# Patient Record
Sex: MALE | Race: WHITE | Employment: FULL TIME | URBAN - METROPOLITAN AREA
[De-identification: names, ages, dates, MRNs, and addresses within clinical notes are randomized per-mention and may not be internally consistent; named-entity substitution may affect disease eponyms.]

---

## 2021-12-01 ENCOUNTER — PATIENT OUTREACH (OUTPATIENT)
Dept: CASE MANAGEMENT | Age: 56
End: 2021-12-01

## 2021-12-01 ENCOUNTER — HOSPITAL ENCOUNTER (OUTPATIENT)
Dept: LAB | Age: 56
Discharge: HOME | End: 2021-12-01

## 2021-12-01 ENCOUNTER — DOCUMENTATION ONLY (OUTPATIENT)
Dept: HEMATOLOGY | Age: 56
End: 2021-12-01

## 2021-12-01 DIAGNOSIS — C83.38 DIFFUSE LARGE B-CELL LYMPHOMA OF LYMPH NODES OF MULTIPLE REGIONS (HCC): ICD-10-CM

## 2021-12-01 LAB
ALBUMIN SERPL-MCNC: 4 G/DL (ref 3.5–5)
ALBUMIN/GLOB SERPL: 1.3 {RATIO} (ref 1.2–3.5)
ALP SERPL-CCNC: 118 U/L (ref 50–136)
ALT SERPL-CCNC: 44 U/L (ref 12–65)
ANION GAP SERPL CALC-SCNC: 3 MMOL/L (ref 7–16)
AST SERPL-CCNC: 39 U/L (ref 15–37)
BASOPHILS # BLD: 0.1 K/UL (ref 0–0.2)
BASOPHILS NFR BLD: 1 % (ref 0–2)
BILIRUB SERPL-MCNC: 0.3 MG/DL (ref 0.2–1.1)
BUN SERPL-MCNC: 14 MG/DL (ref 6–23)
CALCIUM SERPL-MCNC: 9.4 MG/DL (ref 8.3–10.4)
CHLORIDE SERPL-SCNC: 107 MMOL/L (ref 98–107)
CO2 SERPL-SCNC: 32 MMOL/L (ref 21–32)
CREAT SERPL-MCNC: 0.7 MG/DL (ref 0.8–1.5)
DIFFERENTIAL METHOD BLD: ABNORMAL
EOSINOPHIL # BLD: 0.1 K/UL (ref 0–0.8)
EOSINOPHIL NFR BLD: 2 % (ref 0.5–7.8)
ERYTHROCYTE [DISTWIDTH] IN BLOOD BY AUTOMATED COUNT: 12.2 % (ref 11.9–14.6)
GLOBULIN SER CALC-MCNC: 3.2 G/DL (ref 2.3–3.5)
GLUCOSE SERPL-MCNC: 104 MG/DL (ref 65–100)
HAV IGM SER QL: NONREACTIVE
HBV CORE IGM SER QL: NONREACTIVE
HBV SURFACE AG SER QL: NONREACTIVE
HCT VFR BLD AUTO: 37.1 %
HCV AB SER QL: NONREACTIVE
HGB BLD-MCNC: 12.6 G/DL (ref 13.6–17.2)
HIV 1+2 AB+HIV1 P24 AG SERPL QL IA: NONREACTIVE
HIV12 RESULT COMMENT, HHIVC: ABNORMAL
IMM GRANULOCYTES # BLD AUTO: 0 K/UL (ref 0–0.5)
IMM GRANULOCYTES NFR BLD AUTO: 0 % (ref 0–5)
LDH SERPL L TO P-CCNC: 311 U/L (ref 100–190)
LYMPHOCYTES # BLD: 1 K/UL (ref 0.5–4.6)
LYMPHOCYTES NFR BLD: 20 % (ref 13–44)
MAGNESIUM SERPL-MCNC: 2.4 MG/DL (ref 1.8–2.4)
MCH RBC QN AUTO: 28.9 PG (ref 26.1–32.9)
MCHC RBC AUTO-ENTMCNC: 34 G/DL (ref 31.4–35)
MCV RBC AUTO: 85.1 FL (ref 79.6–97.8)
MONOCYTES # BLD: 0.5 K/UL (ref 0.1–1.3)
MONOCYTES NFR BLD: 10 % (ref 4–12)
NEUTS SEG # BLD: 3.3 K/UL (ref 1.7–8.2)
NEUTS SEG NFR BLD: 67 % (ref 43–78)
NRBC # BLD: 0 K/UL (ref 0–0.2)
PHOSPHATE SERPL-MCNC: 3.2 MG/DL (ref 2.5–4.5)
PLATELET # BLD AUTO: 248 K/UL (ref 150–450)
PMV BLD AUTO: 9.4 FL (ref 9.4–12.3)
POTASSIUM SERPL-SCNC: 4.2 MMOL/L (ref 3.5–5.1)
PROT SERPL-MCNC: 7.2 G/DL (ref 6.3–8.2)
RBC # BLD AUTO: 4.36 M/UL (ref 4.23–5.6)
SODIUM SERPL-SCNC: 142 MMOL/L (ref 136–145)
URATE SERPL-MCNC: 4 MG/DL (ref 2.6–6)
WBC # BLD AUTO: 4.9 K/UL (ref 4.3–11.1)

## 2021-12-01 PROCEDURE — 84550 ASSAY OF BLOOD/URIC ACID: CPT

## 2021-12-01 PROCEDURE — 85025 COMPLETE CBC W/AUTO DIFF WBC: CPT

## 2021-12-01 PROCEDURE — 83735 ASSAY OF MAGNESIUM: CPT

## 2021-12-01 PROCEDURE — 80053 COMPREHEN METABOLIC PANEL: CPT

## 2021-12-01 PROCEDURE — 84100 ASSAY OF PHOSPHORUS: CPT

## 2021-12-01 PROCEDURE — 87389 HIV-1 AG W/HIV-1&-2 AB AG IA: CPT

## 2021-12-01 PROCEDURE — 80074 ACUTE HEPATITIS PANEL: CPT

## 2021-12-01 PROCEDURE — 36415 COLL VENOUS BLD VENIPUNCTURE: CPT

## 2021-12-01 PROCEDURE — 82232 ASSAY OF BETA-2 PROTEIN: CPT

## 2021-12-01 PROCEDURE — 83615 LACTATE (LD) (LDH) ENZYME: CPT

## 2021-12-01 NOTE — PROGRESS NOTES
Met with Monik Austin to discuss insurance options for 2022. Patient was mailed different [id number] [employer] [place] insurance options to review. Patient will contact me if interested in enrolling into an insurance plan. Patient signed LPOA. Patient completed FAP application and it was turned in to registration.

## 2021-12-01 NOTE — PROGRESS NOTES
12/2 Pt came in for a new patient appt. We are going to get some blood work today. Dr. Stefany Tijerina would like to admit this pt next for a full work up with some scans then start some chemotherapy. After he has completed some chemo, we will then work on getting an ASCT done. Contacted financial to come and talk with pt regarding getting insurance so he can be covered for the transplant.

## 2021-12-03 ENCOUNTER — PATIENT OUTREACH (OUTPATIENT)
Dept: CASE MANAGEMENT | Age: 56
End: 2021-12-03

## 2021-12-03 LAB — B2 MICROGLOB SERPL-MCNC: 1.6 MG/L (ref 0.8–2.34)

## 2021-12-03 NOTE — PROGRESS NOTES
Tried calling pt 3 times to let him know when his direct admit to the hospital is scheduled for. 1st call he wasn't availiable, the last 2 calls pt hung up or forwarded it to his voicemail.

## 2021-12-06 ENCOUNTER — APPOINTMENT (OUTPATIENT)
Dept: CT IMAGING | Age: 56
DRG: 847 | End: 2021-12-06
Attending: NURSE PRACTITIONER

## 2021-12-06 ENCOUNTER — HOSPITAL ENCOUNTER (INPATIENT)
Age: 56
LOS: 4 days | Discharge: HOME | DRG: 847 | End: 2021-12-10
Attending: INTERNAL MEDICINE | Admitting: INTERNAL MEDICINE

## 2021-12-06 DIAGNOSIS — I34.0 NONRHEUMATIC MITRAL VALVE REGURGITATION: ICD-10-CM

## 2021-12-06 DIAGNOSIS — H49.02 3RD NERVE PALSY, PARTIAL, LEFT: ICD-10-CM

## 2021-12-06 DIAGNOSIS — Z51.11 ADMISSION FOR ANTINEOPLASTIC CHEMOTHERAPY: Primary | ICD-10-CM

## 2021-12-06 DIAGNOSIS — C83.31 DIFFUSE LARGE B-CELL LYMPHOMA OF LYMPH NODES OF NECK (HCC): ICD-10-CM

## 2021-12-06 DIAGNOSIS — C85.90 LYMPHOMA (HCC): ICD-10-CM

## 2021-12-06 DIAGNOSIS — I10 PRIMARY HYPERTENSION: ICD-10-CM

## 2021-12-06 LAB
ALBUMIN SERPL-MCNC: 4.3 G/DL (ref 3.5–5)
ALBUMIN/GLOB SERPL: 1.4 {RATIO} (ref 1.2–3.5)
ALP SERPL-CCNC: 120 U/L (ref 50–136)
ALT SERPL-CCNC: 33 U/L (ref 12–65)
ANION GAP SERPL CALC-SCNC: 3 MMOL/L (ref 7–16)
AST SERPL-CCNC: 28 U/L (ref 15–37)
BASOPHILS # BLD: 0.1 K/UL (ref 0–0.2)
BASOPHILS NFR BLD: 1 % (ref 0–2)
BILIRUB SERPL-MCNC: 0.4 MG/DL (ref 0.2–1.1)
BUN SERPL-MCNC: 14 MG/DL (ref 6–23)
CALCIUM SERPL-MCNC: 9.3 MG/DL (ref 8.3–10.4)
CHLORIDE SERPL-SCNC: 107 MMOL/L (ref 98–107)
CO2 SERPL-SCNC: 31 MMOL/L (ref 21–32)
CREAT SERPL-MCNC: 0.66 MG/DL (ref 0.8–1.5)
DIFFERENTIAL METHOD BLD: ABNORMAL
EOSINOPHIL # BLD: 0.1 K/UL (ref 0–0.8)
EOSINOPHIL NFR BLD: 2 % (ref 0.5–7.8)
ERYTHROCYTE [DISTWIDTH] IN BLOOD BY AUTOMATED COUNT: 12.1 % (ref 11.9–14.6)
EST. AVERAGE GLUCOSE BLD GHB EST-MCNC: 103 MG/DL
GLOBULIN SER CALC-MCNC: 3 G/DL (ref 2.3–3.5)
GLUCOSE SERPL-MCNC: 97 MG/DL (ref 65–100)
HBA1C MFR BLD: 5.2 % (ref 4.2–6.3)
HCT VFR BLD AUTO: 36.6 % (ref 41.1–50.3)
HGB BLD-MCNC: 12.4 G/DL (ref 13.6–17.2)
IMM GRANULOCYTES # BLD AUTO: 0 K/UL (ref 0–0.5)
IMM GRANULOCYTES NFR BLD AUTO: 0 % (ref 0–5)
LDH SERPL L TO P-CCNC: 327 U/L (ref 100–190)
LYMPHOCYTES # BLD: 1.2 K/UL (ref 0.5–4.6)
LYMPHOCYTES NFR BLD: 29 % (ref 13–44)
MAGNESIUM SERPL-MCNC: 2.4 MG/DL (ref 1.8–2.4)
MCH RBC QN AUTO: 28.6 PG (ref 26.1–32.9)
MCHC RBC AUTO-ENTMCNC: 33.9 G/DL (ref 31.4–35)
MCV RBC AUTO: 84.3 FL (ref 79.6–97.8)
MONOCYTES # BLD: 0.4 K/UL (ref 0.1–1.3)
MONOCYTES NFR BLD: 10 % (ref 4–12)
NEUTS SEG # BLD: 2.4 K/UL (ref 1.7–8.2)
NEUTS SEG NFR BLD: 58 % (ref 43–78)
NRBC # BLD: 0 K/UL (ref 0–0.2)
PLATELET # BLD AUTO: 247 K/UL (ref 150–450)
PMV BLD AUTO: 9.8 FL (ref 9.4–12.3)
POTASSIUM SERPL-SCNC: 3.8 MMOL/L (ref 3.5–5.1)
PROT SERPL-MCNC: 7.3 G/DL (ref 6.3–8.2)
RBC # BLD AUTO: 4.34 M/UL (ref 4.23–5.6)
SODIUM SERPL-SCNC: 141 MMOL/L (ref 136–145)
URATE SERPL-MCNC: 3.8 MG/DL (ref 2.6–6)
WBC # BLD AUTO: 4.1 K/UL (ref 4.3–11.1)

## 2021-12-06 PROCEDURE — 74011250636 HC RX REV CODE- 250/636: Performed by: NURSE PRACTITIONER

## 2021-12-06 PROCEDURE — 74011250637 HC RX REV CODE- 250/637: Performed by: INTERNAL MEDICINE

## 2021-12-06 PROCEDURE — 74011000258 HC RX REV CODE- 258: Performed by: INTERNAL MEDICINE

## 2021-12-06 PROCEDURE — 74011000636 HC RX REV CODE- 636: Performed by: INTERNAL MEDICINE

## 2021-12-06 PROCEDURE — 65270000029 HC RM PRIVATE

## 2021-12-06 PROCEDURE — APPSS60 APP SPLIT SHARED TIME 46-60 MINUTES: Performed by: NURSE PRACTITIONER

## 2021-12-06 PROCEDURE — 80053 COMPREHEN METABOLIC PANEL: CPT

## 2021-12-06 PROCEDURE — 83615 LACTATE (LD) (LDH) ENZYME: CPT

## 2021-12-06 PROCEDURE — 71260 CT THORAX DX C+: CPT

## 2021-12-06 PROCEDURE — 84550 ASSAY OF BLOOD/URIC ACID: CPT

## 2021-12-06 PROCEDURE — 83036 HEMOGLOBIN GLYCOSYLATED A1C: CPT

## 2021-12-06 PROCEDURE — 74011250637 HC RX REV CODE- 250/637: Performed by: NURSE PRACTITIONER

## 2021-12-06 PROCEDURE — 99223 1ST HOSP IP/OBS HIGH 75: CPT | Performed by: INTERNAL MEDICINE

## 2021-12-06 PROCEDURE — 83735 ASSAY OF MAGNESIUM: CPT

## 2021-12-06 PROCEDURE — 74011250636 HC RX REV CODE- 250/636: Performed by: INTERNAL MEDICINE

## 2021-12-06 PROCEDURE — 85025 COMPLETE CBC W/AUTO DIFF WBC: CPT

## 2021-12-06 PROCEDURE — 99221 1ST HOSP IP/OBS SF/LOW 40: CPT | Performed by: PSYCHIATRY & NEUROLOGY

## 2021-12-06 RX ORDER — HYDROCODONE BITARTRATE AND ACETAMINOPHEN 5; 325 MG/1; MG/1
1 TABLET ORAL
Status: DISCONTINUED | OUTPATIENT
Start: 2021-12-06 | End: 2021-12-10 | Stop reason: HOSPADM

## 2021-12-06 RX ORDER — ACYCLOVIR 800 MG/1
400 TABLET ORAL 2 TIMES DAILY
Status: DISCONTINUED | OUTPATIENT
Start: 2021-12-06 | End: 2021-12-10 | Stop reason: HOSPADM

## 2021-12-06 RX ORDER — HEPARIN 100 UNIT/ML
300 SYRINGE INTRAVENOUS AS NEEDED
Status: DISCONTINUED | OUTPATIENT
Start: 2021-12-06 | End: 2021-12-10 | Stop reason: HOSPADM

## 2021-12-06 RX ORDER — EPINEPHRINE 1 MG/ML
0.3 INJECTION, SOLUTION, CONCENTRATE INTRAVENOUS AS NEEDED
Status: CANCELLED | OUTPATIENT
Start: 2021-12-06

## 2021-12-06 RX ORDER — ACETAMINOPHEN 325 MG/1
650 TABLET ORAL AS NEEDED
Status: DISCONTINUED | OUTPATIENT
Start: 2021-12-06 | End: 2021-12-10 | Stop reason: HOSPADM

## 2021-12-06 RX ORDER — ONDANSETRON 2 MG/ML
4 INJECTION INTRAMUSCULAR; INTRAVENOUS
Status: DISCONTINUED | OUTPATIENT
Start: 2021-12-06 | End: 2021-12-10 | Stop reason: HOSPADM

## 2021-12-06 RX ORDER — DIPHENHYDRAMINE HYDROCHLORIDE 50 MG/ML
50 INJECTION, SOLUTION INTRAMUSCULAR; INTRAVENOUS AS NEEDED
Status: CANCELLED
Start: 2021-12-06

## 2021-12-06 RX ORDER — LISINOPRIL 5 MG/1
5 TABLET ORAL DAILY
Status: DISCONTINUED | OUTPATIENT
Start: 2021-12-06 | End: 2021-12-09

## 2021-12-06 RX ORDER — ONDANSETRON 2 MG/ML
8 INJECTION INTRAMUSCULAR; INTRAVENOUS AS NEEDED
Status: DISCONTINUED | OUTPATIENT
Start: 2021-12-06 | End: 2021-12-10 | Stop reason: HOSPADM

## 2021-12-06 RX ORDER — MORPHINE SULFATE 2 MG/ML
2 INJECTION, SOLUTION INTRAMUSCULAR; INTRAVENOUS
Status: DISCONTINUED | OUTPATIENT
Start: 2021-12-06 | End: 2021-12-10 | Stop reason: HOSPADM

## 2021-12-06 RX ORDER — ACETAMINOPHEN 325 MG/1
650 TABLET ORAL ONCE
Status: COMPLETED | OUTPATIENT
Start: 2021-12-06 | End: 2021-12-06

## 2021-12-06 RX ORDER — ALLOPURINOL 300 MG/1
300 TABLET ORAL DAILY
Status: DISCONTINUED | OUTPATIENT
Start: 2021-12-06 | End: 2021-12-10 | Stop reason: HOSPADM

## 2021-12-06 RX ORDER — SODIUM CHLORIDE 9 MG/ML
75 INJECTION, SOLUTION INTRAVENOUS CONTINUOUS
Status: DISCONTINUED | OUTPATIENT
Start: 2021-12-06 | End: 2021-12-10 | Stop reason: HOSPADM

## 2021-12-06 RX ORDER — HYDROCORTISONE SODIUM SUCCINATE 100 MG/2ML
100 INJECTION, POWDER, FOR SOLUTION INTRAMUSCULAR; INTRAVENOUS AS NEEDED
Status: CANCELLED | OUTPATIENT
Start: 2021-12-06

## 2021-12-06 RX ORDER — ALBUTEROL SULFATE 0.83 MG/ML
2.5 SOLUTION RESPIRATORY (INHALATION) AS NEEDED
Status: CANCELLED
Start: 2021-12-06

## 2021-12-06 RX ORDER — DIPHENHYDRAMINE HYDROCHLORIDE 50 MG/ML
50 INJECTION, SOLUTION INTRAMUSCULAR; INTRAVENOUS ONCE
Status: COMPLETED | OUTPATIENT
Start: 2021-12-06 | End: 2021-12-06

## 2021-12-06 RX ORDER — SODIUM CHLORIDE 0.9 % (FLUSH) 0.9 %
10 SYRINGE (ML) INJECTION
Status: COMPLETED | OUTPATIENT
Start: 2021-12-06 | End: 2021-12-06

## 2021-12-06 RX ORDER — ENOXAPARIN SODIUM 100 MG/ML
40 INJECTION SUBCUTANEOUS EVERY 24 HOURS
Status: DISCONTINUED | OUTPATIENT
Start: 2021-12-06 | End: 2021-12-10 | Stop reason: HOSPADM

## 2021-12-06 RX ORDER — FLUCONAZOLE 100 MG/1
200 TABLET ORAL DAILY
Status: DISCONTINUED | OUTPATIENT
Start: 2021-12-06 | End: 2021-12-10 | Stop reason: HOSPADM

## 2021-12-06 RX ORDER — DIPHENHYDRAMINE HYDROCHLORIDE 50 MG/ML
25 INJECTION, SOLUTION INTRAMUSCULAR; INTRAVENOUS AS NEEDED
Status: DISCONTINUED | OUTPATIENT
Start: 2021-12-06 | End: 2021-12-10 | Stop reason: HOSPADM

## 2021-12-06 RX ORDER — SODIUM CHLORIDE 0.9 % (FLUSH) 0.9 %
10 SYRINGE (ML) INJECTION AS NEEDED
Status: DISCONTINUED | OUTPATIENT
Start: 2021-12-06 | End: 2021-12-10 | Stop reason: HOSPADM

## 2021-12-06 RX ADMIN — ACYCLOVIR 400 MG: 800 TABLET ORAL at 15:38

## 2021-12-06 RX ADMIN — Medication 10 ML: at 21:25

## 2021-12-06 RX ADMIN — FLUCONAZOLE 200 MG: 100 TABLET ORAL at 15:38

## 2021-12-06 RX ADMIN — IOPAMIDOL 100 ML: 755 INJECTION, SOLUTION INTRAVENOUS at 18:32

## 2021-12-06 RX ADMIN — SODIUM CHLORIDE 75 ML/HR: 900 INJECTION, SOLUTION INTRAVENOUS at 13:02

## 2021-12-06 RX ADMIN — SODIUM CHLORIDE 100 ML: 9 INJECTION, SOLUTION INTRAVENOUS at 18:33

## 2021-12-06 RX ADMIN — ENOXAPARIN SODIUM 40 MG: 100 INJECTION SUBCUTANEOUS at 15:41

## 2021-12-06 RX ADMIN — DIPHENHYDRAMINE HYDROCHLORIDE 50 MG: 50 INJECTION, SOLUTION INTRAMUSCULAR; INTRAVENOUS at 20:10

## 2021-12-06 RX ADMIN — SODIUM CHLORIDE 784 MG: 900 INJECTION, SOLUTION INTRAVENOUS at 20:43

## 2021-12-06 RX ADMIN — ACETAMINOPHEN 650 MG: 325 TABLET, FILM COATED ORAL at 20:10

## 2021-12-06 RX ADMIN — Medication 10 ML: at 18:33

## 2021-12-06 RX ADMIN — ALLOPURINOL 300 MG: 300 TABLET ORAL at 15:38

## 2021-12-06 RX ADMIN — DIATRIZOATE MEGLUMINE AND DIATRIZOATE SODIUM 15 ML: 660; 100 LIQUID ORAL; RECTAL at 17:36

## 2021-12-06 NOTE — PROGRESS NOTES
12/06/21 1200   Dual Skin Pressure Injury Assessment   Dual Skin Pressure Injury Assessment WDL   Second Care Provider (Based on [address] [place] [address]) Cristobal Quintana RN    Skin Integumentary   Skin Integumentary (WDL) X    Pressure  Injury Documentation No Pressure Injury Noted-Pressure Ulcer Prevention Initiated   Skin Color Appropriate for ethnicity   Skin Condition/Temp Dry; Warm   Skin Integrity Intact; Scars (comment);  Tattoos (comment)   Turgor Non-tenting   Hair Growth Present

## 2021-12-06 NOTE — CONSULTS
Consult    Patient: Neva Mejia MRN: [id number]     YOB: 1965  Age: 64 y.o. Sex: male      Subjective:      Neva Mejia is a 64 y.o. male who is being seen for 3rd nerve palsy. The patient has a history of DLBCL and is followed by hematology/oncology. 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 His 3rd nerve palsy developed over a year ago. It has actually improved. He had an MRI of the brain which was unremarkable.   He has no other neurological symptoms aneurysm was ruled out with CT angiogram.    Past Medical History:   Diagnosis Date    Hypertension      Past Surgical History:   Procedure Laterality Date    HX BACK SURGERY      26 years ago    IR CHOLECYSTOSTOMY PERCUTANEOUS        Family History   Problem Relation Age of Onset    Diabetes Mother     Diabetes Father     Hypertension Brother      Social History     Tobacco Use    Smoking status: Never Smoker    Smokeless tobacco: Never Used   Substance Use Topics    Alcohol use: Not Currently      Current Facility-Administered Medications   Medication Dose Route Frequency Provider Last Rate Last Admin    lisinopriL (PRINIVIL, ZESTRIL) tablet 5 mg  5 mg Oral DAILY Monico Nageotte, NP        0.9% sodium chloride infusion  75 mL/hr IntraVENous CONTINUOUS Wyatt Mcdermotte NP 75 mL/hr at 12/06/21 1302 75 mL/hr at 12/06/21 1302    enoxaparin (LOVENOX) injection 40 mg  40 mg SubCUTAneous Q24H Monico Nageotte, NP        ondansetron [facility]) injection 4 mg  4 mg IntraVENous Q4H PRN Monico Nageotte, NP        prochlorperazine (COMPAZINE) with saline injection 5 mg  5 mg IntraVENous Q6H PRN Monico Nageotte, NP        HYDROcodone-acetaminophen (NORCO) 5-325 mg per tablet 1 Tablet  1 Tablet Oral Q6H PRN Wyatt Nageotte, NP        morphine injection 2 mg  2 mg IntraVENous Q4H PRN Wyatt Nageotte, NP        allopurinoL (ZYLOPRIM) tablet 300 mg  300 mg Oral DAILY Wyatt Nageotte, NP        acyclovir (ZOVIRAX) tablet 400 mg  400 mg Oral BID Ellyn Saunders NP        fluconazole (DIFLUCAN) tablet 200 mg  200 mg Oral DAILY Ellyn Saunders NP        acetaminophen (TYLENOL) tablet 650 mg  650 mg Oral ONCE Ezequiel Garcia MD        diphenhydrAMINE (BENADRYL) injection 50 mg  50 mg IntraVENous ONCE Ezequiel Garcia MD        riTUXimab-pvvr (RUXIENCE) 784 mg in 0.9% sodium chloride 784 mL infusion  375 mg/m2 (Treatment Plan Recorded) IntraVENous ONCE Jacki Cash MD            No Known Allergies    Review of Systems:  CONSTITUTIONAL: No weight loss, fever, chills, weakness or fatigue. HEENT: Eyes: No visual loss, blurred vision, double vision or yellow sclerae. Ears, Nose, Throat: No hearing loss, sneezing, congestion, runny nose or sore throat. SKIN: No rash or itching. CARDIOVASCULAR: No chest pain, chest pressure or chest discomfort. No palpitations or edema. RESPIRATORY: No shortness of breath, cough or sputum. GASTROINTESTINAL: No anorexia, nausea, vomiting or diarrhea. No abdominal pain or blood. GENITOURINARY: no burning with urination. NEUROLOGICAL: No headache, dizziness, syncope, paralysis, ataxia, numbness or tingling in the extremities. No change in bowel or bladder control. MUSCULOSKELETAL: No muscle, back pain, joint pain or stiffness. HEMATOLOGIC: No anemia, bleeding or bruising. LYMPHATICS: No enlarged nodes. No history of splenectomy. PSYCHIATRIC: No history of depression or anxiety. ENDOCRINOLOGIC: No reports of sweating, cold or heat intolerance. No polyuria or polydipsia. ALLERGIES: No history of asthma, hives, eczema or rhinitis. Objective:     Vitals:    12/06/21 1127   BP: (!) 172/79   Pulse: 76   Resp: 20   Temp: 97.4 °F (36.3 °C)   SpO2: 97%   Weight: 187 lb 6.4 oz (85 kg)        Physical Exam:  General - Well developed, well nourished, in no apparent distress. Pleasant and conversent. HEENT - Normocephalic, atraumatic. Conjunctiva, tympanic membranes, and oropharynx are clear.    Neck - Supple without masses, no bruits   Cardiovascular - Regular rate and rhythm. Normal S1, S2 without murmurs, rubs, or gallops. Lungs - Clear to auscultation. Abdomen - Soft, nontender with normal bowel sounds. Extremities - Peripheral pulses intact. No edema and no rashes. Neurological examination - Comprehension, attention , memory and reasoning are intact. Language and speech are normal. On cranial nerve examination pupils are equal round and reactive to light. Fundoscopic examination is normal. Visual acuity is adequate. Visual fields are full to finger confrontation. Partial 3rd nerve palsy on the left. Face is symmetric and sensation is intact to light touch. Hearing is intact to finger rustle bilaterally. Motor examination - There is normal muscle tone and bulk. Power is full throughout. Muscle stretch reflexes are normoactive and there are no pathological reflexes present. Sensation is intact to light touch, pinprick, vibration and proprioception in all extremities. Cerebellar examination is normal. Gait and stance are normal.             Assessment:     59-year-old man with DLBCL. He has a 3rd nerve palsy on the left which developed over a year ago and has been improving. He has seen neurology for this in the past.  It is thought to be unrelated to his cancer diagnosis. It may well be unrelated. Spread of cancer to the meninges can cause cranial nerve palsies, but the fact that it is improving is reassuring. Plan:     No further neurological work-up necessary.     Signed By: Luma Herman DO     December 6, 2021

## 2021-12-06 NOTE — PROGRESS NOTES
Received call from Dr. Marlene Maldonado, final path report received. OK to proceed with treatment. Pharmacist notified.       Mayela Hinson NP  [facility]ology [facility]

## 2021-12-06 NOTE — H&P
[facility] Hematology & Oncology        Inpatient Hematology / Oncology History and Physical    Reason for Admission:  Lymphoma     History of Present Illness:  Mr. Marily Hernandez is a 64 y.o. male admitted on 12/6/2021 with relapsed DLBCL. He is a patient of Dr. Jaswant Tim with relapsed DLBCL. 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 He is s/p R-CHOP from April to July 2021. In November 2021, CT CAP showed disease relapse with new L supraclavicular mass as well as stable axillary and chest wall LNs and shotty RP LNs. He is s/p L supraclavicular LN bx +DLBCL. He is being admitted for C1 R-ICE with IT MTX. Heme History (copied):    14-year-old  male, history of herniated lumbar disc & back surgery, cholecystectomy, now kindly referred to us by Dr. Brink [address], for relapsed DLBCL. His hematologic history is as follows:     - 11/20: Presented with inability to keep left eye open and headaches, was seen by ophthalmology and diagnosed with 3rd cranial nerve palsy. Per records, brain imaging including MRI wo contrast 11/12/20 with no acute changes. CT head without contrast 11/12/20 unremarkable, CT angiogram with contrast 11/12/20 without acute findings. He was also seen by neurology Dr. Dalila Calle. Headaches slowly resolved over time. - 03/21: On a follow-up visit, noted 30 pound weight loss. He was also found to have enlarged mass in left upper chest with diffusely palpable adenopathy in cervical area as well as bulky bilateral axillary adenopathy. Per patient, swelling developed over a 2-week, and was nontender. Per patient reports being told that his lymphoma diagnosis was seperate from his 3rd cranial nerve palsy.  - CT neck/chest 3/22/2021 showed extensive adenopathy including left pectoralis lymph node mass measuring 7 x 3.7 cm. Extensive bilateral axillary adenopathy measuring up to 6.8 x 11.4 cm.   Extensive mediastinal adenopathy noted, including right infrahilar region mass measuring 4.6 x 5.3 cm. Enlarged right adrenal gland measuring 4.1 x 4 cm. Extensive adenopathy in the reggie hepatis measuring 5 x 4.4 cm. PET scan could not be performed due to insurance issues. Patient seen by hematology Dr. Zachery Duque. Core needle biopsy of left chest wall mass showed high-grade B-cell malignancy with FISH testing revealing rearrangement of BCL 6/3 q. and additional signals for BCL-2/18 q. Significantly no evidence of MYC rearrangement noted. Bone marrow biopsy without lymphoma involvement. 2D echo with normal EF. LDH elevated at 311. Hepatitis B negative. He was diagnosed with DLBCL, stage IIIb, IPI score 2.  - R-CHOP x6 (from 4/6/2021 till 7/28/2021. PET scan 8/13/2021 with essentially CR and minimal FDG uptake in the right axillary lymph node with FDG 1.2.  -11/16/2021 CT CAP with disease relapse including new left supraclavicular mass measuring 5.5 x 3.4 cm. Stable axillary and chest wall lymph nodes as well as shotty retroperitoneal lymph nodes. Ultrasound-guided left supraclavicular lymph node biopsy 11/22/2021 showing monoclonal B-cell population with increased cell size, results consistent with mature B-cell lymphoproliferative disorder. Patient now referred to us for further work-up and management. Review of Systems:  Constitutional Denies fever, chills, weight loss, appetite changes, fatigue, night sweats. HEENT Denies trauma, blurry vision, hearing loss, ear pain, nosebleeds, sore throat, neck pain and ear discharge. Skin Denies lesions or rashes. Lungs Denies dyspnea, cough, sputum production or hemoptysis. Cardiovascular Denies chest pain, palpitations, or lower extremity edema. Gastrointestinal Denies nausea, vomiting, changes in bowel habits, bloody or black stools, abdominal pain.  Denies dysuria, frequency or hesitancy of urination. Neuro +3rd cranial nerve palsy. Denies headaches, visual changes or ataxia.  Denies dizziness, tingling, tremors, sensory change, speech change, focal weakness or headaches. Hematology Denies easy bruising or bleeding, denies gingival bleeding or epistaxis. Endo Denies heat/cold intolerance, denies diabetes or thyroid abnormalities. MSK Denies back pain, arthralgias, myalgias or frequent falls. Psychiatric/Behavioral Denies depression and substance abuse. The patient is not nervous/anxious. No Known Allergies  Past Medical History:   Diagnosis Date    Hypertension      Past Surgical History:   Procedure Laterality Date    HX BACK SURGERY      26 years ago    IR CHOLECYSTOSTOMY PERCUTANEOUS       Family History   Problem Relation Age of Onset    Diabetes Mother     Diabetes Father     Hypertension Brother      Social History     Socioeconomic History    Marital status:      Spouse name: Not on file    Number of children: Not on file    Years of education: Not on file    Highest education level: Not on file   Occupational History    Not on file   Tobacco Use    Smoking status: Never Smoker    Smokeless tobacco: Never Used   Vaping Use    Vaping Use: Never used   Substance and Sexual Activity    Alcohol use: Not Currently    Drug use: Not Currently    Sexual activity: Not on file   Other Topics Concern    Not on file   Social History Narrative    Not on file     Social Determinants of Health     Financial Resource Strain:     Difficulty of Paying Living Expenses: Not on file   Food Insecurity:     Worried About Running Out of Food in the Last Year: Not on file    Sayra of Food in the Last Year: Not on file   Transportation Needs:     Lack of Transportation (Medical): Not on file    Lack of Transportation (Non-Medical):  Not on file   Physical Activity:     Days of Exercise per Week: Not on file    Minutes of Exercise per Session: Not on file   Stress:     Feeling of Stress : Not on file   Social Connections:     Frequency of Communication with Friends and Family: Not on file    Frequency of Social Gatherings with Friends and Family: Not on file    Attends Taoist Services: Not on file    Active Member of Clubs or Organizations: Not on file    Attends Club or Organization Meetings: Not on file    Marital Status: Not on file   Intimate Partner Violence:     Fear of Current or Ex-Partner: Not on file    Emotionally Abused: Not on file    Physically Abused: Not on file    Sexually Abused: Not on file   Housing Stability:     Unable to Pay for Housing in the Last Year: Not on file    Number of [place] in the Last Year: Not on file    Unstable Housing in the Last Year: Not on file     Current Outpatient Medications   Medication Sig Dispense Refill    lisinopriL (PRINIVIL, ZESTRIL) 5 mg tablet Take  by mouth daily. OBJECTIVE:  No data found. No data recorded. No intake/output data recorded. Physical Exam:  Constitutional: Well developed, well nourished male in no acute distress, sitting comfortably in the bedside chair. HEENT: Normocephalic and atraumatic. Oropharynx is clear, mucous membranes are moist.  +L eye palsy. Sclerae anicteric. Neck supple without JVD. No thyromegaly present. Skin Warm and dry. No bruising and no rash noted. No erythema. No pallor. Respiratory Lungs are clear to auscultation bilaterally without wheezes, rales or rhonchi, normal air exchange without accessory muscle use. CVS Normal rate, regular rhythm and normal S1 and S2. No murmurs, gallops, or rubs. Abdomen Soft, nontender and nondistended, normoactive bowel sounds. No palpable mass. No hepatosplenomegaly. Neuro Grossly nonfocal with no obvious sensory or motor deficits. MSK Normal range of motion in general.  No edema and no tenderness. Psych Appropriate mood and affect. Labs:    No results found for this or any previous visit (from the past 24 hour(s)).     Imaging:  pending    ASSESSMENT:        PLAN:  Relapsed DLBCL  - s/p R-CHOP April - July 2021  - In 11/2021, new supraclavicular mass bx +relapsed DLBCL  - Admit for R-ICE with IT MTX. IR consulted for LP/IT chemo. - Echo and CT CAP ordered    3rd cranial nerve palsy  - Check MRI brain  - Neuro consult    Continue home meds  Ppx meds: Acyclovir, Diflucan  Rhea SOPs  Lovenox for DVT ppx (hold for plt <50k)    Goals and plan of care reviewed with the patient. All questions answered to the best of our ability. Disposition:  Anticipate discharge home after the completion of chemotherapy.               David Perez NP   [facility] Hematology [facility] On[facility]  [id number] [place] [address]  Office : [phone]  Fax : [phone]

## 2021-12-07 ENCOUNTER — APPOINTMENT (OUTPATIENT)
Dept: NON INVASIVE DIAGNOSTICS | Age: 56
DRG: 847 | End: 2021-12-07
Attending: NURSE PRACTITIONER

## 2021-12-07 ENCOUNTER — APPOINTMENT (OUTPATIENT)
Dept: MRI IMAGING | Age: 56
DRG: 847 | End: 2021-12-07
Attending: NURSE PRACTITIONER

## 2021-12-07 ENCOUNTER — DOCUMENTATION ONLY (OUTPATIENT)
Dept: HEMATOLOGY | Age: 56
End: 2021-12-07

## 2021-12-07 LAB
ALBUMIN SERPL-MCNC: 3.6 G/DL (ref 3.5–5)
ALBUMIN/GLOB SERPL: 1.4 {RATIO} (ref 1.2–3.5)
ALP SERPL-CCNC: 99 U/L (ref 50–136)
ALT SERPL-CCNC: 31 U/L (ref 12–65)
ANION GAP SERPL CALC-SCNC: 4 MMOL/L (ref 7–16)
AST SERPL-CCNC: 22 U/L (ref 15–37)
BASOPHILS # BLD: 0.1 K/UL (ref 0–0.2)
BASOPHILS NFR BLD: 2 % (ref 0–2)
BILIRUB SERPL-MCNC: 0.3 MG/DL (ref 0.2–1.1)
BUN SERPL-MCNC: 14 MG/DL (ref 6–23)
CALCIUM SERPL-MCNC: 8.7 MG/DL (ref 8.3–10.4)
CHLORIDE SERPL-SCNC: 109 MMOL/L (ref 98–107)
CO2 SERPL-SCNC: 30 MMOL/L (ref 21–32)
CREAT SERPL-MCNC: 0.68 MG/DL (ref 0.8–1.5)
DIFFERENTIAL METHOD BLD: ABNORMAL
ECHO AV AREA PEAK VELOCITY: 2.15 CM2
ECHO AV AREA VTI: 2.28 CM2
ECHO AV AREA/BSA PEAK VELOCITY: 1 CM2/M2
ECHO AV AREA/BSA VTI: 1.1 CM2/M2
ECHO AV CUSP MM: 2.3 CM
ECHO AV MEAN GRADIENT: 3 MMHG
ECHO AV PEAK GRADIENT: 8 MMHG
ECHO AV PEAK VELOCITY: 145 CM/S
ECHO AV VTI: 28.7 CM
ECHO EST RA PRESSURE: 3 MMHG
ECHO LA AREA 2C: 27.9 CM2
ECHO LA AREA 4C: 18.2 CM2
ECHO LA MAJOR AXIS: 5.5 CM
ECHO LA MINOR AXIS: 2.66 CM
ECHO LV E' LATERAL VELOCITY: 12 CM/S
ECHO LV E' SEPTAL VELOCITY: 8.87 CM/S
ECHO LV EDV A4C: 66 CM3
ECHO LV ESV A4C: 27 CM3
ECHO LV INTERNAL DIMENSION DIASTOLIC: 5.21 CM (ref 4.2–5.9)
ECHO LV INTERNAL DIMENSION SYSTOLIC: 3.81 CM
ECHO LV IVSD: 1.06 CM (ref 0.6–1)
ECHO LV MASS 2D: 218.7 G (ref 88–224)
ECHO LV MASS INDEX 2D: 105.7 G/M2 (ref 49–115)
ECHO LV POSTERIOR WALL DIASTOLIC: 1.12 CM (ref 0.6–1)
ECHO LVOT DIAM: 2.1 CM
ECHO LVOT PEAK GRADIENT: 3 MMHG
ECHO LVOT VTI: 18.9 CM
ECHO MV A VELOCITY: 74.5 CM/S
ECHO MV E DECELERATION TIME (DT): 158 MS
ECHO MV E VELOCITY: 125 CM/S
ECHO MV E/A RATIO: 1.68
ECHO MV E/E' LATERAL: 10.42
ECHO MV E/E' RATIO (AVERAGED): 12.25
ECHO MV E/E' SEPTAL: 14.09
ECHO MV MEAN GRADIENT: 3 MMHG
ECHO MV REGURGITANT VTIA: 207 CM
ECHO MV VTI: 28.7 CM
ECHO RIGHT VENTRICULAR SYSTOLIC PRESSURE (RVSP): 24 MMHG
ECHO RV INTERNAL DIMENSION: 2.85 CM
ECHO RV TAPSE: 3.4 CM (ref 1.5–2)
ECHO TV REGURGITANT MAX VELOCITY: 230 CM/S
ECHO TV REGURGITANT PEAK GRADIENT: 21 MMHG
EOSINOPHIL # BLD: 0.2 K/UL (ref 0–0.8)
EOSINOPHIL NFR BLD: 5 % (ref 0.5–7.8)
ERYTHROCYTE [DISTWIDTH] IN BLOOD BY AUTOMATED COUNT: 12.3 % (ref 11.9–14.6)
GLOBULIN SER CALC-MCNC: 2.6 G/DL (ref 2.3–3.5)
GLUCOSE SERPL-MCNC: 89 MG/DL (ref 65–100)
HCT VFR BLD AUTO: 33.8 % (ref 41.1–50.3)
HGB BLD-MCNC: 11.4 G/DL (ref 13.6–17.2)
IMM GRANULOCYTES # BLD AUTO: 0 K/UL (ref 0–0.5)
IMM GRANULOCYTES NFR BLD AUTO: 0 % (ref 0–5)
LDH SERPL L TO P-CCNC: 264 U/L (ref 100–190)
LYMPHOCYTES # BLD: 1.1 K/UL (ref 0.5–4.6)
LYMPHOCYTES NFR BLD: 28 % (ref 13–44)
MAGNESIUM SERPL-MCNC: 2.2 MG/DL (ref 1.8–2.4)
MCH RBC QN AUTO: 28.9 PG (ref 26.1–32.9)
MCHC RBC AUTO-ENTMCNC: 33.7 G/DL (ref 31.4–35)
MCV RBC AUTO: 85.6 FL (ref 79.6–97.8)
MONOCYTES # BLD: 0.5 K/UL (ref 0.1–1.3)
MONOCYTES NFR BLD: 12 % (ref 4–12)
NEUTS SEG # BLD: 2.1 K/UL (ref 1.7–8.2)
NEUTS SEG NFR BLD: 53 % (ref 43–78)
NRBC # BLD: 0 K/UL (ref 0–0.2)
PLATELET # BLD AUTO: 219 K/UL (ref 150–450)
PMV BLD AUTO: 9.9 FL (ref 9.4–12.3)
POTASSIUM SERPL-SCNC: 3.7 MMOL/L (ref 3.5–5.1)
PROT SERPL-MCNC: 6.2 G/DL (ref 6.3–8.2)
RBC # BLD AUTO: 3.95 M/UL (ref 4.23–5.6)
SODIUM SERPL-SCNC: 143 MMOL/L (ref 136–145)
URATE SERPL-MCNC: 3.7 MG/DL (ref 2.6–6)
WBC # BLD AUTO: 3.9 K/UL (ref 4.3–11.1)

## 2021-12-07 PROCEDURE — 85025 COMPLETE CBC W/AUTO DIFF WBC: CPT

## 2021-12-07 PROCEDURE — 74011250636 HC RX REV CODE- 250/636: Performed by: NURSE PRACTITIONER

## 2021-12-07 PROCEDURE — 74011250636 HC RX REV CODE- 250/636: Performed by: INTERNAL MEDICINE

## 2021-12-07 PROCEDURE — 80053 COMPREHEN METABOLIC PANEL: CPT

## 2021-12-07 PROCEDURE — 84550 ASSAY OF BLOOD/URIC ACID: CPT

## 2021-12-07 PROCEDURE — 99232 SBSQ HOSP IP/OBS MODERATE 35: CPT | Performed by: INTERNAL MEDICINE

## 2021-12-07 PROCEDURE — 65270000029 HC RM PRIVATE

## 2021-12-07 PROCEDURE — 93306 TTE W/DOPPLER COMPLETE: CPT

## 2021-12-07 PROCEDURE — 83735 ASSAY OF MAGNESIUM: CPT

## 2021-12-07 PROCEDURE — APPSS30 APP SPLIT SHARED TIME 16-30 MINUTES: Performed by: NURSE PRACTITIONER

## 2021-12-07 PROCEDURE — A9576 INJ PROHANCE MULTIPACK: HCPCS | Performed by: INTERNAL MEDICINE

## 2021-12-07 PROCEDURE — 36591 DRAW BLOOD OFF VENOUS DEVICE: CPT

## 2021-12-07 PROCEDURE — 70553 MRI BRAIN STEM W/O & W/DYE: CPT

## 2021-12-07 PROCEDURE — 83615 LACTATE (LD) (LDH) ENZYME: CPT

## 2021-12-07 PROCEDURE — 74011250637 HC RX REV CODE- 250/637: Performed by: NURSE PRACTITIONER

## 2021-12-07 RX ORDER — DIPHENHYDRAMINE HYDROCHLORIDE 50 MG/ML
25 INJECTION, SOLUTION INTRAMUSCULAR; INTRAVENOUS
Status: DISCONTINUED | OUTPATIENT
Start: 2021-12-07 | End: 2021-12-10 | Stop reason: HOSPADM

## 2021-12-07 RX ORDER — SODIUM CHLORIDE 0.9 % (FLUSH) 0.9 %
10 SYRINGE (ML) INJECTION
Status: COMPLETED | OUTPATIENT
Start: 2021-12-07 | End: 2021-12-07

## 2021-12-07 RX ORDER — ONDANSETRON 2 MG/ML
8 INJECTION INTRAMUSCULAR; INTRAVENOUS EVERY 8 HOURS
Status: COMPLETED | OUTPATIENT
Start: 2021-12-07 | End: 2021-12-10

## 2021-12-07 RX ORDER — DEXAMETHASONE SODIUM PHOSPHATE 100 MG/10ML
10 INJECTION INTRAMUSCULAR; INTRAVENOUS EVERY 24 HOURS
Status: COMPLETED | OUTPATIENT
Start: 2021-12-07 | End: 2021-12-09

## 2021-12-07 RX ORDER — LORAZEPAM 2 MG/ML
0.5 INJECTION INTRAMUSCULAR
Status: DISCONTINUED | OUTPATIENT
Start: 2021-12-07 | End: 2021-12-10 | Stop reason: HOSPADM

## 2021-12-07 RX ADMIN — SODIUM CHLORIDE 75 ML/HR: 900 INJECTION, SOLUTION INTRAVENOUS at 11:49

## 2021-12-07 RX ADMIN — LISINOPRIL 5 MG: 5 TABLET ORAL at 09:27

## 2021-12-07 RX ADMIN — GADOTERIDOL 17 ML: 279.3 INJECTION, SOLUTION INTRAVENOUS at 08:51

## 2021-12-07 RX ADMIN — FLUCONAZOLE 200 MG: 100 TABLET ORAL at 09:27

## 2021-12-07 RX ADMIN — ONDANSETRON 8 MG: 2 INJECTION INTRAMUSCULAR; INTRAVENOUS at 18:41

## 2021-12-07 RX ADMIN — ACYCLOVIR 400 MG: 800 TABLET ORAL at 17:43

## 2021-12-07 RX ADMIN — Medication 10 ML: at 08:52

## 2021-12-07 RX ADMIN — ACYCLOVIR 400 MG: 800 TABLET ORAL at 09:28

## 2021-12-07 RX ADMIN — SODIUM CHLORIDE 75 ML/HR: 900 INJECTION, SOLUTION INTRAVENOUS at 02:14

## 2021-12-07 RX ADMIN — ETOPOSIDE 200 MG: 20 INJECTION INTRAVENOUS at 18:54

## 2021-12-07 RX ADMIN — DEXAMETHASONE SODIUM PHOSPHATE 10 MG: 10 INJECTION INTRAMUSCULAR; INTRAVENOUS at 18:39

## 2021-12-07 RX ADMIN — ENOXAPARIN SODIUM 40 MG: 100 INJECTION SUBCUTANEOUS at 11:45

## 2021-12-07 RX ADMIN — ALLOPURINOL 300 MG: 300 TABLET ORAL at 09:28

## 2021-12-07 NOTE — PROGRESS NOTES
Kasia Figueredobbles has been enrolled into the [address] [place] [address] with an effective date of 1/1/2022 if the [employer] accepts the patient's application.

## 2021-12-07 NOTE — PROGRESS NOTES
END OF SHIFT NOTE:    Intake/Output  No intake/output data recorded. Voiding: YES  Catheter: NO  Drain:              Stool:  0 occurrences. Emesis:  0 occurrences. VITAL SIGNS  Patient Vitals for the past 12 hrs:   Temp Pulse Resp BP SpO2   12/06/21 1522 97.6 °F (36.4 °C) (!) 59 20 (!) 162/77 100 %   12/06/21 1127 97.4 °F (36.3 °C) 76 20 (!) 172/79 97 %       Pain Assessment  Pain 1  Pain Scale 1: Numeric (0 - 10) (12/06/21 1325)  Pain Intensity 1: 0 (12/06/21 1325)  Patient Stated Pain Goal: 0 (12/06/21 1325)  Pain Reassessment 1: Patient resting w/respiratory rate greater than 10 (12/06/21 1200)    Ambulating  Yes    Additional Information: CT done right before shift change. Chemo teaching and consent sign     Shift report given to oncoming nurse at the bedside.     Marcos Reich RN

## 2021-12-07 NOTE — PROGRESS NOTES
[facility] Hematology & Oncology        Inpatient Hematology / Oncology Progress Note    Reason for Admission:  Admission for antineoplastic chemotherapy [Z51.11]    24 Hour Events:  Afebrile, hypertensive at times  Day 2 RICE  Awaiting LP/IT MTX    Transfusions: None  Replacements: None    ROS:  Constitutional: Negative for fever, chills, weakness, malaise, fatigue. CV: Negative for chest pain, palpitations, edema. Respiratory: Negative for dyspnea, cough, wheezing. GI: Negative for nausea, abdominal pain, diarrhea. 10 point review of systems is otherwise negative with the exception of the elements mentioned above in the HPI. No Known Allergies  Past Medical History:   Diagnosis Date    Hypertension      Past Surgical History:   Procedure Laterality Date    HX BACK SURGERY      26 years ago    IR CHOLECYSTOSTOMY PERCUTANEOUS       Family History   Problem Relation Age of Onset    Diabetes Mother     Diabetes Father     Hypertension Brother      Social History     Socioeconomic History    Marital status:      Spouse name: Not on file    Number of children: Not on file    Years of education: Not on file    Highest education level: Not on file   Occupational History    Not on file   Tobacco Use    Smoking status: Never Smoker    Smokeless tobacco: Never Used   Vaping Use    Vaping Use: Never used   Substance and Sexual Activity    Alcohol use: Not Currently    Drug use: Not Currently    Sexual activity: Not on file   Other Topics Concern    Not on file   Social History Narrative    Not on file     Social Determinants of Health     Financial Resource Strain:     Difficulty of Paying Living Expenses: Not on file   Food Insecurity:     Worried About Running Out of Food in the Last Year: Not on file    Sayra of Food in the Last Year: Not on file   Transportation Needs:     Lack of Transportation (Medical): Not on file    Lack of Transportation (Non-Medical):  Not on file Physical Activity:     Days of Exercise per Week: Not on file    Minutes of Exercise per Session: Not on file   Stress:     Feeling of Stress : Not on file   Social Connections:     Frequency of Communication with Friends and Family: Not on file    Frequency of Social Gatherings with Friends and Family: Not on file    Attends Sabianist Services: Not on file    Active Member of [address] [place] [address] or Organizations: Not on file    Attends Club or Organization Meetings: Not on file    Marital Status: Not on file   Intimate Partner Violence:     Fear of Current or Ex-Partner: Not on file    Emotionally Abused: Not on file    Physically Abused: Not on file    Sexually Abused: Not on file   Housing Stability:     Unable to Pay for Housing in the Last Year: Not on file    Number of [place] in the Last Year: Not on file    Unstable Housing in the Last Year: Not on file     Current Facility-Administered Medications   Medication Dose Route Frequency Provider Last Rate Last Admin    [START ON 12/8/2021] fosaprepitant (EMEND) 150 mg in 0.9% sodium chloride 150 mL IVPB  150 mg IntraVENous ONCE Kishore Barros MD        dexamethasone (DECADRON) 10 mg/mL injection 10 mg  10 mg IntraVENous Q24H Kishore Barros MD        ondansetron [facility]) injection 8 mg  8 mg IntraVENous Q8H Kishore Barros MD        prochlorperazine (COMPAZINE) with saline injection 10 mg  10 mg IntraVENous Q6H PRN Kishore Barros MD        diphenhydrAMINE (BENADRYL) injection 25 mg  25 mg IntraVENous Q6H PRN Kishore Barros MD        LORazepam (ATIVAN) injection 0.5 mg  0.5 mg IntraVENous Q6H PRN Kishore Barros MD        etoposide (VEPESID) 200 mg in 0.9% sodium chloride 500 mL chemo infusion  200 mg IntraVENous Q24H Kishore Barros MD        [START ON 12/8/2021] CARBOplatin (PARAPLATIN) 750 mg in 0.9% sodium chloride 250 mL chemo infusion  750 mg IntraVENous ONCE Kishore Barros MD        [START ON 12/8/2021] ifosfamide (IFEX) 10,000 mg, mesna (MESNEX) 10,000 mg in 0.9% sodium chloride 1,000 mL chemo infusion  10,000 mg IntraVENous CONTINUOUS Vernell Ríos MD        lisinopriL (PRINIVIL, ZESTRIL) tablet 5 mg  5 mg Oral DAILY Bryan Fried, NP   5 mg at 12/07/21 2380    0.9% sodium chloride infusion  75 mL/hr IntraVENous CONTINUOUS Bryan Fried, NP 75 mL/hr at 12/07/21 1149 75 mL/hr at 12/07/21 1149    enoxaparin (LOVENOX) injection 40 mg  40 mg SubCUTAneous Q24H Bryan Fried, NP   40 mg at 12/07/21 1145    ondansetron (ZOFRAN) injection 4 mg  4 mg IntraVENous Q4H PRN Bryan Fried, NP        prochlorperazine (COMPAZINE) with saline injection 5 mg  5 mg IntraVENous Q6H PRN Bryan Fried, NP        HYDROcodone-acetaminophen (NORCO) 5-325 mg per tablet 1 Tablet  1 Tablet Oral Q6H PRN Bryan Fried, NP        morphine injection 2 mg  2 mg IntraVENous Q4H PRN Bryan Fried, NP        allopurinoL (ZYLOPRIM) tablet 300 mg  300 mg Oral DAILY Bryan Fried, NP   300 mg at 12/07/21 9259    acyclovir (ZOVIRAX) tablet 400 mg  400 mg Oral BID Bryan Fried, NP   400 mg at 12/07/21 2247    fluconazole (DIFLUCAN) tablet 200 mg  200 mg Oral DAILY Bryan Fried, NP   200 mg at 12/07/21 4991    sodium chloride 0.9 % bolus infusion 500 mL  500 mL IntraVENous ONCE PRN Vernell Ríos MD        diphenhydrAMINE (BENADRYL) injection 25 mg  25 mg IntraVENous PRN Vernell Ríos MD        acetaminophen (TYLENOL) tablet 650 mg  650 mg Oral PRN Vernell Ríos MD        meperidine (DEMEROL) injection 25 mg  25 mg IntraVENous PRN Vernell Ríos MD        [facility]ans[facility]ron [facility]) injection 8 mg  8 mg IntraVENous PRN Vernell Ríos MD        heparin (porcine) pf 300 Units  300 Units InterCATHeter PRN Vernell Ríos MD        central line flush (saline) syringe 10 mL  10 mL InterCATHeter PRN Vernell Ríos MD   10 mL at 12/06/21 2125       OBJECTIVE:  Patient Vitals for the past 8 hrs:   BP Temp Pulse Resp SpO2 Height Weight   12/07/21 1109 138/80     6' (1.829 m) 187 lb (84.8 kg)   21 0805 (!) 144/78 97.6 °F (36.4 °C) 76 18 97 %       Temp (24hrs), Av.6 °F (36.4 °C), Min:97.3 °F (36.3 °C), Max:98 °F (36.7 °C)    701 -  1900  In: 320 [I.V.:320]  Out: -     Physical Exam:  Constitutional: Well developed, well nourished male in no acute distress, sitting comfortably in the hospital bed. HEENT: Normocephalic and atraumatic. Oropharynx is clear, mucous membranes are moist.  +L eye palsy (wearing eye patch). Sclerae anicteric. Neck supple without JVD. No thyromegaly present. Skin Warm and dry. No bruising and no rash noted. No erythema. No pallor. Respiratory Lungs are clear to auscultation bilaterally without wheezes, rales or rhonchi, normal air exchange without accessory muscle use. CVS Normal rate, regular rhythm and normal S1 and S2. No murmurs, gallops, or rubs. Abdomen Soft, nontender and nondistended, normoactive bowel sounds. No palpable mass. No hepatosplenomegaly. Neuro Grossly nonfocal with no obvious sensory or motor deficits. MSK Normal range of motion in general.  No edema and no tenderness. Psych Appropriate mood and affect. Labs:    Recent Results (from the past 24 hour(s))   METABOLIC PANEL, COMPREHENSIVE    Collection Time: 21 12:49 PM   Result Value Ref Range    Sodium 141 136 - 145 mmol/L    Potassium 3.8 3.5 - 5.1 mmol/L    Chloride 107 98 - 107 mmol/L    CO2 31 21 - 32 mmol/L    Anion gap 3 (L) 7 - 16 mmol/L    Glucose 97 65 - 100 mg/dL    BUN 14 6 - 23 MG/DL    Creatinine 0.66 (L) 0.8 - 1.5 MG/DL    GFR est AA >60 >60 ml/min/1.73m2    GFR est non-AA >60 >60 ml/min/1.73m2    Calcium 9.3 8.3 - 10.4 MG/DL    Bilirubin, total 0.4 0.2 - 1.1 MG/DL    ALT (SGPT) 33 12 - 65 U/L    AST (SGOT) 28 15 - 37 U/L    Alk.  phosphatase 120 50 - 136 U/L    Protein, total 7.3 6.3 - 8.2 g/dL    Albumin 4.3 3.5 - 5.0 g/dL    Globulin 3.0 2.3 - 3.5 g/dL    A-G Ratio 1.4 1.2 - 3.5 MAGNESIUM    Collection Time: 12/06/21 12:49 PM   Result Value Ref Range    Magnesium 2.4 1.8 - 2.4 mg/dL   URIC ACID    Collection Time: 12/06/21 12:49 PM   Result Value Ref Range    Uric acid 3.8 2.6 - 6.0 MG/DL   LD    Collection Time: 12/06/21 12:49 PM   Result Value Ref Range     (H) 100 - 190 U/L   CBC WITH AUTOMATED DIFF    Collection Time: 12/06/21 12:51 PM   Result Value Ref Range    WBC 4.1 (L) 4.3 - 11.1 K/uL    RBC 4.34 4.23 - 5.6 M/uL    HGB 12.4 (L) 13.6 - 17.2 g/dL    HCT 36.6 (L) 41.1 - 50.3 %    MCV 84.3 79.6 - 97.8 FL    MCH 28.6 26.1 - 32.9 PG    MCHC 33.9 31.4 - 35.0 g/dL    RDW 12.1 11.9 - 14.6 %    PLATELET 105 832 - 171 K/uL    MPV 9.8 9.4 - 12.3 FL    ABSOLUTE NRBC 0.00 0.0 - 0.2 K/uL    DF AUTOMATED      NEUTROPHILS 58 43 - 78 %    LYMPHOCYTES 29 13 - 44 %    MONOCYTES 10 4.0 - 12.0 %    EOSINOPHILS 2 0.5 - 7.8 %    BASOPHILS 1 0.0 - 2.0 %    IMMATURE GRANULOCYTES 0 0.0 - 5.0 %    ABS. NEUTROPHILS 2.4 1.7 - 8.2 K/UL    ABS. LYMPHOCYTES 1.2 0.5 - 4.6 K/UL    ABS. MONOCYTES 0.4 0.1 - 1.3 K/UL    ABS. EOSINOPHILS 0.1 0.0 - 0.8 K/UL    ABS. BASOPHILS 0.1 0.0 - 0.2 K/UL    ABS. IMM. GRANS. 0.0 0.0 - 0.5 K/UL   HEMOGLOBIN A1C WITH EAG    Collection Time: 12/06/21 12:51 PM   Result Value Ref Range    Hemoglobin A1c 5.2 4.20 - 6.30 %    Est. average glucose 770 mg/dL   METABOLIC PANEL, COMPREHENSIVE    Collection Time: 12/07/21  2:16 AM   Result Value Ref Range    Sodium 143 136 - 145 mmol/L    Potassium 3.7 3.5 - 5.1 mmol/L    Chloride 109 (H) 98 - 107 mmol/L    CO2 30 21 - 32 mmol/L    Anion gap 4 (L) 7 - 16 mmol/L    Glucose 89 65 - 100 mg/dL    BUN 14 6 - 23 MG/DL    Creatinine 0.68 (L) 0.8 - 1.5 MG/DL    GFR est AA >60 >60 ml/min/1.73m2    GFR est non-AA >60 >60 ml/min/1.73m2    Calcium 8.7 8.3 - 10.4 MG/DL    Bilirubin, total 0.3 0.2 - 1.1 MG/DL    ALT (SGPT) 31 12 - 65 U/L    AST (SGOT) 22 15 - 37 U/L    Alk.  phosphatase 99 50 - 136 U/L    Protein, total 6.2 (L) 6.3 - 8.2 g/dL Albumin 3.6 3.5 - 5.0 g/dL    Globulin 2.6 2.3 - 3.5 g/dL    A-G Ratio 1.4 1.2 - 3.5     MAGNESIUM    Collection Time: 12/07/21  2:16 AM   Result Value Ref Range    Magnesium 2.2 1.8 - 2.4 mg/dL   CBC WITH AUTOMATED DIFF    Collection Time: 12/07/21  2:16 AM   Result Value Ref Range    WBC 3.9 (L) 4.3 - 11.1 K/uL    RBC 3.95 (L) 4.23 - 5.6 M/uL    HGB 11.4 (L) 13.6 - 17.2 g/dL    HCT 33.8 (L) 41.1 - 50.3 %    MCV 85.6 79.6 - 97.8 FL    MCH 28.9 26.1 - 32.9 PG    MCHC 33.7 31.4 - 35.0 g/dL    RDW 12.3 11.9 - 14.6 %    PLATELET 723 986 - 816 K/uL    MPV 9.9 9.4 - 12.3 FL    ABSOLUTE NRBC 0.00 0.0 - 0.2 K/uL    DF AUTOMATED      NEUTROPHILS 53 43 - 78 %    LYMPHOCYTES 28 13 - 44 %    MONOCYTES 12 4.0 - 12.0 %    EOSINOPHILS 5 0.5 - 7.8 %    BASOPHILS 2 0.0 - 2.0 %    IMMATURE GRANULOCYTES 0 0.0 - 5.0 %    ABS. NEUTROPHILS 2.1 1.7 - 8.2 K/UL    ABS. LYMPHOCYTES 1.1 0.5 - 4.6 K/UL    ABS. MONOCYTES 0.5 0.1 - 1.3 K/UL    ABS. EOSINOPHILS 0.2 0.0 - 0.8 K/UL    ABS. BASOPHILS 0.1 0.0 - 0.2 K/UL    ABS. IMM.  GRANS. 0.0 0.0 - 0.5 K/UL   LD    Collection Time: 12/07/21  2:16 AM   Result Value Ref Range     (H) 100 - 190 U/L   URIC ACID    Collection Time: 12/07/21  2:16 AM   Result Value Ref Range    Uric acid 3.7 2.6 - 6.0 MG/DL   ECHO ADULT COMPLETE    Collection Time: 12/07/21 10:00 AM   Result Value Ref Range    Left Atrium Minor Axis 2.66 cm    Left Atrium Major Axis 5.50 cm    LA Area 2C 27.90 cm2    LA Area 4C 18.20 cm2    LV ED Vol A4C 66.00 cm3    LV ES Vol A4C 27.00 cm3    IVSd 1.06 (A) 0.6 - 1.0 cm    LVIDd 5.21 4.2 - 5.9 cm    LVIDs 3.81 cm    LVOT d 2.10 cm    LVOT VTI 18.90 cm    LVOT Peak Gradient 3.00 mmHg    LVPWd 1.12 (A) 0.6 - 1.0 cm    LV E' Lateral Velocity 12.00 cm/s    LV E' Septal Velocity 8.87 cm/s    AV Cusp 2.30 cm    Aortic Valve Systolic Mean Gradient 1.04 mmHg    AoV VTI 28.70 cm    Aortic Valve Systolic Peak Velocity 450.35 cm/s    AoV PG 8.00 mmHg    Aortic Valve Area by Continuity of VTI 2.28 cm2    Aortic Valve Area by Continuity of Peak Velocity 2.15 cm2    Mitral Regurgitant Velocity Time Integral 207.00 cm    MV Mean Gradient 3.00 mmHg    Mitral Valve Annulus Velocity Time Integral 28.70 cm    Mitral Valve E Wave Deceleration Time 158.00 ms    MV A Danny 74.50 cm/s    MV E Danny 125.00 cm/s    RVIDd 2.85 cm    TR Max Velocity 230.00 cm/s    Triscuspid Valve Regurgitation Peak Gradient 21.00 mmHg    Tapse 3.40 (A) 1.5 - 2.0 cm    MV E/A 1.68     LV Mass .7 88 - 224 g    LV Mass AL Index 105.7 49 - 115 g/m2    E/E' lateral 10.42     E/E' septal 14.09     RVSP 24.0 mmHg    E/E' ratio (averaged) 12.25     Est. 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 FINDINGS:     BRAIN:     Midline structures including the corpus callosum, pituitary gland, optic nerves,   and cerebellum are well developed. The ventricles are within normal limits for the mild degree of global brain   parenchymal volume loss. There is no midline shift. The basilar cisterns are   patent. There is no cerebellar tonsillar ectopia or herniation.      There are T2 hyperintensities in the periventricular and subcortical white   matter, a nonspecific finding which likely represents chronic microangiopathy. Diffusion imaging shows no evidence of acute infarction or other acute   abnormality. The expected large intracranial vascular flow voids are preserved. There is no   evidence of intracranial blood products. There is no abnormal intra or extra-axial postcontrast enhancement.      There are no suspicious osseous lesions. ORBITS:     The optic nerves demonstrate normal morphology and signal characteristics. The globes, extraocular muscles, and lacrimal glands are unremarkable. There is no abnormal enhancement in the region of the superior orbital fissure. The cavernous sinuses enhance symmetrically and normally. There is no abnormal enhancement along the expected course of the cisternal   segment of cranial nerve III. There is a patent left posterior communicating artery is courses slightly more   caudad than anatomically typical (axial thin cut postcontrast image 96)    Impression:       1. There is a left posterior communicating artery, whose course is more caudad   than anatomically typical. It is difficult to discern the relationship of this   vessel and the left cisternal segment of cranial nerve III. If there is a   concern for a neurovascular conflict causing the cranial nerve III palsy, the   patient could return for additional heavily T2-weighted thin cut images through   the expected position of the cisternal segment of cranial nerve III. 2. There is otherwise no definite MRI evidence of an etiology for the described   cranial nerve III palsy. 3. No evidence of intracranial manifestations of the systemic lymphoma. 4. Mild burden of chronic microangiopathy.       VOICE DICTATED BY: Dr. Brady Lincoln [[id number]] Collected: 12/07/21 0835   Order Status: Completed Updated: 12/07/21 0853   Narrative:     CT of the Chest, Abdomen, and Pelvis     INDICATION: Follow-up diffuse large B-cell lymphoma. Multiple axial images were obtained through the chest, abdomen, and pelvis.    Oral contrast was used for bowel opacification.  100mL of Isovue 370 intravenous   contrast was used for better evaluation of solid organs and vascular structures.    Radiation dose reduction techniques were used for this study.  All CT scans   performed at this facility use one or all of the following: Automated exposure   control, adjustment of the mA and/or kVp according to patient's size, iterative   reconstruction. COMPARISON: CT chest abdomen pelvis 11/16/2021. PET/CT 8/13/2021. FINDINGS:   LUNGS AND PLEURA: Stable anterior right middle lobe lung nodule again measures   0.5 cm. Additional stable 0.5 cm subpleural nodule left lower lobe on image 49. A few tiny left major fissure lymph nodes are also stable. No new or enlarging   lung lesions. No pleural fluid. MEDIASTINUM/AXILLAE: The left infraclavicular soft tissue mass on image 9 of   series 3 measures approximately 5.8 x 5.0 cm in greatest axial dimensions. This   measures approximately 3.3 cm craniocaudal dimension on image 63 of series 400. This previously measured 5.5 x 3.4 x 2.8 cm suggesting interval growth. Right   axillary lymph node on image 25 1.9 x 0.6 cm, previously remeasuring 2.2 x 1.0   cm suggesting mild interval improvement. Left axillary lymph nodes appear stable   if not improved as well. No enlarged mediastinal or hilar lymph nodes. Right   chest port appears in good position. Heart is normal in size. Moderate coronary   artery calcification. Esophagus is unremarkable. Thyroid gland appears normal   where imaged. HEPATOBILIARY: Cholecystectomy. Liver is unremarkable. PANCREAS: Normal.     SPLEEN: Normal.     ADRENAL GLANDS: Normal.     KIDNEYS/BLADDER: Kidneys and bladder are unremarkable. No hydronephrosis. Excreted contrast is noted in the collecting systems and bladder. BOWEL: No bowel obstruction. LYMPH NODES: No enlarged abdominal or pelvic lymph nodes. Inguinal lymph nodes   are stable if not slightly smaller in size. VASCULATURE: Unremarkable. PELVIC ORGANS: Prostate gland and rectum are unremarkable. No pelvic free fluid   or mass. MUSCULOSKELETAL: Degenerative spine changes. No destructive bone lesions are   evident    Impression:     1. Stable right upper lobe and left lower lung nodules. No new lung lesions. 2. Slight interval growth in the left infraclavicular soft tissue mass when   compared to prior CT. This was not well appreciated on prior PET/CT imaging. This is likely the area of recurrent lymphoma. Remaining axillary and   intrathoracic lymph nodes are stable if not slightly smaller in size. No   enlarged abdominal or pelvic lymph nodes. ASSESSMENT:  Problem List  Never Reviewed          Codes Class Noted    Admission for antineoplastic chemotherapy ICD-10-CM: Z51.11  ICD-9-CM: V58.11  12/6/2021            Mr. Dominga Adams is a 64 y.o. male admitted on 12/6/2021 with relapsed DLBCL. He is a patient of Dr. Richmond Watson with relapsed DLBCL. 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 He is s/p R-CHOP from April to July 2021. In November 2021, CT CAP showed disease relapse with new L supraclavicular mass as well as stable axillary and chest wall LNs and shotty RP LNs. He is s/p L supraclavicular LN bx +DLBCL. He is being admitted for C1 R-ICE with IT MTX. Heme History (copied):    70-year-old  male, history of herniated lumbar disc & back surgery, cholecystectomy, now kindly referred to us by Dr. Fátima Salazar area [address] [place] [address], for relapsed DLBCL. His hematologic history is as follows:     - 11/20: Presented with inability to keep left eye open and headaches, was seen by ophthalmology and diagnosed with 3rd cranial nerve palsy. Per records, brain imaging including MRI wo contrast 11/12/20 with no acute changes. CT head without contrast 11/12/20 unremarkable, CT angiogram with contrast 11/12/20 without acute findings.  He was also seen by neurology Dr. Santiago Haines. Headaches slowly resolved over time. - 03/21: On a follow-up visit, noted 30 pound weight loss. He was also found to have enlarged mass in left upper chest with diffusely palpable adenopathy in cervical area as well as bulky bilateral axillary adenopathy. Per patient, swelling developed over a 2-week, and was nontender. Per patient reports being told that his lymphoma diagnosis was seperate from his 3rd cranial nerve palsy.  - CT neck/chest 3/22/2021 showed extensive adenopathy including left pectoralis lymph node mass measuring 7 x 3.7 cm. Extensive bilateral axillary adenopathy measuring up to 6.8 x 11.4 cm. Extensive mediastinal adenopathy noted, including right infrahilar region mass measuring 4.6 x 5.3 cm. Enlarged right adrenal gland measuring 4.1 x 4 cm. Extensive adenopathy in the reggie hepatis measuring 5 x 4.4 cm. PET scan could not be performed due to insurance issues. Patient seen by hematology Dr. Mansi Prater. Core needle biopsy of left chest wall mass showed high-grade B-cell malignancy with FISH testing revealing rearrangement of BCL 6/3 q. and additional signals for BCL-2/18 q. Significantly no evidence of MYC rearrangement noted. Bone marrow biopsy without lymphoma involvement. 2D echo with normal EF. LDH elevated at 311. Hepatitis B negative. He was diagnosed with DLBCL, stage IIIb, IPI score 2.  - R-CHOP x6 (from 4/6/2021 till 7/28/2021. PET scan 8/13/2021 with essentially CR and minimal FDG uptake in the right axillary lymph node with FDG 1.2.  -11/16/2021 CT CAP with disease relapse including new left supraclavicular mass measuring 5.5 x 3.4 cm. Stable axillary and chest wall lymph nodes as well as shotty retroperitoneal lymph nodes. Ultrasound-guided left supraclavicular lymph node biopsy 11/22/2021 showing monoclonal B-cell population with increased cell size, results consistent with mature B-cell lymphoproliferative disorder.   Patient now referred to us for further work-up and management. PLAN:  Relapsed DLBCL  - s/p R-CHOP April - July 2021  - In 11/2021, new supraclavicular mass bx +relapsed DLBCL  - Admit for R-ICE with IT MTX. IR consulted for LP/IT chemo. - Echo and CT CAP ordered  12/7 Echo with EF 60-65% with mod to severe eccentric mitral regurgitation. CT CAP with stable disease. Day 2 R-ICE. Awaiting LP/IT MTX. Tolerating treatment well thus far. 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  Per neurology, no further neuro w/u necessary. Continue home meds  Ppx meds: Acyclovir, Diflucan  Rhea SOPs  Lovenox for DVT ppx (hold for plt <50k)    Goals and plan of care reviewed with the patient. All questions answered to the best of our ability. Disposition:  Anticipate discharge home after the completion of chemotherapy on Thurs, 12/9.               Grace Xavier NP   [facility]  [id number] [place] [address]  Office : [phone]  Fax : [phone]

## 2021-12-07 NOTE — PROGRESS NOTES
Care Management Interventions  Support Systems: Spouse/Significant Other, Child(dea)  Confirm Follow Up Transport: Family  Discharge Location  Discharge Placement: Home  SW met with pt who was admitted for initial cycle of chemo. Pt had temporarily been in remission but  reoccurance in Nov 2021. Pt reports he lives with his wife and his 19y/o and 20 y/o children still live in the home. Pt states he drives but his spouse does not. Pt states he was working up until this admission and hopes to get back to work as soon as he can. Pt reports his spouse does not work. Pt states he is independent and has no DME. No needs anticipated for dc but SW will follow until dc.   Urszula Lopez, BSW

## 2021-12-08 ENCOUNTER — HOSPITAL ENCOUNTER (OUTPATIENT)
Dept: INTERVENTIONAL RADIOLOGY/VASCULAR | Age: 56
Discharge: HOME | End: 2021-12-08
Attending: NURSE PRACTITIONER

## 2021-12-08 VITALS
TEMPERATURE: 97.4 F | OXYGEN SATURATION: 99 % | WEIGHT: 187 LBS | BODY MASS INDEX: 25.33 KG/M2 | HEIGHT: 72 IN | DIASTOLIC BLOOD PRESSURE: 77 MMHG | RESPIRATION RATE: 16 BRPM | SYSTOLIC BLOOD PRESSURE: 164 MMHG | HEART RATE: 85 BPM

## 2021-12-08 DIAGNOSIS — Z51.11 ADMISSION FOR ANTINEOPLASTIC CHEMOTHERAPY: Primary | ICD-10-CM

## 2021-12-08 LAB
ALBUMIN SERPL-MCNC: 3.6 G/DL (ref 3.5–5)
ALBUMIN/GLOB SERPL: 1.2 {RATIO} (ref 1.2–3.5)
ALP SERPL-CCNC: 109 U/L (ref 50–136)
ALT SERPL-CCNC: 27 U/L (ref 12–65)
ANION GAP SERPL CALC-SCNC: 2 MMOL/L (ref 7–16)
AST SERPL-CCNC: 20 U/L (ref 15–37)
BASOPHILS # BLD: 0 K/UL (ref 0–0.2)
BASOPHILS NFR BLD: 0 % (ref 0–2)
BILIRUB SERPL-MCNC: 0.4 MG/DL (ref 0.2–1.1)
BUN SERPL-MCNC: 12 MG/DL (ref 6–23)
CALCIUM SERPL-MCNC: 8.3 MG/DL (ref 8.3–10.4)
CHLORIDE SERPL-SCNC: 108 MMOL/L (ref 98–107)
CO2 SERPL-SCNC: 30 MMOL/L (ref 21–32)
CREAT SERPL-MCNC: 0.85 MG/DL (ref 0.8–1.5)
DIFFERENTIAL METHOD BLD: ABNORMAL
EOSINOPHIL # BLD: 0 K/UL (ref 0–0.8)
EOSINOPHIL NFR BLD: 0 % (ref 0.5–7.8)
ERYTHROCYTE [DISTWIDTH] IN BLOOD BY AUTOMATED COUNT: 12.1 % (ref 11.9–14.6)
GLOBULIN SER CALC-MCNC: 3.1 G/DL (ref 2.3–3.5)
GLUCOSE SERPL-MCNC: 188 MG/DL (ref 65–100)
HCT VFR BLD AUTO: 35.5 % (ref 41.1–50.3)
HGB BLD-MCNC: 11.9 G/DL (ref 13.6–17.2)
IMM GRANULOCYTES # BLD AUTO: 0 K/UL (ref 0–0.5)
IMM GRANULOCYTES NFR BLD AUTO: 1 % (ref 0–5)
LDH SERPL L TO P-CCNC: 307 U/L (ref 100–190)
LYMPHOCYTES # BLD: 0.3 K/UL (ref 0.5–4.6)
LYMPHOCYTES NFR BLD: 10 % (ref 13–44)
MAGNESIUM SERPL-MCNC: 2.3 MG/DL (ref 1.8–2.4)
MCH RBC QN AUTO: 28.3 PG (ref 26.1–32.9)
MCHC RBC AUTO-ENTMCNC: 33.5 G/DL (ref 31.4–35)
MCV RBC AUTO: 84.5 FL (ref 79.6–97.8)
MONOCYTES # BLD: 0 K/UL (ref 0.1–1.3)
MONOCYTES NFR BLD: 1 % (ref 4–12)
NEUTS SEG # BLD: 3 K/UL (ref 1.7–8.2)
NEUTS SEG NFR BLD: 89 % (ref 43–78)
NRBC # BLD: 0 K/UL (ref 0–0.2)
PLATELET # BLD AUTO: 225 K/UL (ref 150–450)
PMV BLD AUTO: 9.9 FL (ref 9.4–12.3)
POTASSIUM SERPL-SCNC: 4 MMOL/L (ref 3.5–5.1)
PROT SERPL-MCNC: 6.7 G/DL (ref 6.3–8.2)
RBC # BLD AUTO: 4.2 M/UL (ref 4.23–5.6)
SODIUM SERPL-SCNC: 140 MMOL/L (ref 136–145)
URATE SERPL-MCNC: 3.2 MG/DL (ref 2.6–6)
WBC # BLD AUTO: 3.4 K/UL (ref 4.3–11.1)

## 2021-12-08 PROCEDURE — APPSS30 APP SPLIT SHARED TIME 16-30 MINUTES: Performed by: NURSE PRACTITIONER

## 2021-12-08 PROCEDURE — 74011250636 HC RX REV CODE- 250/636: Performed by: INTERNAL MEDICINE

## 2021-12-08 PROCEDURE — 83615 LACTATE (LD) (LDH) ENZYME: CPT

## 2021-12-08 PROCEDURE — 77030003666 HC NDL SPINAL BD -A

## 2021-12-08 PROCEDURE — 99232 SBSQ HOSP IP/OBS MODERATE 35: CPT | Performed by: INTERNAL MEDICINE

## 2021-12-08 PROCEDURE — 84550 ASSAY OF BLOOD/URIC ACID: CPT

## 2021-12-08 PROCEDURE — 74011000250 HC RX REV CODE- 250: Performed by: INTERNAL MEDICINE

## 2021-12-08 PROCEDURE — 83735 ASSAY OF MAGNESIUM: CPT

## 2021-12-08 PROCEDURE — 96450 CHEMOTHERAPY INTO CNS: CPT

## 2021-12-08 PROCEDURE — 80053 COMPREHEN METABOLIC PANEL: CPT

## 2021-12-08 PROCEDURE — 74011250637 HC RX REV CODE- 250/637: Performed by: NURSE PRACTITIONER

## 2021-12-08 PROCEDURE — 77030014143 HC TY PUNC LUMBR BD -A

## 2021-12-08 PROCEDURE — 36591 DRAW BLOOD OFF VENOUS DEVICE: CPT

## 2021-12-08 PROCEDURE — 74011000258 HC RX REV CODE- 258: Performed by: INTERNAL MEDICINE

## 2021-12-08 PROCEDURE — 65270000029 HC RM PRIVATE

## 2021-12-08 PROCEDURE — 74011000250 HC RX REV CODE- 250: Performed by: PHYSICIAN ASSISTANT

## 2021-12-08 PROCEDURE — 85025 COMPLETE CBC W/AUTO DIFF WBC: CPT

## 2021-12-08 RX ORDER — EPINEPHRINE 1 MG/ML
0.3 INJECTION, SOLUTION, CONCENTRATE INTRAVENOUS AS NEEDED
Status: CANCELLED | OUTPATIENT
Start: 2021-12-08

## 2021-12-08 RX ORDER — HYDROCORTISONE SODIUM SUCCINATE 100 MG/2ML
100 INJECTION, POWDER, FOR SOLUTION INTRAMUSCULAR; INTRAVENOUS AS NEEDED
Status: CANCELLED | OUTPATIENT
Start: 2021-12-08

## 2021-12-08 RX ORDER — ALBUTEROL SULFATE 0.83 MG/ML
2.5 SOLUTION RESPIRATORY (INHALATION) AS NEEDED
Status: CANCELLED
Start: 2021-12-08

## 2021-12-08 RX ORDER — LIDOCAINE HYDROCHLORIDE 20 MG/ML
0.2 INJECTION, SOLUTION INFILTRATION; PERINEURAL ONCE
Status: COMPLETED | OUTPATIENT
Start: 2021-12-08 | End: 2021-12-08

## 2021-12-08 RX ORDER — DIPHENHYDRAMINE HYDROCHLORIDE 50 MG/ML
50 INJECTION, SOLUTION INTRAMUSCULAR; INTRAVENOUS AS NEEDED
Status: CANCELLED
Start: 2021-12-08

## 2021-12-08 RX ADMIN — FLUCONAZOLE 200 MG: 100 TABLET ORAL at 08:36

## 2021-12-08 RX ADMIN — ONDANSETRON 8 MG: 2 INJECTION INTRAMUSCULAR; INTRAVENOUS at 00:34

## 2021-12-08 RX ADMIN — CARBOPLATIN 700 MG: 10 INJECTION, SOLUTION INTRAVENOUS at 19:50

## 2021-12-08 RX ADMIN — ONDANSETRON 8 MG: 2 INJECTION INTRAMUSCULAR; INTRAVENOUS at 08:36

## 2021-12-08 RX ADMIN — LIDOCAINE HYDROCHLORIDE 4 MG: 20 INJECTION, SOLUTION INFILTRATION; PERINEURAL at 13:56

## 2021-12-08 RX ADMIN — LISINOPRIL 5 MG: 5 TABLET ORAL at 08:36

## 2021-12-08 RX ADMIN — ETOPOSIDE 200 MG: 20 INJECTION INTRAVENOUS at 18:34

## 2021-12-08 RX ADMIN — DEXAMETHASONE SODIUM PHOSPHATE 10 MG: 10 INJECTION INTRAMUSCULAR; INTRAVENOUS at 17:04

## 2021-12-08 RX ADMIN — MESNA 10000 MG: 100 INJECTION, SOLUTION INTRAVENOUS at 21:00

## 2021-12-08 RX ADMIN — ACYCLOVIR 400 MG: 800 TABLET ORAL at 08:36

## 2021-12-08 RX ADMIN — ONDANSETRON 8 MG: 2 INJECTION INTRAMUSCULAR; INTRAVENOUS at 17:03

## 2021-12-08 RX ADMIN — SODIUM CHLORIDE 12 MG: 9 INJECTION, SOLUTION INTRAMUSCULAR; INTRAVENOUS; SUBCUTANEOUS at 13:25

## 2021-12-08 RX ADMIN — FOSAPREPITANT 150 MG: 150 INJECTION, POWDER, LYOPHILIZED, FOR SOLUTION INTRAVENOUS at 17:59

## 2021-12-08 RX ADMIN — ACYCLOVIR 400 MG: 800 TABLET ORAL at 17:03

## 2021-12-08 RX ADMIN — ALLOPURINOL 300 MG: 300 TABLET ORAL at 08:36

## 2021-12-08 NOTE — PROGRESS NOTES
TRANSFER - OUT REPORT:    Verbal report given to TRAV Nuñez on Myrna Daugherty  being transferred to [facility] room 367 for routine progression of care       Report consisted of patients Situation, Background, Assessment and   Recommendations(SBAR). Information from the following report(s) SBAR, Kardex, Procedure Summary and MAR was reviewed with the receiving nurse. Lines:   Venous Access Device 12/06/21 (Active)   Central Line Being Utilized Yes 12/08/21 4891   Criteria for Appropriate Use Irritant/vesicant medication 12/08/21 0904   Site Assessment Clean, dry, & intact 12/08/21 0904   Date of Last Dressing Change 12/06/21 12/08/21 0904   Dressing Status Clean, dry, & intact 12/08/21 0904   Dressing Type Disk with Chlorhexadine gluconate (CHG); Transparent 12/08/21 0904   Action Taken Other (comment) 12/08/21 0904   Date Accessed (Medial Site) 12/06/21 12/08/21 0758   Positive Blood Return (Medial Site) Yes 12/08/21 0758   Action Taken (Medial Site) Infusing 12/08/21 0904   Date Needle Changed (Medial Site) 12/06/21 12/08/21 0758   Alcohol Cap Used No 12/08/21 0904        Opportunity for questions and clarification was provided.       Patient transported with:   Transport

## 2021-12-08 NOTE — PROGRESS NOTES
[facility]        Inpatient Hematology / Oncology Progress Note    Reason for Admission:  Admission for antineoplastic chemotherapy [Z51.11]    24 Hour Events:  Afebrile, hypertensive  Day 3 RICE  LP/IT MTX today    Transfusions: None  Replacements: None    ROS:  Constitutional: Negative for fever, chills, weakness, malaise, fatigue. CV: Negative for chest pain, palpitations, edema. Respiratory: Negative for dyspnea, cough, wheezing. GI: Negative for nausea, abdominal pain, diarrhea. 10 point review of systems is otherwise negative with the exception of the elements mentioned above in the HPI. No Known Allergies  Past Medical History:   Diagnosis Date    Hypertension      Past Surgical History:   Procedure Laterality Date    HX BACK SURGERY      26 years ago    IR CHOLECYSTOSTOMY PERCUTANEOUS       Family History   Problem Relation Age of Onset    Diabetes Mother     Diabetes Father     Hypertension Brother      Social History     Socioeconomic History    Marital status:      Spouse name: Not on file    Number of children: Not on file    Years of education: Not on file    Highest education level: Not on file   Occupational History    Not on file   Tobacco Use    Smoking status: Never Smoker    Smokeless tobacco: Never Used   Vaping Use    Vaping Use: Never used   Substance and Sexual Activity    Alcohol use: Not Currently    Drug use: Not Currently    Sexual activity: Not on file   Other Topics Concern    Not on file   Social History Narrative    Not on file     Social Determinants of Health     Financial Resource Strain:     Difficulty of Paying Living Expenses: Not on file   Food Insecurity:     Worried About Running Out of Food in the Last Year: Not on file    Sayra of Food in the Last Year: Not on file   Transportation Needs:     Lack of Transportation (Medical): Not on file    Lack of Transportation (Non-Medical):  Not on file   Physical Activity:     Days of Exercise per Week: Not on file    Minutes of Exercise per Session: Not on file   Stress:     Feeling of Stress : Not on file   Social Connections:     Frequency of Communication with Friends and Family: Not on file    Frequency of Social Gatherings with Friends and Family: Not on file    Attends Rastafarian Services: Not on file    Active Member of [employer] or Organizations: Not on file    Attends Club or Organization Meetings: Not on file    Marital Status: Not on file   Intimate Partner Violence:     Fear of Current or Ex-Partner: Not on file    Emotionally Abused: Not on file    Physically Abused: Not on file    Sexually Abused: Not on file   Housing Stability:     Unable to Pay for Housing in the Last Year: Not on file    Number of [place] in the Last Year: Not on file    Unstable Housing in the Last Year: Not on file     Current Facility-Administered Medications   Medication Dose Route Frequency Provider Last Rate Last Admin    fosaprepitant (EMEND) 150 mg in 0.9% sodium chloride 150 mL IVPB  150 mg IntraVENous Radha Parks MD        dexamethasone (DECADRON) 10 mg/mL injection 10 mg  10 mg IntraVENous Q24H Theodore Rodríguez MD   10 mg at 12/07/21 1839    ondansetron (ZOFRAN) injection 8 mg  8 mg IntraVENous Q8H Theodore Rodríguez MD   8 mg at 12/08/21 0836    prochlorperazine (COMPAZINE) with saline injection 10 mg  10 mg IntraVENous Q6H PRN Theodore Rodríguez MD        diphenhydrAMINE (BENADRYL) injection 25 mg  25 mg IntraVENous Q6H PRN Theodore Rodríguez MD        LORazepam (ATIVAN) injection 0.5 mg  0.5 mg IntraVENous Q6H PRN Theodore Rodríguez MD        etoposide (VEPESID) 200 mg in 0.9% sodium chloride 500 mL chemo infusion  200 mg IntraVENous Q24H Theodore Rodríguez MD   Transfusion Completed at 12/07/21 2000    CARBOplatin (PARAPLATIN) 700 mg in 0.9% sodium chloride 250 mL chemo infusion  700 mg IntraVENous ONCE Theodore Rodríguez MD        ifosfamide (IFEX) 10,000 mg, mesna (MESNEX) 10,000 mg in 0.9% sodium chloride 1,000 mL chemo infusion  10,000 mg IntraVENous CONTINUOUS Everardo Verdin MD        lisinopriL (PRINIVIL, ZESTRIL) tablet 5 mg  5 mg Oral DAILY Carmela Pitcher, NP   5 mg at 12/08/21 0836    0.9% sodium chloride infusion  75 mL/hr IntraVENous CONTINUOUS Carmela Pitcher, NP 75 mL/hr at 12/07/21 1149 75 mL/hr at 12/07/21 1149    enoxaparin (LOVENOX) injection 40 mg  40 mg SubCUTAneous Q24H Carmela Pitcher, NP   40 mg at 12/07/21 1145    ondansetron (ZOFRAN) injection 4 mg  4 mg IntraVENous Q4H PRN Carmela Pitcher, NP        prochlorperazine (COMPAZINE) with saline injection 5 mg  5 mg IntraVENous Q6H PRN Carmela Pitcher, NP        HYDROcodone-acetaminophen (NORCO) 5-325 mg per tablet 1 Tablet  1 Tablet Oral Q6H PRN Carmela Pitcher, NP        morphine injection 2 mg  2 mg IntraVENous Q4H PRN Carmela Pitcher, NP        allopurinoL (ZYLOPRIM) tablet 300 mg  300 mg Oral DAILY Carmela Pitcher, NP   300 mg at 12/08/21 0836    acyclovir (ZOVIRAX) tablet 400 mg  400 mg Oral BID Carmela Pitcher, NP   400 mg at 12/08/21 0836    fluconazole (DIFLUCAN) tablet 200 mg  200 mg Oral DAILY Carmela Pitcher, NP   200 mg at 12/08/21 6475    diphenhydrAMINE (BENADRYL) injection 25 mg  25 mg IntraVENous PRN Everardo Verdin MD        acetaminophen (TYLENOL) tablet 650 mg  650 mg Oral PRN Everardo Verdin MD        meperidine (DEMEROL) injection 25 mg  25 mg IntraVENous PRN Everardo Verdin MD        ondansetron [facility]) injection 8 mg  8 mg IntraVENous PRN Everardo Verdin MD        heparin (porcine) pf 300 Units  300 Units InterCATHeter PRN Everardo Verdin MD        central line flush (saline) syringe 10 mL  10 mL InterCATHeter PRN Everardo Verdin MD   10 mL at 12/06/21 3255       OBJECTIVE:  Patient Vitals for the past 8 hrs:   BP Temp Pulse Resp SpO2   12/08/21 1051 (!) 147/77 98 °F (36.7 °C) 80 18 97 %   12/08/21 0815 (!) 144/78 97.5 °F (36.4 °C) 78 16 98 %   12/08/21 0505 (!) 159/74 97.7 °F (36.5 °C) 79 14 96 %     Temp (24hrs), Av.8 °F (36.6 °C), Min:97.5 °F (36.4 °C), Max:98.3 °F (36.8 °C)    No intake/output data recorded. Physical Exam:  Constitutional: Well developed, well nourished male in no acute distress, sitting comfortably in the hospital bed. HEENT: Normocephalic and atraumatic. Oropharynx is clear, mucous membranes are moist.  +L eye palsy (wearing eye patch). Sclerae anicteric. Neck supple without JVD. No thyromegaly present. Skin Warm and dry. No bruising and no rash noted. No erythema. No pallor. Respiratory Lungs are clear to auscultation bilaterally without wheezes, rales or rhonchi, normal air exchange without accessory muscle use. CVS Normal rate, regular rhythm and normal S1 and S2. No murmurs, gallops, or rubs. Abdomen Soft, nontender and nondistended, normoactive bowel sounds. No palpable mass. No hepatosplenomegaly. Neuro Grossly nonfocal with no obvious sensory or motor deficits. MSK Normal range of motion in general.  No edema and no tenderness. Psych Appropriate mood and affect. Labs:    Recent Results (from the past 24 hour(s))   METABOLIC PANEL, COMPREHENSIVE    Collection Time: 21  2:56 AM   Result Value Ref Range    Sodium 140 136 - 145 mmol/L    Potassium 4.0 3.5 - 5.1 mmol/L    Chloride 108 (H) 98 - 107 mmol/L    CO2 30 21 - 32 mmol/L    Anion gap 2 (L) 7 - 16 mmol/L    Glucose 188 (H) 65 - 100 mg/dL    BUN 12 6 - 23 MG/DL    Creatinine 0.85 0.8 - 1.5 MG/DL    GFR est AA >60 >60 ml/min/1.73m2    GFR est non-AA >60 >60 ml/min/1.73m2    Calcium 8.3 8.3 - 10.4 MG/DL    Bilirubin, total 0.4 0.2 - 1.1 MG/DL    ALT (SGPT) 27 12 - 65 U/L    AST (SGOT) 20 15 - 37 U/L    Alk.  phosphatase 109 50 - 136 U/L    Protein, total 6.7 6.3 - 8.2 g/dL    Albumin 3.6 3.5 - 5.0 g/dL    Globulin 3.1 2.3 - 3.5 g/dL    A-G Ratio 1.2 1.2 - 3.5     MAGNESIUM    Collection Time: 21  2:56 AM   Result Value Ref Range    Magnesium 2.3 1.8 - 2.4 mg/dL   CBC WITH AUTOMATED DIFF    Collection Time: 12/08/21  2:56 AM   Result Value Ref Range    WBC 3.4 (L) 4.3 - 11.1 K/uL    RBC 4.20 (L) 4.23 - 5.6 M/uL    HGB 11.9 (L) 13.6 - 17.2 g/dL    HCT 35.5 (L) 41.1 - 50.3 %    MCV 84.5 79.6 - 97.8 FL    MCH 28.3 26.1 - 32.9 PG    MCHC 33.5 31.4 - 35.0 g/dL    RDW 12.1 11.9 - 14.6 %    PLATELET 331 232 - 503 K/uL    MPV 9.9 9.4 - 12.3 FL    ABSOLUTE NRBC 0.00 0.0 - 0.2 K/uL    DF AUTOMATED      NEUTROPHILS 89 (H) 43 - 78 %    LYMPHOCYTES 10 (L) 13 - 44 %    MONOCYTES 1 (L) 4.0 - 12.0 %    EOSINOPHILS 0 (L) 0.5 - 7.8 %    BASOPHILS 0 0.0 - 2.0 %    IMMATURE GRANULOCYTES 1 0.0 - 5.0 %    ABS. NEUTROPHILS 3.0 1.7 - 8.2 K/UL    ABS. LYMPHOCYTES 0.3 (L) 0.5 - 4.6 K/UL    ABS. MONOCYTES 0.0 (L) 0.1 - 1.3 K/UL    ABS. EOSINOPHILS 0.0 0.0 - 0.8 K/UL    ABS. BASOPHILS 0.0 0.0 - 0.2 K/UL    ABS. IMM. GRANS. 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 FINDINGS:     BRAIN:     Midline structures including the corpus callosum, pituitary gland, optic nerves,   and cerebellum are well developed. The ventricles are within normal limits for the mild degree of global brain   parenchymal volume loss. There is no midline shift. The basilar cisterns are   patent. There is no cerebellar tonsillar ectopia or herniation.      There are T2 hyperintensities in the periventricular and subcortical white   matter, a nonspecific finding which likely represents chronic microangiopathy. Diffusion imaging shows no evidence of acute infarction or other acute   abnormality. The expected large intracranial vascular flow voids are preserved. There is no   evidence of intracranial blood products. There is no abnormal intra or extra-axial postcontrast enhancement.      There are no suspicious osseous lesions. ORBITS:     The optic nerves demonstrate normal morphology and signal characteristics. The globes, extraocular muscles, and lacrimal glands are unremarkable. There is no abnormal enhancement in the region of the superior orbital fissure. The cavernous sinuses enhance symmetrically and normally. There is no abnormal enhancement along the expected course of the cisternal   segment of cranial nerve III. There is a patent left posterior communicating artery is courses slightly more   caudad than anatomically typical (axial thin cut postcontrast image 96)    Impression:       1. There is a left posterior communicating artery, whose course is more caudad   than anatomically typical. It is difficult to discern the relationship of this   vessel and the left cisternal segment of cranial nerve III. If there is a   concern for a neurovascular conflict causing the cranial nerve III palsy, the   patient could return for additional heavily T2-weighted thin cut images through   the expected position of the cisternal segment of cranial nerve III. 2. There is otherwise no definite MRI evidence of an etiology for the described   cranial nerve III palsy. 3. No evidence of intracranial manifestations of the systemic lymphoma.      4. Mild burden of chronic microangiopathy.       VOICE DICTATED BY: Dr. Viral Rivas [[id number]] Collected: 12/07/21 0835   Order Status: Completed Updated: 12/07/21 0853   Narrative:     CT of the Chest, Abdomen, and Pelvis     INDICATION: Follow-up diffuse large B-cell lymphoma. Multiple axial images were obtained through the chest, abdomen, and pelvis. Oral contrast was used for bowel opacification.  100mL of Isovue 370 intravenous   contrast was used for better evaluation of solid organs and vascular structures.    Radiation dose reduction techniques were used for this study.  All CT scans   performed at this facility use one or all of the following: Automated exposure   control, adjustment of the mA and/or kVp according to patient's size, iterative   reconstruction. COMPARISON: CT chest abdomen pelvis 11/16/2021. PET/CT 8/13/2021. FINDINGS:   LUNGS AND PLEURA: Stable anterior right middle lobe lung nodule again measures   0.5 cm. Additional stable 0.5 cm subpleural nodule left lower lobe on image 49. A few tiny left major fissure lymph nodes are also stable. No new or enlarging   lung lesions. No pleural fluid. MEDIASTINUM/AXILLAE: The left infraclavicular soft tissue mass on image 9 of   series 3 measures approximately 5.8 x 5.0 cm in greatest axial dimensions. This   measures approximately 3.3 cm craniocaudal dimension on image 63 of series 400. This previously measured 5.5 x 3.4 x 2.8 cm suggesting interval growth. Right   axillary lymph node on image 25 1.9 x 0.6 cm, previously remeasuring 2.2 x 1.0   cm suggesting mild interval improvement. Left axillary lymph nodes appear stable   if not improved as well. No enlarged mediastinal or hilar lymph nodes. Right   chest port appears in good position. Heart is normal in size. Moderate coronary   artery calcification. Esophagus is unremarkable. Thyroid gland appears normal   where imaged. HEPATOBILIARY: Cholecystectomy. Liver is unremarkable.      PANCREAS: Normal. SPLEEN: Normal.     ADRENAL GLANDS: Normal.     KIDNEYS/BLADDER: Kidneys and bladder are unremarkable. No hydronephrosis. Excreted contrast is noted in the collecting systems and bladder. BOWEL: No bowel obstruction. LYMPH NODES: No enlarged abdominal or pelvic lymph nodes. Inguinal lymph nodes   are stable if not slightly smaller in size. VASCULATURE: Unremarkable. PELVIC ORGANS: Prostate gland and rectum are unremarkable. No pelvic free fluid   or mass. MUSCULOSKELETAL: Degenerative spine changes. No destructive bone lesions are   evident    Impression:     1. Stable right upper lobe and left lower lung nodules. No new lung lesions. 2. Slight interval growth in the left infraclavicular soft tissue mass when   compared to prior CT. This was not well appreciated on prior PET/CT imaging. This is likely the area of recurrent lymphoma. Remaining axillary and   intrathoracic lymph nodes are stable if not slightly smaller in size. No   enlarged abdominal or pelvic lymph nodes. ASSESSMENT:  Problem List  Never Reviewed          Codes Class Noted    Admission for antineoplastic chemotherapy ICD-10-CM: Z51.11  ICD-9-CM: V58.11  12/6/2021            Mr. Rudy Schroeder is a 64 y.o. male admitted on 12/6/2021 with relapsed DLBCL. He is a patient of Dr. Mp Berg with relapsed DLBCL. 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 He is s/p R-CHOP from April to July 2021. In November 2021, CT CAP showed disease relapse with new L supraclavicular mass as well as stable axillary and chest wall LNs and shotty RP LNs. He is s/p L supraclavicular LN bx +DLBCL. He is being admitted for C1 R-ICE with IT MTX. Heme History (copied):    45-year-old  male, history of herniated lumbar disc & back surgery, cholecystectomy, now kindly referred to us by Dr. Clari Cardoza area [address] [place] [address], for relapsed DLBCL.   His hematologic history is as follows:     - 11/20: Presented with inability to keep left eye open and headaches, was seen by ophthalmology and diagnosed with 3rd cranial nerve palsy. Per records, brain imaging including MRI wo contrast 11/12/20 with no acute changes. CT head without contrast 11/12/20 unremarkable, CT angiogram with contrast 11/12/20 without acute findings. He was also seen by neurology Dr. Rob Downey. Headaches slowly resolved over time. - 03/21: On a follow-up visit, noted 30 pound weight loss. He was also found to have enlarged mass in left upper chest with diffusely palpable adenopathy in cervical area as well as bulky bilateral axillary adenopathy. Per patient, swelling developed over a 2-week, and was nontender. Per patient reports being told that his lymphoma diagnosis was seperate from his 3rd cranial nerve palsy.  - CT neck/chest 3/22/2021 showed extensive adenopathy including left pectoralis lymph node mass measuring 7 x 3.7 cm. Extensive bilateral axillary adenopathy measuring up to 6.8 x 11.4 cm. Extensive mediastinal adenopathy noted, including right infrahilar region mass measuring 4.6 x 5.3 cm. Enlarged right adrenal gland measuring 4.1 x 4 cm. Extensive adenopathy in the reggie hepatis measuring 5 x 4.4 cm. PET scan could not be performed due to insurance issues. Patient seen by hematology Dr. Sarah Boo. Core needle biopsy of left chest wall mass showed high-grade B-cell malignancy with FISH testing revealing rearrangement of BCL 6/3 q. and additional signals for BCL-2/18 q. Significantly no evidence of MYC rearrangement noted. Bone marrow biopsy without lymphoma involvement. 2D echo with normal EF. LDH elevated at 311. Hepatitis B negative. He was diagnosed with DLBCL, stage IIIb, IPI score 2.  - R-CHOP x6 (from 4/6/2021 till 7/28/2021. PET scan 8/13/2021 with essentially CR and minimal FDG uptake in the right axillary lymph node with FDG 1.2.  -11/16/2021 CT CAP with disease relapse including new left supraclavicular mass measuring 5.5 x 3.4 cm.   Stable axillary and chest wall lymph nodes as well as shotty retroperitoneal lymph nodes. Ultrasound-guided left supraclavicular lymph node biopsy 11/22/2021 showing monoclonal B-cell population with increased cell size, results consistent with mature B-cell lymphoproliferative disorder. Patient now referred to us for further work-up and management. PLAN:  Relapsed DLBCL  - s/p R-CHOP April - July 2021  - In 11/2021, new supraclavicular mass bx +relapsed DLBCL  - Admit for R-ICE with IT MTX. IR consulted for LP/IT chemo. - Echo and CT CAP ordered  12/7 Echo with EF 60-65% with mod to severe eccentric mitral regurgitation (Dr Josselyn Palencia notified). CT CAP with stable disease. Day 2 R-ICE. Awaiting LP/IT MTX. Tolerating treatment well thus far. 12/8 Day 3 RICE, tolerating treatment well. LP/IT MTX today. 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  Per neurology, no further neuro w/u necessary. Continue home meds  Ppx meds: Acyclovir, Diflucan  Rhea SOPs  Lovenox for DVT ppx (hold for plt <50k)    Goals and plan of care reviewed with the patient. All questions answered to the best of our ability. Disposition:  Anticipate discharge home after the completion of chemotherapy tomorrow evening.               Edmond Wakefield NP   [facility]  [address] [address]  Office : [phone]  Fax : [phone]

## 2021-12-08 NOTE — PROGRESS NOTES
END OF SHIFT NOTE:    Intake/Output  No intake/output data recorded. Voiding: YES  Catheter: NO  Drain:              Stool:  0 occurrences. Emesis:  0 occurrences. VITAL SIGNS  Patient Vitals for the past 12 hrs:   Temp Pulse Resp BP SpO2   12/07/21 1534 97.6 °F (36.4 °C) 73 20 (!) 147/80 98 %   12/07/21 1204 97.5 °F (36.4 °C) 78 20 (!) 147/82 97 %   12/07/21 1109    138/80    12/07/21 0805 97.6 °F (36.4 °C) 76 18 (!) 144/78 97 %       Pain Assessment  Pain 1  Pain Scale 1: Numeric (0 - 10) (12/07/21 0840)  Pain Intensity 1: 0 (12/07/21 0840)  Patient Stated Pain Goal: 0 (12/07/21 0840)  Pain Reassessment 1: Patient resting w/respiratory rate greater than 10 (12/06/21 1200)    Ambulating  Yes    Additional Information: IT chemo  time 11:30 AM tomorrow     Shift report given to oncoming nurse at the bedside.     Jacob Chambers RN

## 2021-12-08 NOTE — PROCEDURES
Department of Interventional Radiology  [phone]        Interventional Radiology Brief Procedure Note    Patient: Kasia Dong MRN: [id number]  SSN: [id number]    YOB: 1965  Age: 64 y.o. Sex: male      Date of Procedure: 12/8/2021    Pre-Procedure Diagnosis: lymphoma    Post-Procedure Diagnosis: SAME    Procedure(s): Lumbar Puncture  IT chemotherapy    Brief Description of Procedure: as above.       Performed By: Fer Palmer PA-C     Assistants: None    Anesthesia:Lidocaine    Estimated Blood Loss: 0    Specimens:  CSF    Implants:  None    Findings: opening pressure <81 cm D06    Complications: None    Recommendations: bedrest     Follow Up: prn    Signed By: Fer Palmer PA-C     December 8, 2021

## 2021-12-08 NOTE — PROGRESS NOTES
TRANSFER - OUT REPORT:    Verbal report given to TRAV Herman on Jakub Morse  being transferred to IR prep/recovery room #6 for routine progression of care       Report consisted of patients Situation, Background, Assessment and   Recommendations(SBAR). Information from the following report(s) SBAR, Kardex, Procedure Summary and MAR was reviewed with the receiving nurse. Lines:   Venous Access Device 12/06/21 (Active)   Central Line Being Utilized Yes 12/08/21 5423   Criteria for Appropriate Use Irritant/vesicant medication 12/08/21 0904   Site Assessment Clean, dry, & intact 12/08/21 0904   Date of Last Dressing Change 12/06/21 12/08/21 0904   Dressing Status Clean, dry, & intact 12/08/21 0904   Dressing Type Disk with Chlorhexadine gluconate (CHG); Transparent 12/08/21 0904   Action Taken Other (comment) 12/08/21 0904   Date Accessed (Medial Site) 12/06/21 12/08/21 0758   Positive Blood Return (Medial Site) Yes 12/08/21 0758   Action Taken (Medial Site) Infusing 12/08/21 0904   Date Needle Changed (Medial Site) 12/06/21 12/08/21 0758   Alcohol Cap Used No 12/08/21 0904        Opportunity for questions and clarification was provided. Patient transported with:   Registered Nurse      PA was only able to obtain 1cc of cerebrospinal fluid after a long attempt; proceeded with ITC.

## 2021-12-09 PROBLEM — C83.30 DIFFUSE LARGE B CELL LYMPHOMA (HCC): Status: ACTIVE | Noted: 2021-12-09

## 2021-12-09 PROBLEM — I10 HYPERTENSION: Status: ACTIVE | Noted: 2021-12-09

## 2021-12-09 PROBLEM — I34.0 MITRAL REGURGITATION: Status: ACTIVE | Noted: 2021-12-09

## 2021-12-09 LAB
ALBUMIN SERPL-MCNC: 3.3 G/DL (ref 3.5–5)
ALBUMIN/GLOB SERPL: 1.2 {RATIO} (ref 1.2–3.5)
ALP SERPL-CCNC: 94 U/L (ref 50–136)
ALT SERPL-CCNC: 24 U/L (ref 12–65)
ANION GAP SERPL CALC-SCNC: 5 MMOL/L (ref 7–16)
AST SERPL-CCNC: 16 U/L (ref 15–37)
B PERT DNA SPEC QL NAA+PROBE: NOT DETECTED
BASOPHILS # BLD: 0 K/UL (ref 0–0.2)
BASOPHILS NFR BLD: 0 % (ref 0–2)
BILIRUB SERPL-MCNC: 0.4 MG/DL (ref 0.2–1.1)
BORDETELLA PARAPERTUSSIS PCR, BORPAR: NOT DETECTED
BUN SERPL-MCNC: 16 MG/DL (ref 6–23)
C PNEUM DNA SPEC QL NAA+PROBE: NOT DETECTED
CALCIUM SERPL-MCNC: 8.9 MG/DL (ref 8.3–10.4)
CHLORIDE SERPL-SCNC: 110 MMOL/L (ref 98–107)
CO2 SERPL-SCNC: 28 MMOL/L (ref 21–32)
COVID-19 RAPID TEST, COVR: NOT DETECTED
CREAT SERPL-MCNC: 0.64 MG/DL (ref 0.8–1.5)
DIFFERENTIAL METHOD BLD: ABNORMAL
EOSINOPHIL # BLD: 0 K/UL (ref 0–0.8)
EOSINOPHIL NFR BLD: 0 % (ref 0.5–7.8)
ERYTHROCYTE [DISTWIDTH] IN BLOOD BY AUTOMATED COUNT: 12.4 % (ref 11.9–14.6)
FLUAV SUBTYP SPEC NAA+PROBE: NOT DETECTED
FLUBV RNA SPEC QL NAA+PROBE: NOT DETECTED
GLOBULIN SER CALC-MCNC: 2.7 G/DL (ref 2.3–3.5)
GLUCOSE SERPL-MCNC: 145 MG/DL (ref 65–100)
HADV DNA SPEC QL NAA+PROBE: NOT DETECTED
HCOV 229E RNA SPEC QL NAA+PROBE: NOT DETECTED
HCOV HKU1 RNA SPEC QL NAA+PROBE: NOT DETECTED
HCOV NL63 RNA SPEC QL NAA+PROBE: NOT DETECTED
HCOV OC43 RNA SPEC QL NAA+PROBE: NOT DETECTED
HCT VFR BLD AUTO: 33.3 % (ref 41.1–50.3)
HGB BLD-MCNC: 11.2 G/DL (ref 13.6–17.2)
HMPV RNA SPEC QL NAA+PROBE: NOT DETECTED
HPIV1 RNA SPEC QL NAA+PROBE: NOT DETECTED
HPIV2 RNA SPEC QL NAA+PROBE: NOT DETECTED
HPIV3 RNA SPEC QL NAA+PROBE: NOT DETECTED
HPIV4 RNA SPEC QL NAA+PROBE: NOT DETECTED
IMM GRANULOCYTES # BLD AUTO: 0.3 K/UL (ref 0–0.5)
IMM GRANULOCYTES NFR BLD AUTO: 3 % (ref 0–5)
LDH SERPL L TO P-CCNC: 293 U/L (ref 100–190)
LYMPHOCYTES # BLD: 0.4 K/UL (ref 0.5–4.6)
LYMPHOCYTES NFR BLD: 5 % (ref 13–44)
M PNEUMO DNA SPEC QL NAA+PROBE: NOT DETECTED
MAGNESIUM SERPL-MCNC: 2.2 MG/DL (ref 1.8–2.4)
MCH RBC QN AUTO: 28.6 PG (ref 26.1–32.9)
MCHC RBC AUTO-ENTMCNC: 33.6 G/DL (ref 31.4–35)
MCV RBC AUTO: 85.2 FL (ref 79.6–97.8)
MONOCYTES # BLD: 0.2 K/UL (ref 0.1–1.3)
MONOCYTES NFR BLD: 2 % (ref 4–12)
NEUTS SEG # BLD: 7.7 K/UL (ref 1.7–8.2)
NEUTS SEG NFR BLD: 90 % (ref 43–78)
NRBC # BLD: 0 K/UL (ref 0–0.2)
PLATELET # BLD AUTO: 220 K/UL (ref 150–450)
PLATELET COMMENTS,PCOM: ADEQUATE
PMV BLD AUTO: 10.3 FL (ref 9.4–12.3)
POTASSIUM SERPL-SCNC: 3.9 MMOL/L (ref 3.5–5.1)
PROT SERPL-MCNC: 6 G/DL (ref 6.3–8.2)
RBC # BLD AUTO: 3.91 M/UL (ref 4.23–5.6)
RBC MORPH BLD: ABNORMAL
RSV RNA SPEC QL NAA+PROBE: NOT DETECTED
RV+EV RNA SPEC QL NAA+PROBE: NOT DETECTED
SARS-COV-2 PCR, COVPCR: NOT DETECTED
SODIUM SERPL-SCNC: 143 MMOL/L (ref 136–145)
SOURCE, COVRS: NORMAL
URATE SERPL-MCNC: 2.7 MG/DL (ref 2.6–6)
WBC # BLD AUTO: 8.6 K/UL (ref 4.3–11.1)
WBC MORPH BLD: ABNORMAL

## 2021-12-09 PROCEDURE — 74011250636 HC RX REV CODE- 250/636: Performed by: INTERNAL MEDICINE

## 2021-12-09 PROCEDURE — 36591 DRAW BLOOD OFF VENOUS DEVICE: CPT

## 2021-12-09 PROCEDURE — 99232 SBSQ HOSP IP/OBS MODERATE 35: CPT | Performed by: INTERNAL MEDICINE

## 2021-12-09 PROCEDURE — 74011250637 HC RX REV CODE- 250/637: Performed by: NURSE PRACTITIONER

## 2021-12-09 PROCEDURE — 74011250636 HC RX REV CODE- 250/636: Performed by: NURSE PRACTITIONER

## 2021-12-09 PROCEDURE — 65270000029 HC RM PRIVATE

## 2021-12-09 PROCEDURE — APPSS30 APP SPLIT SHARED TIME 16-30 MINUTES: Performed by: NURSE PRACTITIONER

## 2021-12-09 PROCEDURE — 0202U NFCT DS 22 TRGT SARS-COV-2: CPT

## 2021-12-09 PROCEDURE — 74011250637 HC RX REV CODE- 250/637: Performed by: INTERNAL MEDICINE

## 2021-12-09 PROCEDURE — 84550 ASSAY OF BLOOD/URIC ACID: CPT

## 2021-12-09 PROCEDURE — 87635 SARS-COV-2 COVID-19 AMP PRB: CPT

## 2021-12-09 PROCEDURE — 80053 COMPREHEN METABOLIC PANEL: CPT

## 2021-12-09 PROCEDURE — 3E0R305 INTRODUCTION OF OTHER ANTINEOPLASTIC INTO SPINAL CANAL, PERCUTANEOUS APPROACH: ICD-10-PCS | Performed by: RADIOLOGY

## 2021-12-09 PROCEDURE — 83735 ASSAY OF MAGNESIUM: CPT

## 2021-12-09 PROCEDURE — 009U3ZX DRAINAGE OF SPINAL CANAL, PERCUTANEOUS APPROACH, DIAGNOSTIC: ICD-10-PCS | Performed by: RADIOLOGY

## 2021-12-09 PROCEDURE — 99253 IP/OBS CNSLTJ NEW/EST LOW 45: CPT | Performed by: INTERNAL MEDICINE

## 2021-12-09 PROCEDURE — APPNB15 APP NON BILLABLE TIME 0-15 MINS: Performed by: NURSE PRACTITIONER

## 2021-12-09 PROCEDURE — 83615 LACTATE (LD) (LDH) ENZYME: CPT

## 2021-12-09 PROCEDURE — 85025 COMPLETE CBC W/AUTO DIFF WBC: CPT

## 2021-12-09 RX ORDER — BACLOFEN 10 MG/1
5 TABLET ORAL 3 TIMES DAILY
Status: DISCONTINUED | OUTPATIENT
Start: 2021-12-09 | End: 2021-12-10 | Stop reason: HOSPADM

## 2021-12-09 RX ORDER — LISINOPRIL 5 MG/1
5 TABLET ORAL
Status: COMPLETED | OUTPATIENT
Start: 2021-12-09 | End: 2021-12-09

## 2021-12-09 RX ORDER — AMOXICILLIN 250 MG
1 CAPSULE ORAL DAILY
Status: DISCONTINUED | OUTPATIENT
Start: 2021-12-09 | End: 2021-12-10 | Stop reason: HOSPADM

## 2021-12-09 RX ORDER — ONDANSETRON 8 MG/1
8 TABLET, ORALLY DISINTEGRATING ORAL
Qty: 90 TABLET | Refills: 0 | Status: SHIPPED | OUTPATIENT
Start: 2021-12-09 | End: 2021-12-17

## 2021-12-09 RX ORDER — CALCIUM CARBONATE 200(500)MG
200 TABLET,CHEWABLE ORAL
Status: DISCONTINUED | OUTPATIENT
Start: 2021-12-09 | End: 2021-12-10 | Stop reason: HOSPADM

## 2021-12-09 RX ORDER — PROCHLORPERAZINE MALEATE 10 MG
5-10 TABLET ORAL
Qty: 60 TABLET | Refills: 0 | Status: ON HOLD | OUTPATIENT
Start: 2021-12-09 | End: 2021-12-27

## 2021-12-09 RX ORDER — ALLOPURINOL 300 MG/1
300 TABLET ORAL DAILY
Qty: 30 TABLET | Refills: 0 | Status: SHIPPED | OUTPATIENT
Start: 2021-12-10 | End: 2021-12-17

## 2021-12-09 RX ORDER — ACYCLOVIR 400 MG/1
400 TABLET ORAL 2 TIMES DAILY
Qty: 60 TABLET | Refills: 0 | Status: SHIPPED | OUTPATIENT
Start: 2021-12-09 | End: 2022-01-06

## 2021-12-09 RX ORDER — POLYETHYLENE GLYCOL 3350 17 G/17G
17 POWDER, FOR SOLUTION ORAL DAILY
Status: DISCONTINUED | OUTPATIENT
Start: 2021-12-09 | End: 2021-12-10 | Stop reason: HOSPADM

## 2021-12-09 RX ORDER — PANTOPRAZOLE SODIUM 40 MG/1
40 TABLET, DELAYED RELEASE ORAL
Status: DISCONTINUED | OUTPATIENT
Start: 2021-12-09 | End: 2021-12-10 | Stop reason: HOSPADM

## 2021-12-09 RX ORDER — FLUCONAZOLE 200 MG/1
200 TABLET ORAL DAILY
Qty: 30 TABLET | Refills: 0 | Status: SHIPPED | OUTPATIENT
Start: 2021-12-10 | End: 2022-03-09 | Stop reason: ALTCHOICE

## 2021-12-09 RX ORDER — ATENOLOL 50 MG/1
25 TABLET ORAL DAILY
Status: DISCONTINUED | OUTPATIENT
Start: 2021-12-09 | End: 2021-12-10 | Stop reason: HOSPADM

## 2021-12-09 RX ORDER — LISINOPRIL 5 MG/1
10 TABLET ORAL DAILY
Status: DISCONTINUED | OUTPATIENT
Start: 2021-12-10 | End: 2021-12-10 | Stop reason: HOSPADM

## 2021-12-09 RX ADMIN — ONDANSETRON 8 MG: 2 INJECTION INTRAMUSCULAR; INTRAVENOUS at 16:06

## 2021-12-09 RX ADMIN — BACLOFEN 5 MG: 10 TABLET ORAL at 21:23

## 2021-12-09 RX ADMIN — ALLOPURINOL 300 MG: 300 TABLET ORAL at 08:51

## 2021-12-09 RX ADMIN — BACLOFEN 5 MG: 10 TABLET ORAL at 16:06

## 2021-12-09 RX ADMIN — DEXAMETHASONE SODIUM PHOSPHATE 10 MG: 10 INJECTION INTRAMUSCULAR; INTRAVENOUS at 16:06

## 2021-12-09 RX ADMIN — ATENOLOL 25 MG: 50 TABLET ORAL at 16:06

## 2021-12-09 RX ADMIN — ANTACID TABLETS 200 MG: 500 TABLET, CHEWABLE ORAL at 12:27

## 2021-12-09 RX ADMIN — PANTOPRAZOLE SODIUM 40 MG: 40 TABLET, DELAYED RELEASE ORAL at 12:19

## 2021-12-09 RX ADMIN — FLUCONAZOLE 200 MG: 100 TABLET ORAL at 08:51

## 2021-12-09 RX ADMIN — DOCUSATE SODIUM 50MG AND SENNOSIDES 8.6MG 1 TABLET: 8.6; 5 TABLET, FILM COATED ORAL at 09:21

## 2021-12-09 RX ADMIN — Medication 10 ML: at 21:36

## 2021-12-09 RX ADMIN — ACYCLOVIR 400 MG: 800 TABLET ORAL at 08:51

## 2021-12-09 RX ADMIN — ETOPOSIDE 200 MG: 20 INJECTION INTRAVENOUS at 17:42

## 2021-12-09 RX ADMIN — LISINOPRIL 5 MG: 5 TABLET ORAL at 08:51

## 2021-12-09 RX ADMIN — ONDANSETRON 8 MG: 2 INJECTION INTRAMUSCULAR; INTRAVENOUS at 00:32

## 2021-12-09 RX ADMIN — SODIUM CHLORIDE 75 ML/HR: 900 INJECTION, SOLUTION INTRAVENOUS at 00:45

## 2021-12-09 RX ADMIN — POLYETHYLENE GLYCOL 3350 17 G: 17 POWDER, FOR SOLUTION ORAL at 09:21

## 2021-12-09 RX ADMIN — LISINOPRIL 5 MG: 5 TABLET ORAL at 12:12

## 2021-12-09 RX ADMIN — ACYCLOVIR 400 MG: 800 TABLET ORAL at 17:47

## 2021-12-09 RX ADMIN — ONDANSETRON 8 MG: 2 INJECTION INTRAMUSCULAR; INTRAVENOUS at 08:51

## 2021-12-09 RX ADMIN — ONDANSETRON 8 MG: 2 INJECTION INTRAMUSCULAR; INTRAVENOUS at 23:06

## 2021-12-09 RX ADMIN — ENOXAPARIN SODIUM 40 MG: 100 INJECTION SUBCUTANEOUS at 12:13

## 2021-12-09 NOTE — PROGRESS NOTES
RN reports pt c/o heartburn and hiccups. Orders placed for Protonix, Tums, and Baclofen.         Nesha Maxwell, AMAN  [facility]

## 2021-12-09 NOTE — CONSULTS
[facility] Cardiology Initial Cardiac Evaluation                 Date of  Admission: 12/6/2021 11:21 AM     Primary Care Physician: None  Primary Cardiologist: None  Referring Physician: Dr Cecilia Quigley physician: Dr Charleen Joshi    CC: Mitral regurgitation      Daniel Choe is a 64 y.o. male admitted for Admission for antineoplastic chemotherapy [Z51.11] for diffuse large B-cell lymphoma. .  He has a h/o htn and  diffuse large B cell lymphoma dx 3-2021 s/p treatment w R-CHOP w remission. Echo 3-2021 w EF 60-65% w mild LVH, no valvular abnormalities.  he had recurrence of lymphoma. He is being admitted to start chemo w R-ICE w intrathecal methotrexate. 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 Echo 12-7-21 w EF 60-65% w mod-severe MR. No EKG on admission, /78.   -He denies any anginal chest discomfort, has had some increasing fatigue and has some mild dyspnea on exertion but denies orthopnea PND. He denies ever being told he had a previous murmur. He also has not really seen anybody for medical care with lack of insurance.     Soc: No tobacco use  FH: Neg CAD    Patient Active Problem List   Diagnosis Code    Admission for antineoplastic chemotherapy Z51.11       Past Medical History:   Diagnosis Date    Hypertension       Past Surgical History:   Procedure Laterality Date    HX BACK SURGERY      26 years ago    IR CHOLECYSTOSTOMY PERCUTANEOUS       No Known Allergies   Family History   Problem Relation Age of Onset    Diabetes Mother     Diabetes Father     Hypertension Brother       Social History     Tobacco Use    Smoking status: Never Smoker    Smokeless tobacco: Never Used   Substance Use Topics    Alcohol use: Not Currently        Current Facility-Administered Medications   Medication Dose Route Frequency    dexamethasone (DECADRON) 10 mg/mL injection 10 mg  10 mg IntraVENous Q24H    ondansetron (ZOFRAN) injection 8 mg  8 mg IntraVENous Q8H    prochlorperazine (COMPAZINE) with saline injection 10 mg  10 mg IntraVENous Q6H PRN    diphenhydrAMINE (BENADRYL) injection 25 mg  25 mg IntraVENous Q6H PRN    LORazepam (ATIVAN) injection 0.5 mg  0.5 mg IntraVENous Q6H PRN    etoposide (VEPESID) 200 mg in 0.9% sodium chloride 500 mL chemo infusion  200 mg IntraVENous Q24H    ifosfamide (IFEX) 10,000 mg, mesna (MESNEX) 10,000 mg in 0.9% sodium chloride 1,000 mL chemo infusion  10,000 mg IntraVENous CONTINUOUS    lisinopriL (PRINIVIL, ZESTRIL) tablet 5 mg  5 mg Oral DAILY    0.9% sodium chloride infusion  75 mL/hr IntraVENous CONTINUOUS    enoxaparin (LOVENOX) injection 40 mg  40 mg SubCUTAneous Q24H    ondansetron (ZOFRAN) injection 4 mg  4 mg IntraVENous Q4H PRN    prochlorperazine (COMPAZINE) with saline injection 5 mg  5 mg IntraVENous Q6H PRN    HYDROcodone-acetaminophen (NORCO) 5-325 mg per tablet 1 Tablet  1 Tablet Oral Q6H PRN    morphine injection 2 mg  2 mg IntraVENous Q4H PRN    allopurinoL (ZYLOPRIM) tablet 300 mg  300 mg Oral DAILY    acyclovir (ZOVIRAX) tablet 400 mg  400 mg Oral BID    fluconazole (DIFLUCAN) tablet 200 mg  200 mg Oral DAILY    diphenhydrAMINE (BENADRYL) injection 25 mg  25 mg IntraVENous PRN    acetaminophen (TYLENOL) tablet 650 mg  650 mg Oral PRN    meperidine (DEMEROL) injection 25 mg  25 mg IntraVENous PRN    ondansetron (ZOFRAN) injection 8 mg  8 mg IntraVENous PRN    heparin (porcine) pf 300 Units  300 Units InterCATHeter PRN    central line flush (saline) syringe 10 mL  10 mL InterCATHeter PRN       Review of Symptoms:  General: + weight loss,  no weakness, fever or chills  Skin: no rashes, lumps, or other skin changes  HEENT: no headache, dizziness, lightheadedness, vision changes, hearing changes, tinnitus, vertigo, sinus pressure/pain, bleeding gums, sore throat, or hoarseness  Neck: no swollen glands, goiter, pain or stiffness  Respiratory: no cough, sputum, hemoptysis, no dyspnea, wheezing  Cardiovascular: + as per HPI  Gastrointestinal: no GERD, constipation, diarrhea, liver problems, or h/o GI bleed  Urinary: no frequency, urgency , hematuria, burning/pain with urination, recent flank pain, polyuria, nocturia, or difficulty urinating  Peripheral Vascular: no claudication, leg cramps, prior DVTs, swelling of calves, legs, or feet, color change, or swelling with redness or tenderness  Musculoskeletal: no muscle or joint pain/stiffness, joint swelling, erythema of joints, or back pain  Psychiatric: no depression or excessive stress  Neurological: no sensory or motor loss, seizures, syncope, tremors, numbness, no dementia  Hematologic: no anemia, easy bruising or bleeding  Endocrine: no thyroid problems, heat or cold intolerance, excessive sweating, polyuria, polydipsia, no  diabetes. Physical Exam  Vitals:    12/08/21 1557 12/08/21 2012 12/08/21 2238 12/09/21 0358   BP: (!) 168/82 (!) 179/87 (!) 157/84 (!) 156/72   Pulse: 90 99 91 86   Resp: 18 20 18 16   Temp: 97.9 °F (36.6 °C) 98.3 °F (36.8 °C) 98.4 °F (36.9 °C) 97.5 °F (36.4 °C)   SpO2: 98% 99% 98%    Weight:  84.6 kg (186 lb 8 oz)     Height:           Physical Exam:  General: Well Developed, Well Nourished, No Acute Distress  Head: normocephalic atraumatic   ENT: pupils equal and round, no abnormalities noted  Neck: supple, no JVD, no carotid bruits  Heart: S1S2 with RRR , no rubs or gallops, loud 3/6 holosystolic murmur heard at the apex.   Lungs: Clear throughout auscultation bilaterally without adventitious sounds  Abd: soft, nontender, nondistended, with good bowel sounds  Ext: warm, no edema, calves supple/nontender, pulses 2+ bilaterally  Skin: warm and dry  Psychiatric: Normal mood and affect  Neurologic: Alert and oriented X 3      Labs:   Recent Labs     12/09/21  0340 12/08/21  0256    140   K 3.9 4.0   MG 2.2 2.3   BUN 16 12   CREA 0.64* 0.85   * 188*   WBC 8.6 3.4*   HGB 11.2* 11.9*   HCT 33.3* 35.5*    225        Assessment/Plan: Assessment:   Admission for antineoplastic chemotherapy (12/6/2021)- s/p  R-ICE and intrathecal methotrexate    Diffuse large B cell lymphoma ([address] (12/9/2021)- per onc  -On appropriate chemotherapy. 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 This could be from myxomatous degeneration.  -I agree that a ERINN would be beneficial to rule out significant mitral valve disease. We can try to see if we can have this scheduled tomorrow. Better control of blood pressures should also help his mitral regurgitation.  -If there is any suspicion for underlying bacteremia/infection, blood cultures can be considered.    -Keep him n.p.o. after midnight.  -Plans discussed with Thomas Coulter NP. Thank you very much for this referral. We appreciate the opportunity to participate in this patient's care. We will follow along with above stated plan.           Consulting MD: Lito Hernandez

## 2021-12-09 NOTE — PROGRESS NOTES
[facility]        Inpatient Hematology / Oncology Progress Note    Reason for Admission:  Admission for antineoplastic chemotherapy [Z51.11]    24 Hour Events:  Afebrile, hypertensive  Day 4 RICE  S/p LP/IT MTX yesterday, flow pending  Cards consulted for mitral regurgitation    Transfusions: None  Replacements: None    ROS:  Constitutional: Negative for fever, chills, weakness, malaise, fatigue. CV: Negative for chest pain, palpitations, edema. Respiratory: Negative for dyspnea, cough, wheezing. GI: Negative for nausea, abdominal pain, diarrhea. 10 point review of systems is otherwise negative with the exception of the elements mentioned above in the HPI.        No Known Allergies  Past Medical History:   Diagnosis Date    Hypertension      Past Surgical History:   Procedure Laterality Date    HX BACK SURGERY      26 years ago    IR CHOLECYSTOSTOMY PERCUTANEOUS      IR INTRATHECAL CHEMO INJECTION CNS  12/8/2021     Family History   Problem Relation Age of Onset    Diabetes Mother     Diabetes Father     Hypertension Brother      Social History     Socioeconomic History    Marital status:      Spouse name: Not on file    Number of children: Not on file    Years of education: Not on file    Highest education level: Not on file   Occupational History    Not on file   Tobacco Use    Smoking status: Never Smoker    Smokeless tobacco: Never Used   Vaping Use    Vaping Use: Never used   Substance and Sexual Activity    Alcohol use: Not Currently    Drug use: Not Currently    Sexual activity: Not on file   Other Topics Concern    Not on file   Social History Narrative    Not on file     Social Determinants of Health     Financial Resource Strain:     Difficulty of Paying Living Expenses: Not on file   Food Insecurity:     Worried About Running Out of Food in the Last Year: Not on file    Sayra of Food in the Last Year: Not on file   Transportation Needs:     Lack of Transportation (Medical): Not on file    Lack of Transportation (Non-Medical):  Not on file   Physical Activity:     Days of Exercise per Week: Not on file    Minutes of Exercise per Session: Not on file   Stress:     Feeling of Stress : Not on file   Social Connections:     Frequency of Communication with Friends and Family: Not on file    Frequency of Social Gatherings with Friends and Family: Not on file    Attends Restoration Services: Not on file    Active Member of [address] [place] [address] or Organizations: Not on file    Attends Club or Organization Meetings: Not on file    Marital Status: Not on file   Intimate Partner Violence:     Fear of Current or Ex-Partner: Not on file    Emotionally Abused: Not on file    Physically Abused: Not on file    Sexually Abused: Not on file   Housing Stability:     Unable to Pay for Housing in the Last Year: Not on file    Number of [place] in the Last Year: Not on file    Unstable Housing in the Last Year: Not on file     Current Facility-Administered Medications   Medication Dose Route Frequency Provider Last Rate Last Admin    polyethylene glycol (MIRALAX) packet 17 g  17 g Oral DAILY Kishore Barros MD   17 g at 12/09/21 2558    senna-docusate (PERICOLACE) 8.6-50 mg per tablet 1 Tablet  1 Tablet Oral DAILY Kishore Barros MD   1 Tablet at 12/09/21 0921    [START ON 12/10/2021] lisinopriL (PRINIVIL, ZESTRIL) tablet 10 mg  10 mg Oral DAILY Francia Elise NP        lisinopriL (PRINIVIL, ZESTRIL) tablet 5 mg  5 mg Oral NOW Francia Elise NP        dexamethasone (DECADRON) 10 mg/mL injection 10 mg  10 mg IntraVENous Q24H Kishore Barros MD   10 mg at 12/08/21 1704    ondansetron (ZOFRAN) injection 8 mg  8 mg IntraVENous Q8H Kishore Barros MD   8 mg at 12/09/21 0851    prochlorperazine (COMPAZINE) with saline injection 10 mg  10 mg IntraVENous Q6H PRN Kishore Barros MD        diphenhydrAMINE (BENADRYL) injection 25 mg  25 mg IntraVENous Q6H PRN Britany Cuellar Thuan Payne MD        LORazepam (ATIVAN) injection 0.5 mg  0.5 mg IntraVENous Q6H PRN Karoline Harris MD        etoposide (VEPESID) 200 mg in 0.9% sodium chloride 500 mL chemo infusion  200 mg IntraVENous Q24H Karoline Harris  mL/hr at 12/08/21 1834 200 mg at 12/08/21 1834    ifosfamide (IFEX) 10,000 mg, mesna (MESNEX) 10,000 mg in 0.9% sodium chloride 1,000 mL chemo infusion  10,000 mg IntraVENous CONTINUOUS Karoline Harris MD 50 mL/hr at 12/08/21 2100 10,000 mg at 12/08/21 2100    0.9% sodium chloride infusion  75 mL/hr IntraVENous CONTINUOUS Isaacchaparrita Prince, NP 75 mL/hr at 12/09/21 0045 75 mL/hr at 12/09/21 0045    enoxaparin (LOVENOX) injection 40 mg  40 mg SubCUTAneous Q24H Isaac Postin, NP   40 mg at 12/07/21 1145    ondansetron (ZOFRAN) injection 4 mg  4 mg IntraVENous Q4H PRN Isaac Postin, NP        prochlorperazine (COMPAZINE) with saline injection 5 mg  5 mg IntraVENous Q6H PRN Isaac Postin, NP        HYDROcodone-acetaminophen (NORCO) 5-325 mg per tablet 1 Tablet  1 Tablet Oral Q6H PRN Isaac Postin, NP        morphine injection 2 mg  2 mg IntraVENous Q4H PRN Isaac Postin, NP        allopurinoL (ZYLOPRIM) tablet 300 mg  300 mg Oral DAILY Isaac Postin, NP   300 mg at 12/09/21 0851    acyclovir (ZOVIRAX) tablet 400 mg  400 mg Oral BID Isaac Postin, NP   400 mg at 12/09/21 0851    fluconazole (DIFLUCAN) tablet 200 mg  200 mg Oral DAILY Isaac Postin, NP   200 mg at 12/09/21 0851    diphenhydrAMINE (BENADRYL) injection 25 mg  25 mg IntraVENous PRN Karoline Harris MD        acetaminophen (TYLENOL) tablet 650 mg  650 mg Oral PRN Karoline Harris MD        meperidine (DEMEROL) injection 25 mg  25 mg IntraVENous PRN Karoline Harris MD        [facility] injection 8 mg  8 mg IntraVENous PRN Karoline Harris MD        heparin (porcine) pf 300 Units  300 Units InterCATHeter PRN Karoline Harris MD        central line flush (saline) syringe 10 mL  10 mL InterCATHeter PRN Karoline Harris, MD   10 mL at 216       OBJECTIVE:  Patient Vitals for the past 8 hrs:   BP Temp Pulse Resp SpO2   21 0816 (!) 156/78 97.7 °F (36.5 °C) 77 18 98 %   21 0358 (!) 156/72 97.5 °F (36.4 °C) 86 16      Temp (24hrs), Av.9 °F (36.6 °C), Min:97.4 °F (36.3 °C), Max:98.4 °F (36.9 °C)    701 - 1900  In: 480 [P.O.:480]  Out: -     Physical Exam:  Constitutional: Well developed, well nourished male in no acute distress, sitting comfortably in the hospital bed. HEENT: Normocephalic and atraumatic. Oropharynx is clear, mucous membranes are moist.  +L eye palsy (wearing eye patch). Sclerae anicteric. Neck supple without JVD. No thyromegaly present. Skin Warm and dry. No bruising noted. Erythematous macular rash to RUE. Respiratory Lungs are clear to auscultation bilaterally without wheezes, rales or rhonchi, normal air exchange without accessory muscle use. CVS Normal rate, regular rhythm and normal S1 and S2. No murmurs, gallops, or rubs. Abdomen Soft, nontender and nondistended, normoactive bowel sounds. No palpable mass. No hepatosplenomegaly. Neuro Grossly nonfocal with no obvious sensory or motor deficits. MSK Normal range of motion in general.  No edema and no tenderness. Psych Appropriate mood and affect. Labs:    Recent Results (from the past 24 hour(s))   METABOLIC PANEL, COMPREHENSIVE    Collection Time: 21  3:40 AM   Result Value Ref Range    Sodium 143 136 - 145 mmol/L    Potassium 3.9 3.5 - 5.1 mmol/L    Chloride 110 (H) 98 - 107 mmol/L    CO2 28 21 - 32 mmol/L    Anion gap 5 (L) 7 - 16 mmol/L    Glucose 145 (H) 65 - 100 mg/dL    BUN 16 6 - 23 MG/DL    Creatinine 0.64 (L) 0.8 - 1.5 MG/DL    GFR est AA >60 >60 ml/min/1.73m2    GFR est non-AA >60 >60 ml/min/1.73m2    Calcium 8.9 8.3 - 10.4 MG/DL    Bilirubin, total 0.4 0.2 - 1.1 MG/DL    ALT (SGPT) 24 12 - 65 U/L    AST (SGOT) 16 15 - 37 U/L    Alk.  phosphatase 94 50 - 136 U/L    Protein, total 6.0 (L) 6.3 - 8.2 g/dL    Albumin 3.3 (L) 3.5 - 5.0 g/dL    Globulin 2.7 2.3 - 3.5 g/dL    A-G Ratio 1.2 1.2 - 3.5     MAGNESIUM    Collection Time: 12/09/21  3:40 AM   Result Value Ref Range    Magnesium 2.2 1.8 - 2.4 mg/dL   CBC WITH AUTOMATED DIFF    Collection Time: 12/09/21  3:40 AM   Result Value Ref Range    WBC 8.6 4.3 - 11.1 K/uL    RBC 3.91 (L) 4.23 - 5.6 M/uL    HGB 11.2 (L) 13.6 - 17.2 g/dL    HCT 33.3 (L) 41.1 - 50.3 %    MCV 85.2 79.6 - 97.8 FL    MCH 28.6 26.1 - 32.9 PG    MCHC 33.6 31.4 - 35.0 g/dL    RDW 12.4 11.9 - 14.6 %    PLATELET 512 924 - 125 K/uL    MPV 10.3 9.4 - 12.3 FL    ABSOLUTE NRBC 0.00 0.0 - 0.2 K/uL    NEUTROPHILS 90 (H) 43 - 78 %    LYMPHOCYTES 5 (L) 13 - 44 %    MONOCYTES 2 (L) 4.0 - 12.0 %    EOSINOPHILS 0 (L) 0.5 - 7.8 %    BASOPHILS 0 0.0 - 2.0 %    IMMATURE GRANULOCYTES 3 0.0 - 5.0 %    ABS. NEUTROPHILS 7.7 1.7 - 8.2 K/UL    ABS. LYMPHOCYTES 0.4 (L) 0.5 - 4.6 K/UL    ABS. MONOCYTES 0.2 0.1 - 1.3 K/UL    ABS. EOSINOPHILS 0.0 0.0 - 0.8 K/UL    ABS. BASOPHILS 0.0 0.0 - 0.2 K/UL    ABS. IMM.  GRANS. 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 FINDINGS:     BRAIN:     Midline structures including the corpus callosum, pituitary gland, optic nerves,   and cerebellum are well developed. The ventricles are within normal limits for the mild degree of global brain   parenchymal volume loss. There is no midline shift. The basilar cisterns are   patent. There is no cerebellar tonsillar ectopia or herniation.      There are T2 hyperintensities in the periventricular and subcortical white   matter, a nonspecific finding which likely represents chronic microangiopathy. Diffusion imaging shows no evidence of acute infarction or other acute   abnormality. The expected large intracranial vascular flow voids are preserved. There is no   evidence of intracranial blood products. There is no abnormal intra or extra-axial postcontrast enhancement.      There are no suspicious osseous lesions. ORBITS:     The optic nerves demonstrate normal morphology and signal characteristics. The globes, extraocular muscles, and lacrimal glands are unremarkable. There is no abnormal enhancement in the region of the superior orbital fissure. The cavernous sinuses enhance symmetrically and normally. There is no abnormal enhancement along the expected course of the cisternal   segment of cranial nerve III. There is a patent left posterior communicating artery is courses slightly more   caudad than anatomically typical (axial thin cut postcontrast image 96)    Impression:       1. There is a left posterior communicating artery, whose course is more caudad   than anatomically typical. It is difficult to discern the relationship of this   vessel and the left cisternal segment of cranial nerve III.  If there is a   concern for a neurovascular conflict causing the cranial nerve III palsy, the   patient could return for additional heavily T2-weighted thin cut images through   the expected position of the cisternal segment of cranial nerve III. 2. There is otherwise no definite MRI evidence of an etiology for the described   cranial nerve III palsy. 3. No evidence of intracranial manifestations of the systemic lymphoma. 4. Mild burden of chronic microangiopathy.       VOICE DICTATED BY: Dr. Brady Lincoln [[id number]] Collected: 12/07/21 0835   Order Status: Completed Updated: 12/07/21 0853   Narrative:     CT of the Chest, Abdomen, and Pelvis     INDICATION: Follow-up diffuse large B-cell lymphoma. Multiple axial images were obtained through the chest, abdomen, and pelvis. Oral contrast was used for bowel opacification.  100mL of Isovue 370 intravenous   contrast was used for better evaluation of solid organs and vascular structures.    Radiation dose reduction techniques were used for this study.  All CT scans   performed at this facility use one or all of the following: Automated exposure   control, adjustment of the mA and/or kVp according to patient's size, iterative   reconstruction. COMPARISON: CT chest abdomen pelvis 11/16/2021. PET/CT 8/13/2021. FINDINGS:   LUNGS AND PLEURA: Stable anterior right middle lobe lung nodule again measures   0.5 cm. Additional stable 0.5 cm subpleural nodule left lower lobe on image 49. A few tiny left major fissure lymph nodes are also stable. No new or enlarging   lung lesions. No pleural fluid. MEDIASTINUM/AXILLAE: The left infraclavicular soft tissue mass on image 9 of   series 3 measures approximately 5.8 x 5.0 cm in greatest axial dimensions. This   measures approximately 3.3 cm craniocaudal dimension on image 63 of series 400. This previously measured 5.5 x 3.4 x 2.8 cm suggesting interval growth. Right   axillary lymph node on image 25 1.9 x 0.6 cm, previously remeasuring 2.2 x 1.0   cm suggesting mild interval improvement.  Left axillary lymph nodes appear stable   if not improved as well. No enlarged mediastinal or hilar lymph nodes. Right   chest port appears in good position. Heart is normal in size. Moderate coronary   artery calcification. Esophagus is unremarkable. Thyroid gland appears normal   where imaged. HEPATOBILIARY: Cholecystectomy. Liver is unremarkable. PANCREAS: Normal.     SPLEEN: Normal.     ADRENAL GLANDS: Normal.     KIDNEYS/BLADDER: Kidneys and bladder are unremarkable. No hydronephrosis. Excreted contrast is noted in the collecting systems and bladder. BOWEL: No bowel obstruction. LYMPH NODES: No enlarged abdominal or pelvic lymph nodes. Inguinal lymph nodes   are stable if not slightly smaller in size. VASCULATURE: Unremarkable. PELVIC ORGANS: Prostate gland and rectum are unremarkable. No pelvic free fluid   or mass. MUSCULOSKELETAL: Degenerative spine changes. No destructive bone lesions are   evident    Impression:     1. Stable right upper lobe and left lower lung nodules. No new lung lesions. 2. Slight interval growth in the left infraclavicular soft tissue mass when   compared to prior CT. This was not well appreciated on prior PET/CT imaging. This is likely the area of recurrent lymphoma. Remaining axillary and   intrathoracic lymph nodes are stable if not slightly smaller in size. No   enlarged abdominal or pelvic lymph nodes. ASSESSMENT:  Problem List  Never Reviewed          Codes Class Noted    Diffuse large B cell lymphoma ([facility] [address] ICD-10-CM: C83.30  ICD-9-CM: 202.80  12/9/2021        Hypertension ICD-10-CM: I10  ICD-9-CM: 401.9  12/9/2021        Mitral regurgitation ICD-10-CM: I34.0  ICD-9-CM: 424.0  12/9/2021        Admission for antineoplastic chemotherapy ICD-10-CM: Z51.11  ICD-9-CM: V58.11  12/6/2021            Mr. Verna Gallagher is a 64 y.o. male admitted on 12/6/2021 with relapsed DLBCL. He is a patient of Dr. Cheryl Apodaca with relapsed DLBCL. 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 He is s/p R-CHOP from April to July 2021. In November 2021, CT CAP showed disease relapse with new L supraclavicular mass as well as stable axillary and chest wall LNs and shotty RP LNs. He is s/p L supraclavicular LN bx +DLBCL. He is being admitted for C1 R-ICE with IT MTX. Heme History (copied):    66-year-old  male, history of herniated lumbar disc & back surgery, cholecystectomy, now kindly referred to us by Dr. Selina Egan area [address] [place] [address], for relapsed DLBCL. His hematologic history is as follows:     - 11/20: Presented with inability to keep left eye open and headaches, was seen by ophthalmology and diagnosed with 3rd cranial nerve palsy. Per records, brain imaging including MRI wo contrast 11/12/20 with no acute changes. CT head without contrast 11/12/20 unremarkable, CT angiogram with contrast 11/12/20 without acute findings. He was also seen by neurology Dr. Nilda Johnson. Headaches slowly resolved over time. - 03/21: On a follow-up visit, noted 30 pound weight loss. He was also found to have enlarged mass in left upper chest with diffusely palpable adenopathy in cervical area as well as bulky bilateral axillary adenopathy. Per patient, swelling developed over a 2-week, and was nontender. Per patient reports being told that his lymphoma diagnosis was seperate from his 3rd cranial nerve palsy.  - CT neck/chest 3/22/2021 showed extensive adenopathy including left pectoralis lymph node mass measuring 7 x 3.7 cm. Extensive bilateral axillary adenopathy measuring up to 6.8 x 11.4 cm. Extensive mediastinal adenopathy noted, including right infrahilar region mass measuring 4.6 x 5.3 cm. Enlarged right adrenal gland measuring 4.1 x 4 cm. Extensive adenopathy in the reggie hepatis measuring 5 x 4.4 cm. PET scan could not be performed due to insurance issues. Patient seen by hematology Dr. Karen Aguirre.   Core needle biopsy of left chest wall mass showed high-grade B-cell malignancy with FISH testing revealing rearrangement of BCL 6/3 q. and additional signals for BCL-2/18 q. Significantly no evidence of MYC rearrangement noted. Bone marrow biopsy without lymphoma involvement. 2D echo with normal EF. LDH elevated at 311. Hepatitis B negative. He was diagnosed with DLBCL, stage IIIb, IPI score 2.  - R-CHOP x6 (from 4/6/2021 till 7/28/2021. PET scan 8/13/2021 with essentially CR and minimal FDG uptake in the right axillary lymph node with FDG 1.2.  -11/16/2021 CT CAP with disease relapse including new left supraclavicular mass measuring 5.5 x 3.4 cm. Stable axillary and chest wall lymph nodes as well as shotty retroperitoneal lymph nodes. Ultrasound-guided left supraclavicular lymph node biopsy 11/22/2021 showing monoclonal B-cell population with increased cell size, results consistent with mature B-cell lymphoproliferative disorder. Patient now referred to us for further work-up and management. PLAN:  Relapsed DLBCL  - s/p R-CHOP April - July 2021  - In 11/2021, new supraclavicular mass bx +relapsed DLBCL  - Admit for R-ICE with IT MTX. IR consulted for LP/IT chemo. - Echo and CT CAP ordered  12/7 Echo with EF 60-65% with mod to severe eccentric mitral regurgitation (Dr Silvester Romberg notified). CT CAP with stable disease. Day 2 R-ICE. Awaiting LP/IT MTX. Tolerating treatment well thus far. 12/8 Day 3 RICE, tolerating treatment well. LP/IT MTX today. 12/9 Day 4 RICE. Doing well, no complaints. S/p LP/IT MTX yesterday, flow pending. 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  Per neurology, no further neuro w/u necessary. Mitral regurgitation  12/9 TTE with mod to severe mitral regurgitation. Dr. Silvester Romberg would like patient evaluated by cards while admitted. Consult placed.     Hypertension  - Con't home meds  12/9 Increase lisinopril to 10mg daily    Continue home meds  Ppx meds: Acyclovir, Gabi Wilkerson SOPs  Lovenox for DVT ppx (hold for plt <50k)    Goals and plan of care reviewed with the patient. All questions answered to the best of our ability. Disposition:  Anticipate discharge home after the completion of chemotherapy. Awaiting cards consult.               Gwen Chavez NP   [facility]  [address] [place] [address]  Office : [phone]  Fax : [phone]

## 2021-12-10 ENCOUNTER — HOSPITAL ENCOUNTER (OUTPATIENT)
Dept: CARDIAC CATH/INVASIVE PROCEDURES | Age: 56
Discharge: OTHER INSTITUTION | End: 2021-12-10
Attending: INTERNAL MEDICINE | Admitting: INTERNAL MEDICINE

## 2021-12-10 VITALS
SYSTOLIC BLOOD PRESSURE: 147 MMHG | DIASTOLIC BLOOD PRESSURE: 72 MMHG | WEIGHT: 190 LBS | BODY MASS INDEX: 25.73 KG/M2 | HEIGHT: 72 IN | HEART RATE: 68 BPM | OXYGEN SATURATION: 97 %

## 2021-12-10 VITALS
TEMPERATURE: 98.4 F | RESPIRATION RATE: 17 BRPM | OXYGEN SATURATION: 97 % | SYSTOLIC BLOOD PRESSURE: 146 MMHG | BODY MASS INDEX: 25.86 KG/M2 | HEIGHT: 72 IN | DIASTOLIC BLOOD PRESSURE: 84 MMHG | WEIGHT: 190.9 LBS | HEART RATE: 75 BPM

## 2021-12-10 DIAGNOSIS — I34.0 SEVERE MITRAL REGURGITATION: ICD-10-CM

## 2021-12-10 DIAGNOSIS — I34.0 MITRAL VALVE INSUFFICIENCY, UNSPECIFIED ETIOLOGY: ICD-10-CM

## 2021-12-10 LAB
ALBUMIN SERPL-MCNC: 3.2 G/DL (ref 3.5–5)
ALBUMIN/GLOB SERPL: 1.3 {RATIO} (ref 1.2–3.5)
ALP SERPL-CCNC: 75 U/L (ref 50–136)
ALT SERPL-CCNC: 29 U/L (ref 12–65)
ANION GAP SERPL CALC-SCNC: 3 MMOL/L (ref 7–16)
AST SERPL-CCNC: 16 U/L (ref 15–37)
BASOPHILS # BLD: 0 K/UL (ref 0–0.2)
BASOPHILS NFR BLD: 0 % (ref 0–2)
BILIRUB SERPL-MCNC: 0.5 MG/DL (ref 0.2–1.1)
BUN SERPL-MCNC: 15 MG/DL (ref 6–23)
CALCIUM SERPL-MCNC: 8.8 MG/DL (ref 8.3–10.4)
CHLORIDE SERPL-SCNC: 110 MMOL/L (ref 98–107)
CO2 SERPL-SCNC: 29 MMOL/L (ref 21–32)
CREAT SERPL-MCNC: 0.67 MG/DL (ref 0.8–1.5)
DIFFERENTIAL METHOD BLD: ABNORMAL
EOSINOPHIL # BLD: 0 K/UL (ref 0–0.8)
EOSINOPHIL NFR BLD: 0 % (ref 0.5–7.8)
ERYTHROCYTE [DISTWIDTH] IN BLOOD BY AUTOMATED COUNT: 12.6 % (ref 11.9–14.6)
GLOBULIN SER CALC-MCNC: 2.5 G/DL (ref 2.3–3.5)
GLUCOSE SERPL-MCNC: 129 MG/DL (ref 65–100)
HCT VFR BLD AUTO: 31.8 % (ref 41.1–50.3)
HGB BLD-MCNC: 10.5 G/DL (ref 13.6–17.2)
IMM GRANULOCYTES # BLD AUTO: 0.2 K/UL (ref 0–0.5)
IMM GRANULOCYTES NFR BLD AUTO: 3 % (ref 0–5)
LDH SERPL L TO P-CCNC: 225 U/L (ref 100–190)
LYMPHOCYTES # BLD: 0.4 K/UL (ref 0.5–4.6)
LYMPHOCYTES NFR BLD: 6 % (ref 13–44)
MAGNESIUM SERPL-MCNC: 2.3 MG/DL (ref 1.8–2.4)
MCH RBC QN AUTO: 28.6 PG (ref 26.1–32.9)
MCHC RBC AUTO-ENTMCNC: 33 G/DL (ref 31.4–35)
MCV RBC AUTO: 86.6 FL (ref 79.6–97.8)
MONOCYTES # BLD: 0.3 K/UL (ref 0.1–1.3)
MONOCYTES NFR BLD: 4 % (ref 4–12)
NEUTS SEG # BLD: 6.1 K/UL (ref 1.7–8.2)
NEUTS SEG NFR BLD: 87 % (ref 43–78)
NRBC # BLD: 0 K/UL (ref 0–0.2)
PLATELET # BLD AUTO: 169 K/UL (ref 150–450)
PMV BLD AUTO: 10.6 FL (ref 9.4–12.3)
POTASSIUM SERPL-SCNC: 4.2 MMOL/L (ref 3.5–5.1)
PROT SERPL-MCNC: 5.7 G/DL (ref 6.3–8.2)
RBC # BLD AUTO: 3.67 M/UL (ref 4.23–5.6)
SODIUM SERPL-SCNC: 142 MMOL/L (ref 136–145)
URATE SERPL-MCNC: 2.5 MG/DL (ref 2.6–6)
WBC # BLD AUTO: 7 K/UL (ref 4.3–11.1)

## 2021-12-10 PROCEDURE — 99152 MOD SED SAME PHYS/QHP 5/>YRS: CPT | Performed by: INTERNAL MEDICINE

## 2021-12-10 PROCEDURE — 93312 ECHO TRANSESOPHAGEAL: CPT

## 2021-12-10 PROCEDURE — APPSS180 APP SPLIT SHARED TIME > 60 MINUTES: Performed by: NURSE PRACTITIONER

## 2021-12-10 PROCEDURE — 74011250637 HC RX REV CODE- 250/637: Performed by: NURSE PRACTITIONER

## 2021-12-10 PROCEDURE — 74011250637 HC RX REV CODE- 250/637: Performed by: INTERNAL MEDICINE

## 2021-12-10 PROCEDURE — 99239 HOSP IP/OBS DSCHRG MGMT >30: CPT | Performed by: INTERNAL MEDICINE

## 2021-12-10 PROCEDURE — 99024 POSTOP FOLLOW-UP VISIT: CPT | Performed by: INTERNAL MEDICINE

## 2021-12-10 PROCEDURE — 74011250636 HC RX REV CODE- 250/636: Performed by: INTERNAL MEDICINE

## 2021-12-10 PROCEDURE — 93312 ECHO TRANSESOPHAGEAL: CPT | Performed by: INTERNAL MEDICINE

## 2021-12-10 PROCEDURE — 36591 DRAW BLOOD OFF VENOUS DEVICE: CPT

## 2021-12-10 PROCEDURE — 85025 COMPLETE CBC W/AUTO DIFF WBC: CPT

## 2021-12-10 PROCEDURE — 84550 ASSAY OF BLOOD/URIC ACID: CPT

## 2021-12-10 PROCEDURE — 99153 MOD SED SAME PHYS/QHP EA: CPT

## 2021-12-10 PROCEDURE — 74011000250 HC RX REV CODE- 250: Performed by: INTERNAL MEDICINE

## 2021-12-10 PROCEDURE — 93320 DOPPLER ECHO COMPLETE: CPT | Performed by: INTERNAL MEDICINE

## 2021-12-10 PROCEDURE — 83735 ASSAY OF MAGNESIUM: CPT

## 2021-12-10 PROCEDURE — 83615 LACTATE (LD) (LDH) ENZYME: CPT

## 2021-12-10 PROCEDURE — 93325 DOPPLER ECHO COLOR FLOW MAPG: CPT | Performed by: INTERNAL MEDICINE

## 2021-12-10 PROCEDURE — 74011250636 HC RX REV CODE- 250/636: Performed by: NURSE PRACTITIONER

## 2021-12-10 PROCEDURE — 99152 MOD SED SAME PHYS/QHP 5/>YRS: CPT

## 2021-12-10 PROCEDURE — 80053 COMPREHEN METABOLIC PANEL: CPT

## 2021-12-10 PROCEDURE — 76377 3D RENDER W/INTRP POSTPROCES: CPT | Performed by: INTERNAL MEDICINE

## 2021-12-10 PROCEDURE — 87040 BLOOD CULTURE FOR BACTERIA: CPT

## 2021-12-10 RX ORDER — FLUCONAZOLE 100 MG/1
200 TABLET ORAL DAILY
Status: DISCONTINUED | OUTPATIENT
Start: 2021-12-11 | End: 2021-12-10 | Stop reason: HOSPADM

## 2021-12-10 RX ORDER — SODIUM CHLORIDE 0.9 % (FLUSH) 0.9 %
10 SYRINGE (ML) INJECTION AS NEEDED
Status: DISCONTINUED | OUTPATIENT
Start: 2021-12-10 | End: 2021-12-10 | Stop reason: HOSPADM

## 2021-12-10 RX ORDER — LISINOPRIL 5 MG/1
10 TABLET ORAL DAILY
Status: DISCONTINUED | OUTPATIENT
Start: 2021-12-11 | End: 2021-12-10 | Stop reason: HOSPADM

## 2021-12-10 RX ORDER — LORAZEPAM 2 MG/ML
0.5 INJECTION INTRAMUSCULAR
Status: DISCONTINUED | OUTPATIENT
Start: 2021-12-10 | End: 2021-12-10 | Stop reason: HOSPADM

## 2021-12-10 RX ORDER — PANTOPRAZOLE SODIUM 40 MG/1
40 TABLET, DELAYED RELEASE ORAL
Status: DISCONTINUED | OUTPATIENT
Start: 2021-12-11 | End: 2021-12-10 | Stop reason: HOSPADM

## 2021-12-10 RX ORDER — ACYCLOVIR 800 MG/1
400 TABLET ORAL 2 TIMES DAILY
Status: DISCONTINUED | OUTPATIENT
Start: 2021-12-10 | End: 2021-12-10 | Stop reason: HOSPADM

## 2021-12-10 RX ORDER — AMOXICILLIN 250 MG
1 CAPSULE ORAL DAILY
Status: DISCONTINUED | OUTPATIENT
Start: 2021-12-11 | End: 2021-12-10 | Stop reason: HOSPADM

## 2021-12-10 RX ORDER — ATENOLOL 50 MG/1
25 TABLET ORAL DAILY
Status: DISCONTINUED | OUTPATIENT
Start: 2021-12-11 | End: 2021-12-10 | Stop reason: HOSPADM

## 2021-12-10 RX ORDER — ATENOLOL 25 MG/1
25 TABLET ORAL DAILY
Qty: 30 TABLET | Refills: 0 | Status: SHIPPED | OUTPATIENT
Start: 2021-12-11 | End: 2022-01-06

## 2021-12-10 RX ORDER — SODIUM CHLORIDE 9 MG/ML
75 INJECTION, SOLUTION INTRAVENOUS CONTINUOUS
Status: DISCONTINUED | OUTPATIENT
Start: 2021-12-10 | End: 2021-12-10 | Stop reason: HOSPADM

## 2021-12-10 RX ORDER — DIPHENHYDRAMINE HYDROCHLORIDE 50 MG/ML
25 INJECTION, SOLUTION INTRAMUSCULAR; INTRAVENOUS AS NEEDED
Status: DISCONTINUED | OUTPATIENT
Start: 2021-12-10 | End: 2021-12-10 | Stop reason: HOSPADM

## 2021-12-10 RX ORDER — ENOXAPARIN SODIUM 100 MG/ML
40 INJECTION SUBCUTANEOUS EVERY 24 HOURS
Status: DISCONTINUED | OUTPATIENT
Start: 2021-12-10 | End: 2021-12-10 | Stop reason: HOSPADM

## 2021-12-10 RX ORDER — LISINOPRIL 10 MG/1
10 TABLET ORAL DAILY
Qty: 30 TABLET | Refills: 0 | Status: SHIPPED | OUTPATIENT
Start: 2021-12-11 | End: 2022-01-06

## 2021-12-10 RX ORDER — HYDROCODONE BITARTRATE AND ACETAMINOPHEN 5; 325 MG/1; MG/1
1 TABLET ORAL
Status: DISCONTINUED | OUTPATIENT
Start: 2021-12-10 | End: 2021-12-10 | Stop reason: HOSPADM

## 2021-12-10 RX ORDER — BACLOFEN 10 MG/1
5 TABLET ORAL 3 TIMES DAILY
Status: DISCONTINUED | OUTPATIENT
Start: 2021-12-10 | End: 2021-12-10 | Stop reason: HOSPADM

## 2021-12-10 RX ORDER — ONDANSETRON 2 MG/ML
4 INJECTION INTRAMUSCULAR; INTRAVENOUS
Status: DISCONTINUED | OUTPATIENT
Start: 2021-12-10 | End: 2021-12-10 | Stop reason: HOSPADM

## 2021-12-10 RX ORDER — ACETAMINOPHEN 325 MG/1
650 TABLET ORAL AS NEEDED
Status: DISCONTINUED | OUTPATIENT
Start: 2021-12-10 | End: 2021-12-10 | Stop reason: HOSPADM

## 2021-12-10 RX ORDER — FENTANYL CITRATE 50 UG/ML
25-50 INJECTION, SOLUTION INTRAMUSCULAR; INTRAVENOUS
Status: DISCONTINUED | OUTPATIENT
Start: 2021-12-10 | End: 2021-12-10 | Stop reason: HOSPADM

## 2021-12-10 RX ORDER — POLYETHYLENE GLYCOL 3350 17 G/17G
17 POWDER, FOR SOLUTION ORAL DAILY
Status: DISCONTINUED | OUTPATIENT
Start: 2021-12-11 | End: 2021-12-10 | Stop reason: HOSPADM

## 2021-12-10 RX ORDER — ONDANSETRON 2 MG/ML
8 INJECTION INTRAMUSCULAR; INTRAVENOUS EVERY 8 HOURS
Status: DISCONTINUED | OUTPATIENT
Start: 2021-12-10 | End: 2021-12-10 | Stop reason: HOSPADM

## 2021-12-10 RX ORDER — CALCIUM CARBONATE 200(500)MG
200 TABLET,CHEWABLE ORAL
Status: DISCONTINUED | OUTPATIENT
Start: 2021-12-10 | End: 2021-12-10 | Stop reason: HOSPADM

## 2021-12-10 RX ORDER — LIDOCAINE HYDROCHLORIDE 20 MG/ML
15 SOLUTION OROPHARYNGEAL AS NEEDED
Status: DISCONTINUED | OUTPATIENT
Start: 2021-12-10 | End: 2021-12-10 | Stop reason: HOSPADM

## 2021-12-10 RX ORDER — DIPHENHYDRAMINE HYDROCHLORIDE 50 MG/ML
25 INJECTION, SOLUTION INTRAMUSCULAR; INTRAVENOUS
Status: DISCONTINUED | OUTPATIENT
Start: 2021-12-10 | End: 2021-12-10 | Stop reason: HOSPADM

## 2021-12-10 RX ORDER — HEPARIN 100 UNIT/ML
300 SYRINGE INTRAVENOUS AS NEEDED
Status: DISCONTINUED | OUTPATIENT
Start: 2021-12-10 | End: 2021-12-10 | Stop reason: HOSPADM

## 2021-12-10 RX ORDER — ALLOPURINOL 300 MG/1
300 TABLET ORAL DAILY
Status: DISCONTINUED | OUTPATIENT
Start: 2021-12-11 | End: 2021-12-10 | Stop reason: HOSPADM

## 2021-12-10 RX ORDER — MIDAZOLAM HYDROCHLORIDE 1 MG/ML
.5-2 INJECTION, SOLUTION INTRAMUSCULAR; INTRAVENOUS
Status: DISCONTINUED | OUTPATIENT
Start: 2021-12-10 | End: 2021-12-10 | Stop reason: HOSPADM

## 2021-12-10 RX ORDER — ONDANSETRON 2 MG/ML
4 INJECTION INTRAMUSCULAR; INTRAVENOUS AS NEEDED
Status: DISCONTINUED | OUTPATIENT
Start: 2021-12-10 | End: 2021-12-10 | Stop reason: HOSPADM

## 2021-12-10 RX ORDER — ONDANSETRON 2 MG/ML
8 INJECTION INTRAMUSCULAR; INTRAVENOUS AS NEEDED
Status: DISCONTINUED | OUTPATIENT
Start: 2021-12-10 | End: 2021-12-10 | Stop reason: HOSPADM

## 2021-12-10 RX ORDER — MORPHINE SULFATE 2 MG/ML
2 INJECTION, SOLUTION INTRAMUSCULAR; INTRAVENOUS
Status: DISCONTINUED | OUTPATIENT
Start: 2021-12-10 | End: 2021-12-10 | Stop reason: HOSPADM

## 2021-12-10 RX ADMIN — MIDAZOLAM 2 MG: 1 INJECTION INTRAMUSCULAR; INTRAVENOUS at 09:55

## 2021-12-10 RX ADMIN — ALLOPURINOL 300 MG: 300 TABLET ORAL at 08:04

## 2021-12-10 RX ADMIN — LIDOCAINE HYDROCHLORIDE 15 ML: 20 SOLUTION ORAL; TOPICAL at 09:29

## 2021-12-10 RX ADMIN — MIDAZOLAM 1 MG: 1 INJECTION INTRAMUSCULAR; INTRAVENOUS at 10:10

## 2021-12-10 RX ADMIN — FLUCONAZOLE 200 MG: 100 TABLET ORAL at 08:04

## 2021-12-10 RX ADMIN — BACLOFEN 5 MG: 10 TABLET ORAL at 08:05

## 2021-12-10 RX ADMIN — DOCUSATE SODIUM 50MG AND SENNOSIDES 8.6MG 1 TABLET: 8.6; 5 TABLET, FILM COATED ORAL at 08:03

## 2021-12-10 RX ADMIN — ACYCLOVIR 400 MG: 800 TABLET ORAL at 08:04

## 2021-12-10 RX ADMIN — SODIUM CHLORIDE 75 ML/HR: 900 INJECTION, SOLUTION INTRAVENOUS at 02:22

## 2021-12-10 RX ADMIN — ATENOLOL 25 MG: 50 TABLET ORAL at 08:04

## 2021-12-10 RX ADMIN — MIDAZOLAM 1 MG: 1 INJECTION INTRAMUSCULAR; INTRAVENOUS at 09:57

## 2021-12-10 RX ADMIN — ONDANSETRON 8 MG: 2 INJECTION INTRAMUSCULAR; INTRAVENOUS at 08:03

## 2021-12-10 RX ADMIN — MIDAZOLAM 2 MG: 1 INJECTION INTRAMUSCULAR; INTRAVENOUS at 10:02

## 2021-12-10 RX ADMIN — PANTOPRAZOLE SODIUM 40 MG: 40 TABLET, DELAYED RELEASE ORAL at 08:04

## 2021-12-10 RX ADMIN — FENTANYL CITRATE 25 MCG: 50 INJECTION INTRAMUSCULAR; INTRAVENOUS at 10:08

## 2021-12-10 RX ADMIN — MIDAZOLAM 2 MG: 1 INJECTION INTRAMUSCULAR; INTRAVENOUS at 10:08

## 2021-12-10 RX ADMIN — FENTANYL CITRATE 50 MCG: 50 INJECTION INTRAMUSCULAR; INTRAVENOUS at 09:54

## 2021-12-10 RX ADMIN — ONDANSETRON 4 MG: 2 INJECTION INTRAMUSCULAR; INTRAVENOUS at 10:15

## 2021-12-10 RX ADMIN — MIDAZOLAM 2 MG: 1 INJECTION INTRAMUSCULAR; INTRAVENOUS at 09:59

## 2021-12-10 RX ADMIN — LISINOPRIL 10 MG: 5 TABLET ORAL at 08:04

## 2021-12-10 NOTE — PROGRESS NOTES
Patient discharge is complete at this time. Port needle removed. Follow up appointments and instructions reviewed. Prescriptions reviewed. Opportunity for questions given. Patient is awaiting ride at this time.

## 2021-12-10 NOTE — PROGRESS NOTES
[facility] CARDIOLOGY PROGRESS NOTE           12/10/2021 11:34 AM    Admit Date: 12/10/2021      Subjective:     No overnight events. Denies any chest discomfort. ROS:  Cardiovascular:  As noted above    Objective:      Vitals:    12/10/21 1040 12/10/21 1044 12/10/21 1100 12/10/21 1115   BP: (!) 146/84 (!) 145/87 (!) 142/72 (!) 147/72   Pulse: 66 69 72 68   SpO2: 98% 99% 97% 97%   Weight: 190 lb (86.2 kg)      Height: 6' (1.829 m)          Physical Exam:  General-No Acute Distress  Neck- supple, no JVD  CV- regular rate and rhythm; apical 2/6 HSM  Lung- clear bilaterally  Abd- soft, nontender, nondistended  Ext- no edema bilaterally. Skin- warm and dry    Data Review:   Recent Labs     12/10/21  0221 12/09/21  0340    143   K 4.2 3.9   MG 2.3 2.2   BUN 15 16   CREA 0.67* 0.64*   * 145*   WBC 7.0 8.6   HGB 10.5* 11.2*   HCT 31.8* 33.3*    220       Assessment/Plan:     Active Problems:     Admission for antineoplastic chemotherapy (12/6/2021)- s/p  R-ICE and intrathecal methotrexate     Diffuse large B cell lymphoma (HCC) (12/9/2021)- per onc  -On appropriate chemotherapy.     Hypertension (12/9/2021)-  -controlled.     Mitral regurgitation (12/9/2021)-  -ERINN consistent with severe mitral regurgitation with partially flail A2 segment of the anterior mitral leaflet. -Preserved left ventricular systolic function.  -Check blood cultures.  -Will need to be closely monitored in the outpatient setting to assess for surgical timing/options.     Danisha Calderon MD  12/10/2021 11:34 AM

## 2021-12-10 NOTE — PROGRESS NOTES
Problem: Pain  Goal: *Control of Pain  12/9/2021 2002 by Kristie Kaur  Outcome: Progressing Towards Goal  12/9/2021 2000 by Kristie Kaur  Outcome: Progressing Towards Goal  Goal: *PALLIATIVE CARE:  Alleviation of Pain  12/9/2021 2002 by Kristie Kaur  Outcome: Progressing Towards Goal  12/9/2021 2000 by Kristie Kaur  Outcome: Progressing Towards Goal

## 2021-12-10 NOTE — PROGRESS NOTES
Pt chemo completed this evening. Pt will go downtown 12/10/21 for ERINN and then possible discharge pending cards result. Rapid covid was negative. Resp virus panel pending.

## 2021-12-10 NOTE — PROGRESS NOTES
Report received from [address] [place] [address]. Procedural findings communicated. Intra procedural  medication administration reviewed. Progression of care discussed. Patient received into [address] [facility] 8 post procedure.      Routine post procedural vital signs  yes

## 2021-12-10 NOTE — PROGRESS NOTES
END OF SHIFT NOTE:    Intake/Output  12/09 1901 - 12/10 0700  In: 3686 [P.O.:360; I.V.:3326]  Out: 950 [Urine:950]   Voiding: YES  Catheter: NO  Drain:              Stool:  0 occurrences. Emesis:  0 occurrences. VITAL SIGNS  Patient Vitals for the past 12 hrs:   Temp Pulse Resp BP SpO2   12/09/21 2244 97.9 °F (36.6 °C) 67 18 (!) 147/68 97 %   12/09/21 2047 97.8 °F (36.6 °C) 72 18 111/77 97 %   12/09/21 1603 97.8 °F (36.6 °C) 77 18 (!) 150/76 97 %       Pain Assessment  Pain 1  Pain Scale 1: Numeric (0 - 10) (12/10/21 0140)  Pain Intensity 1: 0 (12/10/21 0140)  Patient Stated Pain Goal: 0 (12/10/21 0140)  Pain Reassessment 1: Patient resting w/respiratory rate greater than 10 (12/06/21 1200)    Ambulating  Yes    Additional Information:   Completed chemo C1 R-ICE last night;tolerated well. NPO fr MN observed;planned ERINN. Shift report given to oncoming nurse TRAV Hayes at the bedside.     Alice Schlatter, RN

## 2021-12-10 NOTE — PROGRESS NOTES
TRANSFER - OUT REPORT:    Verbal report given to [facility] RN on Feliz Hamilton  being transferred to [place] [id number](unit) for routine progression of care       Report consisted of patients Situation, Background, Assessment and   Recommendations(SBAR). Information from the following report(s) Procedure Summary was reviewed with the receiving nurse. Lines:   Venous Access Device 12/06/21 (Active)   Central Line Being Utilized Yes 12/10/21 0744   Criteria for Appropriate Use Irritant/vesicant medication 12/10/21 0744   Site Assessment Clean, dry, & intact 12/10/21 0744   Date of Last Dressing Change 12/06/21 12/10/21 0744   Dressing Status Clean, dry, & intact 12/10/21 0744   Dressing Type Disk with Chlorhexadine gluconate (CHG); Transparent 12/10/21 0744   Action Taken Blood drawn 12/10/21 0243   Date Accessed (Medial Site) 12/06/21 12/10/21 0744   Positive Blood Return (Medial Site) Yes 12/10/21 0744   Action Taken (Medial Site) Infusing 12/10/21 0744   Date Needle Changed (Medial Site) 12/06/21 12/10/21 0744   Alcohol Cap Used No 12/09/21 1349        Opportunity for questions and clarification was provided.       Patient transported with:   [facility] service

## 2021-12-10 NOTE — PROGRESS NOTES
ERINN by Dr Mima Mcclain  II ASA II Mallampati  10mg versed  75mcg fentanyl  Viscous Solution given at 0932  Pt tolerated well.     Blood cultures x 2 ordered  Report given to [facility] in recovery

## 2021-12-10 NOTE — PROGRESS NOTES
TRANSFER - IN REPORT:    Verbal report received from RN(name) on Karthik Spore  being received from [facility](unit) for routine progression of care      Report consisted of patients Situation, Background, Assessment and   Recommendations(SBAR). Information from the following report(s) Procedure Summary was reviewed with the receiving nurse. Opportunity for questions and clarification was provided. Assessment completed upon patients arrival to unit and care assumed.

## 2021-12-10 NOTE — DISCHARGE SUMMARY
[facility] Hematology & Oncology: Inpatient Hematology / Oncology Discharge Summary Note    Patient ID:  Derrick Fuentes  [id number]  55 y.o.  1965    Admit Date: 12/6/2021    Discharge Date: 12/10/2021    Admission Diagnoses: Admission for antineoplastic chemotherapy [Z51.11]    Discharge Diagnoses:  Principal Diagnosis: <principal problem not specified>  Active Problems:    Admission for antineoplastic chemotherapy (12/6/2021)      Diffuse large B cell lymphoma ([address] (12/9/2021)      Hypertension (12/9/2021)      Mitral regurgitation (12/9/2021)        Hospital Course:  Mr. Gutierrez Bruno is a 64 y.o. male admitted on 12/6/2021 with relapsed DLBCL. He is a patient of Dr. Rena Wilkins with relapsed DLBCL. 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 He is s/p R-CHOP from April to July 2021. In November 2021, CT CAP showed disease relapse with new L supraclavicular mass as well as stable axillary and chest wall LNs and shotty RP LNs. He is s/p L supraclavicular LN bx +DLBCL. He was admitted for C1 R-ICE with IT MTX. He tolerated treatment well, completed on 12/9. LP/IT MTX performed 12/8, flow pending. MRI brain with  L posterior communicating artery, whose course is more caudad than anatomically typical.  Per neurology, no further neuro w/u necessary. Echo with EF 60-65% with mod to severe eccentric mitral regurgitation. Cardiology performed ERINN today which revealed severe MR with a partially flail anterior leaflet. BCx obtained. Discussed with cardiology, ok to discharge home with close cardiology f/u. Appt has been requested. He is scheduled for Udenyca on 12/11 and oncology f/u on 12/16. Advised to call with fever, chills, uncontrollable symptoms, or with any other concerns. Relapsed DLBCL  - s/p R-CHOP April - July 2021  - In 11/2021, new supraclavicular mass bx +relapsed DLBCL  - Admit for R-ICE with IT MTX. IR consulted for LP/IT chemo.   - Echo and CT CAP ordered  12/7 Echo with EF 60-65% with mod to severe eccentric mitral regurgitation (Dr Walter Rodriguez notified). CT CAP with stable disease. Day 2 R-ICE. Awaiting LP/IT MTX. Tolerating treatment well thus far. 12/8 Day 3 RICE, tolerating treatment well. LP/IT MTX today. 12/9 Day 4 RICE. Doing well, no complaints. S/p LP/IT MTX yesterday, flow pending.      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  Per neurology, no further neuro w/u necessary.     Mitral regurgitation  12/9 TTE with mod to severe mitral regurgitation. Dr. Walter Rodriguez would like patient evaluated by cards while admitted. Consult placed.     Hypertension  - Con't home meds  12/9 Increase lisinopril to 10mg daily    Consults:  IP CONSULT TO NEUROLOGY  IP CONSULT TO INTERVENTIONAL RADIOLOGY  IP CONSULT TO CARDIOLOGY    Pertinent Diagnostic Studies:   Labs:    Recent Labs     12/10/21  0221 12/09/21  0340 12/08/21  0256   WBC 7.0 8.6 3.4*   HGB 10.5* 11.2* 11.9*    220 225   ANEU 6.1 7.7 3.0      Recent Labs     12/10/21  0221 12/09/21  0340 12/08/21  0256    143 140   K 4.2 3.9 4.0   * 110* 108*   CO2 29 28 30   * 145* 188*   BUN 15 16 12   CREA 0.67* 0.64* 0.85   CA 8.8 8.9 8.3   AP 75 94 109   TP 5.7* 6.0* 6.7   ALB 3.2* 3.3* 3.6   MG 2.3 2.2 2.3       Imaging:  IR INTRATHECAL CHEMO INJECTION CNS [[id number]] Collected: 12/08/21 1503   Order Status: Completed Updated: 12/09/21 0946   Narrative:     Title:   1. Diagnostic Lumbar puncture. 2. Intrathecal chemotherapy administration     History: 51-year-old male with relapsed lymphoma. : Lizy Montelongo PA-C     Supervising Physician: Branden Rangel M.D. Consent: Informed written and oral consent was obtained from the patient after   explanation of benefits and risks (including, but not limited to: infection,   nerve injury, hemorrhage).  The patient's questions were answered to   satisfaction. The patient stated understanding and requested that we proceed. Procedure: Maximal sterile barrier technique was used.  With the patient prone,   the skin of the back was prepped and draped in the standard sterile fashion. 1%   lidocaine was used for local field block. Using fluoroscopy, a 22 gauge spinal   needle was advanced into the intrathecal space at the L3-4 level. Appropriate   position was confirmed with clear spinal fluid return. Opening pressure was less than 6 cm H20.       A total of 1 cc was removed for evaluation. Unable to obtain additional fluid   despite needle repositioning. The chemotherapy was injected as ordered by the   oncologist.     The needle was removed and a dressing was applied. Complications:  None. Radiation dose:   Fluoroscopy time: 18 seconds. Reference air kerma (mGy): 6   Kerma area product (cGy.cm2):  86.93   Fluoroscopic images: 3     Contrast:  0 milliliters. Impression:     Uncomplicated lumbar puncture.       Plan: The patient will recover at bedrest.      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 FINDINGS:     BRAIN:     Midline structures including the corpus callosum, pituitary gland, optic nerves,   and cerebellum are well developed. The ventricles are within normal limits for the mild degree of global brain   parenchymal volume loss. There is no midline shift.  The basilar cisterns are   patent. There is no cerebellar tonsillar ectopia or herniation.      There are T2 hyperintensities in the periventricular and subcortical white   matter, a nonspecific finding which likely represents chronic microangiopathy. Diffusion imaging shows no evidence of acute infarction or other acute   abnormality. The expected large intracranial vascular flow voids are preserved. There is no   evidence of intracranial blood products. There is no abnormal intra or extra-axial postcontrast enhancement.      There are no suspicious osseous lesions. ORBITS:     The optic nerves demonstrate normal morphology and signal characteristics. The globes, extraocular muscles, and lacrimal glands are unremarkable. There is no abnormal enhancement in the region of the superior orbital fissure. The cavernous sinuses enhance symmetrically and normally. There is no abnormal enhancement along the expected course of the cisternal   segment of cranial nerve III. There is a patent left posterior communicating artery is courses slightly more   caudad than anatomically typical (axial thin cut postcontrast image 96)    Impression:       1. There is a left posterior communicating artery, whose course is more caudad   than anatomically typical. It is difficult to discern the relationship of this   vessel and the left cisternal segment of cranial nerve III. If there is a   concern for a neurovascular conflict causing the cranial nerve III palsy, the   patient could return for additional heavily T2-weighted thin cut images through   the expected position of the cisternal segment of cranial nerve III. 2. There is otherwise no definite MRI evidence of an etiology for the described   cranial nerve III palsy. 3. No evidence of intracranial manifestations of the systemic lymphoma.      4. Mild burden of chronic microangiopathy.       VOICE DICTATED BY: Dr. Jose M Guy ABD PELV W CONT [[id number]] Collected: 12/07/21 0835   Order Status: Completed Updated: 12/07/21 0853   Narrative:     CT of the Chest, Abdomen, and Pelvis     INDICATION: Follow-up diffuse large B-cell lymphoma. Multiple axial images were obtained through the chest, abdomen, and pelvis. Oral contrast was used for bowel opacification.  100mL of Isovue 370 intravenous   contrast was used for better evaluation of solid organs and vascular structures.    Radiation dose reduction techniques were used for this study.  All CT scans   performed at this facility use one or all of the following: Automated exposure   control, adjustment of the mA and/or kVp according to patient's size, iterative   reconstruction. COMPARISON: CT chest abdomen pelvis 11/16/2021. PET/CT 8/13/2021. FINDINGS:   LUNGS AND PLEURA: Stable anterior right middle lobe lung nodule again measures   0.5 cm. Additional stable 0.5 cm subpleural nodule left lower lobe on image 49. A few tiny left major fissure lymph nodes are also stable. No new or enlarging   lung lesions. No pleural fluid. MEDIASTINUM/AXILLAE: The left infraclavicular soft tissue mass on image 9 of   series 3 measures approximately 5.8 x 5.0 cm in greatest axial dimensions. This   measures approximately 3.3 cm craniocaudal dimension on image 63 of series 400. This previously measured 5.5 x 3.4 x 2.8 cm suggesting interval growth. Right   axillary lymph node on image 25 1.9 x 0.6 cm, previously remeasuring 2.2 x 1.0   cm suggesting mild interval improvement. Left axillary lymph nodes appear stable   if not improved as well. No enlarged mediastinal or hilar lymph nodes. Right   chest port appears in good position. Heart is normal in size. Moderate coronary   artery calcification. Esophagus is unremarkable. Thyroid gland appears normal   where imaged. HEPATOBILIARY: Cholecystectomy. Liver is unremarkable.      PANCREAS: Normal.     SPLEEN: Normal.     ADRENAL GLANDS: Normal.     KIDNEYS/BLADDER: Kidneys and bladder are unremarkable. No hydronephrosis. Excreted contrast is noted in the collecting systems and bladder. BOWEL: No bowel obstruction. LYMPH NODES: No enlarged abdominal or pelvic lymph nodes. Inguinal lymph nodes   are stable if not slightly smaller in size. VASCULATURE: Unremarkable. PELVIC ORGANS: Prostate gland and rectum are unremarkable. No pelvic free fluid   or mass. MUSCULOSKELETAL: Degenerative spine changes. No destructive bone lesions are   evident    Impression:     1. Stable right upper lobe and left lower lung nodules. No new lung lesions. 2. Slight interval growth in the left infraclavicular soft tissue mass when   compared to prior CT. This was not well appreciated on prior PET/CT imaging. This is likely the area of recurrent lymphoma. Remaining axillary and   intrathoracic lymph nodes are stable if not slightly smaller in size. No   enlarged abdominal or pelvic lymph nodes. Current Discharge Medication List      START taking these medications    Details   atenoloL (TENORMIN) 25 mg tablet Take 1 Tablet by mouth daily. Qty: 30 Tablet, Refills: 0  Start date: 12/11/2021      acyclovir (ZOVIRAX) 400 mg tablet Take 1 Tablet by mouth two (2) times a day for 30 days. Qty: 60 Tablet, Refills: 0  Start date: 12/9/2021, End date: 1/8/2022      allopurinoL (ZYLOPRIM) 300 mg tablet Take 1 Tablet by mouth daily. Qty: 30 Tablet, Refills: 0  Start date: 12/10/2021      fluconazole (DIFLUCAN) 200 mg tablet Take 1 Tablet by mouth daily. FDA advises cautious prescribing of oral fluconazole in pregnancy. Qty: 30 Tablet, Refills: 0  Start date: 12/10/2021      ondansetron (Zofran ODT) 8 mg disintegrating tablet Take 1 Tablet by mouth every eight (8) hours as needed for Nausea or Vomiting.   Qty: 90 Tablet, Refills: 0  Start date: 12/9/2021      prochlorperazine (Compazine) 10 mg tablet Take 0.5-1 Tablets by mouth every six (6) hours as needed for Nausea or Vomiting. Qty: 60 Tablet, Refills: 0  Start date: 2021         CONTINUE these medications which have CHANGED    Details   lisinopriL (PRINIVIL, ZESTRIL) 10 mg tablet Take 1 Tablet by mouth daily. Qty: 30 Tablet, Refills: 0  Start date: 2021           OBJECTIVE:  Patient Vitals for the past 8 hrs:   BP Temp Pulse Resp SpO2   12/10/21 0804 (!) 146/84 98.4 °F (36.9 °C) 75 17 97 %     Temp (24hrs), Av °F (36.7 °C), Min:97.8 °F (36.6 °C), Max:98.4 °F (36.9 °C)    12/10 07 - 12/10 1900  In: -   Out: 525 [Urine:525]    Physical Exam:  Constitutional: Well developed, well nourished male in no acute distress, sitting comfortably in the hospital bed. HEENT: Normocephalic and atraumatic. Oropharynx is clear, mucous membranes are moist.  +L eye palsy (wearing eye patch). Sclerae anicteric. Neck supple without JVD. No thyromegaly present. Skin Warm and dry. No bruising noted. Erythematous macular rash to RUE. Respiratory Lungs are clear to auscultation bilaterally without wheezes, rales or rhonchi, normal air exchange without accessory muscle use. CVS Normal rate, regular rhythm and normal S1 and S2.  +murmur   Abdomen Soft, nontender and nondistended, normoactive bowel sounds. No palpable mass. No hepatosplenomegaly. Neuro Grossly nonfocal with no obvious sensory or motor deficits. MSK Normal range of motion in general.  No edema and no tenderness. Psych Appropriate mood and affect.           ASSESSMENT:    Active Problems:    Admission for antineoplastic chemotherapy (2021)      Diffuse large B cell lymphoma ([address] (2021)      Hypertension (2021)      Mitral regurgitation (2021)        DISPOSITION:  Follow-up Appointments   Procedures    FOLLOW UP VISIT Appointment in: Other (Specify) Please schedule infusion appt for Udenyca on  or .   He will also need a follow up appt with Dr. Juan Luis Mcwilliams or NP in one week     Please schedule infusion appt for Udenyca on 12/11 or 12/12. He will also need a follow up appt with Dr. Tony Mckeon or NP in one week     Standing Status:   Standing     Number of Occurrences:   1     Order Specific Question:   Appointment in     Answer: Other (Specify)    FOLLOW UP VISIT Appointment in: 3 - 5 Days Please schedule follow up with cardiology in 3-5 days     Please schedule follow up with cardiology in 3-5 days     Standing Status:   Standing     Number of Occurrences:   1     Order Specific Question:   Appointment in     Answer:   3 - 5 Days       Over 60 minutes was spent in discharge planning and coordination of care.             Chuck Cantu NP  [facility]ematology & Oncology  [address] [address]  Office : [phone]  Fax : [phone]

## 2021-12-11 ENCOUNTER — HOSPITAL ENCOUNTER (OUTPATIENT)
Dept: INFUSION THERAPY | Age: 56
Discharge: HOME | End: 2021-12-11

## 2021-12-11 VITALS
SYSTOLIC BLOOD PRESSURE: 135 MMHG | RESPIRATION RATE: 18 BRPM | TEMPERATURE: 98.3 F | HEART RATE: 62 BPM | DIASTOLIC BLOOD PRESSURE: 73 MMHG | OXYGEN SATURATION: 98 %

## 2021-12-11 DIAGNOSIS — Z51.11 ADMISSION FOR ANTINEOPLASTIC CHEMOTHERAPY: Primary | ICD-10-CM

## 2021-12-11 PROCEDURE — 74011250636 HC RX REV CODE- 250/636: Performed by: INTERNAL MEDICINE

## 2021-12-11 PROCEDURE — 96372 THER/PROPH/DIAG INJ SC/IM: CPT

## 2021-12-11 RX ADMIN — PEGFILGRASTIM-CBQV 6 MG: 6 INJECTION, SOLUTION SUBCUTANEOUS at 11:03

## 2021-12-11 NOTE — PROGRESS NOTES
Arrived to the [facility]. Jorge completed. Provided education on same. Patient instructed to report any side affects to ordering provider. Patient tolerated well. Any issues or concerns during appointment: none. No further infusion appts scheduled at this time. Discharged ambulatory.

## 2021-12-13 ENCOUNTER — HOSPITAL ENCOUNTER (OUTPATIENT)
Dept: INTERVENTIONAL RADIOLOGY/VASCULAR | Age: 56
Discharge: HOME | End: 2021-12-13
Attending: INTERNAL MEDICINE

## 2021-12-15 LAB
BACTERIA SPEC CULT: NORMAL
BACTERIA SPEC CULT: NORMAL
FLOW CYTOMETRY, FBTC1: NORMAL
SERVICE CMNT-IMP: NORMAL
SERVICE CMNT-IMP: NORMAL
SPECIMEN SOURCE: NORMAL
TEST ORDERED:: NORMAL

## 2021-12-16 ENCOUNTER — HOSPITAL ENCOUNTER (OUTPATIENT)
Dept: LAB | Age: 56
Discharge: HOME | End: 2021-12-16

## 2021-12-16 ENCOUNTER — PATIENT OUTREACH (OUTPATIENT)
Dept: CASE MANAGEMENT | Age: 56
End: 2021-12-16

## 2021-12-16 DIAGNOSIS — C83.31 DIFFUSE LARGE B-CELL LYMPHOMA OF LYMPH NODES OF NECK (HCC): ICD-10-CM

## 2021-12-16 LAB
ABO + RH BLD: NORMAL
ALBUMIN SERPL-MCNC: 3.7 G/DL (ref 3.5–5)
ALBUMIN/GLOB SERPL: 1.2 {RATIO} (ref 1.2–3.5)
ALP SERPL-CCNC: 109 U/L (ref 50–136)
ALT SERPL-CCNC: 25 U/L (ref 12–65)
ANION GAP SERPL CALC-SCNC: 3 MMOL/L (ref 7–16)
AST SERPL-CCNC: 12 U/L (ref 15–37)
BASOPHILS # BLD: 0 K/UL (ref 0–0.2)
BASOPHILS NFR BLD: 3 % (ref 0–2)
BILIRUB SERPL-MCNC: 0.4 MG/DL (ref 0.2–1.1)
BLOOD GROUP ANTIBODIES SERPL: NORMAL
BUN SERPL-MCNC: 14 MG/DL (ref 6–23)
CALCIUM SERPL-MCNC: 8.9 MG/DL (ref 8.3–10.4)
CHLORIDE SERPL-SCNC: 105 MMOL/L (ref 98–107)
CO2 SERPL-SCNC: 31 MMOL/L (ref 21–32)
CREAT SERPL-MCNC: 0.6 MG/DL (ref 0.8–1.5)
DIFFERENTIAL METHOD BLD: ABNORMAL
EOSINOPHIL # BLD: 0 K/UL (ref 0–0.8)
EOSINOPHIL NFR BLD: 3 % (ref 0.5–7.8)
ERYTHROCYTE [DISTWIDTH] IN BLOOD BY AUTOMATED COUNT: 12.1 % (ref 11.9–14.6)
GLOBULIN SER CALC-MCNC: 3 G/DL (ref 2.3–3.5)
GLUCOSE SERPL-MCNC: 110 MG/DL (ref 65–100)
HCT VFR BLD AUTO: 31.4 %
HGB BLD-MCNC: 10.5 G/DL (ref 13.6–17.2)
IMM GRANULOCYTES # BLD AUTO: 0 K/UL (ref 0–0.5)
IMM GRANULOCYTES NFR BLD AUTO: 0 % (ref 0–5)
LYMPHOCYTES # BLD: 0.8 K/UL (ref 0.5–4.6)
LYMPHOCYTES NFR BLD: 74 % (ref 13–44)
MAGNESIUM SERPL-MCNC: 2 MG/DL (ref 1.8–2.4)
MCH RBC QN AUTO: 27.8 PG (ref 26.1–32.9)
MCHC RBC AUTO-ENTMCNC: 33.4 G/DL (ref 31.4–35)
MCV RBC AUTO: 83.1 FL (ref 79.6–97.8)
MONOCYTES # BLD: 0.2 K/UL (ref 0.1–1.3)
MONOCYTES NFR BLD: 18 % (ref 4–12)
NEUTS SEG # BLD: 0 K/UL (ref 1.7–8.2)
NEUTS SEG NFR BLD: 2 % (ref 43–78)
NRBC # BLD: 0 K/UL (ref 0–0.2)
PLATELET # BLD AUTO: 95 K/UL (ref 150–450)
PLATELET COMMENTS,PCOM: SLIGHT
PMV BLD AUTO: 10.7 FL (ref 9.4–12.3)
POTASSIUM SERPL-SCNC: 3.7 MMOL/L (ref 3.5–5.1)
PROT SERPL-MCNC: 6.7 G/DL (ref 6.3–8.2)
RBC # BLD AUTO: 3.78 M/UL (ref 4.23–5.6)
RBC MORPH BLD: ABNORMAL
SODIUM SERPL-SCNC: 139 MMOL/L (ref 136–145)
SPECIMEN EXP DATE BLD: NORMAL
URATE SERPL-MCNC: 2.5 MG/DL (ref 2.6–6)
WBC # BLD AUTO: 1 K/UL (ref 4.3–11.1)
WBC MORPH BLD: ABNORMAL

## 2021-12-16 PROCEDURE — 85025 COMPLETE CBC W/AUTO DIFF WBC: CPT

## 2021-12-16 PROCEDURE — 86901 BLOOD TYPING SEROLOGIC RH(D): CPT

## 2021-12-16 PROCEDURE — 36415 COLL VENOUS BLD VENIPUNCTURE: CPT

## 2021-12-16 PROCEDURE — 84550 ASSAY OF BLOOD/URIC ACID: CPT

## 2021-12-16 PROCEDURE — 80053 COMPREHEN METABOLIC PANEL: CPT

## 2021-12-16 PROCEDURE — 83735 ASSAY OF MAGNESIUM: CPT

## 2021-12-16 NOTE — PROGRESS NOTES
12/16 Pt came in for a HFU. He is doing well, encouraged pt to drink more fluids and get calories in. Otherwise he is doing great. Plan for C2 of RICE on 12/27. We will then get a PET after C2 is completed.  We will see him back on 1/6

## 2021-12-17 PROBLEM — Q23.8 ABNORMALITY OF CHORDAE TENDINEAE OF MITRAL VALVE: Status: ACTIVE | Noted: 2021-12-17

## 2021-12-23 PROCEDURE — APPNB15 APP NON BILLABLE TIME 0-15 MINS: Performed by: NURSE PRACTITIONER

## 2021-12-27 ENCOUNTER — HOSPITAL ENCOUNTER (INPATIENT)
Age: 56
LOS: 2 days | Discharge: HOME | DRG: 847 | End: 2021-12-29
Attending: INTERNAL MEDICINE | Admitting: INTERNAL MEDICINE

## 2021-12-27 DIAGNOSIS — C85.90 LYMPHOMA (HCC): ICD-10-CM

## 2021-12-27 DIAGNOSIS — C83.30 DIFFUSE LARGE B-CELL LYMPHOMA, UNSPECIFIED BODY REGION (HCC): ICD-10-CM

## 2021-12-27 DIAGNOSIS — Z51.11 ADMISSION FOR ANTINEOPLASTIC CHEMOTHERAPY: Primary | ICD-10-CM

## 2021-12-27 LAB
ALBUMIN SERPL-MCNC: 3.5 G/DL (ref 3.5–5)
ALBUMIN/GLOB SERPL: 1.1 {RATIO} (ref 1.2–3.5)
ALP SERPL-CCNC: 154 U/L (ref 50–136)
ALT SERPL-CCNC: 27 U/L (ref 12–65)
ANION GAP SERPL CALC-SCNC: 2 MMOL/L (ref 7–16)
AST SERPL-CCNC: 13 U/L (ref 15–37)
BASOPHILS # BLD: 0.1 K/UL (ref 0–0.2)
BASOPHILS NFR BLD: 1 % (ref 0–2)
BILIRUB SERPL-MCNC: 0.3 MG/DL (ref 0.2–1.1)
BUN SERPL-MCNC: 13 MG/DL (ref 6–23)
CALCIUM SERPL-MCNC: 8.7 MG/DL (ref 8.3–10.4)
CHLORIDE SERPL-SCNC: 107 MMOL/L (ref 98–107)
CO2 SERPL-SCNC: 31 MMOL/L (ref 21–32)
CREAT SERPL-MCNC: 0.67 MG/DL (ref 0.8–1.5)
DIFFERENTIAL METHOD BLD: ABNORMAL
EOSINOPHIL # BLD: 0 K/UL (ref 0–0.8)
EOSINOPHIL NFR BLD: 0 % (ref 0.5–7.8)
ERYTHROCYTE [DISTWIDTH] IN BLOOD BY AUTOMATED COUNT: 13.2 % (ref 11.9–14.6)
GLOBULIN SER CALC-MCNC: 3.1 G/DL (ref 2.3–3.5)
GLUCOSE SERPL-MCNC: 115 MG/DL (ref 65–100)
HCT VFR BLD AUTO: 28.4 % (ref 41.1–50.3)
HGB BLD-MCNC: 9.3 G/DL (ref 13.6–17.2)
IMM GRANULOCYTES # BLD AUTO: 0.5 K/UL (ref 0–0.5)
IMM GRANULOCYTES NFR BLD AUTO: 5 % (ref 0–5)
LDH SERPL L TO P-CCNC: 255 U/L (ref 100–190)
LYMPHOCYTES # BLD: 1.4 K/UL (ref 0.5–4.6)
LYMPHOCYTES NFR BLD: 13 % (ref 13–44)
MAGNESIUM SERPL-MCNC: 2.3 MG/DL (ref 1.8–2.4)
MCH RBC QN AUTO: 28.7 PG (ref 26.1–32.9)
MCHC RBC AUTO-ENTMCNC: 32.7 G/DL (ref 31.4–35)
MCV RBC AUTO: 87.7 FL (ref 79.6–97.8)
MONOCYTES # BLD: 1.6 K/UL (ref 0.1–1.3)
MONOCYTES NFR BLD: 14 % (ref 4–12)
NEUTS SEG # BLD: 7.6 K/UL (ref 1.7–8.2)
NEUTS SEG NFR BLD: 68 % (ref 43–78)
NRBC # BLD: 0 K/UL (ref 0–0.2)
PLATELET # BLD AUTO: 295 K/UL (ref 150–450)
PMV BLD AUTO: 10.3 FL (ref 9.4–12.3)
POTASSIUM SERPL-SCNC: 4.1 MMOL/L (ref 3.5–5.1)
PROT SERPL-MCNC: 6.6 G/DL (ref 6.3–8.2)
RBC # BLD AUTO: 3.24 M/UL (ref 4.23–5.6)
SODIUM SERPL-SCNC: 140 MMOL/L (ref 136–145)
URATE SERPL-MCNC: 4.2 MG/DL (ref 2.6–6)
WBC # BLD AUTO: 11.2 K/UL (ref 4.3–11.1)

## 2021-12-27 PROCEDURE — 00JU3ZZ INSPECTION OF SPINAL CANAL, PERCUTANEOUS APPROACH: ICD-10-PCS | Performed by: RADIOLOGY

## 2021-12-27 PROCEDURE — 74011250636 HC RX REV CODE- 250/636: Performed by: NURSE PRACTITIONER

## 2021-12-27 PROCEDURE — 99222 1ST HOSP IP/OBS MODERATE 55: CPT | Performed by: INTERNAL MEDICINE

## 2021-12-27 PROCEDURE — 83615 LACTATE (LD) (LDH) ENZYME: CPT

## 2021-12-27 PROCEDURE — 65270000029 HC RM PRIVATE

## 2021-12-27 PROCEDURE — 74011250637 HC RX REV CODE- 250/637: Performed by: NURSE PRACTITIONER

## 2021-12-27 PROCEDURE — 84550 ASSAY OF BLOOD/URIC ACID: CPT

## 2021-12-27 PROCEDURE — 85025 COMPLETE CBC W/AUTO DIFF WBC: CPT

## 2021-12-27 PROCEDURE — 74011000258 HC RX REV CODE- 258: Performed by: NURSE PRACTITIONER

## 2021-12-27 PROCEDURE — 83735 ASSAY OF MAGNESIUM: CPT

## 2021-12-27 PROCEDURE — 80053 COMPREHEN METABOLIC PANEL: CPT

## 2021-12-27 PROCEDURE — 36591 DRAW BLOOD OFF VENOUS DEVICE: CPT

## 2021-12-27 RX ORDER — ONDANSETRON 2 MG/ML
8 INJECTION INTRAMUSCULAR; INTRAVENOUS AS NEEDED
Status: CANCELLED | OUTPATIENT
Start: 2021-12-27

## 2021-12-27 RX ORDER — ATENOLOL 50 MG/1
25 TABLET ORAL DAILY
Status: DISCONTINUED | OUTPATIENT
Start: 2021-12-28 | End: 2021-12-29 | Stop reason: HOSPADM

## 2021-12-27 RX ORDER — MORPHINE SULFATE 2 MG/ML
2 INJECTION, SOLUTION INTRAMUSCULAR; INTRAVENOUS
Status: DISCONTINUED | OUTPATIENT
Start: 2021-12-27 | End: 2021-12-29 | Stop reason: HOSPADM

## 2021-12-27 RX ORDER — EPINEPHRINE 1 MG/ML
0.3 INJECTION, SOLUTION, CONCENTRATE INTRAVENOUS AS NEEDED
Status: CANCELLED | OUTPATIENT
Start: 2021-12-27

## 2021-12-27 RX ORDER — DEXAMETHASONE SODIUM PHOSPHATE 100 MG/10ML
10 INJECTION INTRAMUSCULAR; INTRAVENOUS EVERY 24 HOURS
Status: COMPLETED | OUTPATIENT
Start: 2021-12-27 | End: 2021-12-29

## 2021-12-27 RX ORDER — HYDROCORTISONE SODIUM SUCCINATE 100 MG/2ML
100 INJECTION, POWDER, FOR SOLUTION INTRAMUSCULAR; INTRAVENOUS AS NEEDED
Status: CANCELLED | OUTPATIENT
Start: 2021-12-27

## 2021-12-27 RX ORDER — DIPHENHYDRAMINE HYDROCHLORIDE 50 MG/ML
50 INJECTION, SOLUTION INTRAMUSCULAR; INTRAVENOUS AS NEEDED
Status: CANCELLED
Start: 2021-12-27

## 2021-12-27 RX ORDER — ACETAMINOPHEN 325 MG/1
650 TABLET ORAL AS NEEDED
Status: CANCELLED
Start: 2021-12-27

## 2021-12-27 RX ORDER — ONDANSETRON 2 MG/ML
4 INJECTION INTRAMUSCULAR; INTRAVENOUS
Status: DISCONTINUED | OUTPATIENT
Start: 2021-12-27 | End: 2021-12-29 | Stop reason: HOSPADM

## 2021-12-27 RX ORDER — ALLOPURINOL 300 MG/1
300 TABLET ORAL DAILY
Status: DISCONTINUED | OUTPATIENT
Start: 2021-12-28 | End: 2021-12-29 | Stop reason: HOSPADM

## 2021-12-27 RX ORDER — ACETAMINOPHEN 325 MG/1
650 TABLET ORAL ONCE
Status: COMPLETED | OUTPATIENT
Start: 2021-12-27 | End: 2021-12-27

## 2021-12-27 RX ORDER — DIPHENHYDRAMINE HYDROCHLORIDE 50 MG/ML
25 INJECTION, SOLUTION INTRAMUSCULAR; INTRAVENOUS AS NEEDED
Status: CANCELLED
Start: 2021-12-27

## 2021-12-27 RX ORDER — DIPHENHYDRAMINE HYDROCHLORIDE 50 MG/ML
50 INJECTION, SOLUTION INTRAMUSCULAR; INTRAVENOUS ONCE
Status: COMPLETED | OUTPATIENT
Start: 2021-12-27 | End: 2021-12-27

## 2021-12-27 RX ORDER — FLUCONAZOLE 100 MG/1
200 TABLET ORAL DAILY
Status: DISCONTINUED | OUTPATIENT
Start: 2021-12-28 | End: 2021-12-29 | Stop reason: HOSPADM

## 2021-12-27 RX ORDER — LISINOPRIL 5 MG/1
10 TABLET ORAL DAILY
Status: DISCONTINUED | OUTPATIENT
Start: 2021-12-28 | End: 2021-12-29 | Stop reason: HOSPADM

## 2021-12-27 RX ORDER — ACYCLOVIR 800 MG/1
400 TABLET ORAL 2 TIMES DAILY
Status: DISCONTINUED | OUTPATIENT
Start: 2021-12-27 | End: 2021-12-29 | Stop reason: HOSPADM

## 2021-12-27 RX ORDER — ENOXAPARIN SODIUM 100 MG/ML
40 INJECTION SUBCUTANEOUS EVERY 24 HOURS
Status: DISCONTINUED | OUTPATIENT
Start: 2021-12-27 | End: 2021-12-29 | Stop reason: HOSPADM

## 2021-12-27 RX ORDER — ONDANSETRON 2 MG/ML
8 INJECTION INTRAMUSCULAR; INTRAVENOUS EVERY 8 HOURS
Status: DISCONTINUED | OUTPATIENT
Start: 2021-12-27 | End: 2021-12-29 | Stop reason: HOSPADM

## 2021-12-27 RX ORDER — SODIUM CHLORIDE 9 MG/ML
75 INJECTION, SOLUTION INTRAVENOUS CONTINUOUS
Status: DISCONTINUED | OUTPATIENT
Start: 2021-12-27 | End: 2021-12-29 | Stop reason: HOSPADM

## 2021-12-27 RX ORDER — ALBUTEROL SULFATE 0.83 MG/ML
2.5 SOLUTION RESPIRATORY (INHALATION) AS NEEDED
Status: CANCELLED
Start: 2021-12-27

## 2021-12-27 RX ORDER — HYDROCODONE BITARTRATE AND ACETAMINOPHEN 5; 325 MG/1; MG/1
1 TABLET ORAL
Status: DISCONTINUED | OUTPATIENT
Start: 2021-12-27 | End: 2021-12-29 | Stop reason: HOSPADM

## 2021-12-27 RX ORDER — LORAZEPAM 2 MG/ML
0.5 INJECTION INTRAMUSCULAR
Status: DISCONTINUED | OUTPATIENT
Start: 2021-12-27 | End: 2021-12-29 | Stop reason: HOSPADM

## 2021-12-27 RX ORDER — DIPHENHYDRAMINE HYDROCHLORIDE 50 MG/ML
25 INJECTION, SOLUTION INTRAMUSCULAR; INTRAVENOUS
Status: DISCONTINUED | OUTPATIENT
Start: 2021-12-27 | End: 2021-12-29 | Stop reason: HOSPADM

## 2021-12-27 RX ADMIN — ACYCLOVIR 400 MG: 800 TABLET ORAL at 17:09

## 2021-12-27 RX ADMIN — ETOPOSIDE 200 MG: 20 INJECTION INTRAVENOUS at 22:09

## 2021-12-27 RX ADMIN — ACETAMINOPHEN 650 MG: 325 TABLET, FILM COATED ORAL at 17:09

## 2021-12-27 RX ADMIN — SODIUM CHLORIDE 75 ML/HR: 900 INJECTION, SOLUTION INTRAVENOUS at 15:30

## 2021-12-27 RX ADMIN — DIPHENHYDRAMINE HYDROCHLORIDE 50 MG: 50 INJECTION, SOLUTION INTRAMUSCULAR; INTRAVENOUS at 17:09

## 2021-12-27 RX ADMIN — ENOXAPARIN SODIUM 40 MG: 100 INJECTION SUBCUTANEOUS at 17:09

## 2021-12-27 RX ADMIN — SODIUM CHLORIDE 784 MG: 900 INJECTION, SOLUTION INTRAVENOUS at 18:03

## 2021-12-27 RX ADMIN — ONDANSETRON 8 MG: 2 INJECTION INTRAMUSCULAR; INTRAVENOUS at 21:35

## 2021-12-27 RX ADMIN — DEXAMETHASONE SODIUM PHOSPHATE 10 MG: 10 INJECTION INTRAMUSCULAR; INTRAVENOUS at 21:35

## 2021-12-27 NOTE — PROGRESS NOTES
END OF SHIFT NOTE:    Intake/Output  No intake/output data recorded. Voiding: YES  Catheter: NO  Drain:              Stool:  0 occurrences. Emesis:  0 occurrences. VITAL SIGNS  Patient Vitals for the past 12 hrs:   Temp Pulse Resp BP SpO2   12/27/21 1523 97.6 °F (36.4 °C) 78 18 (!) 152/81 100 %       Pain Assessment  Pain 1  Pain Scale 1: Numeric (0 - 10) (12/27/21 1811)  Pain Intensity 1: 0 (12/27/21 1811)  Patient Stated Pain Goal: 0 (12/27/21 1811)    Ambulating  Yes    Additional Information: rapid rituxamab started- tolerating    Shift report given to oncoming nurse at the bedside.     Ursula Leonard

## 2021-12-27 NOTE — H&P
[facility] Hematology & Oncology        Inpatient Hematology / Oncology History and Physical    Reason for Admission:  lymphoma     History of Present Illness:  Mr. Akira Carroll is a 64 y.o. male admitted on 12/27/2021. The encounter diagnosis was Admission for antineoplastic chemotherapy. He is a patient of Dr. Fabricio Le with relapsed DLBCL. 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 He is s/p R-CHOP from April to July 2021. In November 2021, CT CAP showed disease relapse with new L supraclavicular mass as well as stable axillary and chest wall LNs and shotty RP LNs. He is s/p L supraclavicular LN bx +DLBCL. He tolerated C1 R-ICE with IT MTX well. CSF flow neg. MRI brain ordered for L eye palsy, showed L posterior communicating artery, whose course is more caudad than anatomically typical.  Per neurology, no further neuro w/u necessary as pt has underwent w/u for L eye palsy and felt not to be malignancy-related. Echo with EF 60-65% with mod to severe eccentric mitral regurgitation. Cardiology performed ERINN on 12/10 which revealed severe MR with a partially flail anterior leaflet. Cardiology monitoring as OP. He will eventually require valve repair surgery. Per cards, while undergoing chemotherapy, valve repair would not be in his best interest for fear of infection/poor healing. He is being admitted for C2 R-ICE with IT MTX. He is feeling well and is ready to proceed with treatment. 3rd nerve palsy is essentially stable. No complications noted from cycle 1, he recovered to baseline within a few days. Review of Systems:  Constitutional Denies fever, chills, weight loss, appetite changes, fatigue, night sweats. HEENT +L eye palsy. Denies trauma, blurry vision, hearing loss, ear pain, nosebleeds, sore throat, neck pain and ear discharge. Skin Denies lesions or rashes. Lungs Denies dyspnea, cough, sputum production or hemoptysis.    Cardiovascular Denies chest pain, palpitations, or lower extremity edema. Gastrointestinal Denies nausea, vomiting, changes in bowel habits, bloody or black stools, abdominal pain.  Denies dysuria, frequency or hesitancy of urination. Neuro +L eye palsy. Denies headaches, visual changes or ataxia. Denies dizziness, tingling, tremors, sensory change, speech change, focal weakness or headaches. Hematology Denies easy bruising or bleeding, denies gingival bleeding or epistaxis. Endo Denies heat/cold intolerance, denies diabetes or thyroid abnormalities. MSK Denies back pain, arthralgias, myalgias or frequent falls. Psychiatric/Behavioral Denies depression and substance abuse. The patient is not nervous/anxious.          No Known Allergies  Past Medical History:   Diagnosis Date    Cancer ([facility] [address]     Hypertension     Valvular heart disease      Past Surgical History:   Procedure Laterality Date    HX BACK SURGERY      26 years ago    IR CHOLECYSTOSTOMY PERCUTANEOUS      IR INTRATHECAL CHEMO INJECTION CNS  12/8/2021     Family History   Problem Relation Age of Onset    Diabetes Mother     Hypertension Mother     Diabetes Father     Hypertension Father     Hypertension Brother      Social History     Socioeconomic History    Marital status:      Spouse name: Not on file    Number of children: Not on file    Years of education: Not on file    Highest education level: Not on file   Occupational History    Not on file   Tobacco Use    Smoking status: Never Smoker    Smokeless tobacco: Never Used   Vaping Use    Vaping Use: Never used   Substance and Sexual Activity    Alcohol use: Not Currently    Drug use: Not Currently    Sexual activity: Not on file   Other Topics Concern    Not on file   Social History Narrative    Not on file     Social Determinants of Health     Financial Resource Strain:     Difficulty of Paying Living Expenses: Not on file   Food Insecurity:     Worried About Running Out of Food in the Last Year: Not on file    [address] in the Last Year: Not on file   Transportation Needs:     Lack of Transportation (Medical): Not on file    Lack of Transportation (Non-Medical): Not on file   Physical Activity:     Days of Exercise per Week: Not on file    Minutes of Exercise per Session: Not on file   Stress:     Feeling of Stress : Not on file   Social Connections:     Frequency of Communication with Friends and Family: Not on file    Frequency of Social Gatherings with Friends and Family: Not on file    Attends [employer] Services: Not on file    Active Member of [address] [place] [address] Ellie or Organizations: Not on file    Attends Club or Organization Meetings: Not on file    Marital Status: Not on file   Intimate Partner Violence:     Fear of Current or Ex-Partner: Not on file    Emotionally Abused: Not on file    Physically Abused: Not on file    Sexually Abused: Not on file   Housing Stability:     Unable to Pay for Housing in the Last Year: Not on file    Number of [place] in the Last Year: Not on file    Unstable Housing in the Last Year: Not on file     Current Outpatient Medications   Medication Sig Dispense Refill    nystatin (MYCOSTATIN) 100,000 unit/mL suspension Take 5 mL by mouth four (4) times daily. swish and spit 473 mL 0    lisinopriL (PRINIVIL, ZESTRIL) 10 mg tablet Take 1 Tablet by mouth daily. 30 Tablet 0    atenoloL (TENORMIN) 25 mg tablet Take 1 Tablet by mouth daily. 30 Tablet 0    acyclovir (ZOVIRAX) 400 mg tablet Take 1 Tablet by mouth two (2) times a day for 30 days. 60 Tablet 0    fluconazole (DIFLUCAN) 200 mg tablet Take 1 Tablet by mouth daily. FDA advises cautious prescribing of oral fluconazole in pregnancy. 30 Tablet 0    prochlorperazine (Compazine) 10 mg tablet Take 0.5-1 Tablets by mouth every six (6) hours as needed for Nausea or Vomiting.  (Patient not taking: Reported on 12/16/2021) 60 Tablet 0       OBJECTIVE:  Patient Vitals for the past 8 hrs:   BP Temp Pulse Resp SpO2 Height Weight   21 1829 133/72 97.5 °F (36.4 °C) 70 18 98 %     21 1810      6' (1.829 m) 187 lb 4.8 oz (85 kg)   21 1523 (!) 152/81 97.6 °F (36.4 °C) 78 18 100 %       Temp (24hrs), Av.6 °F (36.4 °C), Min:97.5 °F (36.4 °C), Max:97.6 °F (36.4 °C)    No intake/output data recorded. Physical Exam:  Constitutional: Well developed, well nourished male in no acute distress, sitting comfortably in the hospital bed. HEENT: Normocephalic and atraumatic. Oropharynx is clear, mucous membranes are moist. Extraocular muscles are intact. Sclerae anicteric. Skin Warm and dry. No bruising and no rash noted. No erythema. No pallor. Respiratory Lungs are clear to auscultation bilaterally without wheezes, rales or rhonchi, normal air exchange without accessory muscle use. CVS Normal rate, regular rhythm and normal S1 and S2. No murmurs, gallops, or rubs. Abdomen Soft, nontender and nondistended, normoactive bowel sounds. No palpable mass. No hepatosplenomegaly. Neuro Grossly nonfocal with no obvious sensory or motor deficits. MSK Normal range of motion in general.  No edema and no tenderness. Psych Appropriate mood and affect. Labs:    Recent Results (from the past 24 hour(s))   METABOLIC PANEL, COMPREHENSIVE    Collection Time: 21  3:32 PM   Result Value Ref Range    Sodium 140 136 - 145 mmol/L    Potassium 4.1 3.5 - 5.1 mmol/L    Chloride 107 98 - 107 mmol/L    CO2 31 21 - 32 mmol/L    Anion gap 2 (L) 7 - 16 mmol/L    Glucose 115 (H) 65 - 100 mg/dL    BUN 13 6 - 23 MG/DL    Creatinine 0.67 (L) 0.8 - 1.5 MG/DL    GFR est AA >60 >60 ml/min/1.73m2    GFR est non-AA >60 >60 ml/min/1.73m2    Calcium 8.7 8.3 - 10.4 MG/DL    Bilirubin, total 0.3 0.2 - 1.1 MG/DL    ALT (SGPT) 27 12 - 65 U/L    AST (SGOT) 13 (L) 15 - 37 U/L    Alk.  phosphatase 154 (H) 50 - 136 U/L    Protein, total 6.6 6.3 - 8.2 g/dL    Albumin 3.5 3.5 - 5.0 g/dL    Globulin 3.1 2.3 - 3.5 g/dL    A-G Ratio 1.1 (L) 1.2 - 3.5     MAGNESIUM    Collection Time: 12/27/21  3:32 PM   Result Value Ref Range    Magnesium 2.3 1.8 - 2.4 mg/dL   CBC WITH AUTOMATED DIFF    Collection Time: 12/27/21  3:32 PM   Result Value Ref Range    WBC 11.2 (H) 4.3 - 11.1 K/uL    RBC 3.24 (L) 4.23 - 5.6 M/uL    HGB 9.3 (L) 13.6 - 17.2 g/dL    HCT 28.4 (L) 41.1 - 50.3 %    MCV 87.7 79.6 - 97.8 FL    MCH 28.7 26.1 - 32.9 PG    MCHC 32.7 31.4 - 35.0 g/dL    RDW 13.2 11.9 - 14.6 %    PLATELET 737 937 - 202 K/uL    MPV 10.3 9.4 - 12.3 FL    ABSOLUTE NRBC 0.00 0.0 - 0.2 K/uL    DF AUTOMATED      NEUTROPHILS 68 43 - 78 %    LYMPHOCYTES 13 13 - 44 %    MONOCYTES 14 (H) 4.0 - 12.0 %    EOSINOPHILS 0 (L) 0.5 - 7.8 %    BASOPHILS 1 0.0 - 2.0 %    IMMATURE GRANULOCYTES 5 0.0 - 5.0 %    ABS. NEUTROPHILS 7.6 1.7 - 8.2 K/UL    ABS. LYMPHOCYTES 1.4 0.5 - 4.6 K/UL    ABS. MONOCYTES 1.6 (H) 0.1 - 1.3 K/UL    ABS. EOSINOPHILS 0.0 0.0 - 0.8 K/UL    ABS. BASOPHILS 0.1 0.0 - 0.2 K/UL    ABS. IMM. GRANS. 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 Consult IR for LP/IT MTX.     Hypertension  - Con't home meds    Continue home meds  Ppx meds: Acyclovir, Diflucan  Rhea SOPs  Lovenox for DVT ppx (hold for plt <50k)     Goals and plan of care reviewed with the patient. All questions answered to the best of our ability.     Disposition:  Anticipate discharge home after the completion of chemotherapy.              Jody Diaz MD  [facility] Hematology and Oncology  [address] [place] [address]  Office : [phone]  Fax : [phone]

## 2021-12-27 NOTE — PROGRESS NOTES
12/27/21 1530   Dual Skin Pressure Injury Assessment   Dual Skin Pressure Injury Assessment WDL   Second Care Provider (Based on [address] [place] [address]) Kirk Hayes RN   Skin Integumentary   Skin Integumentary (WDL) WDL    Pressure  Injury Documentation No Pressure Injury Noted-Pressure Ulcer Prevention Initiated   Wound Prevention and Protection Methods   Orientation of Wound Prevention Posterior   Location of Wound Prevention Sacrum/Coccyx   Dressing Present  No   Wound Offloading (Prevention Methods) Bed, pressure reduction mattress   Piyush Scale (11years of age and older)   Sensory Perception 4   Moisture 4   Activity 4   Mobility 4   Nutrition 3   Nutrition Interventions Document food/fluid/supplement intake; Offer support with meals,snacks and hydration   Friction and Shear 3   Piyush Score 22

## 2021-12-28 ENCOUNTER — HOSPITAL ENCOUNTER (OUTPATIENT)
Dept: INTERVENTIONAL RADIOLOGY/VASCULAR | Age: 56
Discharge: HOME | End: 2021-12-28
Attending: NURSE PRACTITIONER

## 2021-12-28 VITALS
SYSTOLIC BLOOD PRESSURE: 163 MMHG | WEIGHT: 187 LBS | RESPIRATION RATE: 18 BRPM | BODY MASS INDEX: 25.33 KG/M2 | OXYGEN SATURATION: 97 % | TEMPERATURE: 97.4 F | HEART RATE: 82 BPM | HEIGHT: 72 IN | DIASTOLIC BLOOD PRESSURE: 77 MMHG

## 2021-12-28 DIAGNOSIS — Z51.11 ADMISSION FOR ANTINEOPLASTIC CHEMOTHERAPY: Primary | ICD-10-CM

## 2021-12-28 LAB
ALBUMIN SERPL-MCNC: 3.2 G/DL (ref 3.5–5)
ALBUMIN/GLOB SERPL: 1.1 {RATIO} (ref 1.2–3.5)
ALP SERPL-CCNC: 126 U/L (ref 50–136)
ALT SERPL-CCNC: 23 U/L (ref 12–65)
ANION GAP SERPL CALC-SCNC: 2 MMOL/L (ref 7–16)
APPEARANCE FLD: CLEAR
AST SERPL-CCNC: 13 U/L (ref 15–37)
BASOPHILS # BLD: 0.1 K/UL (ref 0–0.2)
BASOPHILS NFR BLD: 1 % (ref 0–2)
BILIRUB SERPL-MCNC: 0.2 MG/DL (ref 0.2–1.1)
BUN SERPL-MCNC: 12 MG/DL (ref 6–23)
CALCIUM SERPL-MCNC: 8.4 MG/DL (ref 8.3–10.4)
CHLORIDE SERPL-SCNC: 109 MMOL/L (ref 98–107)
CO2 SERPL-SCNC: 28 MMOL/L (ref 21–32)
COLOR FLD: COLORLESS
CREAT SERPL-MCNC: 0.59 MG/DL (ref 0.8–1.5)
DIFFERENTIAL METHOD BLD: ABNORMAL
EOSINOPHIL # BLD: 0 K/UL (ref 0–0.8)
EOSINOPHIL NFR BLD: 0 % (ref 0.5–7.8)
ERYTHROCYTE [DISTWIDTH] IN BLOOD BY AUTOMATED COUNT: 13.1 % (ref 11.9–14.6)
GLOBULIN SER CALC-MCNC: 2.9 G/DL (ref 2.3–3.5)
GLUCOSE CSF-MCNC: 77 MG/DL (ref 40–70)
GLUCOSE SERPL-MCNC: 141 MG/DL (ref 65–100)
HCT VFR BLD AUTO: 28 % (ref 41.1–50.3)
HGB BLD-MCNC: 9.2 G/DL (ref 13.6–17.2)
IMM GRANULOCYTES # BLD AUTO: 0.5 K/UL (ref 0–0.5)
IMM GRANULOCYTES NFR BLD AUTO: 4 % (ref 0–5)
LYMPHOCYTES # BLD: 0.5 K/UL (ref 0.5–4.6)
LYMPHOCYTES NFR BLD: 4 % (ref 13–44)
MAGNESIUM SERPL-MCNC: 2.2 MG/DL (ref 1.8–2.4)
MCH RBC QN AUTO: 28.8 PG (ref 26.1–32.9)
MCHC RBC AUTO-ENTMCNC: 32.9 G/DL (ref 31.4–35)
MCV RBC AUTO: 87.5 FL (ref 79.6–97.8)
MONOCYTES # BLD: 0.3 K/UL (ref 0.1–1.3)
MONOCYTES NFR BLD: 2 % (ref 4–12)
NEUTS SEG # BLD: 11.5 K/UL (ref 1.7–8.2)
NEUTS SEG NFR BLD: 89 % (ref 43–78)
NRBC # BLD: 0 K/UL (ref 0–0.2)
NUC CELL # FLD: 1 /CU MM
PLATELET # BLD AUTO: 289 K/UL (ref 150–450)
PMV BLD AUTO: 10.7 FL (ref 9.4–12.3)
POTASSIUM SERPL-SCNC: 4.2 MMOL/L (ref 3.5–5.1)
PROT CSF-MCNC: 45 MG/DL (ref 15–45)
PROT SERPL-MCNC: 6.1 G/DL (ref 6.3–8.2)
RBC # BLD AUTO: 3.2 M/UL (ref 4.23–5.6)
RBC # FLD: 4 /CU MM
SODIUM SERPL-SCNC: 139 MMOL/L (ref 136–145)
SPECIMEN SOURCE FLD: NORMAL
TUBE # CSF: ABNORMAL
TUBE # CSF: NORMAL
WBC # BLD AUTO: 12.9 K/UL (ref 4.3–11.1)

## 2021-12-28 PROCEDURE — 36591 DRAW BLOOD OFF VENOUS DEVICE: CPT

## 2021-12-28 PROCEDURE — 83735 ASSAY OF MAGNESIUM: CPT

## 2021-12-28 PROCEDURE — 89050 BODY FLUID CELL COUNT: CPT

## 2021-12-28 PROCEDURE — 74011000250 HC RX REV CODE- 250: Performed by: NURSE PRACTITIONER

## 2021-12-28 PROCEDURE — APPSS45 APP SPLIT SHARED TIME 31-45 MINUTES: Performed by: NURSE PRACTITIONER

## 2021-12-28 PROCEDURE — 84157 ASSAY OF PROTEIN OTHER: CPT

## 2021-12-28 PROCEDURE — 85025 COMPLETE CBC W/AUTO DIFF WBC: CPT

## 2021-12-28 PROCEDURE — 74011000258 HC RX REV CODE- 258: Performed by: NURSE PRACTITIONER

## 2021-12-28 PROCEDURE — 65270000029 HC RM PRIVATE

## 2021-12-28 PROCEDURE — 99232 SBSQ HOSP IP/OBS MODERATE 35: CPT | Performed by: INTERNAL MEDICINE

## 2021-12-28 PROCEDURE — 77030014143 HC TY PUNC LUMBR BD -A

## 2021-12-28 PROCEDURE — 82945 GLUCOSE OTHER FLUID: CPT

## 2021-12-28 PROCEDURE — 74011250636 HC RX REV CODE- 250/636: Performed by: NURSE PRACTITIONER

## 2021-12-28 PROCEDURE — 83615 LACTATE (LD) (LDH) ENZYME: CPT

## 2021-12-28 PROCEDURE — 77030003666 HC NDL SPINAL BD -A

## 2021-12-28 PROCEDURE — 74011250637 HC RX REV CODE- 250/637: Performed by: NURSE PRACTITIONER

## 2021-12-28 PROCEDURE — 96450 CHEMOTHERAPY INTO CNS: CPT

## 2021-12-28 PROCEDURE — 74011250637 HC RX REV CODE- 250/637: Performed by: INTERNAL MEDICINE

## 2021-12-28 PROCEDURE — 74011000250 HC RX REV CODE- 250: Performed by: RADIOLOGY

## 2021-12-28 PROCEDURE — 80053 COMPREHEN METABOLIC PANEL: CPT

## 2021-12-28 RX ORDER — ALBUTEROL SULFATE 0.83 MG/ML
2.5 SOLUTION RESPIRATORY (INHALATION) AS NEEDED
Status: CANCELLED
Start: 2021-12-28

## 2021-12-28 RX ORDER — HYDROCORTISONE SODIUM SUCCINATE 100 MG/2ML
100 INJECTION, POWDER, FOR SOLUTION INTRAMUSCULAR; INTRAVENOUS AS NEEDED
Status: CANCELLED | OUTPATIENT
Start: 2021-12-28

## 2021-12-28 RX ORDER — LIDOCAINE HYDROCHLORIDE 20 MG/ML
40-120 INJECTION, SOLUTION INFILTRATION; PERINEURAL ONCE
Status: COMPLETED | OUTPATIENT
Start: 2021-12-28 | End: 2021-12-28

## 2021-12-28 RX ORDER — DIPHENHYDRAMINE HYDROCHLORIDE 50 MG/ML
50 INJECTION, SOLUTION INTRAMUSCULAR; INTRAVENOUS AS NEEDED
Status: CANCELLED
Start: 2021-12-28

## 2021-12-28 RX ORDER — EPINEPHRINE 1 MG/ML
0.3 INJECTION, SOLUTION, CONCENTRATE INTRAVENOUS AS NEEDED
Status: CANCELLED | OUTPATIENT
Start: 2021-12-28

## 2021-12-28 RX ORDER — AMOXICILLIN 250 MG
2 CAPSULE ORAL
Status: DISCONTINUED | OUTPATIENT
Start: 2021-12-28 | End: 2021-12-29

## 2021-12-28 RX ADMIN — LISINOPRIL 10 MG: 5 TABLET ORAL at 07:37

## 2021-12-28 RX ADMIN — ONDANSETRON 8 MG: 2 INJECTION INTRAMUSCULAR; INTRAVENOUS at 11:29

## 2021-12-28 RX ADMIN — DEXAMETHASONE SODIUM PHOSPHATE 10 MG: 10 INJECTION INTRAMUSCULAR; INTRAVENOUS at 17:23

## 2021-12-28 RX ADMIN — ATENOLOL 25 MG: 50 TABLET ORAL at 07:36

## 2021-12-28 RX ADMIN — ONDANSETRON 8 MG: 2 INJECTION INTRAMUSCULAR; INTRAVENOUS at 20:23

## 2021-12-28 RX ADMIN — LIDOCAINE HYDROCHLORIDE 100 MG: 20 INJECTION, SOLUTION INFILTRATION; PERINEURAL at 10:11

## 2021-12-28 RX ADMIN — FLUCONAZOLE 200 MG: 100 TABLET ORAL at 07:37

## 2021-12-28 RX ADMIN — FOSAPREPITANT 150 MG: 150 INJECTION, POWDER, LYOPHILIZED, FOR SOLUTION INTRAVENOUS at 17:24

## 2021-12-28 RX ADMIN — MESNA 10000 MG: 100 INJECTION, SOLUTION INTRAVENOUS at 20:23

## 2021-12-28 RX ADMIN — ALLOPURINOL 300 MG: 300 TABLET ORAL at 07:37

## 2021-12-28 RX ADMIN — SODIUM CHLORIDE 12 MG: 9 INJECTION, SOLUTION INTRAMUSCULAR; INTRAVENOUS; SUBCUTANEOUS at 10:00

## 2021-12-28 RX ADMIN — DOCUSATE SODIUM 50MG AND SENNOSIDES 8.6MG 2 TABLET: 8.6; 5 TABLET, FILM COATED ORAL at 18:50

## 2021-12-28 RX ADMIN — ACYCLOVIR 400 MG: 800 TABLET ORAL at 07:36

## 2021-12-28 RX ADMIN — ETOPOSIDE 200 MG: 20 INJECTION INTRAVENOUS at 18:01

## 2021-12-28 RX ADMIN — CARBOPLATIN 750 MG: 10 INJECTION, SOLUTION INTRAVENOUS at 19:05

## 2021-12-28 RX ADMIN — ONDANSETRON 8 MG: 2 INJECTION INTRAMUSCULAR; INTRAVENOUS at 03:46

## 2021-12-28 RX ADMIN — ACYCLOVIR 400 MG: 800 TABLET ORAL at 17:23

## 2021-12-28 RX ADMIN — SODIUM CHLORIDE 75 ML/HR: 900 INJECTION, SOLUTION INTRAVENOUS at 03:47

## 2021-12-28 NOTE — PROGRESS NOTES
Care Management Interventions  Support Systems: Spouse/Significant Other,Child(dea)  Confirm Follow Up Transport: Family  Discharge Location  Discharge Placement: Home  SW met with pt. Pt admitted for chemo. Pt admitted in early December for first chemo cycle. Pt had lymphoma recurrence in November. Pt lives at home with spouse and children (25 and 21). Pt is independent, drives and works. Pt states he was able to go back to work last week. Pt states his chemo will be completed at 9pm tomorrow and pt plans to dc home tomorrow night after that. Pt reports the physician states he could leave tomorrow night after chemo completed. No needs anticipated for dc.   BARTOLOME MelendezW

## 2021-12-28 NOTE — PROGRESS NOTES
END OF SHIFT NOTE:    Intake/Output  12/28 0701 - 12/28 1900  In: 323 [P.O.:50; I.V.:273]  Out: -    Voiding: YES  Catheter: NO  Drain:              Stool:  0 occurrences. Emesis:  0 occurrences. VITAL SIGNS  Patient Vitals for the past 12 hrs:   Temp Pulse Resp BP SpO2   12/28/21 1646 98.1 °F (36.7 °C) 82 19 133/72 97 %   12/28/21 1140 98.3 °F (36.8 °C) 79 18 (!) 142/80 96 %   12/28/21 0758 97.5 °F (36.4 °C) 81 18 138/68 96 %       Pain Assessment  Pain 1  Pain Scale 1: Numeric (0 - 10) (12/28/21 1130)  Pain Intensity 1: 0 (12/28/21 1130)  Patient Stated Pain Goal: 0 (12/28/21 1130)    Ambulating  Yes    Additional Information: pt had IT chemo, etopside and carbo given, ifex/mesna to start next shift, c/o constipation- pericolace given    Shift report given to oncoming nurse at the bedside.     Cecilia Persaud

## 2021-12-28 NOTE — PROGRESS NOTES
[facility]        Inpatient Hematology / Oncology Progress Note    Reason for Admission:  Admission for antineoplastic chemotherapy [Z51.11]    24 Hour Events:  Afebrile, VSS  C2D2 R-ICE  LP/IT MTX today  Tolerating treatment well    Transfusions: None  Replacements: None    ROS:  Constitutional: Negative for fever, chills, weakness, malaise, fatigue. CV: Negative for chest pain, palpitations, edema. Respiratory: Negative for dyspnea, cough, wheezing. GI: Negative for nausea, abdominal pain, diarrhea. 10 point review of systems is otherwise negative with the exception of the elements mentioned above in the HPI.        No Known Allergies  Past Medical History:   Diagnosis Date    Cancer ([facility] [address]     Hypertension     Valvular heart disease      Past Surgical History:   Procedure Laterality Date    HX BACK SURGERY      26 years ago    IR CHOLECYSTOSTOMY PERCUTANEOUS      IR INTRATHECAL CHEMO INJECTION CNS  12/8/2021     Family History   Problem Relation Age of Onset    Diabetes Mother     Hypertension Mother     Diabetes Father     Hypertension Father     Hypertension Brother      Social History     Socioeconomic History    Marital status:      Spouse name: Not on file    Number of children: Not on file    Years of education: Not on file    Highest education level: Not on file   Occupational History    Not on file   Tobacco Use    Smoking status: Never Smoker    Smokeless tobacco: Never Used   Vaping Use    Vaping Use: Never used   Substance and Sexual Activity    Alcohol use: Not Currently    Drug use: Not Currently    Sexual activity: Not on file   Other Topics Concern    Not on file   Social History Narrative    Not on file     Social Determinants of Health     Financial Resource Strain:     Difficulty of Paying Living Expenses: Not on file   Food Insecurity:     Worried About Running Out of Food in the Last Year: Not on file    [facility] [address] in the Last Year: Not on file   Transportation Needs:     Lack of Transportation (Medical): Not on file    Lack of Transportation (Non-Medical):  Not on file   Physical Activity:     Days of Exercise per Week: Not on file    Minutes of Exercise per Session: Not on file   Stress:     Feeling of Stress : Not on file   Social Connections:     Frequency of Communication with Friends and Family: Not on file    Frequency of Social Gatherings with Friends and Family: Not on file    Attends [employer] Services: Not on file    Active Member of Clubs or Organizations: Not on file    Attends Club or Organization Meetings: Not on file    Marital Status: Not on file   Intimate Partner Violence:     Fear of Current or Ex-Partner: Not on file    Emotionally Abused: Not on file    Physically Abused: Not on file    Sexually Abused: Not on file   Housing Stability:     Unable to Pay for Housing in the Last Year: Not on file    Number of [place] in the Last Year: Not on file    Unstable Housing in the Last Year: Not on file     Current Facility-Administered Medications   Medication Dose Route Frequency Provider Last Rate Last Admin    acyclovir (ZOVIRAX) tablet 400 mg  400 mg Oral BID Bryan Fried, NP   400 mg at 12/28/21 0736    atenoloL (TENORMIN) tablet 25 mg  25 mg Oral DAILY Bryan Fried, NP   25 mg at 12/28/21 0736    fluconazole (DIFLUCAN) tablet 200 mg  200 mg Oral DAILY Bryan Fried, NP   200 mg at 12/28/21 0737    lisinopriL (PRINIVIL, ZESTRIL) tablet 10 mg  10 mg Oral DAILY Bryan Fried, NP   10 mg at 12/28/21 0737    allopurinoL (ZYLOPRIM) tablet 300 mg  300 mg Oral DAILY Bryan Fried, NP   300 mg at 12/28/21 0737    ondansetron (ZOFRAN) injection 4 mg  4 mg IntraVENous Q4H PRN Bryan Gleason NP        prochlorperazine (COMPAZINE) with saline injection 5 mg  5 mg IntraVENous Q6H PRN Bryan Gleason NP        HYDROcodone-acetaminophen (NORCO) 5-325 mg per tablet 1 Tablet  1 Tablet Oral Q6H PRN Bryan Gleason, NP  morphine injection 2 mg  2 mg IntraVENous Q4H PRN Joanette Clamp, NP        0.9% sodium chloride infusion  75 mL/hr IntraVENous CONTINUOUS Joanette Clamp, NP 75 mL/hr at 21 0347 75 mL/hr at 21 0347    enoxaparin (LOVENOX) injection 40 mg  40 mg SubCUTAneous Q24H Joanette Clamp, NP   40 mg at 21 1709    fosaprepitant (EMEND) 150 mg in 0.9% sodium chloride 150 mL IVPB  150 mg IntraVENous Nirav Labor, NP        dexamethasone (DECADRON) 10 mg/mL injection 10 mg  10 mg IntraVENous Q24H Joanette Clamp, NP   10 mg at 21    ondansetron (ZOFRAN) injection 8 mg  8 mg IntraVENous Q8H Joanette Clamp, NP   8 mg at 21 034    prochlorperazine (COMPAZINE) with saline injection 10 mg  10 mg IntraVENous Q6H PRN Joanette Clamp, NP        diphenhydrAMINE (BENADRYL) injection 25 mg  25 mg IntraVENous Q6H PRN Joanette Clamp, NP        LORazepam (ATIVAN) injection 0.5 mg  0.5 mg IntraVENous Q6H PRN Joanette Clamp, NP        etoposide (VEPESID) 200 mg in 0.9% sodium chloride 500 mL chemo infusion  200 mg IntraVENous Q24H Joanette Clamp,  mL/hr at 21 200 mg at 21    CARBOplatin (PARAPLATIN) 750 mg in 0.9% sodium chloride 250 mL chemo infusion  750 mg IntraVENous Nirav Labor, NP        ifosfamide (IFEX) 10,000 mg, mesna (MESNEX) 10,000 mg in 0.9% sodium chloride 1,000 mL chemo infusion  10,000 mg IntraVENous CONTINUOUS Joanette Clamp, NP           OBJECTIVE:  Patient Vitals for the past 8 hrs:   BP Temp Pulse Resp SpO2   21 0758 138/68 97.5 °F (36.4 °C) 81 18 96 %   21 0357 133/64 97.9 °F (36.6 °C) 73 16 94 %     Temp (24hrs), Av.6 °F (36.4 °C), Min:97.4 °F (36.3 °C), Max:97.9 °F (36.6 °C)    701 - 1900  In: 273 [I.V.:273]  Out: -     Physical Exam:  Constitutional: Well developed, well nourished male in no acute distress, sitting comfortably in the bedside chair. HEENT: Normocephalic and atraumatic.  Oropharynx is clear, mucous membranes are moist. Extraocular muscles are intact. Sclerae anicteric. +L eye palsy (wearing eye patch)   Skin Warm and dry. No bruising and no rash noted. No erythema. No pallor. Respiratory Lungs are clear to auscultation bilaterally without wheezes, rales or rhonchi, normal air exchange without accessory muscle use. CVS Normal rate, regular rhythm and normal S1 and S2.  +murmur   Abdomen Soft, nontender and nondistended, normoactive bowel sounds. No palpable mass. No hepatosplenomegaly. Neuro Grossly nonfocal with no obvious sensory or motor deficits. +L eye palsy. MSK Normal range of motion in general.  No edema and no tenderness. Psych Appropriate mood and affect. Labs:    Recent Results (from the past 24 hour(s))   METABOLIC PANEL, COMPREHENSIVE    Collection Time: 12/27/21  3:32 PM   Result Value Ref Range    Sodium 140 136 - 145 mmol/L    Potassium 4.1 3.5 - 5.1 mmol/L    Chloride 107 98 - 107 mmol/L    CO2 31 21 - 32 mmol/L    Anion gap 2 (L) 7 - 16 mmol/L    Glucose 115 (H) 65 - 100 mg/dL    BUN 13 6 - 23 MG/DL    Creatinine 0.67 (L) 0.8 - 1.5 MG/DL    GFR est AA >60 >60 ml/min/1.73m2    GFR est non-AA >60 >60 ml/min/1.73m2    Calcium 8.7 8.3 - 10.4 MG/DL    Bilirubin, total 0.3 0.2 - 1.1 MG/DL    ALT (SGPT) 27 12 - 65 U/L    AST (SGOT) 13 (L) 15 - 37 U/L    Alk.  phosphatase 154 (H) 50 - 136 U/L    Protein, total 6.6 6.3 - 8.2 g/dL    Albumin 3.5 3.5 - 5.0 g/dL    Globulin 3.1 2.3 - 3.5 g/dL    A-G Ratio 1.1 (L) 1.2 - 3.5     MAGNESIUM    Collection Time: 12/27/21  3:32 PM   Result Value Ref Range    Magnesium 2.3 1.8 - 2.4 mg/dL   CBC WITH AUTOMATED DIFF    Collection Time: 12/27/21  3:32 PM   Result Value Ref Range    WBC 11.2 (H) 4.3 - 11.1 K/uL    RBC 3.24 (L) 4.23 - 5.6 M/uL    HGB 9.3 (L) 13.6 - 17.2 g/dL    HCT 28.4 (L) 41.1 - 50.3 %    MCV 87.7 79.6 - 97.8 FL    MCH 28.7 26.1 - 32.9 PG    MCHC 32.7 31.4 - 35.0 g/dL    RDW 13.2 11.9 - 14.6 %    PLATELET 548 007 - 321 K/uL    MPV 10.3 9.4 - 12.3 FL    ABSOLUTE NRBC 0.00 0.0 - 0.2 K/uL    DF AUTOMATED      NEUTROPHILS 68 43 - 78 %    LYMPHOCYTES 13 13 - 44 %    MONOCYTES 14 (H) 4.0 - 12.0 %    EOSINOPHILS 0 (L) 0.5 - 7.8 %    BASOPHILS 1 0.0 - 2.0 %    IMMATURE GRANULOCYTES 5 0.0 - 5.0 %    ABS. NEUTROPHILS 7.6 1.7 - 8.2 K/UL    ABS. LYMPHOCYTES 1.4 0.5 - 4.6 K/UL    ABS. MONOCYTES 1.6 (H) 0.1 - 1.3 K/UL    ABS. EOSINOPHILS 0.0 0.0 - 0.8 K/UL    ABS. BASOPHILS 0.1 0.0 - 0.2 K/UL    ABS. IMM. GRANS. 0.5 0.0 - 0.5 K/UL   LD    Collection Time: 12/27/21  3:32 PM   Result Value Ref Range     (H) 100 - 190 U/L   URIC ACID    Collection Time: 12/27/21  3:32 PM   Result Value Ref Range    Uric acid 4.2 2.6 - 6.0 MG/DL   METABOLIC PANEL, COMPREHENSIVE    Collection Time: 12/28/21  3:49 AM   Result Value Ref Range    Sodium 139 136 - 145 mmol/L    Potassium 4.2 3.5 - 5.1 mmol/L    Chloride 109 (H) 98 - 107 mmol/L    CO2 28 21 - 32 mmol/L    Anion gap 2 (L) 7 - 16 mmol/L    Glucose 141 (H) 65 - 100 mg/dL    BUN 12 6 - 23 MG/DL    Creatinine 0.59 (L) 0.8 - 1.5 MG/DL    GFR est AA >60 >60 ml/min/1.73m2    GFR est non-AA >60 >60 ml/min/1.73m2    Calcium 8.4 8.3 - 10.4 MG/DL    Bilirubin, total 0.2 0.2 - 1.1 MG/DL    ALT (SGPT) 23 12 - 65 U/L    AST (SGOT) 13 (L) 15 - 37 U/L    Alk.  phosphatase 126 50 - 136 U/L    Protein, total 6.1 (L) 6.3 - 8.2 g/dL    Albumin 3.2 (L) 3.5 - 5.0 g/dL    Globulin 2.9 2.3 - 3.5 g/dL    A-G Ratio 1.1 (L) 1.2 - 3.5     MAGNESIUM    Collection Time: 12/28/21  3:49 AM   Result Value Ref Range    Magnesium 2.2 1.8 - 2.4 mg/dL   CBC WITH AUTOMATED DIFF    Collection Time: 12/28/21  3:49 AM   Result Value Ref Range    WBC 12.9 (H) 4.3 - 11.1 K/uL    RBC 3.20 (L) 4.23 - 5.6 M/uL    HGB 9.2 (L) 13.6 - 17.2 g/dL    HCT 28.0 (L) 41.1 - 50.3 %    MCV 87.5 79.6 - 97.8 FL    MCH 28.8 26.1 - 32.9 PG    MCHC 32.9 31.4 - 35.0 g/dL    RDW 13.1 11.9 - 14.6 %    PLATELET 479 260 - 713 K/uL    MPV 10.7 9.4 - 12.3 FL    ABSOLUTE NRBC 0.00 0.0 - 0.2 K/uL DF AUTOMATED      NEUTROPHILS 89 (H) 43 - 78 %    LYMPHOCYTES 4 (L) 13 - 44 %    MONOCYTES 2 (L) 4.0 - 12.0 %    EOSINOPHILS 0 (L) 0.5 - 7.8 %    BASOPHILS 1 0.0 - 2.0 %    IMMATURE GRANULOCYTES 4 0.0 - 5.0 %    ABS. NEUTROPHILS 11.5 (H) 1.7 - 8.2 K/UL    ABS. LYMPHOCYTES 0.5 0.5 - 4.6 K/UL    ABS. MONOCYTES 0.3 0.1 - 1.3 K/UL    ABS. EOSINOPHILS 0.0 0.0 - 0.8 K/UL    ABS. BASOPHILS 0.1 0.0 - 0.2 K/UL    ABS. IMM. GRANS. 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 Aleyda Jasmine is a 64 y.o. male admitted on 12/27/2021. The primary encounter diagnosis was Admission for antineoplastic chemotherapy. Diagnoses of Diffuse large B-cell lymphoma, unspecified body region [facility]) and Lymphoma ([facility] were also pertinent to this visit. He is a patient of Dr. Bret Bailey with relapsed DLBCL. 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 He is s/p R-CHOP from April to July 2021. In November 2021, CT CAP showed disease relapse with new L supraclavicular mass as well as stable axillary and chest wall LNs and shotty RP LNs. He is s/p L supraclavicular LN bx +DLBCL. He tolerated C1 R-ICE with IT MTX well. CSF flow neg. MRI brain ordered for L eye palsy, showed L posterior communicating artery, whose course is more caudad than anatomically typical.  Per neurology, no further neuro w/u necessary as pt has underwent w/u for L eye palsy and felt not to be malignancy-related.   Echo with EF 60-65% with mod to severe eccentric mitral regurgitation. Cardiology performed ERINN on 12/10 which revealed severe MR with a partially flail anterior leaflet. Cardiology monitoring as OP. He will eventually require valve repair surgery. Per cards, while undergoing chemotherapy, valve repair would not be in his best interest for fear of infection/poor healing. He is being admitted for C2 R-ICE with IT MTX. He is feeling well and is ready to proceed with treatment. 3rd nerve palsy is essentially stable. No complications noted from cycle 1, he recovered to baseline within a few days. PLAN:  Relapsed DLBCL  - admit for C2 R-ICE. Consult IR for LP/IT MTX.  12/28 Day 2 R-ICE. LP/IT MTX today. Tolerating treatment well. Hypertension  - Con't home meds    Continue home meds  Ppx meds: Acyclovir, Diflucan  Rhea SOPs  Lovenox for DVT ppx (hold for plt <50k)     Goals and plan of care reviewed with the patient. All questions answered to the best of our ability.     Disposition:  Anticipate discharge home after the completion of chemotherapy either Wed 12/29 or Thurs 12/30.              Rigo Salazar NP  [facility]  [address] [place] [address]  Office : [phone]  Fax : [phone]

## 2021-12-28 NOTE — PROGRESS NOTES
TRANSFER - OUT REPORT:    Verbal report given to Giulia RAVI(name) on Joseline Wayne  being transferred to [address] [place] [address]) for routine progression of care       Report consisted of patients Situation, Background, Assessment and   Recommendations(SBAR). Information from the following report(s) SBAR, Kardex, Procedure Summary, Intake/Output and MAR was reviewed with the receiving nurse. Lines:   Venous Access Device 12/06/21 (Active)   Central Line Being Utilized Yes 12/28/21 0357   Criteria for Appropriate Use Irritant/vesicant medication 12/28/21 0357   Site Assessment Clean, dry, & intact 12/28/21 0357   Date of Last Dressing Change 12/27/21 12/28/21 0357   Dressing Status Clean, dry, & intact 12/28/21 0357   Dressing Type Disk with Chlorhexadine gluconate (CHG); Transparent 12/28/21 0357   Action Taken Blood drawn 12/28/21 0357   Date Accessed (Medial Site) 12/27/21 12/28/21 0357   Access Time (Medial Site) 1530 12/27/21 1530   Access Needle Size (Site #1) 20 G 12/27/21 1530   Access Needle Length (Medial Site) 0.75 inches 12/27/21 1530   Positive Blood Return (Medial Site) Yes 12/28/21 0357   Action Taken (Medial Site) Infusing;Flushed 12/28/21 0357   Date Needle Changed (Medial Site) 12/27/21 12/27/21 1530   Alcohol Cap Used No 12/09/21 1349        Opportunity for questions and clarification was provided.       Patient transported with:   [employer] [facility] transport

## 2021-12-29 ENCOUNTER — DOCUMENTATION ONLY (OUTPATIENT)
Dept: HEMATOLOGY | Age: 56
End: 2021-12-29

## 2021-12-29 VITALS
TEMPERATURE: 97.9 F | OXYGEN SATURATION: 97 % | WEIGHT: 189.7 LBS | RESPIRATION RATE: 18 BRPM | HEIGHT: 72 IN | HEART RATE: 79 BPM | DIASTOLIC BLOOD PRESSURE: 69 MMHG | SYSTOLIC BLOOD PRESSURE: 136 MMHG | BODY MASS INDEX: 25.7 KG/M2

## 2021-12-29 LAB
ALBUMIN SERPL-MCNC: 3 G/DL (ref 3.5–5)
ALBUMIN/GLOB SERPL: 1.1 {RATIO} (ref 1.2–3.5)
ALP SERPL-CCNC: 118 U/L (ref 50–136)
ALT SERPL-CCNC: 25 U/L (ref 12–65)
ANION GAP SERPL CALC-SCNC: 3 MMOL/L (ref 7–16)
AST SERPL-CCNC: 10 U/L (ref 15–37)
BASOPHILS # BLD: 0 K/UL (ref 0–0.2)
BASOPHILS NFR BLD: 0 % (ref 0–2)
BILIRUB SERPL-MCNC: 0.2 MG/DL (ref 0.2–1.1)
BUN SERPL-MCNC: 17 MG/DL (ref 6–23)
CALCIUM SERPL-MCNC: 8.5 MG/DL (ref 8.3–10.4)
CHLORIDE SERPL-SCNC: 109 MMOL/L (ref 98–107)
CO2 SERPL-SCNC: 28 MMOL/L (ref 21–32)
CREAT SERPL-MCNC: 0.63 MG/DL (ref 0.8–1.5)
DIFFERENTIAL METHOD BLD: ABNORMAL
EOSINOPHIL # BLD: 0 K/UL (ref 0–0.8)
EOSINOPHIL NFR BLD: 0 % (ref 0.5–7.8)
ERYTHROCYTE [DISTWIDTH] IN BLOOD BY AUTOMATED COUNT: 13.5 % (ref 11.9–14.6)
GLOBULIN SER CALC-MCNC: 2.8 G/DL (ref 2.3–3.5)
GLUCOSE SERPL-MCNC: 153 MG/DL (ref 65–100)
HCT VFR BLD AUTO: 26.1 % (ref 41.1–50.3)
HGB BLD-MCNC: 8.5 G/DL (ref 13.6–17.2)
IMM GRANULOCYTES # BLD AUTO: 0.1 K/UL (ref 0–0.5)
IMM GRANULOCYTES NFR BLD AUTO: 1 % (ref 0–5)
LYMPHOCYTES # BLD: 0.4 K/UL (ref 0.5–4.6)
LYMPHOCYTES NFR BLD: 3 % (ref 13–44)
MAGNESIUM SERPL-MCNC: 2.3 MG/DL (ref 1.8–2.4)
MCH RBC QN AUTO: 28.6 PG (ref 26.1–32.9)
MCHC RBC AUTO-ENTMCNC: 32.6 G/DL (ref 31.4–35)
MCV RBC AUTO: 87.9 FL (ref 79.6–97.8)
MONOCYTES # BLD: 0.1 K/UL (ref 0.1–1.3)
MONOCYTES NFR BLD: 1 % (ref 4–12)
NEUTS SEG # BLD: 11 K/UL (ref 1.7–8.2)
NEUTS SEG NFR BLD: 95 % (ref 43–78)
NRBC # BLD: 0 K/UL (ref 0–0.2)
PLATELET # BLD AUTO: 299 K/UL (ref 150–450)
PMV BLD AUTO: 10.4 FL (ref 9.4–12.3)
POTASSIUM SERPL-SCNC: 4 MMOL/L (ref 3.5–5.1)
PROT SERPL-MCNC: 5.8 G/DL (ref 6.3–8.2)
RBC # BLD AUTO: 2.97 M/UL (ref 4.23–5.6)
SODIUM SERPL-SCNC: 140 MMOL/L (ref 136–145)
WBC # BLD AUTO: 11.6 K/UL (ref 4.3–11.1)

## 2021-12-29 PROCEDURE — 85025 COMPLETE CBC W/AUTO DIFF WBC: CPT

## 2021-12-29 PROCEDURE — 99239 HOSP IP/OBS DSCHRG MGMT >30: CPT | Performed by: INTERNAL MEDICINE

## 2021-12-29 PROCEDURE — 74011250636 HC RX REV CODE- 250/636: Performed by: INTERNAL MEDICINE

## 2021-12-29 PROCEDURE — APPSS180 APP SPLIT SHARED TIME > 60 MINUTES: Performed by: NURSE PRACTITIONER

## 2021-12-29 PROCEDURE — 80053 COMPREHEN METABOLIC PANEL: CPT

## 2021-12-29 PROCEDURE — 83735 ASSAY OF MAGNESIUM: CPT

## 2021-12-29 PROCEDURE — 74011250637 HC RX REV CODE- 250/637: Performed by: NURSE PRACTITIONER

## 2021-12-29 PROCEDURE — 74011250636 HC RX REV CODE- 250/636: Performed by: NURSE PRACTITIONER

## 2021-12-29 PROCEDURE — 36591 DRAW BLOOD OFF VENOUS DEVICE: CPT

## 2021-12-29 RX ORDER — ONDANSETRON 8 MG/1
8 TABLET, ORALLY DISINTEGRATING ORAL
Qty: 60 TABLET | Refills: 0 | Status: ON HOLD | OUTPATIENT
Start: 2021-12-29 | End: 2022-04-22 | Stop reason: SDUPTHER

## 2021-12-29 RX ORDER — POLYETHYLENE GLYCOL 3350 17 G/17G
17 POWDER, FOR SOLUTION ORAL DAILY
Status: DISCONTINUED | OUTPATIENT
Start: 2021-12-29 | End: 2021-12-29 | Stop reason: HOSPADM

## 2021-12-29 RX ORDER — ALLOPURINOL 300 MG/1
300 TABLET ORAL DAILY
Qty: 30 TABLET | Refills: 0 | Status: SHIPPED | OUTPATIENT
Start: 2021-12-30 | End: 2022-04-24

## 2021-12-29 RX ORDER — HEPARIN 100 UNIT/ML
300 SYRINGE INTRAVENOUS AS NEEDED
Status: DISCONTINUED | OUTPATIENT
Start: 2021-12-29 | End: 2021-12-29 | Stop reason: HOSPADM

## 2021-12-29 RX ORDER — AMOXICILLIN 250 MG
1 CAPSULE ORAL 2 TIMES DAILY
Status: DISCONTINUED | OUTPATIENT
Start: 2021-12-29 | End: 2021-12-29 | Stop reason: HOSPADM

## 2021-12-29 RX ADMIN — DOCUSATE SODIUM 50MG AND SENNOSIDES 8.6MG 1 TABLET: 8.6; 5 TABLET, FILM COATED ORAL at 08:27

## 2021-12-29 RX ADMIN — ONDANSETRON 8 MG: 2 INJECTION INTRAMUSCULAR; INTRAVENOUS at 12:40

## 2021-12-29 RX ADMIN — ACYCLOVIR 400 MG: 800 TABLET ORAL at 18:02

## 2021-12-29 RX ADMIN — ALLOPURINOL 300 MG: 300 TABLET ORAL at 08:25

## 2021-12-29 RX ADMIN — Medication 300 UNITS: at 18:01

## 2021-12-29 RX ADMIN — ETOPOSIDE 200 MG: 20 INJECTION INTRAVENOUS at 16:27

## 2021-12-29 RX ADMIN — LISINOPRIL 10 MG: 5 TABLET ORAL at 08:25

## 2021-12-29 RX ADMIN — FLUCONAZOLE 200 MG: 100 TABLET ORAL at 08:25

## 2021-12-29 RX ADMIN — ACYCLOVIR 400 MG: 800 TABLET ORAL at 08:26

## 2021-12-29 RX ADMIN — ONDANSETRON 8 MG: 2 INJECTION INTRAMUSCULAR; INTRAVENOUS at 03:52

## 2021-12-29 RX ADMIN — ATENOLOL 25 MG: 50 TABLET ORAL at 08:25

## 2021-12-29 RX ADMIN — DOCUSATE SODIUM 50MG AND SENNOSIDES 8.6MG 1 TABLET: 8.6; 5 TABLET, FILM COATED ORAL at 18:02

## 2021-12-29 RX ADMIN — DEXAMETHASONE SODIUM PHOSPHATE 10 MG: 10 INJECTION INTRAMUSCULAR; INTRAVENOUS at 15:54

## 2021-12-29 NOTE — PROGRESS NOTES
Discharge instructions reviewed with patient and new medications,. All questions answered. Escorted to lobby by this rn discharged home.

## 2021-12-29 NOTE — PROGRESS NOTES
Received [facility] card from patient's daughter via e-mail. Information sent to registration to add to patient's Epic accounts.

## 2021-12-29 NOTE — DISCHARGE INSTRUCTIONS
DISCHARGE SUMMARY from Nurse    PATIENT INSTRUCTIONS:    After general anesthesia or intravenous sedation, for 24 hours or while taking prescription Narcotics:  · Limit your activities  · Do not drive and operate hazardous machinery  · Do not make important personal or business decisions  · Do  not drink alcoholic beverages  · If you have not urinated within 8 hours after discharge, please contact your surgeon on call. Report the following to your surgeon:  · Excessive pain, swelling, redness or odor of or around the surgical area  · Temperature over 100.5  · Nausea and vomiting lasting longer than 4 hours or if unable to take medications  · Any signs of decreased circulation or nerve impairment to extremity: change in color, persistent  numbness, tingling, coldness or increase pain  · Any questions    What to do at Home:  Recommended activity: Activity as tolerated,     If you experience any of the following symptoms fever>100.5, uncontrollable pain or nauseau please follow up with your oncologist.    *  Please give a list of your current medications to your Primary Care Provider. *  Please update this list whenever your medications are discontinued, doses are      changed, or new medications (including over-the-counter products) are added. *  Please carry medication information at all times in case of emergency situations. These are general instructions for a healthy lifestyle:    No smoking/ No tobacco products/ Avoid exposure to second hand smoke  Surgeon General's Warning:  Quitting smoking now greatly reduces serious risk to your health.     Obesity, smoking, and sedentary lifestyle greatly increases your risk for illness    A healthy diet, regular physical exercise & weight monitoring are important for maintaining a healthy lifestyle    You may be retaining fluid if you have a history of heart failure or if you experience any of the following symptoms:  Weight gain of 3 pounds or more overnight or 5 pounds in a week, increased swelling in our hands or feet or shortness of breath while lying flat in bed. Please call your doctor as soon as you notice any of these symptoms; do not wait until your next office visit. The discharge information has been reviewed with the patient. The patient verbalized understanding. Discharge medications reviewed with the patient and appropriate educational materials and side effects teaching were provided.   ___________________________________________________________________________________________________________________________________

## 2021-12-30 LAB
LDH SERPL L TO P-CCNC: 32 IU/L
SPECIMEN SOURCE: NORMAL

## 2021-12-31 LAB
FLOW CYTOMETRY, FBTC1: NORMAL
SPECIMEN SOURCE: NORMAL
TEST ORDERED:: NORMAL

## 2022-01-01 ENCOUNTER — HOSPITAL ENCOUNTER (OUTPATIENT)
Dept: INFUSION THERAPY | Age: 57
Discharge: HOME | End: 2022-01-01
Payer: COMMERCIAL

## 2022-01-01 VITALS
HEART RATE: 67 BPM | TEMPERATURE: 97.7 F | RESPIRATION RATE: 18 BRPM | SYSTOLIC BLOOD PRESSURE: 128 MMHG | DIASTOLIC BLOOD PRESSURE: 63 MMHG | OXYGEN SATURATION: 100 %

## 2022-01-01 DIAGNOSIS — Z51.11 ADMISSION FOR ANTINEOPLASTIC CHEMOTHERAPY: ICD-10-CM

## 2022-01-01 PROCEDURE — 96372 THER/PROPH/DIAG INJ SC/IM: CPT

## 2022-01-01 PROCEDURE — 74011250636 HC RX REV CODE- 250/636: Performed by: INTERNAL MEDICINE

## 2022-01-01 RX ADMIN — PEGFILGRASTIM-CBQV 6 MG: 6 INJECTION, SOLUTION SUBCUTANEOUS at 11:52

## 2022-01-01 NOTE — PROGRESS NOTES
Arrived to the [facility]. Udenyca injection completed. Provided education on taking claritin while receiving udenyca. Patient instructed to report any side affects to ordering provider. Patient tolerated well. Any issues or concerns during appointment: none. Patient aware of next infusion appointment on 1/6 at 2:00pm.  Discharged ambulatory to home.

## 2022-01-06 ENCOUNTER — HOSPITAL ENCOUNTER (OUTPATIENT)
Dept: INFUSION THERAPY | Age: 57
Discharge: HOME | End: 2022-01-06

## 2022-01-06 ENCOUNTER — HOSPITAL ENCOUNTER (OUTPATIENT)
Dept: LAB | Age: 57
Discharge: HOME | End: 2022-01-06
Payer: COMMERCIAL

## 2022-01-06 DIAGNOSIS — C83.31 DIFFUSE LARGE B-CELL LYMPHOMA OF LYMPH NODES OF NECK (HCC): ICD-10-CM

## 2022-01-06 LAB
ABO + RH BLD: NORMAL
ALBUMIN SERPL-MCNC: 3.7 G/DL (ref 3.5–5)
ALBUMIN/GLOB SERPL: 1.3 {RATIO} (ref 1.2–3.5)
ALP SERPL-CCNC: 148 U/L (ref 50–136)
ALT SERPL-CCNC: 52 U/L (ref 12–65)
ANION GAP SERPL CALC-SCNC: 3 MMOL/L (ref 7–16)
AST SERPL-CCNC: 19 U/L (ref 15–37)
BASOPHILS # BLD: 0 K/UL (ref 0–0.2)
BASOPHILS NFR BLD: 3 % (ref 0–2)
BILIRUB SERPL-MCNC: 0.3 MG/DL (ref 0.2–1.1)
BLOOD GROUP ANTIBODIES SERPL: NORMAL
BUN SERPL-MCNC: 15 MG/DL (ref 6–23)
CALCIUM SERPL-MCNC: 8.9 MG/DL (ref 8.3–10.4)
CHLORIDE SERPL-SCNC: 106 MMOL/L (ref 98–107)
CO2 SERPL-SCNC: 31 MMOL/L (ref 21–32)
CREAT SERPL-MCNC: 0.7 MG/DL (ref 0.8–1.5)
DIFFERENTIAL METHOD BLD: ABNORMAL
EOSINOPHIL # BLD: 0 K/UL (ref 0–0.8)
EOSINOPHIL NFR BLD: 1 % (ref 0.5–7.8)
ERYTHROCYTE [DISTWIDTH] IN BLOOD BY AUTOMATED COUNT: 13.6 % (ref 11.9–14.6)
GLOBULIN SER CALC-MCNC: 2.8 G/DL (ref 2.3–3.5)
GLUCOSE SERPL-MCNC: 96 MG/DL (ref 65–100)
HCT VFR BLD AUTO: 25.4 %
HGB BLD-MCNC: 8.4 G/DL (ref 13.6–17.2)
IMM GRANULOCYTES # BLD AUTO: 0.1 K/UL (ref 0–0.5)
IMM GRANULOCYTES NFR BLD AUTO: 7 % (ref 0–5)
LYMPHOCYTES # BLD: 0.7 K/UL (ref 0.5–4.6)
LYMPHOCYTES NFR BLD: 47 % (ref 13–44)
MAGNESIUM SERPL-MCNC: 2.1 MG/DL (ref 1.8–2.4)
MCH RBC QN AUTO: 28.7 PG (ref 26.1–32.9)
MCHC RBC AUTO-ENTMCNC: 33.1 G/DL (ref 31.4–35)
MCV RBC AUTO: 86.7 FL (ref 79.6–97.8)
MONOCYTES # BLD: 0.2 K/UL (ref 0.1–1.3)
MONOCYTES NFR BLD: 18 % (ref 4–12)
NEUTS SEG # BLD: 0.3 K/UL (ref 1.7–8.2)
NEUTS SEG NFR BLD: 24 % (ref 43–78)
NRBC # BLD: 0 K/UL (ref 0–0.2)
PLATELET # BLD AUTO: 115 K/UL (ref 150–450)
PLATELET COMMENTS,PCOM: SLIGHT
PMV BLD AUTO: 10.5 FL (ref 9.4–12.3)
POTASSIUM SERPL-SCNC: 4.4 MMOL/L (ref 3.5–5.1)
PROT SERPL-MCNC: 6.5 G/DL (ref 6.3–8.2)
RBC # BLD AUTO: 2.93 M/UL (ref 4.23–5.6)
RBC MORPH BLD: ABNORMAL
RBC MORPH BLD: ABNORMAL
SODIUM SERPL-SCNC: 140 MMOL/L (ref 136–145)
SPECIMEN EXP DATE BLD: NORMAL
URATE SERPL-MCNC: 2.2 MG/DL (ref 2.6–6)
WBC # BLD AUTO: 1.3 K/UL (ref 4.3–11.1)
WBC MORPH BLD: ABNORMAL

## 2022-01-06 PROCEDURE — 83735 ASSAY OF MAGNESIUM: CPT

## 2022-01-06 PROCEDURE — 84550 ASSAY OF BLOOD/URIC ACID: CPT

## 2022-01-06 PROCEDURE — 85025 COMPLETE CBC W/AUTO DIFF WBC: CPT

## 2022-01-06 PROCEDURE — 36415 COLL VENOUS BLD VENIPUNCTURE: CPT

## 2022-01-06 PROCEDURE — 86900 BLOOD TYPING SEROLOGIC ABO: CPT

## 2022-01-06 PROCEDURE — 80053 COMPREHEN METABOLIC PANEL: CPT

## 2022-01-12 ENCOUNTER — HOSPITAL ENCOUNTER (OUTPATIENT)
Dept: PET IMAGING | Age: 57
Discharge: HOME | End: 2022-01-12
Payer: COMMERCIAL

## 2022-01-12 DIAGNOSIS — C83.31 DIFFUSE LARGE B-CELL LYMPHOMA OF LYMPH NODES OF NECK (HCC): ICD-10-CM

## 2022-01-12 LAB
GLUCOSE BLD STRIP.AUTO-MCNC: 104 MG/DL (ref 65–100)
SERVICE CMNT-IMP: ABNORMAL

## 2022-01-12 PROCEDURE — A9552 F18 FDG: HCPCS

## 2022-01-12 PROCEDURE — 74011000636 HC RX REV CODE- 636: Performed by: RADIOLOGY

## 2022-01-12 PROCEDURE — 82962 GLUCOSE BLOOD TEST: CPT

## 2022-01-12 RX ORDER — FLUDEOXYGLUCOSE F18 300 MCI/ML
10 INJECTION INTRAVENOUS ONCE
Status: COMPLETED | OUTPATIENT
Start: 2022-01-12 | End: 2022-01-12

## 2022-01-12 RX ORDER — SODIUM CHLORIDE 0.9 % (FLUSH) 0.9 %
10 SYRINGE (ML) INJECTION
Status: COMPLETED | OUTPATIENT
Start: 2022-01-12 | End: 2022-01-12

## 2022-01-12 RX ADMIN — DIATRIZOATE MEGLUMINE AND DIATRIZOATE SODIUM 10 ML: 660; 100 LIQUID ORAL; RECTAL at 09:50

## 2022-01-12 RX ADMIN — Medication 20 ML: at 09:50

## 2022-01-12 RX ADMIN — FLUDEOXYGLUCOSE F18 14.04 MILLICURIE: 300 INJECTION INTRAVENOUS at 09:50

## 2022-01-13 ENCOUNTER — HOSPITAL ENCOUNTER (OUTPATIENT)
Dept: LAB | Age: 57
Discharge: HOME | End: 2022-01-13
Payer: COMMERCIAL

## 2022-01-13 ENCOUNTER — HOSPITAL ENCOUNTER (OUTPATIENT)
Dept: INFUSION THERAPY | Age: 57
Discharge: HOME | End: 2022-01-13

## 2022-01-13 DIAGNOSIS — C83.30 DIFFUSE LARGE B-CELL LYMPHOMA, UNSPECIFIED BODY REGION (HCC): ICD-10-CM

## 2022-01-13 LAB
ABO + RH BLD: NORMAL
ALBUMIN SERPL-MCNC: 3.6 G/DL (ref 3.5–5)
ALBUMIN/GLOB SERPL: 1.2 {RATIO} (ref 1.2–3.5)
ALP SERPL-CCNC: 183 U/L (ref 50–136)
ALT SERPL-CCNC: 43 U/L (ref 12–65)
ANION GAP SERPL CALC-SCNC: 4 MMOL/L (ref 7–16)
AST SERPL-CCNC: 29 U/L (ref 15–37)
BASOPHILS # BLD: 0.2 K/UL (ref 0–0.2)
BASOPHILS NFR BLD: 1 % (ref 0–2)
BILIRUB SERPL-MCNC: 0.2 MG/DL (ref 0.2–1.1)
BLOOD GROUP ANTIBODIES SERPL: NORMAL
BUN SERPL-MCNC: 11 MG/DL (ref 6–23)
CALCIUM SERPL-MCNC: 9 MG/DL (ref 8.3–10.4)
CHLORIDE SERPL-SCNC: 108 MMOL/L (ref 98–107)
CO2 SERPL-SCNC: 30 MMOL/L (ref 21–32)
COVID-19 RAPID TEST, COVR: DETECTED
CREAT SERPL-MCNC: 0.7 MG/DL (ref 0.8–1.5)
DIFFERENTIAL METHOD BLD: ABNORMAL
EOSINOPHIL # BLD: 0.2 K/UL (ref 0–0.8)
EOSINOPHIL NFR BLD: 1 % (ref 0.5–7.8)
ERYTHROCYTE [DISTWIDTH] IN BLOOD BY AUTOMATED COUNT: 15.8 % (ref 11.9–14.6)
FLUAV AG NPH QL IA: NEGATIVE
FLUBV AG NPH QL IA: NEGATIVE
GLOBULIN SER CALC-MCNC: 2.9 G/DL (ref 2.3–3.5)
GLUCOSE SERPL-MCNC: 105 MG/DL (ref 65–100)
HCT VFR BLD AUTO: 24.5 %
HGB BLD-MCNC: 7.9 G/DL (ref 13.6–17.2)
IMM GRANULOCYTES # BLD AUTO: 1.6 K/UL (ref 0–0.5)
IMM GRANULOCYTES NFR BLD AUTO: 9 % (ref 0–5)
LYMPHOCYTES # BLD: 1 K/UL (ref 0.5–4.6)
LYMPHOCYTES NFR BLD: 6 % (ref 13–44)
MAGNESIUM SERPL-MCNC: 2.4 MG/DL (ref 1.8–2.4)
MCH RBC QN AUTO: 29.5 PG (ref 26.1–32.9)
MCHC RBC AUTO-ENTMCNC: 32.2 G/DL (ref 31.4–35)
MCV RBC AUTO: 91.4 FL (ref 79.6–97.8)
MONOCYTES # BLD: 2.1 K/UL (ref 0.1–1.3)
MONOCYTES NFR BLD: 12 % (ref 4–12)
NEUTS SEG # BLD: 12.3 K/UL (ref 1.7–8.2)
NEUTS SEG NFR BLD: 71 % (ref 43–78)
NRBC # BLD: 0 K/UL (ref 0–0.2)
PLATELET # BLD AUTO: 54 K/UL (ref 150–450)
PLATELET COMMENTS,PCOM: ABNORMAL
PMV BLD AUTO: 11.7 FL (ref 9.4–12.3)
POTASSIUM SERPL-SCNC: 4 MMOL/L (ref 3.5–5.1)
PROT SERPL-MCNC: 6.5 G/DL (ref 6.3–8.2)
RBC # BLD AUTO: 2.68 M/UL (ref 4.23–5.6)
RBC MORPH BLD: ABNORMAL
SODIUM SERPL-SCNC: 142 MMOL/L (ref 136–145)
SOURCE, COVRS: ABNORMAL
SPECIMEN EXP DATE BLD: NORMAL
SPECIMEN SOURCE: NORMAL
WBC # BLD AUTO: 17.4 K/UL (ref 4.3–11.1)
WBC MORPH BLD: ABNORMAL

## 2022-01-13 PROCEDURE — 87635 SARS-COV-2 COVID-19 AMP PRB: CPT

## 2022-01-13 PROCEDURE — 87804 INFLUENZA ASSAY W/OPTIC: CPT

## 2022-01-13 PROCEDURE — 85025 COMPLETE CBC W/AUTO DIFF WBC: CPT

## 2022-01-13 PROCEDURE — 80053 COMPREHEN METABOLIC PANEL: CPT

## 2022-01-13 PROCEDURE — 36415 COLL VENOUS BLD VENIPUNCTURE: CPT

## 2022-01-13 PROCEDURE — 86900 BLOOD TYPING SEROLOGIC ABO: CPT

## 2022-01-13 PROCEDURE — 83735 ASSAY OF MAGNESIUM: CPT

## 2022-01-14 ENCOUNTER — HOSPITAL ENCOUNTER (OUTPATIENT)
Dept: INTERVENTIONAL RADIOLOGY/VASCULAR | Age: 57
Discharge: HOME | End: 2022-01-14
Attending: INTERNAL MEDICINE

## 2022-01-22 ENCOUNTER — APPOINTMENT (OUTPATIENT)
Dept: INFUSION THERAPY | Age: 57
End: 2022-01-22

## 2022-01-25 NOTE — DISCHARGE SUMMARY
[facility] On[facility]: Inpatient Hematology / Oncology Discharge Summary Note    Patient ID:  Neva Mejia  [id number]  53 y.o.  1965    Admit Date: 12/27/2021    Discharge Date: 12/29/2021    Admission Diagnoses: Admission for antineoplastic chemotherapy [Z51.11]    Discharge Diagnoses:  Principal Diagnosis: <principal problem not specified>  Active Problems:    Admission for antineoplastic chemotherapy (12/6/2021)        Hospital Course:  Mr. Santa Nuno is a 64 y.o. male admitted on 12/27/2021. The encounter diagnosis was Admission for antineoplastic chemotherapy. He is a patient of Dr. Rosa Ford with relapsed DLBCL. 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 He is s/p R-CHOP from April to July 2021.  In November 2021, CT CAP showed disease relapse with new L supraclavicular mass as well as stable axillary and chest wall LNs and shotty RP LNs.  He is s/p L supraclavicular LN bx +DLBCL.  He tolerated C1 R-ICE with IT MTX well. CSF flow neg. MRI brain ordered for L eye palsy, showed L posterior communicating artery, whose course is more caudad than anatomically typical.  Per neurology, no further neuro w/u necessary as pt has underwent w/u for L eye palsy and felt not to be malignancy-related. Echo with EF 60-65% with mod to severe eccentric mitral regurgitation.  Cardiology performed ERINN on 12/10 which revealed severe MR with a partially flail anterior leaflet. Cardiology monitoring as OP. He will eventually require valve repair surgery. Per cards, while undergoing chemotherapy, valve repair would not be in his best interest for fear of infection/poor healing. He was admitted for C2 R-ICE with IT MTX.  3rd nerve palsy is essentially stable. No complications noted from cycle 1, he recovered to baseline within a few days. He tolerated C2 well. He received LP/IT MTX on 12/28. He is feeling well and is ready for discharge home after the completion of chemotherapy today.   He is scheduled for Udenyca on 1/1 and f/u with Dr. Fabricio Le on 1/6. Advised to call with fever, chills, uncontrollable symptoms, or with any other concerns. Consults:  IP CONSULT TO INTERVENTIONAL RADIOLOGY    Pertinent Diagnostic Studies:   Labs:    Recent Labs     12/29/21 0318 12/28/21 0349 12/27/21  1532   WBC 11.6* 12.9* 11.2*   HGB 8.5* 9.2* 9.3*    289 295   ANEU 11.0* 11.5* 7.6      Recent Labs     12/29/21 0318 12/28/21  0349 12/27/21  1532    139 140   K 4.0 4.2 4.1   * 109* 107   CO2 28 28 31   * 141* 115*   BUN 17 12 13   CREA 0.63* 0.59* 0.67*   CA 8.5 8.4 8.7    126 154*   TP 5.8* 6.1* 6.6   ALB 3.0* 3.2* 3.5   MG 2.3 2.2 2.3       Imaging:  IR INTRATHECAL CHEMO INJECTION CNS [[id number]] Collected: 12/28/21 1152   Order Status: Completed Updated: 12/28/21 1402   Narrative:     Title: 1 diagnostic Lumbar puncture. 2. Intrathecal chemotherapy administration     History: 70-year-old male with lymphoma     : Areis Yuen PA-C     Supervising Physician: Geoffrey Abarca M.D. Consent: Informed written and oral consent was obtained from the patient after   explanation of benefits and risks (including, but not limited to: infection,   nerve injury, hemorrhage). The patient's questions were answered to   satisfaction. The patient stated understanding and requested that we proceed. Procedure: Maximal sterile barrier technique was used.  With the patient prone,   the skin of the back was prepped and draped in the standard sterile fashion. 1%   lidocaine was used for local field block. Using fluoroscopy, a 22 gauge spinal   needle was advanced into the intrathecal space at the L3-4 level. Appropriate   position was confirmed with clear spinal fluid return. A total of 5 cc was removed for evaluation. The chemotherapy was injected as   ordered by the oncologist.     The needle was removed and a dressing was applied. Complications:  None.      Radiation dose:   Fluoroscopy time: 12 seconds. Reference air kerma (mGy): 13   Kerma area product (cGy.cm2):  286   Fluoroscopic images: 2     Contrast:  0 milliliters. Impression:     Uncomplicated lumbar puncture and intrathecal chemotherapy   administration.       Plan: The patient will recover at bedrest.          Current Discharge Medication List      START taking these medications    Details   allopurinoL (ZYLOPRIM) 300 mg tablet Take 1 Tablet by mouth daily. Qty: 30 Tablet, Refills: 0  Start date: 2021      ondansetron (ZOFRAN ODT) 8 mg disintegrating tablet Take 1 Tablet by mouth every eight (8) hours as needed for Nausea or Vomiting. Qty: 60 Tablet, Refills: 0  Start date: 2021         CONTINUE these medications which have NOT CHANGED    Details   lisinopriL (PRINIVIL, ZESTRIL) 10 mg tablet Take 1 Tablet by mouth daily. Qty: 30 Tablet, Refills: 0      atenoloL (TENORMIN) 25 mg tablet Take 1 Tablet by mouth daily. Qty: 30 Tablet, Refills: 0      acyclovir (ZOVIRAX) 400 mg tablet Take 1 Tablet by mouth two (2) times a day for 30 days. Qty: 60 Tablet, Refills: 0      fluconazole (DIFLUCAN) 200 mg tablet Take 1 Tablet by mouth daily. FDA advises cautious prescribing of oral fluconazole in pregnancy. Qty: 30 Tablet, Refills: 0         STOP taking these medications       nystatin (MYCOSTATIN) 100,000 unit/mL suspension Comments:   Reason for Stopping:               OBJECTIVE:  Patient Vitals for the past 8 hrs:   BP Temp Pulse Resp SpO2 Weight   21 0746 137/70 97.8 °F (36.6 °C) 77 18 96 %    21 0330 (!) 141/68 98.1 °F (36.7 °C) 81 18 95 % 189 lb 11.2 oz (86 kg)     Temp (24hrs), Av.2 °F (36.8 °C), Min:97.8 °F (36.6 °C), Max:98.4 °F (36.9 °C)     0701 -  1900  In: 640 [I.V.:640]  Out: -     Physical Exam:  Constitutional: Well developed, well nourished male in no acute distress, sitting comfortably in the bedside chair. HEENT: Normocephalic and atraumatic.  Oropharynx is clear, mucous membranes are moist.  Extraocular muscles are intact. Wearing eye patch over L eye. Sclerae anicteric. Neck supple without JVD. No thyromegaly present. Skin Warm and dry. No bruising and no rash noted. No erythema. No pallor. Respiratory Lungs are clear to auscultation bilaterally without wheezes, rales or rhonchi, normal air exchange without accessory muscle use. CVS Normal rate, regular rhythm and normal S1 and S2. No murmurs, gallops, or rubs. Abdomen Soft, nontender and nondistended, normoactive bowel sounds. No palpable mass. No hepatosplenomegaly. Neuro Grossly nonfocal with no obvious sensory or motor deficits. 3rd nerve palsy. MSK Normal range of motion in general.  No edema and no tenderness. Psych Appropriate mood and affect. ASSESSMENT:    Active Problems:    Admission for antineoplastic chemotherapy (12/6/2021)        DISPOSITION:  Follow-up Appointments   Procedures    FOLLOW UP VISIT Appointment in: Other (Vielka Bell) Follow up as scheduled on 1/1 for Udenyca and 1/6 for follow up with Dr. Mabel Diez     Follow up as scheduled on 1/1 for Udenyca and 1/6 for follow up with Dr. Mabel Diez     Standing Status:   Standing     Number of Occurrences:   1     Order Specific Question:   Appointment in     Answer: Other (Specify)       Over 60 minutes was spent in discharge planning and coordination of care.             Padma Berrios NP  [facility]  [address] [address]  Office : [phone]  Fax : [phone] No respiratory distress. No stridor, Lungs sounds clear with good aeration bilaterally.

## 2022-01-27 ENCOUNTER — HOSPITAL ENCOUNTER (OUTPATIENT)
Dept: INFUSION THERAPY | Age: 57
Discharge: HOME | End: 2022-01-27
Payer: COMMERCIAL

## 2022-01-27 VITALS
RESPIRATION RATE: 18 BRPM | HEART RATE: 66 BPM | DIASTOLIC BLOOD PRESSURE: 67 MMHG | SYSTOLIC BLOOD PRESSURE: 135 MMHG | TEMPERATURE: 97.7 F | OXYGEN SATURATION: 99 %

## 2022-01-27 DIAGNOSIS — Z51.11 ADMISSION FOR ANTINEOPLASTIC CHEMOTHERAPY: ICD-10-CM

## 2022-01-27 LAB
ALBUMIN SERPL-MCNC: 3.2 G/DL (ref 3.5–5)
ALBUMIN/GLOB SERPL: 1.2 {RATIO} (ref 1.2–3.5)
ALP SERPL-CCNC: 124 U/L (ref 50–136)
ALT SERPL-CCNC: 35 U/L (ref 12–65)
ANION GAP SERPL CALC-SCNC: 4 MMOL/L (ref 7–16)
AST SERPL-CCNC: 30 U/L (ref 15–37)
BASOPHILS # BLD: 0.1 K/UL (ref 0–0.2)
BASOPHILS NFR BLD: 1 % (ref 0–2)
BILIRUB SERPL-MCNC: 0.2 MG/DL (ref 0.2–1.1)
BUN SERPL-MCNC: 11 MG/DL (ref 6–23)
CALCIUM SERPL-MCNC: 8.8 MG/DL (ref 8.3–10.4)
CHLORIDE SERPL-SCNC: 110 MMOL/L (ref 98–107)
CO2 SERPL-SCNC: 29 MMOL/L (ref 21–32)
CREAT SERPL-MCNC: 0.7 MG/DL (ref 0.8–1.5)
DIFFERENTIAL METHOD BLD: ABNORMAL
EOSINOPHIL # BLD: 0 K/UL (ref 0–0.8)
EOSINOPHIL NFR BLD: 1 % (ref 0.5–7.8)
ERYTHROCYTE [DISTWIDTH] IN BLOOD BY AUTOMATED COUNT: 18.9 % (ref 11.9–14.6)
GLOBULIN SER CALC-MCNC: 2.7 G/DL (ref 2.3–3.5)
GLUCOSE SERPL-MCNC: 103 MG/DL (ref 65–100)
HCT VFR BLD AUTO: 25.7 %
HGB BLD-MCNC: 8.2 G/DL (ref 13.6–17.2)
IMM GRANULOCYTES # BLD AUTO: 0 K/UL (ref 0–0.5)
IMM GRANULOCYTES NFR BLD AUTO: 1 % (ref 0–5)
LYMPHOCYTES # BLD: 0.9 K/UL (ref 0.5–4.6)
LYMPHOCYTES NFR BLD: 21 % (ref 13–44)
MAGNESIUM SERPL-MCNC: 2.3 MG/DL (ref 1.8–2.4)
MCH RBC QN AUTO: 29.7 PG (ref 26.1–32.9)
MCHC RBC AUTO-ENTMCNC: 31.9 G/DL (ref 31.4–35)
MCV RBC AUTO: 93.1 FL (ref 79.6–97.8)
MONOCYTES # BLD: 0.5 K/UL (ref 0.1–1.3)
MONOCYTES NFR BLD: 12 % (ref 4–12)
NEUTS SEG # BLD: 2.8 K/UL (ref 1.7–8.2)
NEUTS SEG NFR BLD: 64 % (ref 43–78)
NRBC # BLD: 0 K/UL (ref 0–0.2)
PLATELET # BLD AUTO: 294 K/UL (ref 150–450)
PMV BLD AUTO: 10.3 FL (ref 9.4–12.3)
POTASSIUM SERPL-SCNC: 4 MMOL/L (ref 3.5–5.1)
PROT SERPL-MCNC: 5.9 G/DL (ref 6.3–8.2)
RBC # BLD AUTO: 2.76 M/UL (ref 4.23–5.6)
SODIUM SERPL-SCNC: 143 MMOL/L (ref 136–145)
WBC # BLD AUTO: 4.3 K/UL (ref 4.3–11.1)

## 2022-01-27 PROCEDURE — 85025 COMPLETE CBC W/AUTO DIFF WBC: CPT

## 2022-01-27 PROCEDURE — 83735 ASSAY OF MAGNESIUM: CPT

## 2022-01-27 PROCEDURE — 80053 COMPREHEN METABOLIC PANEL: CPT

## 2022-01-27 PROCEDURE — 36591 DRAW BLOOD OFF VENOUS DEVICE: CPT

## 2022-01-27 NOTE — PROGRESS NOTES
Arrived to the [facility] and taken to isolation room for COVID pending test results and recent positive test.  Pt is asymptomatic today. Port accessed and labs drawn. No replacements needed and needle removed from port. Any issues or concerns during appointment:  No  Discharged home in stable condition.

## 2022-01-28 ENCOUNTER — HOSPITAL ENCOUNTER (OUTPATIENT)
Dept: INTERVENTIONAL RADIOLOGY/VASCULAR | Age: 57
Discharge: HOME | End: 2022-01-28
Attending: INTERNAL MEDICINE
Payer: COMMERCIAL

## 2022-01-28 VITALS
BODY MASS INDEX: 25.06 KG/M2 | OXYGEN SATURATION: 99 % | HEART RATE: 72 BPM | TEMPERATURE: 97.2 F | SYSTOLIC BLOOD PRESSURE: 150 MMHG | DIASTOLIC BLOOD PRESSURE: 77 MMHG | HEIGHT: 72 IN | RESPIRATION RATE: 18 BRPM | WEIGHT: 185 LBS

## 2022-01-28 DIAGNOSIS — C83.30 DIFFUSE LARGE B-CELL LYMPHOMA, UNSPECIFIED BODY REGION (HCC): Primary | ICD-10-CM

## 2022-01-28 LAB
APPEARANCE FLD: CLEAR
COLOR FLD: COLORLESS
GLUCOSE CSF-MCNC: 54 MG/DL (ref 40–70)
NUC CELL # FLD: 1 /CU MM
PROT CSF-MCNC: 47 MG/DL (ref 15–45)
RBC # FLD: 54 /CU MM
SPECIMEN SOURCE FLD: NORMAL
TUBE # CSF: 2
TUBE # CSF: 3

## 2022-01-28 PROCEDURE — 89050 BODY FLUID CELL COUNT: CPT

## 2022-01-28 PROCEDURE — 74011000250 HC RX REV CODE- 250: Performed by: PHYSICIAN ASSISTANT

## 2022-01-28 PROCEDURE — 96450 CHEMOTHERAPY INTO CNS: CPT

## 2022-01-28 PROCEDURE — 77030014143 HC TY PUNC LUMBR BD -A

## 2022-01-28 PROCEDURE — 74011000250 HC RX REV CODE- 250: Performed by: NURSE PRACTITIONER

## 2022-01-28 PROCEDURE — 74011250636 HC RX REV CODE- 250/636: Performed by: NURSE PRACTITIONER

## 2022-01-28 PROCEDURE — 82945 GLUCOSE OTHER FLUID: CPT

## 2022-01-28 PROCEDURE — 84157 ASSAY OF PROTEIN OTHER: CPT

## 2022-01-28 PROCEDURE — 77030003666 HC NDL SPINAL BD -A

## 2022-01-28 RX ORDER — LIDOCAINE HYDROCHLORIDE 20 MG/ML
20-300 INJECTION, SOLUTION INFILTRATION; PERINEURAL
Status: DISCONTINUED | OUTPATIENT
Start: 2022-01-28 | End: 2022-02-01 | Stop reason: HOSPADM

## 2022-01-28 RX ADMIN — SODIUM CHLORIDE 12 MG: 9 INJECTION, SOLUTION INTRAMUSCULAR; INTRAVENOUS; SUBCUTANEOUS at 11:02

## 2022-01-28 RX ADMIN — LIDOCAINE HYDROCHLORIDE 60 MG: 20 INJECTION, SOLUTION INFILTRATION; PERINEURAL at 11:19

## 2022-01-28 NOTE — PROGRESS NOTES
Recovery period without difficulty. Pt alert and oriented and denies pain. Dressing is clean, dry, and intact. Reviewed discharge instructions with patient and son, both verbalized understanding.

## 2022-01-28 NOTE — DISCHARGE INSTRUCTIONS
[address]  Department of Interventional Radiology  [phone] Office  [phone] Fax  POST LUMBAR PUNCTURE/MYELOGRAM/INTRATHECAL CHEMOTHERAPY DISCHARGE INSTRUCTIONS  General Information:  Lumbar Puncture: A LP is done to help diagnose several disorders, like pseudo tumor, migraines, meningitis, and multiple sclerosis. It involves a puncture (usually in the lower spine) into the sac that protects the spinal column. A sample of the fluid in that space is removed and tested in the lab. Myelogram:     A Myelogram involves a lumbar puncture, and instead of removing fluid, contrast will be injected into the sac surrounding the spinal column. It is done to visualize the spinal column, nerve roots, spinal canal, vertebral discs and disc space. It is usually done to diagnose back pain with unknown cause or in preparation for surgery. After the injection, a CT scan will be done, usually within two hours of the injection. Intrathecal Chemotherapy:      Chemotherapy can be given in many forms. Intrathecal chemo involves a lumbar puncture, and instead of removing fluid, the chemo will be injected into the space. After any of procedures, you will be asked to lie flat on your back for 4-6 hours to prevent complications. You should also rest for 24 hours after you go home, and force fluids. If you have a headache, you should take Tylenol or acetaminophen. Call If:     You should call your Physician and/or the Radiology Nurse if you develop a headache that is not relieved by Tylenol, and worsens when you stand and eases when you lie down, you need to call. You may have developed what is referred to as a spinal headache. Our physician's will probably advise you to be on strict bed rest for 24 hours, to drink lots of fluids and caffeine. If this does not help the head pain, call again the next day.  You should call if you have bleeding other than a small spot on your bandage. You should call if you have any numbness, tingling, weakness, fever, chills, urinary retention, severe itching, rash, welts, swelling, or confusion. Follow-Up Instructions: See the doctor who ordered your procedure as he/she has instructed. If you had a Lumbar Puncture or Myelogram, your results should be available to your ordering doctor in 3-5 business days. You can remove your dressing in 24 hours and shower regularly. Do not bathe or swim for 72 hours. To Reach Us: If you have any questions about your procedure, please call the Interventional Radiology department at [phone]. After business hours (5pm) and weekends, call the answering service at [phone] and ask for the Radiologist on call to be paged. Si tiene Preguntas acerca del procedimiento, por favor llame al departamento de Radiología Intervencional al [phone]. Después de horas de oficina (5 pm) y los fines de [place], llamar al Mike Sorto al [phone] y pregunte por el Radiologo de [facility]. Interventional Radiology General Nurse Discharge    After general anesthesia or intravenous sedation, for 24 hours or while taking prescription Narcotics:  · Limit your activities  · Do not drive and operate hazardous machinery  · Do not make important personal or business decisions  · Do  not drink alcoholic beverages  · If you have not urinated within 8 hours after discharge, please contact your surgeon on call. * Please give a list of your current medications to your Primary Care Provider. * Please update this list whenever your medications are discontinued, doses are     changed, or new medications (including over-the-counter products) are added. * Please carry medication information at all times in case of emergency situations.     These are general instructions for a healthy lifestyle:    No smoking/ No tobacco products/ Avoid exposure to second hand smoke  Surgeon General's Warning:  Quitting smoking now greatly reduces serious risk to your health. Obesity, smoking, and sedentary lifestyle greatly increases your risk for illness  A healthy diet, regular physical exercise & weight monitoring are important for maintaining a healthy lifestyle    You may be retaining fluid if you have a history of heart failure or if you experience any of the following symptoms:  Weight gain of 3 pounds or more overnight or 5 pounds in a week, increased swelling in our hands or feet or shortness of breath while lying flat in bed. Please call your doctor as soon as you notice any of these symptoms; do not wait until your next office visit. Recognize signs and symptoms of STROKE:  F-face looks uneven    A-arms unable to move or move unevenly    S-speech slurred or non-existent    T-time-call 911 as soon as signs and symptoms begin-DO NOT go       Back to bed or wait to see if you get better-TIME IS BRAIN.

## 2022-02-01 ENCOUNTER — HOSPITAL ENCOUNTER (INPATIENT)
Age: 57
LOS: 3 days | Discharge: HOME | DRG: 847 | End: 2022-02-04
Attending: INTERNAL MEDICINE | Admitting: INTERNAL MEDICINE
Payer: COMMERCIAL

## 2022-02-01 DIAGNOSIS — Z51.11 ADMISSION FOR ANTINEOPLASTIC CHEMOTHERAPY: Primary | ICD-10-CM

## 2022-02-01 DIAGNOSIS — Q23.8 ABNORMALITY OF CHORDAE TENDINEAE OF MITRAL VALVE: ICD-10-CM

## 2022-02-01 DIAGNOSIS — C83.30 DIFFUSE LARGE B-CELL LYMPHOMA, UNSPECIFIED BODY REGION (HCC): ICD-10-CM

## 2022-02-01 LAB
ALBUMIN SERPL-MCNC: 3.2 G/DL (ref 3.5–5)
ALBUMIN/GLOB SERPL: 1.1 {RATIO} (ref 1.2–3.5)
ALP SERPL-CCNC: 124 U/L (ref 50–136)
ALT SERPL-CCNC: 48 U/L (ref 12–65)
ANION GAP SERPL CALC-SCNC: 2 MMOL/L (ref 7–16)
AST SERPL-CCNC: 29 U/L (ref 15–37)
BASOPHILS # BLD: 0.1 K/UL (ref 0–0.2)
BASOPHILS NFR BLD: 1 % (ref 0–2)
BILIRUB SERPL-MCNC: 0.2 MG/DL (ref 0.2–1.1)
BUN SERPL-MCNC: 12 MG/DL (ref 6–23)
CALCIUM SERPL-MCNC: 9.3 MG/DL (ref 8.3–10.4)
CHLORIDE SERPL-SCNC: 108 MMOL/L (ref 98–107)
CO2 SERPL-SCNC: 31 MMOL/L (ref 21–32)
CREAT SERPL-MCNC: 0.59 MG/DL (ref 0.8–1.5)
DIFFERENTIAL METHOD BLD: ABNORMAL
EOSINOPHIL # BLD: 0.1 K/UL (ref 0–0.8)
EOSINOPHIL NFR BLD: 3 % (ref 0.5–7.8)
ERYTHROCYTE [DISTWIDTH] IN BLOOD BY AUTOMATED COUNT: 18.7 % (ref 11.9–14.6)
FLOW CYTOMETRY, FBTC1: NORMAL
GLOBULIN SER CALC-MCNC: 2.9 G/DL (ref 2.3–3.5)
GLUCOSE SERPL-MCNC: 96 MG/DL (ref 65–100)
HCT VFR BLD AUTO: 26.8 % (ref 41.1–50.3)
HGB BLD-MCNC: 8.6 G/DL (ref 13.6–17.2)
IMM GRANULOCYTES # BLD AUTO: 0 K/UL (ref 0–0.5)
IMM GRANULOCYTES NFR BLD AUTO: 0 % (ref 0–5)
LYMPHOCYTES # BLD: 1 K/UL (ref 0.5–4.6)
LYMPHOCYTES NFR BLD: 22 % (ref 13–44)
Lab: NORMAL
MAGNESIUM SERPL-MCNC: NORMAL MG/DL (ref 1.8–2.4)
MCH RBC QN AUTO: 30.3 PG (ref 26.1–32.9)
MCHC RBC AUTO-ENTMCNC: 32.1 G/DL (ref 31.4–35)
MCV RBC AUTO: 94.4 FL (ref 79.6–97.8)
MONOCYTES # BLD: 0.4 K/UL (ref 0.1–1.3)
MONOCYTES NFR BLD: 9 % (ref 4–12)
NEUTS SEG # BLD: 3 K/UL (ref 1.7–8.2)
NEUTS SEG NFR BLD: 65 % (ref 43–78)
NRBC # BLD: 0 K/UL (ref 0–0.2)
PLATELET # BLD AUTO: 222 K/UL (ref 150–450)
PMV BLD AUTO: 10.8 FL (ref 9.4–12.3)
POTASSIUM SERPL-SCNC: 4 MMOL/L (ref 3.5–5.1)
PROT SERPL-MCNC: 6.1 G/DL (ref 6.3–8.2)
RBC # BLD AUTO: 2.84 M/UL (ref 4.23–5.6)
REFERENCE LAB,REFLB: NORMAL
SODIUM SERPL-SCNC: 141 MMOL/L (ref 136–145)
SPECIMEN SOURCE: NORMAL
TEST DESCRIPTION:,ATST: NORMAL
TEST ORDERED:: NORMAL
WBC # BLD AUTO: 4.6 K/UL (ref 4.3–11.1)

## 2022-02-01 PROCEDURE — 80053 COMPREHEN METABOLIC PANEL: CPT

## 2022-02-01 PROCEDURE — 85025 COMPLETE CBC W/AUTO DIFF WBC: CPT

## 2022-02-01 PROCEDURE — 83735 ASSAY OF MAGNESIUM: CPT

## 2022-02-01 PROCEDURE — 74011250636 HC RX REV CODE- 250/636: Performed by: NURSE PRACTITIONER

## 2022-02-01 PROCEDURE — APPNB30 APP NON BILLABLE TIME 0-30 MINS: Performed by: NURSE PRACTITIONER

## 2022-02-01 PROCEDURE — 74011250637 HC RX REV CODE- 250/637: Performed by: NURSE PRACTITIONER

## 2022-02-01 PROCEDURE — 65270000029 HC RM PRIVATE

## 2022-02-01 RX ORDER — FLUCONAZOLE 100 MG/1
200 TABLET ORAL DAILY
Status: DISCONTINUED | OUTPATIENT
Start: 2022-02-02 | End: 2022-02-04 | Stop reason: HOSPADM

## 2022-02-01 RX ORDER — ATENOLOL 25 MG/1
25 TABLET ORAL DAILY
Status: DISCONTINUED | OUTPATIENT
Start: 2022-02-02 | End: 2022-02-04 | Stop reason: HOSPADM

## 2022-02-01 RX ORDER — LISINOPRIL 5 MG/1
10 TABLET ORAL DAILY
Status: DISCONTINUED | OUTPATIENT
Start: 2022-02-02 | End: 2022-02-04 | Stop reason: HOSPADM

## 2022-02-01 RX ORDER — ALLOPURINOL 300 MG/1
300 TABLET ORAL DAILY
Status: DISCONTINUED | OUTPATIENT
Start: 2022-02-02 | End: 2022-02-04 | Stop reason: HOSPADM

## 2022-02-01 RX ORDER — ACYCLOVIR 800 MG/1
400 TABLET ORAL 2 TIMES DAILY
Status: DISCONTINUED | OUTPATIENT
Start: 2022-02-01 | End: 2022-02-04 | Stop reason: HOSPADM

## 2022-02-01 RX ORDER — ONDANSETRON 2 MG/ML
4 INJECTION INTRAMUSCULAR; INTRAVENOUS
Status: DISCONTINUED | OUTPATIENT
Start: 2022-02-01 | End: 2022-02-04 | Stop reason: HOSPADM

## 2022-02-01 RX ORDER — SODIUM CHLORIDE 9 MG/ML
75 INJECTION, SOLUTION INTRAVENOUS CONTINUOUS
Status: DISCONTINUED | OUTPATIENT
Start: 2022-02-01 | End: 2022-02-04 | Stop reason: HOSPADM

## 2022-02-01 RX ORDER — ENOXAPARIN SODIUM 100 MG/ML
40 INJECTION SUBCUTANEOUS EVERY 24 HOURS
Status: DISCONTINUED | OUTPATIENT
Start: 2022-02-01 | End: 2022-02-04 | Stop reason: HOSPADM

## 2022-02-01 RX ORDER — MORPHINE SULFATE 2 MG/ML
2 INJECTION, SOLUTION INTRAMUSCULAR; INTRAVENOUS
Status: DISCONTINUED | OUTPATIENT
Start: 2022-02-01 | End: 2022-02-04 | Stop reason: HOSPADM

## 2022-02-01 RX ORDER — HYDROCODONE BITARTRATE AND ACETAMINOPHEN 5; 325 MG/1; MG/1
1 TABLET ORAL
Status: DISCONTINUED | OUTPATIENT
Start: 2022-02-01 | End: 2022-02-04 | Stop reason: HOSPADM

## 2022-02-01 RX ADMIN — SODIUM CHLORIDE 75 ML/HR: 900 INJECTION, SOLUTION INTRAVENOUS at 17:32

## 2022-02-01 RX ADMIN — ACYCLOVIR 400 MG: 800 TABLET ORAL at 17:32

## 2022-02-01 NOTE — H&P
[facility] Hematology & Oncology        Inpatient Hematology / Oncology History and Physical    Reason for Admission:  Admission for antineoplastic chemotherapy [Z51.11]    History of Present Illness:  Mr. Bishnu Gomez is a 64 y.o. male admitted on 2/1/2022. The encounter diagnosis was Admission for antineoplastic chemotherapy. He is a patient of Dr. Haroldo Crane with relapsed DLBCL. 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 He is s/p R-CHOP from April to July 2021.  In November 2021, CT CAP showed disease relapse with new L supraclavicular mass as well as stable axillary and chest wall LNs and shotty RP LNs.  He is s/p L supraclavicular LN bx +DLBCL.  He has tolerated 2 cycles R-ICE with IT MTX well. CSF flow neg. MRI brain ordered 12/7 for L eye palsy, showed L posterior communicating artery, whose course is more caudad than anatomically typical.  Per neurology, no further neuro w/u necessary as pt has underwent w/u for L eye palsy and felt not to be malignancy-related. Echo with EF 60-65% with mod to severe eccentric mitral regurgitation.  Cardiology performed ERINN on 12/10 which revealed severe MR with a partially flail anterior leaflet. Cardiology monitoring as OP. He will eventually require valve repair surgery. Per cards, while undergoing chemotherapy, valve repair would not be in his best interest for fear of infection/poor healing. He is being admitted for C3 R-ICE. He is s/p IT MTX on 1/28. 3rd nerve palsy is essentially stable. No complications noted from cycle 2, he recovered to baseline within a few days. He tested positive for COVID on 1/13. Recent PCR reportedly negative. He reports COVID-related symptoms resolved since 1/15. He is feeling well and is ready to proceed with treatment. Review of Systems:  Constitutional Denies fever, chills, weight loss, appetite changes, fatigue, night sweats. HEENT +L eye palsy.   Denies trauma, blurry vision, hearing loss, ear pain, nosebleeds, sore throat, neck pain and ear discharge. Skin Denies lesions or rashes. Lungs Denies dyspnea, cough, sputum production or hemoptysis. Cardiovascular Denies chest pain, palpitations, or lower extremity edema. Gastrointestinal Denies nausea, vomiting, changes in bowel habits, bloody or black stools, abdominal pain.  Denies dysuria, frequency or hesitancy of urination. Neuro +L eye palsy. Denies headaches, visual changes or ataxia. Denies dizziness, tingling, tremors, sensory change, speech change, focal weakness or headaches. Hematology Denies easy bruising or bleeding, denies gingival bleeding or epistaxis. Endo Denies heat/cold intolerance, denies diabetes or thyroid abnormalities. MSK Denies back pain, arthralgias, myalgias or frequent falls. Psychiatric/Behavioral Denies depression and substance abuse. The patient is not nervous/anxious.          No Known Allergies  Past Medical History:   Diagnosis Date    Cancer ([facility] [address]     Hypertension     Valvular heart disease      Past Surgical History:   Procedure Laterality Date    HX BACK SURGERY      26 years ago    IR CHOLECYSTOSTOMY PERCUTANEOUS      IR INTRATHECAL CHEMO INJECTION CNS  12/8/2021    IR INTRATHECAL CHEMO INJECTION CNS  12/28/2021    IR INTRATHECAL CHEMO INJECTION CNS  1/28/2022     Family History   Problem Relation Age of Onset    Diabetes Mother     Hypertension Mother     Diabetes Father     Hypertension Father     Hypertension Brother      Social History     Socioeconomic History    Marital status:      Spouse name: Not on file    Number of children: Not on file    Years of education: Not on file    Highest education level: Not on file   Occupational History    Not on file   Tobacco Use    Smoking status: Never Smoker    Smokeless tobacco: Never Used   Vaping Use    Vaping Use: Never used   Substance and Sexual Activity    Alcohol use: Not Currently    Drug use: Not Currently    Sexual activity: Not on file   Other Topics Concern    Not on file   Social History Narrative    Not on file     Social Determinants of Health     Financial Resource Strain:     Difficulty of Paying Living Expenses: Not on file   Food Insecurity:     Worried About Running Out of Food in the Last Year: Not on file    Sayra of Food in the Last Year: Not on file   Transportation Needs:     Lack of Transportation (Medical): Not on file    Lack of Transportation (Non-Medical):  Not on file   Physical Activity:     Days of Exercise per Week: Not on file    Minutes of Exercise per Session: Not on file   Stress:     Feeling of Stress : Not on file   Social Connections:     Frequency of Communication with Friends and Family: Not on file    Frequency of Social Gatherings with Friends and Family: Not on file    Attends Jehovah's witness Services: Not on file    Active Member of Clubs or Organizations: Not on file    Attends Club or Organization Meetings: Not on file    Marital Status: Not on file   Intimate Partner Violence:     Fear of Current or Ex-Partner: Not on file    Emotionally Abused: Not on file    Physically Abused: Not on file    Sexually Abused: Not on file   Housing Stability:     Unable to Pay for Housing in the Last Year: Not on file    Number of [place] in the Last Year: Not on file    Unstable Housing in the Last Year: Not on file     Current Facility-Administered Medications   Medication Dose Route Frequency Provider Last Rate Last Admin    acetaminophen (TYLENOL) tablet 650 mg  650 mg Oral ONCE Taiwo Farrell MD        diphenhydrAMINE (BENADRYL) injection 50 mg  50 mg IntraVENous ONCE Taiwo Farrell MD        riTUXimab-pvvr (RUXIENCE) 784 mg in 0.9% sodium chloride 250 mL RAPID infusion  375 mg/m2 (Treatment Plan Recorded) IntraVENous ONCE TITR Taiwo Farrell MD        [START ON 2/3/2022] fosaprepitant (EMEND) 150 mg in 0.9% sodium chloride 150 mL IVPB  150 mg IntraVENous ONCE Milka Lars Sanchez MD        dexamethasone (PF) (DECADRON) 10 mg/mL injection 10 mg  10 mg IntraVENous Q24H Sarah Nelson MD        ondansetron [facility]) injection 8 mg  8 mg IntraVENous Q8H Sarah Nelson MD        etoposide (VEPESID) 200 mg in 0.9% sodium chloride 500 mL chemo infusion  200 mg IntraVENous Q24H [facility] 2/3/2022] CARBOplatin (PARAPLATIN) 750 mg in 0.9% sodium chloride 250 mL chemo infusion  750 mg IntraVENous ONCE MD Zena King ON 2/3/2022] ifosfamide (IFEX) 10,000 mg, mesna (MESNEX) 10,000 mg in 0.9% sodium chloride 1,000 mL chemo infusion  10,000 mg IntraVENous CONTINUOUS Sarah Nelson MD        acyclovir (ZOVIRAX) tablet 400 mg  400 mg Oral BID Carvel Deena, NP   400 mg at 02/02/22 0825    allopurinoL (ZYLOPRIM) tablet 300 mg  300 mg Oral DAILY Carvel Blitz, NP   300 mg at 02/02/22 8380    atenoloL (TENORMIN) tablet 25 mg  25 mg Oral DAILY Carvel Blitz, NP   25 mg at 02/02/22 2527    fluconazole (DIFLUCAN) tablet 200 mg  200 mg Oral DAILY Carvel Blitz, NP   200 mg at 02/02/22 5858    lisinopriL (PRINIVIL, ZESTRIL) tablet 10 mg  10 mg Oral DAILY Carvel Blitz, NP   10 mg at 02/02/22 0818    0.9% sodium chloride infusion  75 mL/hr IntraVENous CONTINUOUS Carvel Deena, NP 75 mL/hr at 02/02/22 0235 75 mL/hr at 02/02/22 0235    enoxaparin (LOVENOX) injection 40 mg  40 mg SubCUTAneous Q24H Wilton Shaffer NP        ondansetron [facility]) injection 4 mg  4 mg IntraVENous Q4H PRN Wilton Shaffer NP        prochlorperazine (COMPAZINE) with saline injection 5 mg  5 mg IntraVENous Q6H PRN Wilton Shaffer NP        HYDROcodone-acetaminophen (NORCO) 5-325 mg per tablet 1 Tablet  1 Tablet Oral Q6H PRN Wilton Shaffer NP        morphine injection 2 mg  2 mg IntraVENous Q4H PRN Wilton Shaffer NP           OBJECTIVE:  Patient Vitals for the past 8 hrs:   BP Temp Pulse Resp SpO2   02/02/22 0759 (!) 143/78 97.5 °F (36.4 °C) 76 18 99 %   02/02/22 0241 125/68 98.1 °F (36.7 °C) 69 17 99 %     Temp (24hrs), Av.8 °F (36.6 °C), Min:97.5 °F (36.4 °C), Max:98.1 °F (36.7 °C)    701 -  1900  In: 240 [P.O.:240]  Out: -     Physical Exam:  Constitutional: Well developed, well nourished male in no acute distress, sitting comfortably in the hospital bed. HEENT: Normocephalic and atraumatic. Oropharynx is clear, mucous membranes are moist. Neck supple without JVD. No thyromegaly present. +L eye palsy, wearing eye patch. Skin Warm and dry. No bruising and no rash noted. No erythema. No pallor. Respiratory Lungs are clear to auscultation bilaterally without wheezes, rales or rhonchi, normal air exchange without accessory muscle use. CVS Normal rate, regular rhythm and normal S1 and S2. No murmurs, gallops, or rubs. Abdomen Soft, nontender and nondistended, normoactive bowel sounds. No palpable mass. No hepatosplenomegaly. Neuro Grossly nonfocal with no obvious sensory or motor deficits. MSK Normal range of motion in general.  No edema and no tenderness. Psych Appropriate mood and affect. Labs:    Recent Results (from the past 24 hour(s))   METABOLIC PANEL, COMPREHENSIVE    Collection Time: 22  5:16 PM   Result Value Ref Range    Sodium 141 136 - 145 mmol/L    Potassium 4.0 3.5 - 5.1 mmol/L    Chloride 108 (H) 98 - 107 mmol/L    CO2 31 21 - 32 mmol/L    Anion gap 2 (L) 7 - 16 mmol/L    Glucose 96 65 - 100 mg/dL    BUN 12 6 - 23 MG/DL    Creatinine 0.59 (L) 0.8 - 1.5 MG/DL    GFR est AA >60 >60 ml/min/1.73m2    GFR est non-AA >60 >60 ml/min/1.73m2    Calcium 9.3 8.3 - 10.4 MG/DL    Bilirubin, total 0.2 0.2 - 1.1 MG/DL    ALT (SGPT) 48 12 - 65 U/L    AST (SGOT) 29 15 - 37 U/L    Alk.  phosphatase 124 50 - 136 U/L    Protein, total 6.1 (L) 6.3 - 8.2 g/dL    Albumin 3.2 (L) 3.5 - 5.0 g/dL    Globulin 2.9 2.3 - 3.5 g/dL    A-G Ratio 1.1 (L) 1.2 - 3.5     MAGNESIUM    Collection Time: 22  5:16 PM   Result Value Ref Range    Magnesium RESULTS SCANNED IN [facility] 1.8 - 2.4 mg/dL   CBC WITH AUTOMATED DIFF    Collection Time: 02/01/22  5:16 PM   Result Value Ref Range    WBC 4.6 4.3 - 11.1 K/uL    RBC 2.84 (L) 4.23 - 5.6 M/uL    HGB 8.6 (L) 13.6 - 17.2 g/dL    HCT 26.8 (L) 41.1 - 50.3 %    MCV 94.4 79.6 - 97.8 FL    MCH 30.3 26.1 - 32.9 PG    MCHC 32.1 31.4 - 35.0 g/dL    RDW 18.7 (H) 11.9 - 14.6 %    PLATELET 991 781 - 259 K/uL    MPV 10.8 9.4 - 12.3 FL    ABSOLUTE NRBC 0.00 0.0 - 0.2 K/uL    DF AUTOMATED      NEUTROPHILS 65 43 - 78 %    LYMPHOCYTES 22 13 - 44 %    MONOCYTES 9 4.0 - 12.0 %    EOSINOPHILS 3 0.5 - 7.8 %    BASOPHILS 1 0.0 - 2.0 %    IMMATURE GRANULOCYTES 0 0.0 - 5.0 %    ABS. NEUTROPHILS 3.0 1.7 - 8.2 K/UL    ABS. LYMPHOCYTES 1.0 0.5 - 4.6 K/UL    ABS. MONOCYTES 0.4 0.1 - 1.3 K/UL    ABS. EOSINOPHILS 0.1 0.0 - 0.8 K/UL    ABS. BASOPHILS 0.1 0.0 - 0.2 K/UL    ABS. IMM. GRANS. 0.0 0.0 - 0.5 K/UL   MISC. LAB TEST    Collection Time: 02/01/22  5:16 PM   Result Value Ref Range    Test Description: MAGNESIUM     Reference Lab: PERFORMED BY [facility]      Results: RESULTS SCANNED IN [facility]     METABOLIC PANEL, COMPREHENSIVE    Collection Time: 02/02/22  2:40 AM   Result Value Ref Range    Sodium 143 136 - 145 mmol/L    Potassium 3.8 3.5 - 5.1 mmol/L    Chloride 109 (H) 98 - 107 mmol/L    CO2 31 21 - 32 mmol/L    Anion gap 3 (L) 7 - 16 mmol/L    Glucose 92 65 - 100 mg/dL    BUN 12 6 - 23 MG/DL    Creatinine 0.56 (L) 0.8 - 1.5 MG/DL    GFR est AA >60 >60 ml/min/1.73m2    GFR est non-AA >60 >60 ml/min/1.73m2    Calcium 8.4 8.3 - 10.4 MG/DL    Bilirubin, total 0.2 0.2 - 1.1 MG/DL    ALT (SGPT) 43 12 - 65 U/L    AST (SGOT) 23 15 - 37 U/L    Alk.  phosphatase 99 50 - 136 U/L    Protein, total 5.3 (L) 6.3 - 8.2 g/dL    Albumin 2.8 (L) 3.5 - 5.0 g/dL    Globulin 2.5 2.3 - 3.5 g/dL    A-G Ratio 1.1 (L) 1.2 - 3.5     CBC WITH AUTOMATED DIFF    Collection Time: 02/02/22  2:40 AM   Result Value Ref Range    WBC 4.3 4.3 - 11.1 K/uL    RBC 2.65 (L) 4.23 - 5.6 M/uL    HGB 8.0 (L) 13.6 - 17.2 g/dL    HCT 25.2 (L) 41.1 - 50.3 %    MCV 95.1 79.6 - 97.8 FL    MCH 30.2 26.1 - 32.9 PG    MCHC 31.7 31.4 - 35.0 g/dL    RDW 18.6 (H) 11.9 - 14.6 %    PLATELET 188 239 - 116 K/uL    MPV 10.8 9.4 - 12.3 FL    ABSOLUTE NRBC 0.00 0.0 - 0.2 K/uL    NEUTROPHILS 65 43 - 78 %    LYMPHOCYTES 23 13 - 44 %    MONOCYTES 7 4.0 - 12.0 %    EOSINOPHILS 4 0.5 - 7.8 %    BASOPHILS 1 0.0 - 2.0 %    IMMATURE GRANULOCYTES 0 0.0 - 5.0 %    ABS. NEUTROPHILS 2.8 1.7 - 8.2 K/UL    ABS. LYMPHOCYTES 1.0 0.5 - 4.6 K/UL    ABS. MONOCYTES 0.3 0.1 - 1.3 K/UL    ABS. EOSINOPHILS 0.2 0.0 - 0.8 K/UL    ABS. BASOPHILS 0.1 0.0 - 0.2 K/UL    ABS. IMM. GRANS. 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-10-CM: Q23.8  ICD-9-CM: 746.89  12/17/2021        [facility] ICD-10-CM: C83.30  ICD-9-CM: 202.80  12/9/2021        Hypertension ICD-10-CM: I10  ICD-9-CM: 401.9  12/9/2021        Mitral regurgitation ICD-10-CM: I34.0  ICD-9-CM: 424.0  12/9/2021        Admission for antineoplastic chemotherapy ICD-10-CM: Z51.11  ICD-9-CM: V58.11  12/6/2021                PLAN:  Relapsed DLBCL  - admit for C3 R-ICE  - s/p IT MTX on 1/28     Hypertension  - Con't home meds     Continue home meds  Ppx meds: Acyclovir, Diflucan  Rhea SOPs  Lovenox for DVT ppx (hold for plt <50k)     Goals and plan of care reviewed with the patient.  All questions answered to the best of our ability.     Disposition:  Anticipate discharge home after the completion of chemotherapy.                Hannah Sanches  Hematology & Oncology  [address] [address]  Office : [phone]  Fax : [phone]

## 2022-02-01 NOTE — PROGRESS NOTES
02/01/22 1732   Dual Skin Pressure Injury Assessment   Dual Skin Pressure Injury Assessment WDL   Second Care Provider (Based on [address] [place] [address]) Yuri Holt RN     Skin blanchable and free of injury

## 2022-02-01 NOTE — PROGRESS NOTES
Patient Vitals for the past 12 hrs:   Temp Pulse Resp BP SpO2   02/01/22 1700 97.5 °F (36.4 °C) 75 18 (!) 144/74 99 %     Pt admitted this evening for chemotherapy. Will start C3 R-ICE tomorrow.     Shift report given to dev Singletary RN

## 2022-02-02 LAB
ALBUMIN SERPL-MCNC: 2.8 G/DL (ref 3.5–5)
ALBUMIN/GLOB SERPL: 1.1 {RATIO} (ref 1.2–3.5)
ALP SERPL-CCNC: 99 U/L (ref 50–136)
ALT SERPL-CCNC: 43 U/L (ref 12–65)
ANION GAP SERPL CALC-SCNC: 3 MMOL/L (ref 7–16)
AST SERPL-CCNC: 23 U/L (ref 15–37)
BASOPHILS # BLD: 0.1 K/UL (ref 0–0.2)
BASOPHILS NFR BLD: 1 % (ref 0–2)
BILIRUB SERPL-MCNC: 0.2 MG/DL (ref 0.2–1.1)
BUN SERPL-MCNC: 12 MG/DL (ref 6–23)
CALCIUM SERPL-MCNC: 8.4 MG/DL (ref 8.3–10.4)
CHLORIDE SERPL-SCNC: 109 MMOL/L (ref 98–107)
CO2 SERPL-SCNC: 31 MMOL/L (ref 21–32)
CREAT SERPL-MCNC: 0.56 MG/DL (ref 0.8–1.5)
DIFFERENTIAL METHOD BLD: ABNORMAL
EOSINOPHIL # BLD: 0.2 K/UL (ref 0–0.8)
EOSINOPHIL NFR BLD: 4 % (ref 0.5–7.8)
ERYTHROCYTE [DISTWIDTH] IN BLOOD BY AUTOMATED COUNT: 18.6 % (ref 11.9–14.6)
GLOBULIN SER CALC-MCNC: 2.5 G/DL (ref 2.3–3.5)
GLUCOSE SERPL-MCNC: 92 MG/DL (ref 65–100)
HCT VFR BLD AUTO: 25.2 % (ref 41.1–50.3)
HGB BLD-MCNC: 8 G/DL (ref 13.6–17.2)
IMM GRANULOCYTES # BLD AUTO: 0 K/UL (ref 0–0.5)
IMM GRANULOCYTES NFR BLD AUTO: 0 % (ref 0–5)
LYMPHOCYTES # BLD: 1 K/UL (ref 0.5–4.6)
LYMPHOCYTES NFR BLD: 23 % (ref 13–44)
Lab: NORMAL
MAGNESIUM SERPL-MCNC: NORMAL MG/DL (ref 1.8–2.4)
MCH RBC QN AUTO: 30.2 PG (ref 26.1–32.9)
MCHC RBC AUTO-ENTMCNC: 31.7 G/DL (ref 31.4–35)
MCV RBC AUTO: 95.1 FL (ref 79.6–97.8)
MONOCYTES # BLD: 0.3 K/UL (ref 0.1–1.3)
MONOCYTES NFR BLD: 7 % (ref 4–12)
NEUTS SEG # BLD: 2.8 K/UL (ref 1.7–8.2)
NEUTS SEG NFR BLD: 65 % (ref 43–78)
NRBC # BLD: 0 K/UL (ref 0–0.2)
PLATELET # BLD AUTO: 187 K/UL (ref 150–450)
PMV BLD AUTO: 10.8 FL (ref 9.4–12.3)
POTASSIUM SERPL-SCNC: 3.8 MMOL/L (ref 3.5–5.1)
PROT SERPL-MCNC: 5.3 G/DL (ref 6.3–8.2)
RBC # BLD AUTO: 2.65 M/UL (ref 4.23–5.6)
REFERENCE LAB,REFLB: NORMAL
SODIUM SERPL-SCNC: 143 MMOL/L (ref 136–145)
TEST DESCRIPTION:,ATST: NORMAL
WBC # BLD AUTO: 4.3 K/UL (ref 4.3–11.1)

## 2022-02-02 PROCEDURE — 74011250637 HC RX REV CODE- 250/637: Performed by: NURSE PRACTITIONER

## 2022-02-02 PROCEDURE — 99222 1ST HOSP IP/OBS MODERATE 55: CPT | Performed by: INTERNAL MEDICINE

## 2022-02-02 PROCEDURE — 74011250636 HC RX REV CODE- 250/636: Performed by: NURSE PRACTITIONER

## 2022-02-02 PROCEDURE — 83735 ASSAY OF MAGNESIUM: CPT

## 2022-02-02 PROCEDURE — 74011250636 HC RX REV CODE- 250/636: Performed by: INTERNAL MEDICINE

## 2022-02-02 PROCEDURE — 36591 DRAW BLOOD OFF VENOUS DEVICE: CPT

## 2022-02-02 PROCEDURE — APPSS60 APP SPLIT SHARED TIME 46-60 MINUTES: Performed by: NURSE PRACTITIONER

## 2022-02-02 PROCEDURE — 74011250637 HC RX REV CODE- 250/637: Performed by: INTERNAL MEDICINE

## 2022-02-02 PROCEDURE — 80053 COMPREHEN METABOLIC PANEL: CPT

## 2022-02-02 PROCEDURE — 85025 COMPLETE CBC W/AUTO DIFF WBC: CPT

## 2022-02-02 PROCEDURE — 65270000029 HC RM PRIVATE

## 2022-02-02 PROCEDURE — 74011000258 HC RX REV CODE- 258: Performed by: INTERNAL MEDICINE

## 2022-02-02 RX ORDER — ONDANSETRON 2 MG/ML
8 INJECTION INTRAMUSCULAR; INTRAVENOUS EVERY 8 HOURS
Status: DISCONTINUED | OUTPATIENT
Start: 2022-02-02 | End: 2022-02-04 | Stop reason: HOSPADM

## 2022-02-02 RX ORDER — DEXAMETHASONE SODIUM PHOSPHATE 100 MG/10ML
10 INJECTION INTRAMUSCULAR; INTRAVENOUS EVERY 24 HOURS
Status: COMPLETED | OUTPATIENT
Start: 2022-02-02 | End: 2022-02-04

## 2022-02-02 RX ORDER — HYDROCORTISONE SODIUM SUCCINATE 100 MG/2ML
100 INJECTION, POWDER, FOR SOLUTION INTRAMUSCULAR; INTRAVENOUS AS NEEDED
Status: CANCELLED | OUTPATIENT
Start: 2022-02-04

## 2022-02-02 RX ORDER — ALBUTEROL SULFATE 0.83 MG/ML
2.5 SOLUTION RESPIRATORY (INHALATION) AS NEEDED
Status: CANCELLED
Start: 2022-02-04

## 2022-02-02 RX ORDER — DIPHENHYDRAMINE HYDROCHLORIDE 50 MG/ML
50 INJECTION, SOLUTION INTRAMUSCULAR; INTRAVENOUS AS NEEDED
Status: CANCELLED
Start: 2022-02-04

## 2022-02-02 RX ORDER — ACETAMINOPHEN 325 MG/1
650 TABLET ORAL ONCE
Status: COMPLETED | OUTPATIENT
Start: 2022-02-02 | End: 2022-02-02

## 2022-02-02 RX ORDER — EPINEPHRINE 1 MG/ML
0.3 INJECTION, SOLUTION, CONCENTRATE INTRAVENOUS AS NEEDED
Status: CANCELLED | OUTPATIENT
Start: 2022-02-04

## 2022-02-02 RX ORDER — DIPHENHYDRAMINE HYDROCHLORIDE 50 MG/ML
50 INJECTION, SOLUTION INTRAMUSCULAR; INTRAVENOUS ONCE
Status: COMPLETED | OUTPATIENT
Start: 2022-02-02 | End: 2022-02-02

## 2022-02-02 RX ADMIN — ONDANSETRON 8 MG: 2 INJECTION INTRAMUSCULAR; INTRAVENOUS at 21:25

## 2022-02-02 RX ADMIN — SODIUM CHLORIDE 784 MG: 900 INJECTION, SOLUTION INTRAVENOUS at 12:14

## 2022-02-02 RX ADMIN — LISINOPRIL 10 MG: 5 TABLET ORAL at 08:18

## 2022-02-02 RX ADMIN — SODIUM CHLORIDE 75 ML/HR: 900 INJECTION, SOLUTION INTRAVENOUS at 02:35

## 2022-02-02 RX ADMIN — FLUCONAZOLE 200 MG: 100 TABLET ORAL at 08:19

## 2022-02-02 RX ADMIN — ALLOPURINOL 300 MG: 300 TABLET ORAL at 08:19

## 2022-02-02 RX ADMIN — ACETAMINOPHEN 650 MG: 325 TABLET, FILM COATED ORAL at 11:31

## 2022-02-02 RX ADMIN — ENOXAPARIN SODIUM 40 MG: 100 INJECTION SUBCUTANEOUS at 16:53

## 2022-02-02 RX ADMIN — ACYCLOVIR 400 MG: 800 TABLET ORAL at 08:25

## 2022-02-02 RX ADMIN — ATENOLOL 25 MG: 25 TABLET ORAL at 08:19

## 2022-02-02 RX ADMIN — ETOPOSIDE 200 MG: 20 INJECTION INTRAVENOUS at 14:55

## 2022-02-02 RX ADMIN — ACYCLOVIR 400 MG: 800 TABLET ORAL at 16:53

## 2022-02-02 RX ADMIN — ONDANSETRON 8 MG: 2 INJECTION INTRAMUSCULAR; INTRAVENOUS at 13:50

## 2022-02-02 RX ADMIN — DIPHENHYDRAMINE HYDROCHLORIDE 50 MG: 50 INJECTION, SOLUTION INTRAMUSCULAR; INTRAVENOUS at 11:31

## 2022-02-02 RX ADMIN — DEXAMETHASONE SODIUM PHOSPHATE 10 MG: 10 INJECTION INTRAMUSCULAR; INTRAVENOUS at 13:50

## 2022-02-02 NOTE — PROGRESS NOTES
Care Management Interventions  Transition of Care Consult (CM Consult): Discharge Planning  Discharge Durable Medical Equipment: No  Physical Therapy Consult: No  Occupational Therapy Consult: No  Speech Therapy Consult: No  Support Systems: Spouse/Significant Other  Confirm Follow Up Transport: Family  The Plan for Transition of Care is Related to the Following Treatment Goals : Plan return home  Discharge Location  Patient Expects to be Discharged to[de-identified] Home  Patient admitted for chemotherapy treatment. Prior to admission he lives with his wife and his children. He is independent of ADL's and requires no DME. Has transportation as needed. Has [employer] and financial assistance through hospital. D/C plan home with family when chemotherapy completed.

## 2022-02-03 ENCOUNTER — DOCUMENTATION ONLY (OUTPATIENT)
Dept: HEMATOLOGY | Age: 57
End: 2022-02-03

## 2022-02-03 LAB
ALBUMIN SERPL-MCNC: 2.9 G/DL (ref 3.5–5)
ALBUMIN/GLOB SERPL: 1 {RATIO} (ref 1.2–3.5)
ALP SERPL-CCNC: 100 U/L (ref 50–136)
ALT SERPL-CCNC: 37 U/L (ref 12–65)
ANION GAP SERPL CALC-SCNC: 3 MMOL/L (ref 7–16)
AST SERPL-CCNC: 16 U/L (ref 15–37)
BASOPHILS # BLD: 0 K/UL (ref 0–0.2)
BASOPHILS NFR BLD: 0 % (ref 0–2)
BILIRUB SERPL-MCNC: 0.5 MG/DL (ref 0.2–1.1)
BUN SERPL-MCNC: 12 MG/DL (ref 6–23)
CALCIUM SERPL-MCNC: 8.3 MG/DL (ref 8.3–10.4)
CHLORIDE SERPL-SCNC: 109 MMOL/L (ref 98–107)
CO2 SERPL-SCNC: 29 MMOL/L (ref 21–32)
CREAT SERPL-MCNC: 0.62 MG/DL (ref 0.8–1.5)
DIFFERENTIAL METHOD BLD: ABNORMAL
EOSINOPHIL # BLD: 0 K/UL (ref 0–0.8)
EOSINOPHIL NFR BLD: 0 % (ref 0.5–7.8)
ERYTHROCYTE [DISTWIDTH] IN BLOOD BY AUTOMATED COUNT: 18.1 % (ref 11.9–14.6)
GLOBULIN SER CALC-MCNC: 2.9 G/DL (ref 2.3–3.5)
GLUCOSE SERPL-MCNC: 133 MG/DL (ref 65–100)
HCT VFR BLD AUTO: 27.3 % (ref 41.1–50.3)
HGB BLD-MCNC: 8.9 G/DL (ref 13.6–17.2)
IMM GRANULOCYTES # BLD AUTO: 0 K/UL (ref 0–0.5)
IMM GRANULOCYTES NFR BLD AUTO: 1 % (ref 0–5)
LYMPHOCYTES # BLD: 0.2 K/UL (ref 0.5–4.6)
LYMPHOCYTES NFR BLD: 5 % (ref 13–44)
Lab: NORMAL
MCH RBC QN AUTO: 30.8 PG (ref 26.1–32.9)
MCHC RBC AUTO-ENTMCNC: 32.6 G/DL (ref 31.4–35)
MCV RBC AUTO: 94.5 FL (ref 79.6–97.8)
MONOCYTES # BLD: 0 K/UL (ref 0.1–1.3)
MONOCYTES NFR BLD: 1 % (ref 4–12)
NEUTS SEG # BLD: 3.9 K/UL (ref 1.7–8.2)
NEUTS SEG NFR BLD: 93 % (ref 43–78)
NRBC # BLD: 0 K/UL (ref 0–0.2)
PLATELET # BLD AUTO: 202 K/UL (ref 150–450)
PMV BLD AUTO: 11.2 FL (ref 9.4–12.3)
POTASSIUM SERPL-SCNC: 4.2 MMOL/L (ref 3.5–5.1)
PROT SERPL-MCNC: 5.8 G/DL (ref 6.3–8.2)
RBC # BLD AUTO: 2.89 M/UL (ref 4.23–5.6)
REFERENCE LAB,REFLB: NORMAL
SODIUM SERPL-SCNC: 141 MMOL/L (ref 136–145)
TEST DESCRIPTION:,ATST: NORMAL
WBC # BLD AUTO: 4.2 K/UL (ref 4.3–11.1)

## 2022-02-03 PROCEDURE — 74011250636 HC RX REV CODE- 250/636: Performed by: NURSE PRACTITIONER

## 2022-02-03 PROCEDURE — 83735 ASSAY OF MAGNESIUM: CPT

## 2022-02-03 PROCEDURE — 74011250637 HC RX REV CODE- 250/637: Performed by: NURSE PRACTITIONER

## 2022-02-03 PROCEDURE — 80053 COMPREHEN METABOLIC PANEL: CPT

## 2022-02-03 PROCEDURE — 36591 DRAW BLOOD OFF VENOUS DEVICE: CPT

## 2022-02-03 PROCEDURE — 65270000029 HC RM PRIVATE

## 2022-02-03 PROCEDURE — 74011250636 HC RX REV CODE- 250/636: Performed by: INTERNAL MEDICINE

## 2022-02-03 PROCEDURE — APPSS60 APP SPLIT SHARED TIME 46-60 MINUTES: Performed by: NURSE PRACTITIONER

## 2022-02-03 PROCEDURE — 74011000258 HC RX REV CODE- 258: Performed by: INTERNAL MEDICINE

## 2022-02-03 PROCEDURE — 85025 COMPLETE CBC W/AUTO DIFF WBC: CPT

## 2022-02-03 PROCEDURE — 99232 SBSQ HOSP IP/OBS MODERATE 35: CPT | Performed by: INTERNAL MEDICINE

## 2022-02-03 RX ADMIN — ETOPOSIDE 200 MG: 20 INJECTION INTRAVENOUS at 15:43

## 2022-02-03 RX ADMIN — FOSAPREPITANT 150 MG: 150 INJECTION, POWDER, LYOPHILIZED, FOR SOLUTION INTRAVENOUS at 15:04

## 2022-02-03 RX ADMIN — DEXAMETHASONE SODIUM PHOSPHATE 10 MG: 10 INJECTION INTRAMUSCULAR; INTRAVENOUS at 15:04

## 2022-02-03 RX ADMIN — ALLOPURINOL 300 MG: 300 TABLET ORAL at 08:15

## 2022-02-03 RX ADMIN — FLUCONAZOLE 200 MG: 100 TABLET ORAL at 08:15

## 2022-02-03 RX ADMIN — MESNA 10000 MG: 100 INJECTION, SOLUTION INTRAVENOUS at 17:30

## 2022-02-03 RX ADMIN — ENOXAPARIN SODIUM 40 MG: 100 INJECTION SUBCUTANEOUS at 17:33

## 2022-02-03 RX ADMIN — ATENOLOL 25 MG: 25 TABLET ORAL at 08:15

## 2022-02-03 RX ADMIN — SODIUM CHLORIDE 75 ML/HR: 900 INJECTION, SOLUTION INTRAVENOUS at 08:24

## 2022-02-03 RX ADMIN — LISINOPRIL 10 MG: 5 TABLET ORAL at 08:15

## 2022-02-03 RX ADMIN — ACYCLOVIR 400 MG: 800 TABLET ORAL at 08:25

## 2022-02-03 RX ADMIN — ONDANSETRON 8 MG: 2 INJECTION INTRAMUSCULAR; INTRAVENOUS at 15:03

## 2022-02-03 RX ADMIN — CARBOPLATIN 750 MG: 10 INJECTION, SOLUTION INTRAVENOUS at 16:47

## 2022-02-03 RX ADMIN — ONDANSETRON 8 MG: 2 INJECTION INTRAMUSCULAR; INTRAVENOUS at 05:14

## 2022-02-03 RX ADMIN — ONDANSETRON 8 MG: 2 INJECTION INTRAMUSCULAR; INTRAVENOUS at 21:39

## 2022-02-03 RX ADMIN — ACYCLOVIR 400 MG: 800 TABLET ORAL at 17:33

## 2022-02-03 NOTE — PROGRESS NOTES
Patient enrolled into the [facility] Essentials 6 Aj Plan effective date 2/1/2022.

## 2022-02-03 NOTE — PROGRESS NOTES
[facility] Hematology & Oncology        Inpatient Hematology / Oncology Progress Note    Reason for Admission:  Admission for antineoplastic chemotherapy [Z51.11]    24 Hour Events:  VSS, afebrile  D2 R-ICE  Tolerating treatment well  No complaints        ROS:  Constitutional: Negative for fever, chills, weakness, malaise, fatigue. CV: Negative for chest pain, palpitations, edema. Respiratory: Negative for dyspnea, cough, wheezing. GI: Negative for nausea, abdominal pain, diarrhea. 10 point review of systems is otherwise negative with the exception of the elements mentioned above in the HPI.            No Known Allergies  Past Medical History:   Diagnosis Date    Cancer ([facility] [address]     Hypertension     Valvular heart disease      Past Surgical History:   Procedure Laterality Date    HX BACK SURGERY      26 years ago    IR CHOLECYSTOSTOMY PERCUTANEOUS      IR INTRATHECAL CHEMO INJECTION CNS  12/8/2021    IR INTRATHECAL CHEMO INJECTION CNS  12/28/2021    IR INTRATHECAL CHEMO INJECTION CNS  1/28/2022     Family History   Problem Relation Age of Onset    Diabetes Mother     Hypertension Mother     Diabetes Father     Hypertension Father     Hypertension Brother      Social History     Socioeconomic History    Marital status:      Spouse name: Not on file    Number of children: Not on file    Years of education: Not on file    Highest education level: Not on file   Occupational History    Not on file   Tobacco Use    Smoking status: Never Smoker    Smokeless tobacco: Never Used   Vaping Use    Vaping Use: Never used   Substance and Sexual Activity    Alcohol use: Not Currently    Drug use: Not Currently    Sexual activity: Not on file   Other Topics Concern    Not on file   Social History Narrative    Not on file     Social Determinants of Health     Financial Resource Strain:     Difficulty of Paying Living Expenses: Not on file   Food Insecurity:     Worried About Running Out of Food in the Last Year: Not on file    Ran Out of Food in the Last Year: Not on file   Transportation Needs:     Lack of Transportation (Medical): Not on file    Lack of Transportation (Non-Medical):  Not on file   Physical Activity:     Days of Exercise per Week: Not on file    Minutes of Exercise per Session: Not on file   Stress:     Feeling of Stress : Not on file   Social Connections:     Frequency of Communication with Friends and Family: Not on file    Frequency of Social Gatherings with Friends and Family: Not on file    Attends [employer] Services: Not on file    Active Member of Clubs or Organizations: Not on file    Attends Club or Organization Meetings: Not on file    Marital Status: Not on file   Intimate Partner Violence:     Fear of Current or Ex-Partner: Not on file    Emotionally Abused: Not on file    Physically Abused: Not on file    Sexually Abused: Not on file   Housing Stability:     Unable to Pay for Housing in the Last Year: Not on file    Number of [place] in the Last Year: Not on file    Unstable Housing in the Last Year: Not on file     Current Facility-Administered Medications   Medication Dose Route Frequency Provider Last Rate Last Admin    fosaprepitant (EMEND) 150 mg in 0.9% sodium chloride 150 mL IVPB  150 mg IntraVENous ONCE Carrie Jorge MD        dexamethasone (DECADRON) 10 mg/mL injection 10 mg  10 mg IntraVENous Q24H Carrie Jorge MD   10 mg at 02/02/22 1350    ondansetron (ZOFRAN) injection 8 mg  8 mg IntraVENous Q8H Carrie Jorge MD   8 mg at 02/03/22 0514    etoposide (VEPESID) 200 mg in 0.9% sodium chloride 500 mL chemo infusion  200 mg IntraVENous Q24H Carrie Jorge  mL/hr at 02/02/22 1455 200 mg at 02/02/22 1455    CARBOplatin (PARAPLATIN) 750 mg in 0.9% sodium chloride 250 mL chemo infusion  750 mg IntraVENous ONCE Carrie Jorge MD        ifosfamide (IFEX) 10,000 mg, mesna (MESNEX) 10,000 mg in 0.9% sodium chloride 1,000 mL chemo infusion 10,000 mg IntraVENous CONTINUOUS Emily Thompson MD        acyclovir (ZOVIRAX) tablet 400 mg  400 mg Oral BID Norwood Shallow, NP   400 mg at 22 0825    allopurinoL (ZYLOPRIM) tablet 300 mg  300 mg Oral DAILY Yared Shallow, NP   300 mg at 22 0815    atenoloL (TENORMIN) tablet 25 mg  25 mg Oral DAILY Yared Shallow, NP   25 mg at 22 0815    fluconazole (DIFLUCAN) tablet 200 mg  200 mg Oral DAILY Norwood Shallow, NP   200 mg at 22 0815    lisinopriL (PRINIVIL, ZESTRIL) tablet 10 mg  10 mg Oral DAILY Norwood Shallow, NP   10 mg at 22 0815    0.9% sodium chloride infusion  75 mL/hr IntraVENous CONTINUOUS Norwood Shallow, NP 75 mL/hr at 22 0824 75 mL/hr at 22 0824    enoxaparin (LOVENOX) injection 40 mg  40 mg SubCUTAneous Q24H Yared Shallow, NP   40 mg at 22 1653    ondansetron (ZOFRAN) injection 4 mg  4 mg IntraVENous Q4H PRN Norwood Shallow, NP        prochlorperazine (COMPAZINE) with saline injection 5 mg  5 mg IntraVENous Q6H PRN Yared Shallow, NP        HYDROcodone-acetaminophen [facility]) 5-325 mg per tablet 1 Tablet  1 Tablet Oral Q6H PRN Yared Shallow, NP        morphine injection 2 mg  2 mg IntraVENous Q4H PRN Norwood Shallow, NP           OBJECTIVE:  Patient Vitals for the past 8 hrs:   BP Temp Pulse Resp SpO2   22 1111 122/61 97.8 °F (36.6 °C) 73 18 97 %   22 0755 136/73 97.4 °F (36.3 °C) 75 18 97 %     Temp (24hrs), Av °F (36.7 °C), Min:97.4 °F (36.3 °C), Max:98.5 °F (36.9 °C)     07 -  1900  In: 240 [P.O.:240]  Out: 1000 [Urine:1000]    Physical Exam:  Constitutional: Well developed, well nourished male in no acute distress, sitting comfortably in the hospital bed. HEENT: Normocephalic and atraumatic. Oropharynx is clear, mucous membranes are moist. Neck supple without JVD. No thyromegaly present. +L eye palsy, wearing eye patch. Skin Warm and dry. No bruising and no rash noted. No erythema. No pallor.     Respiratory Lungs are clear to auscultation bilaterally without wheezes, rales or rhonchi, normal air exchange without accessory muscle use. CVS Normal rate, regular rhythm and normal S1 and S2. No murmurs, gallops, or rubs. Abdomen Soft, nontender and nondistended, normoactive bowel sounds. No palpable mass. No hepatosplenomegaly. Neuro Grossly nonfocal with no obvious sensory or motor deficits. MSK Normal range of motion in general.  No edema and no tenderness. Psych Appropriate mood and affect. Labs:    Recent Results (from the past 24 hour(s))   METABOLIC PANEL, COMPREHENSIVE    Collection Time: 02/03/22  2:49 AM   Result Value Ref Range    Sodium 141 136 - 145 mmol/L    Potassium 4.2 3.5 - 5.1 mmol/L    Chloride 109 (H) 98 - 107 mmol/L    CO2 29 21 - 32 mmol/L    Anion gap 3 (L) 7 - 16 mmol/L    Glucose 133 (H) 65 - 100 mg/dL    BUN 12 6 - 23 MG/DL    Creatinine 0.62 (L) 0.8 - 1.5 MG/DL    GFR est AA >60 >60 ml/min/1.73m2    GFR est non-AA >60 >60 ml/min/1.73m2    Calcium 8.3 8.3 - 10.4 MG/DL    Bilirubin, total 0.5 0.2 - 1.1 MG/DL    ALT (SGPT) 37 12 - 65 U/L    AST (SGOT) 16 15 - 37 U/L    Alk. phosphatase 100 50 - 136 U/L    Protein, total 5.8 (L) 6.3 - 8.2 g/dL    Albumin 2.9 (L) 3.5 - 5.0 g/dL    Globulin 2.9 2.3 - 3.5 g/dL    A-G Ratio 1.0 (L) 1.2 - 3.5     CBC WITH AUTOMATED DIFF    Collection Time: 02/03/22  2:49 AM   Result Value Ref Range    WBC 4.2 (L) 4.3 - 11.1 K/uL    RBC 2.89 (L) 4.23 - 5.6 M/uL    HGB 8.9 (L) 13.6 - 17.2 g/dL    HCT 27.3 (L) 41.1 - 50.3 %    MCV 94.5 79.6 - 97.8 FL    MCH 30.8 26.1 - 32.9 PG    MCHC 32.6 31.4 - 35.0 g/dL    RDW 18.1 (H) 11.9 - 14.6 %    PLATELET 292 361 - 930 K/uL    MPV 11.2 9.4 - 12.3 FL    ABSOLUTE NRBC 0.00 0.0 - 0.2 K/uL    NEUTROPHILS 93 (H) 43 - 78 %    LYMPHOCYTES 5 (L) 13 - 44 %    MONOCYTES 1 (L) 4.0 - 12.0 %    EOSINOPHILS 0 (L) 0.5 - 7.8 %    BASOPHILS 0 0.0 - 2.0 %    IMMATURE GRANULOCYTES 1 0.0 - 5.0 %    ABS. NEUTROPHILS 3.9 1.7 - 8.2 K/UL    ABS.  LYMPHOCYTES 0.2 (L) 0.5 - 4.6 K/UL    ABS. MONOCYTES 0.0 (L) 0.1 - 1.3 K/UL    ABS. EOSINOPHILS 0.0 0.0 - 0.8 K/UL    ABS. BASOPHILS 0.0 0.0 - 0.2 K/UL    ABS. IMM. GRANS. 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 Bishnu Gomez is a 64 y.o. male admitted on 2/1/2022. The encounter diagnosis was Admission for antineoplastic chemotherapy. He is a patient of Dr. Haroldo Crane with relapsed DLBCL. 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 He is s/p R-CHOP from April to July 2021.  In November 2021, CT CAP showed disease relapse with new L supraclavicular mass as well as stable axillary and chest wall LNs and shotty RP LNs.  He is s/p L supraclavicular LN bx +DLBCL.  He has tolerated 2 cycles R-ICE with IT MTX well. CSF flow neg. MRI brain ordered 12/7 for L eye palsy, showed L posterior communicating artery, whose course is more caudad than anatomically typical.  Per neurology, no further neuro w/u necessary as pt has underwent w/u for L eye palsy and felt not to be malignancy-related. Echo with EF 60-65% with mod to severe eccentric mitral regurgitation.  Cardiology performed ERINN on 12/10 which revealed severe MR with a partially flail anterior leaflet. Cardiology monitoring as OP. He will eventually require valve repair surgery. Per cards, while undergoing chemotherapy, valve repair would not be in his best interest for fear of infection/poor healing. He is being admitted for C3 R-ICE. He is s/p IT MTX on 1/28. 3rd nerve palsy is essentially stable. No complications noted from cycle 2, he recovered to baseline within a few days. He tested positive for COVID on 1/13. Recent PCR reportedly negative. He reports COVID-related symptoms resolved since 1/15. He is feeling well and is ready to proceed with treatment. PLAN:  Relapsed DLBCL  - admit for C3 R-ICE  - s/p IT MTX on 1/28  2/3 D2 C3 R-ICE. Tolerating well. No complaints.     Hypertension  - Con't home meds     Continue home meds  Ppx meds: Acyclovir, Diflucan  Rhea SOPs  Lovenox for DVT ppx (hold for plt <50k)     Goals and plan of care reviewed with the patient.  All questions answered to the best of our ability.     Disposition:  Anticipate discharge home tomorrow after the completion of chemotherapy. IT chemo planned for tomorrow. Has G-CSF set up for 2/5 and f/u 2/9.                Yareli Nagy, [address] Hematology & Oncology  [address] [address]  Office : [phone]  Fax : [phone]

## 2022-02-03 NOTE — DISCHARGE SUMMARY
[facility] Hematology and Oncology: Inpatient Hematology / Oncology Discharge Summary Note    Patient ID:  Martha Chen  [id number]  25 y.o.  1965    Admit Date: 2/1/2022    Discharge Date: 2/4/22    Admission Diagnoses: Admission for antineoplastic chemotherapy [Z51.11]    Discharge Diagnoses:  Principal Diagnosis: <principal problem not specified>  Active Problems:    Admission for antineoplastic chemotherapy (12/6/2021)        Hospital Course:    Mr. Quinteros is a 64 y. o. male admitted on 2/1/2022. The encounter diagnosis was Admission for antineoplastic chemotherapy.  He is a patient of Dr. Eri Pacheco with relapsed DLBCL. [facility] PMH includes HTN and 3rd cranial nerve palsy. He is s/p R-CHOP from April to July 2021.  In November 2021, CT CAP showed disease relapse with new L supraclavicular mass as well as stable axillary and chest wall LNs and shotty RP LNs.  He is s/p L supraclavicular LN bx +DLBCL.  He has tolerated 2 cycles R-ICE with IT MTX well.  CSF flow neg.  MRI brain ordered 12/7 for L eye palsy, showed L posterior communicating artery, whose course is more caudad than anatomically typical.  Per neurology, no further neuro w/u necessary as pt has underwent w/u for L eye palsy and felt not to be malignancy-related.  Echo with EF 60-65% with mod to severe eccentric mitral regurgitation.  Cardiology performed ERINN on 12/10 which revealed severe MR with a partially flail anterior leaflet.  Cardiology monitoring as OP.  He will eventually require valve repair surgery.  Per cards, while undergoing chemotherapy, valve repair would not be in his best interest for fear of infection/poor healing.  He is being admitted for C3 R-ICE. He is s/p IT MTX on 1/28.  3rd nerve palsy is essentially stable.  No complications noted from cycle 2, he recovered to baseline within a few days. He tested positive for COVID on 1/13. Recent PCR reportedly negative. He reports COVID-related symptoms resolved since 1/15.       He tolerated C3 R-ICE without any issues. He is now ready for DC. He will go to IR for IT chemo today and then will be discharged. He has G-CSF scheduled for 2/5 and f/u with NP 2/9. He knows to call with any questions or concerns, including fever or symptoms not managed with medications. Consults:  IP CONSULT TO INTERVENTIONAL RADIOLOGY    Pertinent Diagnostic Studies:   Labs:    Recent Labs     02/04/22 0320 02/03/22 0249 02/02/22 0240   WBC 5.1 4.2* 4.3   HGB 9.0* 8.9* 8.0*    202 187   ANEU 4.9 3.9 2.8      Recent Labs     02/04/22 0320 02/03/22  0249 02/02/22 0240 02/01/22  1716 02/01/22 1716    141 143   < > 141   K 4.0 4.2 3.8   < > 4.0   * 109* 109*   < > 108*   CO2 28 29 31   < > 31   * 133* 92   < > 96   BUN 15 12 12   < > 12   CREA 0.60* 0.62* 0.56*   < > 0.59*   CA 8.4 8.3 8.4   < > 9.3   AP 90 100 99   < > 124   TP 5.7* 5.8* 5.3*   < > 6.1*   ALB 2.8* 2.9* 2.8*   < > 3.2*   MG  --   --  RESULTS SCANNED IN CONNECT CARE  --  RESULTS SCANNED IN [facility] CARE    < > = values in this interval not displayed. Imaging:    None    Current Discharge Medication List      CONTINUE these medications which have NOT CHANGED    Details   lisinopriL (PRINIVIL, ZESTRIL) 10 mg tablet TAKE 1 TABLET BY MOUTH EVERY DAY  Qty: 30 Tablet, Refills: 0      acyclovir (ZOVIRAX) 400 mg tablet TAKE 1 TABLET BY MOUTH TWO (2) TIMES A DAY FOR 30 DAYS. Qty: 60 Tablet, Refills: 0      atenoloL (TENORMIN) 25 mg tablet TAKE 1 TABLET BY MOUTH EVERY DAY  Qty: 30 Tablet, Refills: 0      allopurinoL (ZYLOPRIM) 300 mg tablet Take 1 Tablet by mouth daily. Qty: 30 Tablet, Refills: 0      ondansetron (ZOFRAN ODT) 8 mg disintegrating tablet Take 1 Tablet by mouth every eight (8) hours as needed for Nausea or Vomiting. Qty: 60 Tablet, Refills: 0      fluconazole (DIFLUCAN) 200 mg tablet Take 1 Tablet by mouth daily. FDA advises cautious prescribing of oral fluconazole in pregnancy.   Qty: 30 Tablet, Refills: 0 OBJECTIVE:  Patient Vitals for the past 8 hrs:   BP Temp Pulse Resp SpO2   22 0743 (!) 159/85 97.4 °F (36.3 °C) 80 18 99 %   22 0308 136/72 97.8 °F (36.6 °C) 75 18 96 %     Temp (24hrs), Av.7 °F (36.5 °C), Min:97.4 °F (36.3 °C), Max:98 °F (36.7 °C)    701 -  1900  In: 240 [P.O.:240]  Out: 500 [Urine:500]    Physical Exam:  Constitutional: Well developed, well nourished male in no acute distress, sitting comfortably in bed. HEENT: +eye-patch in place. Normocephalic and atraumatic. Oropharynx is clear, mucous membranes are moist. Extraocular muscles are intact. Sclerae anicteric. Neck supple without JVD. No thyromegaly present. Skin Warm and dry. No bruising and no rash noted. No erythema. No pallor. Respiratory Lungs are clear to auscultation bilaterally without wheezes, rales or rhonchi, normal air exchange without accessory muscle use. CVS Normal rate, regular rhythm and normal S1 and S2. No murmurs, gallops, or rubs. Abdomen Soft, nontender and nondistended, normoactive bowel sounds. No palpable mass. No hepatosplenomegaly. Neuro Grossly nonfocal with no obvious sensory or motor deficits. MSK Normal range of motion in general.  No edema and no tenderness. Psych Appropriate mood and affect. ASSESSMENT:    Active Problems:    Admission for antineoplastic chemotherapy (2021)        DISPOSITION:    Follow-up Appointments   Procedures    FOLLOW UP VISIT Appointment in: Other (Dyan Chavez) As scheduled 2/5 for Udenyca and 2/9 with NP     As scheduled 2/5 for Udenyca and 2/9 with NP     Standing Status:   Standing     Number of Occurrences:   1     Order Specific Question:   Appointment in     Answer: Other (Specify)         Over 30 minutes was spent in discharge planning and coordination of care.          Christi Salvador, [address] Hematology & Oncology  [address] [email or web address]  Office : [phone]  Fax : [phone]

## 2022-02-03 NOTE — PROGRESS NOTES
CM received notification from  that patient had [address] and was out of network with [facility]. CM spoke with patient and he stated that the financial counselor was working with his daughter to change his insurance to cover the transplant in [place] instead of having to go to [facility]. CM provided patient with information that CM was informed his insurance is not in network with [facility] and would need to be transferred to another facility. JEFFY spoke with Manuel Holliday NP for oncology and she states that patient is to d/c tomorrow and is receiving chemotherapy and they would have to find another oncologist to provide service at another facility and that would not happen before he was ready for d/c. She requested CM contact Amy Galvez who is the financial counselor at the Cancer center and speak with him as he is the one that assist patient's with insurance. CM left message for Amy Galvez requesting call back to discuss insurance out of network. Patient anticipates d/c home in am after chemotherapy completed. He voices no concerns or needs for d/c.

## 2022-02-04 ENCOUNTER — HOSPITAL ENCOUNTER (OUTPATIENT)
Dept: INTERVENTIONAL RADIOLOGY/VASCULAR | Age: 57
Discharge: HOME | End: 2022-02-04
Attending: INTERNAL MEDICINE
Payer: COMMERCIAL

## 2022-02-04 VITALS
TEMPERATURE: 98.6 F | DIASTOLIC BLOOD PRESSURE: 72 MMHG | RESPIRATION RATE: 18 BRPM | BODY MASS INDEX: 25.42 KG/M2 | SYSTOLIC BLOOD PRESSURE: 154 MMHG | HEART RATE: 64 BPM | HEIGHT: 72 IN | WEIGHT: 187.7 LBS | OXYGEN SATURATION: 100 %

## 2022-02-04 VITALS
HEART RATE: 69 BPM | TEMPERATURE: 97.8 F | SYSTOLIC BLOOD PRESSURE: 143 MMHG | DIASTOLIC BLOOD PRESSURE: 69 MMHG | BODY MASS INDEX: 25.42 KG/M2 | OXYGEN SATURATION: 100 % | HEIGHT: 72 IN | RESPIRATION RATE: 18 BRPM | WEIGHT: 187.7 LBS

## 2022-02-04 DIAGNOSIS — C83.30 DIFFUSE LARGE B-CELL LYMPHOMA, UNSPECIFIED BODY REGION (HCC): Primary | ICD-10-CM

## 2022-02-04 LAB
ALBUMIN SERPL-MCNC: 2.8 G/DL (ref 3.5–5)
ALBUMIN/GLOB SERPL: 1 {RATIO} (ref 1.2–3.5)
ALP SERPL-CCNC: 90 U/L (ref 50–136)
ALT SERPL-CCNC: 31 U/L (ref 12–65)
ANION GAP SERPL CALC-SCNC: 5 MMOL/L (ref 7–16)
APPEARANCE FLD: NORMAL
AST SERPL-CCNC: 9 U/L (ref 15–37)
BASOPHILS # BLD: 0 K/UL (ref 0–0.2)
BASOPHILS NFR BLD: 0 % (ref 0–2)
BILIRUB SERPL-MCNC: 0.3 MG/DL (ref 0.2–1.1)
BUN SERPL-MCNC: 15 MG/DL (ref 6–23)
CALCIUM SERPL-MCNC: 8.4 MG/DL (ref 8.3–10.4)
CHLORIDE SERPL-SCNC: 109 MMOL/L (ref 98–107)
CO2 SERPL-SCNC: 28 MMOL/L (ref 21–32)
COLOR FLD: NORMAL
CREAT SERPL-MCNC: 0.6 MG/DL (ref 0.8–1.5)
DIFFERENTIAL METHOD BLD: ABNORMAL
EOSINOPHIL # BLD: 0 K/UL (ref 0–0.8)
EOSINOPHIL NFR BLD: 0 % (ref 0.5–7.8)
ERYTHROCYTE [DISTWIDTH] IN BLOOD BY AUTOMATED COUNT: 18.8 % (ref 11.9–14.6)
GLOBULIN SER CALC-MCNC: 2.9 G/DL (ref 2.3–3.5)
GLUCOSE FLD-MCNC: 75 MG/DL
GLUCOSE SERPL-MCNC: 134 MG/DL (ref 65–100)
HCT VFR BLD AUTO: 27.8 % (ref 41.1–50.3)
HGB BLD-MCNC: 9 G/DL (ref 13.6–17.2)
IMM GRANULOCYTES # BLD AUTO: 0 K/UL (ref 0–0.5)
IMM GRANULOCYTES NFR BLD AUTO: 1 % (ref 0–5)
LDH FLD L TO P-CCNC: 39 U/L
LYMPHOCYTES # BLD: 0.2 K/UL (ref 0.5–4.6)
LYMPHOCYTES NFR BLD: 4 % (ref 13–44)
Lab: NORMAL
MCH RBC QN AUTO: 30.9 PG (ref 26.1–32.9)
MCHC RBC AUTO-ENTMCNC: 32.4 G/DL (ref 31.4–35)
MCV RBC AUTO: 95.5 FL (ref 79.6–97.8)
MONOCYTES # BLD: 0 K/UL (ref 0.1–1.3)
MONOCYTES NFR BLD: 1 % (ref 4–12)
NEUTS SEG # BLD: 4.9 K/UL (ref 1.7–8.2)
NEUTS SEG NFR BLD: 94 % (ref 43–78)
NRBC # BLD: 0 K/UL (ref 0–0.2)
NUC CELL # FLD: 10 /CU MM
PLATELET # BLD AUTO: 180 K/UL (ref 150–450)
PMV BLD AUTO: 11.4 FL (ref 9.4–12.3)
POTASSIUM SERPL-SCNC: 4 MMOL/L (ref 3.5–5.1)
PROT FLD-MCNC: 0.2 G/DL
PROT SERPL-MCNC: 5.7 G/DL (ref 6.3–8.2)
RBC # BLD AUTO: 2.91 M/UL (ref 4.23–5.6)
RBC # FLD: NORMAL /CU MM
REFERENCE LAB,REFLB: NORMAL
SODIUM SERPL-SCNC: 142 MMOL/L (ref 136–145)
SPECIMEN SOURCE FLD: NORMAL
TEST DESCRIPTION:,ATST: NORMAL
WBC # BLD AUTO: 5.1 K/UL (ref 4.3–11.1)

## 2022-02-04 PROCEDURE — 77030003666 HC NDL SPINAL BD -A

## 2022-02-04 PROCEDURE — 99239 HOSP IP/OBS DSCHRG MGMT >30: CPT | Performed by: INTERNAL MEDICINE

## 2022-02-04 PROCEDURE — 74011000250 HC RX REV CODE- 250: Performed by: INTERNAL MEDICINE

## 2022-02-04 PROCEDURE — 85025 COMPLETE CBC W/AUTO DIFF WBC: CPT

## 2022-02-04 PROCEDURE — 84157 ASSAY OF PROTEIN OTHER: CPT

## 2022-02-04 PROCEDURE — 80053 COMPREHEN METABOLIC PANEL: CPT

## 2022-02-04 PROCEDURE — 74011250637 HC RX REV CODE- 250/637: Performed by: NURSE PRACTITIONER

## 2022-02-04 PROCEDURE — 83615 LACTATE (LD) (LDH) ENZYME: CPT

## 2022-02-04 PROCEDURE — APPSS45 APP SPLIT SHARED TIME 31-45 MINUTES: Performed by: NURSE PRACTITIONER

## 2022-02-04 PROCEDURE — 77030014143 HC TY PUNC LUMBR BD -A

## 2022-02-04 PROCEDURE — 82945 GLUCOSE OTHER FLUID: CPT

## 2022-02-04 PROCEDURE — 89050 BODY FLUID CELL COUNT: CPT

## 2022-02-04 PROCEDURE — 74011250636 HC RX REV CODE- 250/636: Performed by: INTERNAL MEDICINE

## 2022-02-04 PROCEDURE — 83735 ASSAY OF MAGNESIUM: CPT

## 2022-02-04 PROCEDURE — 96450 CHEMOTHERAPY INTO CNS: CPT

## 2022-02-04 RX ORDER — LISINOPRIL 10 MG/1
10 TABLET ORAL DAILY
Qty: 30 TABLET | Refills: 0 | Status: SHIPPED | OUTPATIENT
Start: 2022-02-04 | End: 2022-03-09

## 2022-02-04 RX ORDER — LIDOCAINE HYDROCHLORIDE 20 MG/ML
20-300 INJECTION, SOLUTION INFILTRATION; PERINEURAL
Status: DISCONTINUED | OUTPATIENT
Start: 2022-02-04 | End: 2022-02-08 | Stop reason: HOSPADM

## 2022-02-04 RX ORDER — ATENOLOL 25 MG/1
25 TABLET ORAL DAILY
Qty: 30 TABLET | Refills: 0 | Status: SHIPPED | OUTPATIENT
Start: 2022-02-04 | End: 2022-03-09

## 2022-02-04 RX ADMIN — FLUCONAZOLE 200 MG: 100 TABLET ORAL at 08:08

## 2022-02-04 RX ADMIN — LISINOPRIL 10 MG: 5 TABLET ORAL at 08:07

## 2022-02-04 RX ADMIN — ATENOLOL 25 MG: 25 TABLET ORAL at 08:08

## 2022-02-04 RX ADMIN — SODIUM CHLORIDE 12 MG: 9 INJECTION, SOLUTION INTRAMUSCULAR; INTRAVENOUS; SUBCUTANEOUS at 14:40

## 2022-02-04 RX ADMIN — ONDANSETRON 8 MG: 2 INJECTION INTRAMUSCULAR; INTRAVENOUS at 05:56

## 2022-02-04 RX ADMIN — ETOPOSIDE 200 MG: 20 INJECTION INTRAVENOUS at 09:53

## 2022-02-04 RX ADMIN — ALLOPURINOL 300 MG: 300 TABLET ORAL at 08:08

## 2022-02-04 RX ADMIN — DEXAMETHASONE SODIUM PHOSPHATE 10 MG: 10 INJECTION INTRAMUSCULAR; INTRAVENOUS at 09:36

## 2022-02-04 RX ADMIN — ONDANSETRON 8 MG: 2 INJECTION INTRAMUSCULAR; INTRAVENOUS at 09:36

## 2022-02-04 RX ADMIN — ACYCLOVIR 400 MG: 800 TABLET ORAL at 08:08

## 2022-02-04 NOTE — PROGRESS NOTES
Care Management Interventions  Mode of Transport at Discharge: Other (see comment) (family)  Transition of Care Consult (CM Consult): Discharge Planning  Discharge Durable Medical Equipment: No  Physical Therapy Consult: No  Occupational Therapy Consult: No  Speech Therapy Consult: No  Support Systems: Spouse/Significant Other  Confirm Follow Up Transport: Family  The Plan for Transition of Care is Related to the Following Treatment Goals : Return home  Discharge Location  Patient Expects to be Discharged to[de-identified] Home  Patient medically stable for d/c today. CM met with patient for d/c. Patient voices no concerns or needs for d/c. He is concerned about his insurance being out of network and CM has left message with financial counselor to please call. CM instructed patient that if he has not heard from the financial counselor by Tuesday to please call and see what they can review. He voiced understanding of information. Patient d/c home.

## 2022-02-04 NOTE — PROCEDURES
Department of Interventional Radiology  [phone]        Interventional Radiology Brief Procedure Note    Patient: Keiry Blount MRN: [id number]  SSN: [id number]    YOB: 1965  Age: 64 y.o.   Sex: male      Date of Procedure: 2/4/2022    Pre-Procedure Diagnosis: lymphoma    Post-Procedure Diagnosis: SAME    Procedure(s): Lumbar Puncture  IT chemo     Brief Description of Procedure: as above    Performed By: Myron Saavedra PA-C     Assistants: None    Anesthesia:Lidocaine    Estimated Blood Loss: None    Specimens:  3 ml CSF    Implants:  None    Findings: n/a    Complications: None    Recommendations: bedrest     Follow Up: prn    Signed By: Myron Saavedra PA-C     February 4, 2022

## 2022-02-04 NOTE — DISCHARGE INSTRUCTIONS
[address]  Department of Interventional Radiology  [phone] Office  [phone] Fax  POST LUMBAR PUNCTURE/MYELOGRAM/INTRATHECAL CHEMOTHERAPY DISCHARGE INSTRUCTIONS  General Information:  Lumbar Puncture: A LP is done to help diagnose several disorders, like pseudo tumor, migraines, meningitis, and multiple sclerosis. It involves a puncture (usually in the lower spine) into the sac that protects the spinal column. A sample of the fluid in that space is removed and tested in the lab. Myelogram:     A Myelogram involves a lumbar puncture, and instead of removing fluid, contrast will be injected into the sac surrounding the spinal column. It is done to visualize the spinal column, nerve roots, spinal canal, vertebral discs and disc space. It is usually done to diagnose back pain with unknown cause or in preparation for surgery. After the injection, a CT scan will be done, usually within two hours of the injection. Intrathecal Chemotherapy:      Chemotherapy can be given in many forms. Intrathecal chemo involves a lumbar puncture, and instead of removing fluid, the chemo will be injected into the space. After any of procedures, you will be asked to lie flat on your back for 4-6 hours to prevent complications. You should also rest for 24 hours after you go home, and force fluids. If you have a headache, you should take Tylenol or acetaminophen. Call If:     You should call your Physician and/or the Radiology Nurse if you develop a headache that is not relieved by Tylenol, and worsens when you stand and eases when you lie down, you need to call. You may have developed what is referred to as a spinal headache. Our physician's will probably advise you to be on strict bed rest for 24 hours, to drink lots of fluids and caffeine. If this does not help the head pain, call again the next day.  You should call if you have bleeding other than a small spot on your bandage. You should call if you have any numbness, tingling, weakness, fever, chills, urinary retention, severe itching, rash, welts, swelling, or confusion. Follow-Up Instructions: See the doctor who ordered your procedure as he/she has instructed. If you had a Lumbar Puncture or Myelogram, your results should be available to your ordering doctor in 3-5 business days. You can remove your dressing in 24 hours and shower regularly. Do not bathe or swim for 72 hours. To Reach Us: If you have any questions about your procedure, please call the Interventional Radiology department at [phone]. After business hours (5pm) and weekends, call the answering service at [phone] and ask for the Radiologist on call to be paged. Si tiene Preguntas acerca del procedimiento, por favor llame al departamento de Radiología Intervencional al [phone]. Después de horas de oficina (5 pm) y los fines de [place], llamar al Selene Sorto al [phone] y pregunte por el Radiologo de [facility]. Interventional Radiology General Nurse Discharge    After general anesthesia or intravenous sedation, for 24 hours or while taking prescription Narcotics:  · Limit your activities  · Do not drive and operate hazardous machinery  · Do not make important personal or business decisions  · Do  not drink alcoholic beverages  · If you have not urinated within 8 hours after discharge, please contact your surgeon on call. * Please give a list of your current medications to your Primary Care Provider. * Please update this list whenever your medications are discontinued, doses are     changed, or new medications (including over-the-counter products) are added. * Please carry medication information at all times in case of emergency situations.     These are general instructions for a healthy lifestyle:    No smoking/ No tobacco products/ Avoid exposure to second hand smoke  Surgeon General's Warning:  Quitting smoking now greatly reduces serious risk to your health. Obesity, smoking, and sedentary lifestyle greatly increases your risk for illness  A healthy diet, regular physical exercise & weight monitoring are important for maintaining a healthy lifestyle    You may be retaining fluid if you have a history of heart failure or if you experience any of the following symptoms:  Weight gain of 3 pounds or more overnight or 5 pounds in a week, increased swelling in our hands or feet or shortness of breath while lying flat in bed. Please call your doctor as soon as you notice any of these symptoms; do not wait until your next office visit. Recognize signs and symptoms of STROKE:  F-face looks uneven    A-arms unable to move or move unevenly    S-speech slurred or non-existent    T-time-call 911 as soon as signs and symptoms begin-DO NOT go       Back to bed or wait to see if you get better-TIME IS BRAIN.

## 2022-02-05 ENCOUNTER — HOSPITAL ENCOUNTER (OUTPATIENT)
Dept: INFUSION THERAPY | Age: 57
Discharge: HOME | End: 2022-02-05
Payer: COMMERCIAL

## 2022-02-05 VITALS
WEIGHT: 191.2 LBS | HEART RATE: 72 BPM | SYSTOLIC BLOOD PRESSURE: 140 MMHG | TEMPERATURE: 97.6 F | BODY MASS INDEX: 25.93 KG/M2 | DIASTOLIC BLOOD PRESSURE: 68 MMHG | RESPIRATION RATE: 18 BRPM

## 2022-02-05 PROCEDURE — 96372 THER/PROPH/DIAG INJ SC/IM: CPT

## 2022-02-05 PROCEDURE — 74011250636 HC RX REV CODE- 250/636: Performed by: NURSE PRACTITIONER

## 2022-02-05 RX ADMIN — PEGFILGRASTIM-CBQV 6 MG: 6 INJECTION, SOLUTION SUBCUTANEOUS at 17:41

## 2022-02-05 NOTE — PROGRESS NOTES
Arrived to the [facility]. Udenyca injection completed. Patient tolerated well. Any issues or concerns during appointment: NO.  Patient aware of next infusion appointment on 02/09/22 (date) at 0800 (time). Patient aware of next lab and [facility] office visit on 02/09/22 (date) at 0830 (time). Patient instructed to call provider with temperature of 100.4 or greater or nausea/vomiting/ diarrhea or pain not controlled by medications  Discharged ambulatory.

## 2022-02-05 NOTE — PROGRESS NOTES
Udenyca to be completed at 5:31 PM, 24 hours after Ifex/mesna was documented to be completed per pharmacy. Patient arrived and will return closer to that time. Miguel Ángel Stratton RN to oversee care of patient when he returns.

## 2022-02-09 ENCOUNTER — HOSPITAL ENCOUNTER (OUTPATIENT)
Dept: INFUSION THERAPY | Age: 57
Discharge: HOME | End: 2022-02-09
Payer: COMMERCIAL

## 2022-02-09 VITALS
TEMPERATURE: 97.6 F | OXYGEN SATURATION: 99 % | WEIGHT: 179.6 LBS | SYSTOLIC BLOOD PRESSURE: 134 MMHG | RESPIRATION RATE: 18 BRPM | DIASTOLIC BLOOD PRESSURE: 62 MMHG | BODY MASS INDEX: 24.36 KG/M2 | HEART RATE: 74 BPM

## 2022-02-09 DIAGNOSIS — C83.30 DIFFUSE LARGE B-CELL LYMPHOMA, UNSPECIFIED BODY REGION (HCC): Primary | ICD-10-CM

## 2022-02-09 LAB
ABO + RH BLD: NORMAL
ALBUMIN SERPL-MCNC: 3.6 G/DL (ref 3.5–5)
ALBUMIN/GLOB SERPL: 1.5 {RATIO} (ref 1.2–3.5)
ALP SERPL-CCNC: 121 U/L (ref 50–136)
ALT SERPL-CCNC: 40 U/L (ref 12–65)
ANION GAP SERPL CALC-SCNC: 5 MMOL/L (ref 7–16)
AST SERPL-CCNC: 18 U/L (ref 15–37)
BASOPHILS # BLD: 0.1 K/UL (ref 0–0.2)
BASOPHILS NFR BLD: 1 % (ref 0–2)
BILIRUB SERPL-MCNC: 0.4 MG/DL (ref 0.2–1.1)
BLOOD GROUP ANTIBODIES SERPL: NORMAL
BUN SERPL-MCNC: 21 MG/DL (ref 6–23)
CALCIUM SERPL-MCNC: 8.5 MG/DL (ref 8.3–10.4)
CHLORIDE SERPL-SCNC: 110 MMOL/L (ref 98–107)
CO2 SERPL-SCNC: 26 MMOL/L (ref 21–32)
CREAT SERPL-MCNC: 0.7 MG/DL (ref 0.8–1.5)
DIFFERENTIAL METHOD BLD: ABNORMAL
EOSINOPHIL # BLD: 0.2 K/UL (ref 0–0.8)
EOSINOPHIL NFR BLD: 4 % (ref 0.5–7.8)
ERYTHROCYTE [DISTWIDTH] IN BLOOD BY AUTOMATED COUNT: 17.6 % (ref 11.9–14.6)
GLOBULIN SER CALC-MCNC: 2.4 G/DL (ref 2.3–3.5)
GLUCOSE SERPL-MCNC: 102 MG/DL (ref 65–100)
HCT VFR BLD AUTO: 27.3 %
HGB BLD-MCNC: 8.9 G/DL (ref 13.6–17.2)
IMM GRANULOCYTES # BLD AUTO: 1 K/UL (ref 0–0.5)
IMM GRANULOCYTES NFR BLD AUTO: 16 % (ref 0–5)
LYMPHOCYTES # BLD: 0.6 K/UL (ref 0.5–4.6)
LYMPHOCYTES NFR BLD: 10 % (ref 13–44)
MCH RBC QN AUTO: 31.2 PG (ref 26.1–32.9)
MCHC RBC AUTO-ENTMCNC: 32.6 G/DL (ref 31.4–35)
MCV RBC AUTO: 95.8 FL (ref 79.6–97.8)
MONOCYTES # BLD: 0.1 K/UL (ref 0.1–1.3)
MONOCYTES NFR BLD: 1 % (ref 4–12)
NEUTS SEG # BLD: 4 K/UL (ref 1.7–8.2)
NEUTS SEG NFR BLD: 68 % (ref 43–78)
NRBC # BLD: 0 K/UL (ref 0–0.2)
PLATELET # BLD AUTO: 74 K/UL (ref 150–450)
PLATELET COMMENTS,PCOM: ABNORMAL
PMV BLD AUTO: 11 FL (ref 9.4–12.3)
POTASSIUM SERPL-SCNC: 3.9 MMOL/L (ref 3.5–5.1)
PROT SERPL-MCNC: 6 G/DL (ref 6.3–8.2)
RBC # BLD AUTO: 2.85 M/UL (ref 4.23–5.6)
RBC MORPH BLD: ABNORMAL
SODIUM SERPL-SCNC: 141 MMOL/L (ref 136–145)
SPECIMEN EXP DATE BLD: NORMAL
WBC # BLD AUTO: 6 K/UL (ref 4.3–11.1)
WBC MORPH BLD: ABNORMAL

## 2022-02-09 PROCEDURE — 36591 DRAW BLOOD OFF VENOUS DEVICE: CPT

## 2022-02-09 PROCEDURE — 86900 BLOOD TYPING SEROLOGIC ABO: CPT

## 2022-02-09 PROCEDURE — 85025 COMPLETE CBC W/AUTO DIFF WBC: CPT

## 2022-02-09 PROCEDURE — 80053 COMPREHEN METABOLIC PANEL: CPT

## 2022-02-09 PROCEDURE — 83735 ASSAY OF MAGNESIUM: CPT

## 2022-02-09 PROCEDURE — 99214 OFFICE O/P EST MOD 30 MIN: CPT | Performed by: NURSE PRACTITIONER

## 2022-02-09 RX ORDER — SODIUM CHLORIDE 0.9 % (FLUSH) 0.9 %
10 SYRINGE (ML) INJECTION AS NEEDED
Status: DISCONTINUED | OUTPATIENT
Start: 2022-02-09 | End: 2022-02-11 | Stop reason: HOSPADM

## 2022-02-09 RX ADMIN — Medication 10 ML: at 09:41

## 2022-02-09 NOTE — PROGRESS NOTES
Pt arrived ambulatory. Labs drawn from port. No replacements needed. Pt has no future OP appts scheduled a this time. Discharged ambulatory, no distress noted.   Patient instructed to call provider with temperature of 100.4 or greater or nausea/vomiting/ diarrhea or pain not controlled by medications

## 2022-02-10 LAB — MAGNESIUM SERPL-MCNC: 2 MG/DL (ref 1.6–2.3)

## 2022-02-11 NOTE — PROGRESS NOTES
Data Source: Patient, [facility] record. 2/11/2022    1:19 PM    Velma Holt [id number]    64 y.o. Patient Encounter: [facility] Visit    Heme Diagnosis:  Recurrent high grade B cell lymphoma    Heme History (Copied from prior):   68-year-old  male, history of herniated lumbar disc & back surgery, cholecystectomy, now kindly referred to us by Dr. Nathaniel Long area [address] [place] [address], for relapsed DLBCL. 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 Per records, brain imaging including MRI wo contrast 11/12/20 with no acute changes. CT head without contrast 11/12/20 unremarkable, CT angiogram with contrast 11/12/20 without acute findings. He was also seen by neurology Dr. Shahida John. Headaches slowly resolved over time. - 03/21: On a follow-up visit, noted 30 pound weight loss. He was also found to have enlarged mass in left upper chest with diffusely palpable adenopathy in cervical area as well as bulky bilateral axillary adenopathy. Per patient, swelling developed over a 2-week, and was nontender. Per patient reports being told that his lymphoma diagnosis was seperate from his 3rd cranial nerve palsy.  - CT neck/chest 3/22/2021 showed extensive adenopathy including left pectoralis lymph node mass measuring 7 x 3.7 cm. Extensive bilateral axillary adenopathy measuring up to 6.8 x 11.4 cm. Extensive mediastinal adenopathy noted, including right infrahilar region mass measuring 4.6 x 5.3 cm. Enlarged right adrenal gland measuring 4.1 x 4 cm. Extensive adenopathy in the reggie hepatis measuring 5 x 4.4 cm. PET scan could not be performed due to insurance issues. Patient seen by hematology Dr. Lenny Newell.   Core needle biopsy of left chest wall mass showed high-grade B-cell malignancy with FISH testing revealing rearrangement of BCL6/3q. and additional signals for BCL2/18 q. Significantly no evidence of MYC rearrangement noted. Bone marrow biopsy without lymphoma involvement. 2D echo with normal EF. LDH elevated at 311. Hepatitis B negative. He was diagnosed with DLBCL, stage IIIb, IPI score 2.  - R-CHOP x6 (from 4/6/2021 till 7/28/2021. PET scan 8/13/2021 with essentially CR and minimal FDG uptake in the right axillary lymph node with FDG 1.2.  -11/16/2021 CT CAP with disease relapse including new left supraclavicular mass measuring 5.5 x 3.4 cm. Stable axillary and chest wall lymph nodes as well as shotty retroperitoneal lymph nodes. Ultrasound-guided left supraclavicular lymph node biopsy 11/22/2021 showing monoclonal B-cell population with increased cell size, results consistent with mature B-cell lymphoproliferative disorder. Patient now referred to us for further work-up and management. Interval History:  2/9/22 Here today for follow up. Recently completed cycle 3 R ICE. Tolerated treatment well. No issues or concerns today. No bleeding. He did feel some pain during IT when a nerve got irritated. No bleeding. No infectious symptoms. No GI complaints. Labs reviewed. No replacements needed. NCCN Distress Score:  0    REVIEW OF SYSTEMS:  As mentioned above, all other systems were reviewed in full and are negative. Past Medical History:   Diagnosis Date    Cancer ([facility] [address]     Hypertension     Valvular heart disease        Past Surgical History:   Procedure Laterality Date    HX BACK SURGERY      26 years ago    IR CHOLECYSTOSTOMY PERCUTANEOUS      IR INTRATHECAL CHEMO INJECTION CNS  12/8/2021    IR INTRATHECAL CHEMO INJECTION CNS  12/28/2021    IR INTRATHECAL CHEMO INJECTION CNS  1/28/2022    IR SPINAL PUNCTURE CSF TREAT / DRAIN  2/4/2022       Current Outpatient Medications   Medication Sig Dispense Refill    atenoloL (TENORMIN) 25 mg tablet Take 1 Tablet by mouth daily for 30 days.  30 Tablet 0    lisinopriL (PRINIVIL, ZESTRIL) 10 mg tablet Take 1 Tablet by mouth daily for 30 days. 30 Tablet 0    allopurinoL (ZYLOPRIM) 300 mg tablet Take 1 Tablet by mouth daily. 30 Tablet 0    ondansetron (ZOFRAN ODT) 8 mg disintegrating tablet Take 1 Tablet by mouth every eight (8) hours as needed for Nausea or Vomiting. (Patient not taking: Reported on 2/1/2022) 60 Tablet 0    fluconazole (DIFLUCAN) 200 mg tablet Take 1 Tablet by mouth daily. FDA advises cautious prescribing of oral fluconazole in pregnancy. 30 Tablet 0       Social History     Socioeconomic History    Marital status:    Tobacco Use    Smoking status: Never Smoker    Smokeless tobacco: Never Used   Vaping Use    Vaping Use: Never used   Substance and Sexual Activity    Alcohol use: Not Currently    Drug use: Not Currently       Family History   Problem Relation Age of Onset    Diabetes Mother     Hypertension Mother     Diabetes Father     Hypertension Father     Hypertension Brother        No Known Allergies    PHYSICAL EXAMINATION:  General Appearance: Healthy appearing patient in no acute distress  Vitals reviewed. Visit Vitals  /62 (BP 1 Location: Right upper arm, BP Patient Position: Sitting)   Pulse 74   Temp 97.6 °F (36.4 °C)   Resp 18   Wt 179 lb 9.6 oz (81.5 kg)   SpO2 99%   BMI 24.36 kg/m²     HEENT:  Neck is supple with no thyromegaly or JVD noted. Lungs/Thorax: Clear to auscultation, no accessory muscles of respiration being used. Heart: Regular rate and rhythm, normal S1, S2, no appreciable murmurs, rubs, gallops  Abdomen: Soft, nontender, bowel sounds present  Extremeties: Good pulses bilaterally, no peripheral edema. Skin: Normal skin tone with no rash, petechiae, ecchymosis noted.   Musculoskeletal: No pain on palpation over bony prominence, no edema, no evidence of gout, no joint or bony deformity  Neurologic: Grossly intact        LABS/IMAGING:    Lab Results   Component Value Date/Time    WBC 6.0 02/09/2022 08:35 AM    HGB 8.9 (L) 02/09/2022 08:35 AM    HCT 27.3 02/09/2022 08:35 AM    PLATELET 74 (L) 43/84/4162 08:35 AM    MCV 95.8 02/09/2022 08:35 AM       Lab Results   Component Value Date/Time    Sodium 141 02/09/2022 08:35 AM    Potassium 3.9 02/09/2022 08:35 AM    Chloride 110 (H) 02/09/2022 08:35 AM    CO2 26 02/09/2022 08:35 AM    Anion gap 5 (L) 02/09/2022 08:35 AM    Glucose 102 (H) 02/09/2022 08:35 AM    BUN 21 02/09/2022 08:35 AM    Creatinine 0.70 (L) 02/09/2022 08:35 AM    GFR est AA >60 02/09/2022 08:35 AM    GFR est non-AA >60 02/09/2022 08:35 AM    Calcium 8.5 02/09/2022 08:35 AM    Alk. phosphatase 121 02/09/2022 08:35 AM    Protein, total 6.0 (L) 02/09/2022 08:35 AM    Albumin 3.6 02/09/2022 08:35 AM    Globulin 2.4 02/09/2022 08:35 AM    A-G Ratio 1.5 02/09/2022 08:35 AM    ALT (SGPT) 40 02/09/2022 08:35 AM             Above results reviewed with patient. ASSESSMENT:  63-year-old  male, history of herniated lumbar disc & back surgery, cholecystectomy, now kindly referred to us by Dr. Gricel Payan area [address] [place] [address], for relapsed DLBCL. 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 Per records, brain imaging including MRI wo contrast 11/12/20 with no acute changes. CT head without contrast 11/12/20 unremarkable, CT angiogram with contrast 11/12/20 without acute findings. He was also seen by neurology Dr. Sarahy Webster. Headaches slowly resolved over time. - 03/21: On a follow-up visit, noted 30 pound weight loss. He was also found to have enlarged mass in left upper chest with diffusely palpable adenopathy in cervical area as well as bulky bilateral axillary adenopathy. Per patient, swelling developed over a 2-week, and was nontender.  Per patient reports being told that his lymphoma diagnosis was seperate from his 3rd cranial nerve palsy.  - CT neck/chest 3/22/2021 showed extensive adenopathy including left pectoralis lymph node mass measuring 7 x 3.7 cm. Extensive bilateral axillary adenopathy measuring up to 6.8 x 11.4 cm. Extensive mediastinal adenopathy noted, including right infrahilar region mass measuring 4.6 x 5.3 cm. Enlarged right adrenal gland measuring 4.1 x 4 cm. Extensive adenopathy in the reggie hepatis measuring 5 x 4.4 cm. PET scan could not be performed due to insurance issues. Patient seen by hematology Dr. Julián Pedersen. Core needle biopsy of left chest wall mass showed high-grade B-cell malignancy with FISH testing revealing rearrangement of BCL6/3q. and additional signals for BCL2/18 q. Significantly no evidence of MYC rearrangement noted. Bone marrow biopsy without lymphoma involvement. 2D echo with normal EF. LDH elevated at 311. Hepatitis B negative. He was diagnosed with DLBCL, stage IIIb, IPI score 2.  - R-CHOP x6 (from 4/6/2021 till 7/28/2021. PET scan 8/13/2021 with essentially CR and minimal FDG uptake in the right axillary lymph node with FDG 1.2.  -11/16/2021 CT CAP with disease relapse including new left supraclavicular mass measuring 5.5 x 3.4 cm. Stable axillary and chest wall lymph nodes as well as shotty retroperitoneal lymph nodes. Ultrasound-guided left supraclavicular lymph node biopsy 11/22/2021 showing monoclonal B-cell population with increased cell size, results consistent with mature B-cell lymphoproliferative disorder. Patient now referred to us for further work-up and management. On exam today, he is accompanied by his daughter. Family consists of 4 adult children, 3 daughters and 1 son. Denies b-symptoms or new palpable lumps. Reports headaches mostly resolved, continues to wear left sided eye patch. 1/6/2022: His hepatitis/HIV were negative. LDH was high at 255. He was seen by neurology in house Dr. Theo Hampton, and 3rd nerve palsy felt unlikely related to lymphoma.   Brain MRI 12/7/2021 without evidence of intracranial systemic lymphoma. Contrasted CT CAP 12/6/2021 with slight interval growth in left supraclavicular soft tissue mass measuring 5.8 x 5 cm, likely area of recurrent lymphoma. Other lymph nodes felt stable to slightly smaller. CSF fluid analysis negative. 2D echo had shown normal EF at 60 to 65%, however moderate to severe mitral regurg noted, seen by cardiology, status post ERINN with partially flail anterior mitral leaflet, cardiology recommendations to monitor for now with possible surgical intervention should he clinically worsen. He has since received R-ICE x2 which he has tolerated well. Today denies any B symptoms, fevers or chills. Now scheduled for repeat PET scan and follow-up with us in a week. Has since last visit now enrolled in [address] [place] [address] [employer], and hopefully should be able to proceed with HDT/ASCT after 3-4 cycles depending on results of PET scan. He will continue with prophylaxis with acyclovir, Diflucan and allopurinol. Navigation following, we will see him back in a week. 1/13/2022: Reports some runny nose with sore throat over the last few days. Denies any fevers, otherwise feels well. Labs with mild leukocytosis, possibly G-CSF related. PET scan with significant resolution of prior known disease including left-sided axillary/subpectoral mass (masses decreased from 5.8 x 5 to 1.7 x 1.5 cm, minimal FDG uptake with SUV 1.8). Note made of developing lower lobe groundglass infiltrates possibly sequela of subclinical infection. Will check for COVID and flu. Prescribed Levaquin x5 days for coverage. Given clinically asymptomatic, hopefully still should be able to proceed with cycle 3 RICE next week. We will plan for HDT/ASCT thereafter. 1/27/2022: He is here for follow up. Seen in isolation room due to +Covid on 1/13. He has been well since last seen. Reports covid symptoms resolved since 1/15, no shortness of breath. There are no GI or bowel complaints.   He is eating and drinking well. Denies any fevers or infectious symptoms. Labs reviewed and adequate to proceed with IT chemo tomorrow and plan for admission for C3 RICE next week. 2/9/22 Here today for follow up. Recently completed cycle 3 R ICE. Tolerated treatment well. No issues or concerns today. No bleeding. He did feel some pain during IT when a nerve got irritated. No bleeding. No infectious symptoms. No GI complaints. Labs reviewed. No replacements needed. 1. 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 Mitral valve regurge, mod-severe flail anterior leaflet. PLAN:  - As above. Undergoing R ICE x 3-4 cycles followed by high-dose therapy/autologous stem cell transplant. Completed cycle 3, pre studies starting next week. - Financials on board, now w [facility] plan  - Neurology has evaluated pt for 3rd nerve palsy, not felt to be lymphoma related  - Mitral valve flail ant leaflet: to be monitored per cardiology.   -Prophylaxis: Acyclovir and Diflucan    RTC in 3 weeks for evaluation.            Maribel Lynch NP  [facility]  [address] [place] [address], [address] [place] [address]  Office : [phone]  Fax : [phone]

## 2022-02-14 LAB
FLOW CYTOMETRY, FBTC1: NORMAL
SPECIMEN SOURCE: NORMAL
TEST ORDERED:: NORMAL

## 2022-02-21 ENCOUNTER — HOSPITAL ENCOUNTER (OUTPATIENT)
Dept: GENERAL RADIOLOGY | Age: 57
Discharge: HOME | End: 2022-02-21
Payer: COMMERCIAL

## 2022-02-21 ENCOUNTER — HOSPITAL ENCOUNTER (OUTPATIENT)
Dept: LAB | Age: 57
Discharge: HOME | End: 2022-02-21
Payer: COMMERCIAL

## 2022-02-21 ENCOUNTER — HOSPITAL ENCOUNTER (OUTPATIENT)
Dept: PET IMAGING | Age: 57
Discharge: HOME | End: 2022-02-21
Payer: COMMERCIAL

## 2022-02-21 DIAGNOSIS — C83.31 DIFFUSE LARGE B-CELL LYMPHOMA OF LYMPH NODES OF NECK (HCC): ICD-10-CM

## 2022-02-21 LAB
ABO + RH BLD: NORMAL
ALBUMIN SERPL-MCNC: 3.8 G/DL (ref 3.5–5)
ALBUMIN/GLOB SERPL: 1.5 {RATIO} (ref 1.2–3.5)
ALP SERPL-CCNC: 156 U/L (ref 50–136)
ALT SERPL-CCNC: 34 U/L (ref 12–65)
ANION GAP SERPL CALC-SCNC: 4 MMOL/L (ref 7–16)
AST SERPL-CCNC: 18 U/L (ref 15–37)
BASOPHILS # BLD: 0 K/UL (ref 0–0.2)
BASOPHILS NFR BLD: 0 % (ref 0–2)
BILIRUB SERPL-MCNC: 0.3 MG/DL (ref 0.2–1.1)
BUN SERPL-MCNC: 14 MG/DL (ref 6–23)
CALCIUM SERPL-MCNC: 9.2 MG/DL (ref 8.3–10.4)
CHLORIDE SERPL-SCNC: 108 MMOL/L (ref 98–107)
CO2 SERPL-SCNC: 29 MMOL/L (ref 21–32)
CREAT SERPL-MCNC: 0.6 MG/DL (ref 0.8–1.5)
DIFFERENTIAL METHOD BLD: ABNORMAL
EOSINOPHIL # BLD: 0 K/UL (ref 0–0.8)
EOSINOPHIL NFR BLD: 0 % (ref 0.5–7.8)
ERYTHROCYTE [DISTWIDTH] IN BLOOD BY AUTOMATED COUNT: 17.7 % (ref 11.9–14.6)
GLOBULIN SER CALC-MCNC: 2.5 G/DL (ref 2.3–3.5)
GLUCOSE BLD STRIP.AUTO-MCNC: 95 MG/DL (ref 65–100)
GLUCOSE SERPL-MCNC: 94 MG/DL (ref 65–100)
HBV SURFACE AB SERPL IA-ACNC: 5.5 MIU/ML
HCT VFR BLD AUTO: 25.4 %
HGB BLD-MCNC: 8.1 G/DL (ref 13.6–17.2)
IMM GRANULOCYTES # BLD AUTO: 0.2 K/UL (ref 0–0.5)
IMM GRANULOCYTES NFR BLD AUTO: 2 % (ref 0–5)
LYMPHOCYTES # BLD: 0.9 K/UL (ref 0.5–4.6)
LYMPHOCYTES NFR BLD: 12 % (ref 13–44)
MAGNESIUM SERPL-MCNC: 2.2 MG/DL (ref 1.8–2.4)
MCH RBC QN AUTO: 31.4 PG (ref 26.1–32.9)
MCHC RBC AUTO-ENTMCNC: 31.9 G/DL (ref 31.4–35)
MCV RBC AUTO: 98.4 FL (ref 79.6–97.8)
MONOCYTES # BLD: 0.6 K/UL (ref 0.1–1.3)
MONOCYTES NFR BLD: 8 % (ref 4–12)
NEUTS SEG # BLD: 6 K/UL (ref 1.7–8.2)
NEUTS SEG NFR BLD: 78 % (ref 43–78)
NRBC # BLD: 0 K/UL (ref 0–0.2)
PLATELET # BLD AUTO: 77 K/UL (ref 150–450)
PMV BLD AUTO: 11.4 FL (ref 9.4–12.3)
POTASSIUM SERPL-SCNC: 3.9 MMOL/L (ref 3.5–5.1)
PROT SERPL-MCNC: 6.3 G/DL (ref 6.3–8.2)
RBC # BLD AUTO: 2.58 M/UL (ref 4.23–5.6)
SERVICE CMNT-IMP: NORMAL
SODIUM SERPL-SCNC: 141 MMOL/L (ref 136–145)
WBC # BLD AUTO: 7.8 K/UL (ref 4.3–11.1)

## 2022-02-21 PROCEDURE — 82962 GLUCOSE BLOOD TEST: CPT

## 2022-02-21 PROCEDURE — 36415 COLL VENOUS BLD VENIPUNCTURE: CPT

## 2022-02-21 PROCEDURE — 86706 HEP B SURFACE ANTIBODY: CPT

## 2022-02-21 PROCEDURE — 83020 HEMOGLOBIN ELECTROPHORESIS: CPT

## 2022-02-21 PROCEDURE — 86694 HERPES SIMPLEX NES ANTBDY: CPT

## 2022-02-21 PROCEDURE — 85025 COMPLETE CBC W/AUTO DIFF WBC: CPT

## 2022-02-21 PROCEDURE — 74011000636 HC RX REV CODE- 636: Performed by: INTERNAL MEDICINE

## 2022-02-21 PROCEDURE — 86664 EPSTEIN-BARR NUCLEAR ANTIGEN: CPT

## 2022-02-21 PROCEDURE — 86900 BLOOD TYPING SEROLOGIC ABO: CPT

## 2022-02-21 PROCEDURE — 86592 SYPHILIS TEST NON-TREP QUAL: CPT

## 2022-02-21 PROCEDURE — A9552 F18 FDG: HCPCS

## 2022-02-21 PROCEDURE — 83735 ASSAY OF MAGNESIUM: CPT

## 2022-02-21 PROCEDURE — 80053 COMPREHEN METABOLIC PANEL: CPT

## 2022-02-21 PROCEDURE — 86787 VARICELLA-ZOSTER ANTIBODY: CPT

## 2022-02-21 PROCEDURE — 86790 VIRUS ANTIBODY NOS: CPT

## 2022-02-21 RX ORDER — FLUDEOXYGLUCOSE F-18 200 MCI/ML
10 INJECTION INTRAVENOUS ONCE
Status: COMPLETED | OUTPATIENT
Start: 2022-02-21 | End: 2022-02-21

## 2022-02-21 RX ORDER — SODIUM CHLORIDE 0.9 % (FLUSH) 0.9 %
10 SYRINGE (ML) INJECTION
Status: COMPLETED | OUTPATIENT
Start: 2022-02-21 | End: 2022-02-21

## 2022-02-21 RX ADMIN — FLUDEOXYGLUCOSE F-18 13.7 MILLICURIE: 200 INJECTION INTRAVENOUS at 12:39

## 2022-02-21 RX ADMIN — Medication 10 ML: at 12:39

## 2022-02-21 RX ADMIN — DIATRIZOATE MEGLUMINE AND DIATRIZOATE SODIUM 10 ML: 660; 100 LIQUID ORAL; RECTAL at 12:39

## 2022-02-22 LAB
EBV NA IGG SER-ACNC: >600 U/ML (ref 0–17.9)
EBV VCA IGG SER-ACNC: 180 U/ML (ref 0–17.9)
EBV VCA IGM SER-ACNC: <36 U/ML (ref 0–35.9)
INTERPRETATION, 169995: ABNORMAL
VZV IGG SER IA-ACNC: 1102 INDEX
VZV IGM SER IA-ACNC: <0.91 INDEX (ref 0–0.9)

## 2022-02-23 LAB
HGB A MFR BLD: 97.4 % (ref 96.4–98.8)
HGB A2 MFR BLD COLUMN CHROM: 2.6 % (ref 1.8–3.2)
HGB F MFR BLD: 0 % (ref 0–2)
HGB FRACT BLD-IMP: NORMAL
HGB S MFR BLD: 0 %
HSV1 IGG SER QL: NORMAL
HSV1+2 IGM SER IA-ACNC: <0.91 RATIO (ref 0–0.9)

## 2022-02-24 ENCOUNTER — HOSPITAL ENCOUNTER (OUTPATIENT)
Dept: CT IMAGING | Age: 57
Discharge: HOME | End: 2022-02-24
Attending: INTERNAL MEDICINE
Payer: COMMERCIAL

## 2022-02-24 ENCOUNTER — HOSPITAL ENCOUNTER (OUTPATIENT)
Dept: INTERVENTIONAL RADIOLOGY/VASCULAR | Age: 57
Discharge: HOME | End: 2022-02-24
Attending: INTERNAL MEDICINE
Payer: COMMERCIAL

## 2022-02-24 ENCOUNTER — HOSPITAL ENCOUNTER (OUTPATIENT)
Dept: RESPIRATORY THERAPY | Age: 57
Discharge: HOME | End: 2022-02-24
Attending: INTERNAL MEDICINE
Payer: COMMERCIAL

## 2022-02-24 VITALS
TEMPERATURE: 97.1 F | BODY MASS INDEX: 24.24 KG/M2 | HEIGHT: 72 IN | SYSTOLIC BLOOD PRESSURE: 158 MMHG | WEIGHT: 179 LBS | RESPIRATION RATE: 16 BRPM | HEART RATE: 66 BPM | OXYGEN SATURATION: 96 % | DIASTOLIC BLOOD PRESSURE: 68 MMHG

## 2022-02-24 VITALS — DIASTOLIC BLOOD PRESSURE: 53 MMHG | HEART RATE: 66 BPM | OXYGEN SATURATION: 96 % | SYSTOLIC BLOOD PRESSURE: 131 MMHG

## 2022-02-24 DIAGNOSIS — C83.31 DIFFUSE LARGE B-CELL LYMPHOMA OF LYMPH NODES OF NECK (HCC): ICD-10-CM

## 2022-02-24 LAB
BASOPHILS # BLD: 0 K/UL (ref 0–0.2)
BASOPHILS NFR BLD: 1 % (ref 0–2)
DIFFERENTIAL METHOD BLD: ABNORMAL
EOSINOPHIL # BLD: 0 K/UL (ref 0–0.8)
EOSINOPHIL NFR BLD: 0 % (ref 0.5–7.8)
ERYTHROCYTE [DISTWIDTH] IN BLOOD BY AUTOMATED COUNT: 18.4 % (ref 11.9–14.6)
HCT VFR BLD AUTO: 25.8 % (ref 41.1–50.3)
HGB BLD-MCNC: 8.3 G/DL (ref 13.6–17.2)
IMM GRANULOCYTES # BLD AUTO: 0.1 K/UL (ref 0–0.5)
IMM GRANULOCYTES NFR BLD AUTO: 1 % (ref 0–5)
LYMPHOCYTES # BLD: 0.9 K/UL (ref 0.5–4.6)
LYMPHOCYTES NFR BLD: 11 % (ref 13–44)
MCH RBC QN AUTO: 32 PG (ref 26.1–32.9)
MCHC RBC AUTO-ENTMCNC: 32.2 G/DL (ref 31.4–35)
MCV RBC AUTO: 99.6 FL (ref 79.6–97.8)
MONOCYTES # BLD: 0.6 K/UL (ref 0.1–1.3)
MONOCYTES NFR BLD: 8 % (ref 4–12)
NEUTS SEG # BLD: 6.5 K/UL (ref 1.7–8.2)
NEUTS SEG NFR BLD: 80 % (ref 43–78)
NRBC # BLD: 0 K/UL (ref 0–0.2)
PLATELET # BLD AUTO: 137 K/UL (ref 150–450)
PMV BLD AUTO: 11 FL (ref 9.4–12.3)
RBC # BLD AUTO: 2.59 M/UL (ref 4.23–5.6)
WBC # BLD AUTO: 8.1 K/UL (ref 4.3–11.1)

## 2022-02-24 PROCEDURE — 77030028872 HC BN BIOP NDL ON CNTRL TY TELE -C

## 2022-02-24 PROCEDURE — 38221 DX BONE MARROW BIOPSIES: CPT

## 2022-02-24 PROCEDURE — 88313 SPECIAL STAINS GROUP 2: CPT

## 2022-02-24 PROCEDURE — 88305 TISSUE EXAM BY PATHOLOGIST: CPT

## 2022-02-24 PROCEDURE — 94729 DIFFUSING CAPACITY: CPT

## 2022-02-24 PROCEDURE — 85025 COMPLETE CBC W/AUTO DIFF WBC: CPT

## 2022-02-24 PROCEDURE — 94010 BREATHING CAPACITY TEST: CPT

## 2022-02-24 PROCEDURE — 88311 DECALCIFY TISSUE: CPT

## 2022-02-24 PROCEDURE — 74011000250 HC RX REV CODE- 250: Performed by: RADIOLOGY

## 2022-02-24 PROCEDURE — 74011250636 HC RX REV CODE- 250/636: Performed by: RADIOLOGY

## 2022-02-24 PROCEDURE — 77030003666 HC NDL SPINAL BD -A

## 2022-02-24 PROCEDURE — 94726 PLETHYSMOGRAPHY LUNG VOLUMES: CPT

## 2022-02-24 PROCEDURE — 99152 MOD SED SAME PHYS/QHP 5/>YRS: CPT

## 2022-02-24 RX ORDER — MIDAZOLAM HYDROCHLORIDE 1 MG/ML
.5-2 INJECTION, SOLUTION INTRAMUSCULAR; INTRAVENOUS
Status: DISCONTINUED | OUTPATIENT
Start: 2022-02-24 | End: 2022-02-24

## 2022-02-24 RX ORDER — SODIUM CHLORIDE 9 MG/ML
25 INJECTION, SOLUTION INTRAVENOUS ONCE
Status: COMPLETED | OUTPATIENT
Start: 2022-02-24 | End: 2022-02-24

## 2022-02-24 RX ORDER — LIDOCAINE HYDROCHLORIDE 20 MG/ML
20-200 INJECTION, SOLUTION INFILTRATION; PERINEURAL
Status: DISCONTINUED | OUTPATIENT
Start: 2022-02-24 | End: 2022-02-24

## 2022-02-24 RX ORDER — DIPHENHYDRAMINE HYDROCHLORIDE 50 MG/ML
25-50 INJECTION, SOLUTION INTRAMUSCULAR; INTRAVENOUS
Status: DISCONTINUED | OUTPATIENT
Start: 2022-02-24 | End: 2022-02-24

## 2022-02-24 RX ORDER — FENTANYL CITRATE 50 UG/ML
25-100 INJECTION, SOLUTION INTRAMUSCULAR; INTRAVENOUS
Status: DISCONTINUED | OUTPATIENT
Start: 2022-02-24 | End: 2022-02-24

## 2022-02-24 RX ADMIN — MIDAZOLAM 1 MG: 1 INJECTION INTRAMUSCULAR; INTRAVENOUS at 14:31

## 2022-02-24 RX ADMIN — SODIUM CHLORIDE 25 ML/HR: 900 INJECTION, SOLUTION INTRAVENOUS at 14:33

## 2022-02-24 RX ADMIN — FENTANYL CITRATE 50 MCG: 0.05 INJECTION, SOLUTION INTRAMUSCULAR; INTRAVENOUS at 14:31

## 2022-02-24 RX ADMIN — LIDOCAINE HYDROCHLORIDE 100 MG: 20 INJECTION, SOLUTION INFILTRATION; PERINEURAL at 14:39

## 2022-02-24 RX ADMIN — MIDAZOLAM 1 MG: 1 INJECTION INTRAMUSCULAR; INTRAVENOUS at 14:38

## 2022-02-24 RX ADMIN — FENTANYL CITRATE 50 MCG: 0.05 INJECTION, SOLUTION INTRAMUSCULAR; INTRAVENOUS at 14:38

## 2022-02-24 NOTE — PROGRESS NOTES
Pt to IR Suite 1 via stretcher with RN.       IR Nurse Pre-Procedure Checklist Part 2          Consent signed: Yes    H&P complete:  Yes    Antibiotics: Not applicable    Airway/Mallampati Done: Yes    Shaved: Not applicable    Pregnancy Form:Not applicable    Patient Position: Yes    MD Side: Yes     Biopsy Worksheet: Yes    Specimen Medium: Yes

## 2022-02-24 NOTE — PROGRESS NOTES
Recovery period without difficulty. Pt alert and oriented and denies pain. Dressing is clean, dry, and intact. Reviewed discharge instructions with patient, pt verbalized understanding. Pt escorted to lobby discharge area via wheelchair. Vital signs and Whitney score completed.

## 2022-02-24 NOTE — PROGRESS NOTES
TRANSFER - OUT REPORT:    Verbal report given to Yumiko Ackerman RN on Velma Holt  being transferred to IR room 8 for routine post - op       Report consisted of patients Situation, Background, Assessment and   Recommendations(SBAR). Information from the following report(s) SBAR, Procedure Summary and MAR was reviewed with the receiving nurse. Opportunity for questions and clarification was provided. Conscious Sedation:   100 Mcg of Fentanyl administered  2 Mg of Versed administered    Pt tolerated procedure well.      Visit Vitals  BP (!) 140/76   Pulse 60   Temp 97.1 °F (36.2 °C)   Resp 18   Ht 6' (1.829 m)   Wt 81.2 kg (179 lb)   SpO2 100%   BMI 24.28 kg/m²     Past Medical History:   Diagnosis Date    Cancer ([facility] [address]     Hypertension     Valvular heart disease      Peripheral IV 02/24/22 Right Forearm (Active)   Site Assessment Clean, dry, & intact 02/24/22 1206   Phlebitis Assessment 0 02/24/22 1206   Infiltration Assessment 0 02/24/22 1206   Dressing Status Clean, dry, & intact 02/24/22 1206   Hub Color/Line Status Pink 02/24/22 1206              Venous Access Device 12/06/21 (Active)

## 2022-02-24 NOTE — H&P
Department of Interventional Radiology  [phone]    History and Physical    Patient:  Sruthi Gomez MRN:  [id number]  SSN:  [id number]    YOB: 1965  Age:  64 y.o. Sex:  male      Primary Care Provider:  None  Referring Physician:  Claudell Mcgill, MD    Subjective:     Chief Complaint: biopsy    History of the Present Illness: The patient is a 64 y.o. male with lymphoma who presents for bone marrow biopsy. NPO. Past Medical History:   Diagnosis Date    Cancer ([address]     Hypertension     Valvular heart disease      Past Surgical History:   Procedure Laterality Date    HX BACK SURGERY      26 years ago    IR CHOLECYSTOSTOMY PERCUTANEOUS      IR INTRATHECAL CHEMO INJECTION CNS  12/8/2021    IR INTRATHECAL CHEMO INJECTION CNS  12/28/2021    IR INTRATHECAL CHEMO INJECTION CNS  1/28/2022    IR SPINAL PUNCTURE CSF TREAT / DRAIN  2/4/2022        Review of Systems:    Pertinent items are noted in the History of Present Illness. Prior to Admission medications    Medication Sig Start Date End Date Taking? Authorizing Provider   acyclovir (ZOVIRAX) 400 mg tablet TAKE 1 TABLET BY MOUTH TWO (2) TIMES A DAY FOR 30 DAYS. 2/14/22 3/16/22  Seth Sullivan NP   atenoloL (TENORMIN) 25 mg tablet Take 1 Tablet by mouth daily for 30 days. 2/4/22 3/6/22  JW Zaldivar   lisinopriL (PRINIVIL, ZESTRIL) 10 mg tablet Take 1 Tablet by mouth daily for 30 days. 2/4/22 3/6/22  JW Zaldivar   allopurinoL (ZYLOPRIM) 300 mg tablet Take 1 Tablet by mouth daily. 12/30/21   Seth Sullivan NP   ondansetron (ZOFRAN ODT) 8 mg disintegrating tablet Take 1 Tablet by mouth every eight (8) hours as needed for Nausea or Vomiting. Patient not taking: Reported on 2/1/2022 12/29/21   Seth Sullivan NP   fluconazole (DIFLUCAN) 200 mg tablet Take 1 Tablet by mouth daily. FDA advises cautious prescribing of oral fluconazole in pregnancy.  12/10/21   Seth Sullivan NP        No Known Allergies    Family History Problem Relation Age of Onset    Diabetes Mother     Hypertension Mother     Diabetes Father     Hypertension Father     Hypertension Brother      Social History     Tobacco Use    Smoking status: Never Smoker    Smokeless tobacco: Never Used   Substance Use Topics    Alcohol use: Not Currently        Objective:       Physical Examination:    There were no vitals filed for this visit.     Pain Assessment                  HEART: regular rate and rhythm  LUNG: clear to auscultation bilaterally  ABDOMEN: normal findings: soft, non-tender  EXTREMITIES: warm, no edema    Laboratory:     Lab Results   Component Value Date/Time    Sodium 141 02/21/2022 12:31 PM    Sodium 141 02/09/2022 08:35 AM    Potassium 3.9 02/21/2022 12:31 PM    Potassium 3.9 02/09/2022 08:35 AM    Chloride 108 (H) 02/21/2022 12:31 PM    Chloride 110 (H) 02/09/2022 08:35 AM    CO2 29 02/21/2022 12:31 PM    CO2 26 02/09/2022 08:35 AM    Anion gap 4 (L) 02/21/2022 12:31 PM    Anion gap 5 (L) 02/09/2022 08:35 AM    Glucose 94 02/21/2022 12:31 PM    Glucose 102 (H) 02/09/2022 08:35 AM    BUN 14 02/21/2022 12:31 PM    BUN 21 02/09/2022 08:35 AM    Creatinine 0.60 (L) 02/21/2022 12:31 PM    Creatinine 0.70 (L) 02/09/2022 08:35 AM    GFR est AA >60 02/21/2022 12:31 PM    GFR est AA >60 02/09/2022 08:35 AM    GFR est non-AA >60 02/21/2022 12:31 PM    GFR est non-AA >60 02/09/2022 08:35 AM    Calcium 9.2 02/21/2022 12:31 PM    Calcium 8.5 02/09/2022 08:35 AM    Magnesium 2.2 02/21/2022 12:31 PM    Magnesium 2.0 02/09/2022 08:35 AM    Phosphorus 3.2 12/01/2021 11:16 AM    Albumin 3.8 02/21/2022 12:31 PM    Albumin 3.6 02/09/2022 08:35 AM    Protein, total 6.3 02/21/2022 12:31 PM    Protein, total 6.0 (L) 02/09/2022 08:35 AM    Globulin 2.5 02/21/2022 12:31 PM    Globulin 2.4 02/09/2022 08:35 AM    A-G Ratio 1.5 02/21/2022 12:31 PM    A-G Ratio 1.5 02/09/2022 08:35 AM    ALT (SGPT) 34 02/21/2022 12:31 PM    ALT (SGPT) 40 02/09/2022 08:35 AM     Lab Results   Component Value Date/Time    WBC 8.1 02/24/2022 12:10 PM    WBC 7.8 02/21/2022 12:31 PM    HGB 8.3 (L) 02/24/2022 12:10 PM    HGB 8.1 (L) 02/21/2022 12:31 PM    HCT 25.8 (L) 02/24/2022 12:10 PM    HCT 25.4 02/21/2022 12:31 PM    PLATELET 286 (L) 18/33/2360 12:10 PM    PLATELET 77 (L) 57/85/8238 12:31 PM     No results found for: APTT, PTP, INR, INREXT    Assessment:     [address] Problems  Date Reviewed: 1/27/2022    None          Plan:     Planned Procedure:  Bone marrow biopsy    Risks, benefits, and alternatives reviewed with patient and he agrees to proceed with the procedure.       Signed By: Brooke Bhardwaj PA-C     February 24, 2022

## 2022-02-24 NOTE — DISCHARGE INSTRUCTIONS
[address]  Department of Interventional Radiology  [facility]  [phone] Office  [phone] Fax    BIOPSY DISCHARGE INSTRUCTIONS    General Instructions:     A biopsy is the removal of a small piece of tissue for microscopic examination or testing. Healthy tissue can be obtained for the purpose of tissue-type matching for transplants. Unhealthy tissues are more commonly biopsied to diagnose disease. Lung Biopsy:     A needle lung biopsy is performed when there is a mass discovered in the lung area. The most serious risk is infection, bleeding, and pneumothorax (a collapsed lung). Signs of pneumothorax include shortness of breath, rapid heart rate, and blueness of the skin. If any of these occur, call 911. Liver Biopsy: This test helps detect cancer, infections, and the cause of an enlargement of the liver or elevated liver enzymes. It also helps to diagnose a number of liver diseases. The pain associated with the procedure may be felt in the shoulder. The risks include internal bleeding, pneumothorax, and injury to the surrounding organs. Renal Biopsy: This procedure is sometimes done to evaluate a transplanted kidney. It is also used to evaluate unexplained decrease in kidney function, blood, or protein in the urine. You may see bright red blood in the urine the first 24 hours after the test. If the bleeding lasts longer, you need to call your doctor. There is a risk of infection and bleeding into the muscle, which may cause soreness. Spinal Biopsy: This test is sometimes done in conjunction with other procedures. Your back will be sore, as we are taking a small sample of bone, which is slightly more difficult to sample than tissue. General Biopsy:     A mass can grow in any area of the body, and we are taking a specimen as ordered by your doctor. The risks are the same.  They include bleeding, pain, and infection. Home Care Instructions: You may resume your regular diet and medication regimen. Do not drink alcohol, drive, or make any important legal decisions in the next 24 hours. Do not lift anything heavier than a gallon of milk until the soreness goes away. You may use over the counter acetaminophen or ibuprofen for the soreness. You may apply an ice pack to the affected area for 20-30 minutes at time for the first 24 hours. After that, you may apply a heat pack. Call If: You should call your Physician and/or the Radiology Nurse if you have any questions or concerns about the biopsy site. Call if you should have increased pain, fever, redness, drainage, or bleeding more than a small spot on the bandage. Follow-Up Instructions: Please see your ordering doctor as he/she has requested. To Reach Us: If you have any questions about your procedure, please call the Interventional Radiology department at [phone]. After business hours (5pm) and weekends, call the answering service at [phone] and ask for the Radiologist on call to be paged. Si tiene Preguntas acerca del procedimiento, por favor llame al departamento de Radiología Intervencional al [phone]. Después de horas de oficina (5 pm) y los fines de [place], llamar al Yvonne Sorto al [phone] y pregunte por el Radiologo de [facility] [facility]. Interventional Radiology General Nurse Discharge    After general anesthesia or intravenous sedation, for 24 hours or while taking prescription Narcotics:  · Limit your activities  · Do not drive and operate hazardous machinery  · Do not make important personal or business decisions  · Do  not drink alcoholic beverages  · If you have not urinated within 8 hours after discharge, please contact your surgeon on call. * Please give a list of your current medications to your Primary Care Provider.   * Please update this list whenever your medications are discontinued, doses are changed, or new medications (including over-the-counter products) are added. * Please carry medication information at all times in case of emergency situations. These are general instructions for a healthy lifestyle:    No smoking/ No tobacco products/ Avoid exposure to second hand smoke  Surgeon General's Warning:  Quitting smoking now greatly reduces serious risk to your health. Obesity, smoking, and sedentary lifestyle greatly increases your risk for illness  A healthy diet, regular physical exercise & weight monitoring are important for maintaining a healthy lifestyle    You may be retaining fluid if you have a history of heart failure or if you experience any of the following symptoms:  Weight gain of 3 pounds or more overnight or 5 pounds in a week, increased swelling in our hands or feet or shortness of breath while lying flat in bed. Please call your doctor as soon as you notice any of these symptoms; do not wait until your next office visit. Recognize signs and symptoms of STROKE:  F-face looks uneven    A-arms unable to move or move unevenly    S-speech slurred or non-existent    T-time-call 911 as soon as signs and symptoms begin-DO NOT go       Back to bed or wait to see if you get better-TIME IS BRAIN.

## 2022-02-24 NOTE — PROCEDURES
Department of Interventional Radiology  [phone]        Interventional Radiology Brief Procedure Note    Patient: Jena Alexander MRN: [id number]  SSN: [id number]    YOB: 1965  Age: 64 y.o. Sex: male      Date of Procedure: 2/24/2022    Pre-Procedure Diagnosis: lymphoma    Post-Procedure Diagnosis: SAME    Procedure(s): Image Guided Biopsy    Brief Description of Procedure: CT guided bone marrow biopsy.     Performed By: Greg Nguyen PA-C     Assistants: None    Anesthesia:Moderate Sedation per Darwin Oneil MD    Estimated Blood Loss: Less than 10ml    Specimens:  aspirate and core    Implants:  None    Findings: no post biopsy bleeding     Complications: None    Recommendations: routine wound care     Follow Up: prn    Signed By: Greg Nguyen PA-C     February 24, 2022

## 2022-02-25 LAB — STEM CELL PANEL, XSTEMT: NORMAL

## 2022-02-28 LAB — BONE MARROW PREP & W,BMA: NORMAL

## 2022-03-01 ENCOUNTER — PATIENT OUTREACH (OUTPATIENT)
Dept: CASE MANAGEMENT | Age: 57
End: 2022-03-01

## 2022-03-01 ENCOUNTER — HOSPITAL ENCOUNTER (OUTPATIENT)
Dept: INFUSION THERAPY | Age: 57
Discharge: HOME | End: 2022-03-01
Payer: COMMERCIAL

## 2022-03-01 VITALS
BODY MASS INDEX: 25.85 KG/M2 | SYSTOLIC BLOOD PRESSURE: 142 MMHG | HEART RATE: 67 BPM | RESPIRATION RATE: 16 BRPM | DIASTOLIC BLOOD PRESSURE: 78 MMHG | TEMPERATURE: 97.5 F | WEIGHT: 190.6 LBS | OXYGEN SATURATION: 97 %

## 2022-03-01 DIAGNOSIS — C83.30 DIFFUSE LARGE B-CELL LYMPHOMA, UNSPECIFIED BODY REGION (HCC): ICD-10-CM

## 2022-03-01 DIAGNOSIS — Z51.11 ADMISSION FOR ANTINEOPLASTIC CHEMOTHERAPY: ICD-10-CM

## 2022-03-01 PROCEDURE — 99213 OFFICE O/P EST LOW 20 MIN: CPT

## 2022-03-01 NOTE — PROGRESS NOTES
Problem: Infection - Risk of, Central Venous Catheter-Associated Bloodstream Infection  Goal: *Absence of infection signs and symptoms  Outcome: Progressing Towards Goal       Problem: Pain  Goal: *Control of Pain  Outcome: Progressing Towards Goal     Problem: Anemia Care Plan (Adult and Pediatric)  Goal: *Labs within defined limits  Outcome: Progressing Towards Goal  Goal: *Tolerates increased activity  Outcome: Progressing Towards Goal     Problem: Diarrhea (Adult and Pediatrics)  Goal: *Absence of diarrhea  Outcome: Progressing Towards Goal     Problem: Knowledge Deficit  Goal: *Verbalizes understanding of procedures and medications  Outcome: Progressing Towards Goal

## 2022-03-01 NOTE — PROGRESS NOTES
3/1  BMT Education: Navigator met with Zulema Blount for BMT education. Patient given BMT education notebook. All sections discussed. Patient encouraged to write down any questions or concerns. Pt is aware of opportunity to meet again for any final questions. Content gone over verbally and given in writing including drug education sheets. Granix side effects, and administration guidelines discussed. Patient aware to report any left side pain during mobilization for the possibility of spleen enlargement or rupture from Granix. Patient aware splenic rupture is extremely rare. Patient stated understanding. The possible use of Mozobil injections was also discussed. Patient aware that mozobil may  started on Monday evening before collection begins. Patient educated on administration of mozobil and the rationale for use during mobilization process. Patient is aware of being NPO stating midnight on Sunday for 3/13 apheresis CVC placement on Monday 3/14 and stated understanding. Apheresis consult completed 3/1 by Arleen Selby RN. Patient aware of the possibility of collection being Tuesday through Friday. Mobilzation schedule given and gone over. Patient aware non-tunnled CVC will  be removed after he collects. Patient stated understanding. High dose education gone over and written information given on B.E.A.M. Patient aware of possible side effects from chemo including but not limited oral mucositis, diarrhea, n/v, and hair loss. Patient aware of stem cell reinfusion will occur at least 24 hours after the chemo was completed (the next day). Patient aware to prepare for daily visits at least 3 weeks during high dose and the possibility of a 4th week. Patient aware of the need for a 24 hr caregiver during high dose through recovery. A copy of treatment consents given to patient for review. Patient is aware to review consent and sign prior to high dose therapy being initiated.  Patient aware of the risks involved with BMT including but not limited to infection, bleeding, engraftment failure, and death. Questions were answered and again encouraged patient to read material and write down any questions.  Patient aware of next appointment with Dr. Margot Arroyo for pre-study results and mobilization eval.

## 2022-03-01 NOTE — PROGRESS NOTES
Pt arrived ambulating. Apheresis consult completed. 45 minutes spent on education. Verbal and written information was given on process of stem cell collection, including potential side effects from process as well as medicines such as Mozobil and Granix. Instructed pt to take po Claritin for pain prevention. Handouts given on Claritin for pain, Central line Care, and hand hygiene. Donor questionnaire was completed. Any questions answered yes beyond reading material:no (no/yes). Physician was notified of any yes responses n/a. All questions were answered, pt verbalized understanding.

## 2022-03-09 ENCOUNTER — PATIENT OUTREACH (OUTPATIENT)
Dept: CASE MANAGEMENT | Age: 57
End: 2022-03-09

## 2022-03-09 ENCOUNTER — HOSPITAL ENCOUNTER (OUTPATIENT)
Dept: LAB | Age: 57
Discharge: HOME | End: 2022-03-09
Payer: COMMERCIAL

## 2022-03-09 DIAGNOSIS — C83.30 DIFFUSE LARGE B-CELL LYMPHOMA, UNSPECIFIED BODY REGION (HCC): ICD-10-CM

## 2022-03-09 LAB
ALBUMIN SERPL-MCNC: 3.8 G/DL (ref 3.5–5)
ALBUMIN/GLOB SERPL: 1.5 {RATIO} (ref 1.2–3.5)
ALP SERPL-CCNC: 121 U/L (ref 50–136)
ALT SERPL-CCNC: 26 U/L (ref 12–65)
ANION GAP SERPL CALC-SCNC: 3 MMOL/L (ref 7–16)
AST SERPL-CCNC: 18 U/L (ref 15–37)
BASOPHILS # BLD: 0.1 K/UL (ref 0–0.2)
BASOPHILS NFR BLD: 1 % (ref 0–2)
BILIRUB SERPL-MCNC: 0.3 MG/DL (ref 0.2–1.1)
BUN SERPL-MCNC: 18 MG/DL (ref 6–23)
CALCIUM SERPL-MCNC: 8.9 MG/DL (ref 8.3–10.4)
CHLORIDE SERPL-SCNC: 107 MMOL/L (ref 98–107)
CO2 SERPL-SCNC: 30 MMOL/L (ref 21–32)
CREAT SERPL-MCNC: 0.8 MG/DL (ref 0.8–1.5)
DIFFERENTIAL METHOD BLD: ABNORMAL
EOSINOPHIL # BLD: 0.1 K/UL (ref 0–0.8)
EOSINOPHIL NFR BLD: 1 % (ref 0.5–7.8)
ERYTHROCYTE [DISTWIDTH] IN BLOOD BY AUTOMATED COUNT: 15.6 % (ref 11.9–14.6)
GLOBULIN SER CALC-MCNC: 2.5 G/DL (ref 2.3–3.5)
GLUCOSE SERPL-MCNC: 97 MG/DL (ref 65–100)
HCT VFR BLD AUTO: 31.5 %
HGB BLD-MCNC: 10.3 G/DL (ref 13.6–17.2)
IMM GRANULOCYTES # BLD AUTO: 0 K/UL (ref 0–0.5)
IMM GRANULOCYTES NFR BLD AUTO: 0 % (ref 0–5)
LYMPHOCYTES # BLD: 0.8 K/UL (ref 0.5–4.6)
LYMPHOCYTES NFR BLD: 16 % (ref 13–44)
MAGNESIUM SERPL-MCNC: 2.2 MG/DL (ref 1.8–2.4)
MCH RBC QN AUTO: 32 PG (ref 26.1–32.9)
MCHC RBC AUTO-ENTMCNC: 32.7 G/DL (ref 31.4–35)
MCV RBC AUTO: 97.8 FL (ref 79.6–97.8)
MONOCYTES # BLD: 0.7 K/UL (ref 0.1–1.3)
MONOCYTES NFR BLD: 13 % (ref 4–12)
NEUTS SEG # BLD: 3.5 K/UL (ref 1.7–8.2)
NEUTS SEG NFR BLD: 69 % (ref 43–78)
NRBC # BLD: 0 K/UL (ref 0–0.2)
PLATELET # BLD AUTO: 220 K/UL (ref 150–450)
PMV BLD AUTO: 10.5 FL (ref 9.4–12.3)
POTASSIUM SERPL-SCNC: 4 MMOL/L (ref 3.5–5.1)
PROT SERPL-MCNC: 6.3 G/DL (ref 6.3–8.2)
RBC # BLD AUTO: 3.22 M/UL (ref 4.23–5.6)
SODIUM SERPL-SCNC: 140 MMOL/L (ref 136–145)
WBC # BLD AUTO: 5.1 K/UL (ref 4.3–11.1)

## 2022-03-09 PROCEDURE — 85025 COMPLETE CBC W/AUTO DIFF WBC: CPT

## 2022-03-09 PROCEDURE — 83735 ASSAY OF MAGNESIUM: CPT

## 2022-03-09 PROCEDURE — 80053 COMPREHEN METABOLIC PANEL: CPT

## 2022-03-09 PROCEDURE — 36415 COLL VENOUS BLD VENIPUNCTURE: CPT

## 2022-03-09 NOTE — PROGRESS NOTES
3/9  Mobilization Evaluation  Patient met with Dr. Kenia Waldron to discuss results of pre-studies including chest xray, pulmonary function test, ekg, echocardiogram and stem cell infectious disease panel. Results noted in the chart. Patient to start Granix 10mcg/kg daily on 3/11/22 and continue until collection completed. Target goal 5.0 x 10^6kg   Mozobil to start 3/14/22 PRN per SOP    Non-tunnled Catheter placement on 3/14/22 with day 1 collection on 3/15/22. Expanded Suitability is not needed. Karnofsky _80%_.

## 2022-03-11 ENCOUNTER — HOSPITAL ENCOUNTER (OUTPATIENT)
Dept: INFUSION THERAPY | Age: 57
Discharge: HOME | End: 2022-03-11
Payer: COMMERCIAL

## 2022-03-11 VITALS
HEART RATE: 67 BPM | WEIGHT: 189.4 LBS | DIASTOLIC BLOOD PRESSURE: 75 MMHG | OXYGEN SATURATION: 100 % | RESPIRATION RATE: 16 BRPM | SYSTOLIC BLOOD PRESSURE: 139 MMHG | TEMPERATURE: 97.7 F | BODY MASS INDEX: 25.69 KG/M2

## 2022-03-11 DIAGNOSIS — C83.30 DIFFUSE LARGE B-CELL LYMPHOMA, UNSPECIFIED BODY REGION (HCC): Primary | ICD-10-CM

## 2022-03-11 PROCEDURE — 74011250636 HC RX REV CODE- 250/636: Performed by: INTERNAL MEDICINE

## 2022-03-11 PROCEDURE — 96372 THER/PROPH/DIAG INJ SC/IM: CPT

## 2022-03-11 RX ADMIN — FILGRASTIM-AAFI 960 MCG: 480 INJECTION, SOLUTION INTRAVENOUS; SUBCUTANEOUS at 09:20

## 2022-03-11 NOTE — PROGRESS NOTES
Mobilization Day +1  Labs needed at next visit: see orders  Next Appointment scheduled 3/12 @0900  WBC/ANC= 5.1/3.5  Apheresis catheter placement scheduled for 3/14  New orders received: none  Issues: none  Pt arrived ambulatory to [facility]. NVESTYM given sq. Pt monitored. Discharged ambulatory.

## 2022-03-12 ENCOUNTER — HOSPITAL ENCOUNTER (OUTPATIENT)
Dept: INFUSION THERAPY | Age: 57
Discharge: HOME | End: 2022-03-12
Payer: COMMERCIAL

## 2022-03-12 VITALS
SYSTOLIC BLOOD PRESSURE: 137 MMHG | HEART RATE: 76 BPM | RESPIRATION RATE: 16 BRPM | OXYGEN SATURATION: 98 % | TEMPERATURE: 97.6 F | DIASTOLIC BLOOD PRESSURE: 69 MMHG

## 2022-03-12 DIAGNOSIS — C83.30 DIFFUSE LARGE B-CELL LYMPHOMA, UNSPECIFIED BODY REGION (HCC): Primary | ICD-10-CM

## 2022-03-12 LAB
ANION GAP SERPL CALC-SCNC: 0 MMOL/L (ref 7–16)
BASOPHILS # BLD: 0.3 K/UL (ref 0–0.2)
BASOPHILS NFR BLD: 1 % (ref 0–2)
BUN SERPL-MCNC: 12 MG/DL (ref 6–23)
CALCIUM SERPL-MCNC: 8.8 MG/DL (ref 8.3–10.4)
CHLORIDE SERPL-SCNC: 107 MMOL/L (ref 98–107)
CO2 SERPL-SCNC: 33 MMOL/L (ref 21–32)
CREAT SERPL-MCNC: 0.8 MG/DL (ref 0.8–1.5)
DIFFERENTIAL METHOD BLD: ABNORMAL
EOSINOPHIL # BLD: 0 K/UL (ref 0–0.8)
EOSINOPHIL NFR BLD: 0 % (ref 0.5–7.8)
ERYTHROCYTE [DISTWIDTH] IN BLOOD BY AUTOMATED COUNT: 15.2 % (ref 11.9–14.6)
GLUCOSE SERPL-MCNC: 109 MG/DL (ref 65–100)
HCT VFR BLD AUTO: 35.6 %
HGB BLD-MCNC: 11.3 G/DL (ref 13.6–17.2)
IMM GRANULOCYTES # BLD AUTO: 2.2 K/UL (ref 0–0.5)
IMM GRANULOCYTES NFR BLD AUTO: 7 % (ref 0–5)
LYMPHOCYTES # BLD: 1.2 K/UL (ref 0.5–4.6)
LYMPHOCYTES NFR BLD: 4 % (ref 13–44)
MAGNESIUM SERPL-MCNC: 2.2 MG/DL (ref 1.8–2.4)
MCH RBC QN AUTO: 31.6 PG (ref 26.1–32.9)
MCHC RBC AUTO-ENTMCNC: 31.7 G/DL (ref 31.4–35)
MCV RBC AUTO: 99.4 FL (ref 79.6–97.8)
MONOCYTES # BLD: 1.8 K/UL (ref 0.1–1.3)
MONOCYTES NFR BLD: 6 % (ref 4–12)
NEUTS SEG # BLD: 25.2 K/UL (ref 1.7–8.2)
NEUTS SEG NFR BLD: 82 % (ref 43–78)
NRBC # BLD: 0 K/UL (ref 0–0.2)
PLATELET # BLD AUTO: 206 K/UL (ref 150–450)
PLATELET COMMENTS,PCOM: ADEQUATE
PMV BLD AUTO: 10.7 FL (ref 9.4–12.3)
POTASSIUM SERPL-SCNC: 3.9 MMOL/L (ref 3.5–5.1)
RBC # BLD AUTO: 3.58 M/UL (ref 4.23–5.6)
RBC MORPH BLD: ABNORMAL
SODIUM SERPL-SCNC: 140 MMOL/L (ref 136–145)
WBC # BLD AUTO: 30.7 K/UL (ref 4.3–11.1)
WBC MORPH BLD: ABNORMAL

## 2022-03-12 PROCEDURE — 74011250636 HC RX REV CODE- 250/636: Performed by: INTERNAL MEDICINE

## 2022-03-12 PROCEDURE — 96372 THER/PROPH/DIAG INJ SC/IM: CPT

## 2022-03-12 PROCEDURE — 85025 COMPLETE CBC W/AUTO DIFF WBC: CPT

## 2022-03-12 PROCEDURE — 83735 ASSAY OF MAGNESIUM: CPT

## 2022-03-12 PROCEDURE — 80048 BASIC METABOLIC PNL TOTAL CA: CPT

## 2022-03-12 RX ADMIN — FILGRASTIM-AAFI 960 MCG: 480 INJECTION, SOLUTION INTRAVENOUS; SUBCUTANEOUS at 09:07

## 2022-03-12 NOTE — PROGRESS NOTES
Mobilization Day +2  Labs needed at next visit: see orders  Next Appointment scheduled 3/13 @0930  WBC/ANC= 30.7/  Apheresis catheter placement scheduled for 3/14  New orders received: none  Issues: none  Pt arrived ambulatory to C. blood drawn peripherally and sent to lab. NVESTYM given sq. Pt aware of next appt. Discharged ambulatory.

## 2022-03-13 ENCOUNTER — HOSPITAL ENCOUNTER (OUTPATIENT)
Dept: INFUSION THERAPY | Age: 57
Discharge: HOME | End: 2022-03-13
Payer: COMMERCIAL

## 2022-03-13 VITALS
WEIGHT: 189.4 LBS | SYSTOLIC BLOOD PRESSURE: 131 MMHG | RESPIRATION RATE: 16 BRPM | BODY MASS INDEX: 25.69 KG/M2 | HEART RATE: 66 BPM | TEMPERATURE: 97.5 F | OXYGEN SATURATION: 100 % | DIASTOLIC BLOOD PRESSURE: 63 MMHG

## 2022-03-13 DIAGNOSIS — C83.30 DIFFUSE LARGE B-CELL LYMPHOMA, UNSPECIFIED BODY REGION (HCC): Primary | ICD-10-CM

## 2022-03-13 LAB
ANION GAP SERPL CALC-SCNC: ABNORMAL MMOL/L (ref 7–16)
APTT PPP: 31.8 SEC (ref 24.1–35.1)
BASOPHILS # BLD: 0.3 K/UL (ref 0–0.2)
BASOPHILS NFR BLD: 1 % (ref 0–2)
BUN SERPL-MCNC: 10 MG/DL (ref 6–23)
CALCIUM SERPL-MCNC: 9.1 MG/DL (ref 8.3–10.4)
CHLORIDE SERPL-SCNC: 108 MMOL/L (ref 98–107)
CO2 SERPL-SCNC: 35 MMOL/L (ref 21–32)
CREAT SERPL-MCNC: 0.7 MG/DL (ref 0.8–1.5)
DIFFERENTIAL METHOD BLD: ABNORMAL
EOSINOPHIL # BLD: 0 K/UL (ref 0–0.8)
EOSINOPHIL NFR BLD: 0 % (ref 0.5–7.8)
ERYTHROCYTE [DISTWIDTH] IN BLOOD BY AUTOMATED COUNT: 15.2 % (ref 11.9–14.6)
GLUCOSE SERPL-MCNC: 105 MG/DL (ref 65–100)
HCT VFR BLD AUTO: 36.4 %
HGB BLD-MCNC: 11.4 G/DL (ref 13.6–17.2)
IMM GRANULOCYTES # BLD AUTO: 5.5 K/UL (ref 0–0.5)
IMM GRANULOCYTES NFR BLD AUTO: 16 % (ref 0–5)
INR PPP: 1.3
LYMPHOCYTES # BLD: 1.4 K/UL (ref 0.5–4.6)
LYMPHOCYTES NFR BLD: 4 % (ref 13–44)
MAGNESIUM SERPL-MCNC: 2.3 MG/DL (ref 1.8–2.4)
MCH RBC QN AUTO: 31.6 PG (ref 26.1–32.9)
MCHC RBC AUTO-ENTMCNC: 31.3 G/DL (ref 31.4–35)
MCV RBC AUTO: 100.8 FL (ref 79.6–97.8)
MONOCYTES # BLD: 2.4 K/UL (ref 0.1–1.3)
MONOCYTES NFR BLD: 7 % (ref 4–12)
NEUTS SEG # BLD: 25 K/UL (ref 1.7–8.2)
NEUTS SEG NFR BLD: 72 % (ref 43–78)
NRBC # BLD: 0 K/UL (ref 0–0.2)
PLATELET # BLD AUTO: 195 K/UL (ref 150–450)
PLATELET COMMENTS,PCOM: ADEQUATE
PMV BLD AUTO: 10.5 FL (ref 9.4–12.3)
POTASSIUM SERPL-SCNC: 3.7 MMOL/L (ref 3.5–5.1)
PROTHROMBIN TIME: 16 SEC (ref 12.6–14.5)
RBC # BLD AUTO: 3.61 M/UL (ref 4.23–5.6)
RBC MORPH BLD: ABNORMAL
SODIUM SERPL-SCNC: 142 MMOL/L (ref 136–145)
WBC # BLD AUTO: 34.6 K/UL (ref 4.3–11.1)
WBC MORPH BLD: ABNORMAL

## 2022-03-13 PROCEDURE — 74011250636 HC RX REV CODE- 250/636: Performed by: INTERNAL MEDICINE

## 2022-03-13 PROCEDURE — 96372 THER/PROPH/DIAG INJ SC/IM: CPT

## 2022-03-13 PROCEDURE — 85025 COMPLETE CBC W/AUTO DIFF WBC: CPT

## 2022-03-13 PROCEDURE — 80048 BASIC METABOLIC PNL TOTAL CA: CPT

## 2022-03-13 PROCEDURE — 85730 THROMBOPLASTIN TIME PARTIAL: CPT

## 2022-03-13 PROCEDURE — 83735 ASSAY OF MAGNESIUM: CPT

## 2022-03-13 PROCEDURE — 85610 PROTHROMBIN TIME: CPT

## 2022-03-13 RX ORDER — SODIUM CHLORIDE 0.9 % (FLUSH) 0.9 %
10-40 SYRINGE (ML) INJECTION AS NEEDED
Status: ACTIVE | OUTPATIENT
Start: 2022-03-13 | End: 2022-03-13

## 2022-03-13 RX ADMIN — FILGRASTIM-AAFI 960 MCG: 480 INJECTION, SOLUTION INTRAVENOUS; SUBCUTANEOUS at 09:55

## 2022-03-13 NOTE — PROGRESS NOTES
Mobilization Day +3  Labs needed at next visit: see orders  Next Appointment scheduled 3/14 @0715  WBC/ANC= 34.6/25  Apheresis catheter placement scheduled for 3/14  New orders received: none  Issues: none  Pt arrived ambulatory to [facility]. blood drawn peripherally and sent to lab. NVESTYM given sq. Pt aware of next appt.  Discharged ambulatory

## 2022-03-14 ENCOUNTER — HOSPITAL ENCOUNTER (OUTPATIENT)
Dept: INFUSION THERAPY | Age: 57
Discharge: HOME | End: 2022-03-14
Payer: COMMERCIAL

## 2022-03-14 ENCOUNTER — HOSPITAL ENCOUNTER (OUTPATIENT)
Dept: INTERVENTIONAL RADIOLOGY/VASCULAR | Age: 57
Discharge: HOME | End: 2022-03-14
Attending: INTERNAL MEDICINE
Payer: COMMERCIAL

## 2022-03-14 VITALS
RESPIRATION RATE: 18 BRPM | WEIGHT: 189.6 LBS | BODY MASS INDEX: 25.71 KG/M2 | HEART RATE: 69 BPM | TEMPERATURE: 97.7 F | OXYGEN SATURATION: 100 % | DIASTOLIC BLOOD PRESSURE: 64 MMHG | SYSTOLIC BLOOD PRESSURE: 136 MMHG

## 2022-03-14 VITALS
TEMPERATURE: 97.8 F | DIASTOLIC BLOOD PRESSURE: 74 MMHG | SYSTOLIC BLOOD PRESSURE: 162 MMHG | HEART RATE: 69 BPM | RESPIRATION RATE: 14 BRPM | OXYGEN SATURATION: 98 %

## 2022-03-14 VITALS
RESPIRATION RATE: 18 BRPM | HEART RATE: 69 BPM | BODY MASS INDEX: 25.5 KG/M2 | WEIGHT: 188 LBS | OXYGEN SATURATION: 97 % | SYSTOLIC BLOOD PRESSURE: 134 MMHG | DIASTOLIC BLOOD PRESSURE: 69 MMHG | TEMPERATURE: 98.1 F

## 2022-03-14 DIAGNOSIS — C83.30 DIFFUSE LARGE B-CELL LYMPHOMA, UNSPECIFIED BODY REGION (HCC): Primary | ICD-10-CM

## 2022-03-14 DIAGNOSIS — C83.31 DIFFUSE LARGE B-CELL LYMPHOMA OF LYMPH NODES OF NECK (HCC): ICD-10-CM

## 2022-03-14 LAB
ALBUMIN SERPL-MCNC: 3.9 G/DL (ref 3.5–5)
ALBUMIN/GLOB SERPL: 1.4 {RATIO} (ref 1.2–3.5)
ALP SERPL-CCNC: 175 U/L (ref 50–136)
ALT SERPL-CCNC: 21 U/L (ref 12–65)
ANION GAP SERPL CALC-SCNC: 1 MMOL/L (ref 7–16)
AST SERPL-CCNC: 21 U/L (ref 15–37)
BASOPHILS # BLD: 0.4 K/UL (ref 0–0.2)
BASOPHILS NFR BLD: 1 % (ref 0–2)
BILIRUB SERPL-MCNC: 0.3 MG/DL (ref 0.2–1.1)
BUN SERPL-MCNC: 9 MG/DL (ref 6–23)
CALCIUM SERPL-MCNC: 9.2 MG/DL (ref 8.3–10.4)
CHLORIDE SERPL-SCNC: 108 MMOL/L (ref 98–107)
CO2 SERPL-SCNC: 33 MMOL/L (ref 21–32)
CREAT SERPL-MCNC: 0.8 MG/DL (ref 0.8–1.5)
DIFFERENTIAL METHOD BLD: ABNORMAL
EOSINOPHIL # BLD: 0.4 K/UL (ref 0–0.8)
EOSINOPHIL NFR BLD: 1 % (ref 0.5–7.8)
ERYTHROCYTE [DISTWIDTH] IN BLOOD BY AUTOMATED COUNT: 15 % (ref 11.9–14.6)
GLOBULIN SER CALC-MCNC: 2.7 G/DL (ref 2.3–3.5)
GLUCOSE SERPL-MCNC: 101 MG/DL (ref 65–100)
HCT VFR BLD AUTO: 35.8 %
HGB BLD-MCNC: 11.3 G/DL (ref 13.6–17.2)
IMM GRANULOCYTES # BLD AUTO: 2.5 K/UL (ref 0–0.5)
IMM GRANULOCYTES NFR BLD AUTO: 7 % (ref 0–5)
LYMPHOCYTES # BLD: 1.4 K/UL (ref 0.5–4.6)
LYMPHOCYTES NFR BLD: 4 % (ref 13–44)
MAGNESIUM SERPL-MCNC: 1.9 MG/DL (ref 1.8–2.4)
MCH RBC QN AUTO: 31.7 PG (ref 26.1–32.9)
MCHC RBC AUTO-ENTMCNC: 31.6 G/DL (ref 31.4–35)
MCV RBC AUTO: 100.6 FL (ref 79.6–97.8)
MONOCYTES # BLD: 2.9 K/UL (ref 0.1–1.3)
MONOCYTES NFR BLD: 8 % (ref 4–12)
NEUTS SEG # BLD: 28.6 K/UL (ref 1.7–8.2)
NEUTS SEG NFR BLD: 79 % (ref 43–78)
NRBC # BLD: 0 K/UL (ref 0–0.2)
PHOSPHATE SERPL-MCNC: 2.8 MG/DL (ref 2.5–4.5)
PLATELET # BLD AUTO: 176 K/UL (ref 150–450)
PLATELET COMMENTS,PCOM: ADEQUATE
PMV BLD AUTO: 10.4 FL (ref 9.4–12.3)
POTASSIUM SERPL-SCNC: 3.7 MMOL/L (ref 3.5–5.1)
PROT SERPL-MCNC: 6.6 G/DL (ref 6.3–8.2)
RBC # BLD AUTO: 3.56 M/UL (ref 4.23–5.6)
RBC MORPH BLD: ABNORMAL
SODIUM SERPL-SCNC: 142 MMOL/L (ref 136–145)
WBC # BLD AUTO: 36.2 K/UL (ref 4.3–11.1)
WBC MORPH BLD: ABNORMAL

## 2022-03-14 PROCEDURE — 83735 ASSAY OF MAGNESIUM: CPT

## 2022-03-14 PROCEDURE — C1752 CATH,HEMODIALYSIS,SHORT-TERM: HCPCS

## 2022-03-14 PROCEDURE — 74011000250 HC RX REV CODE- 250: Performed by: INTERNAL MEDICINE

## 2022-03-14 PROCEDURE — 96372 THER/PROPH/DIAG INJ SC/IM: CPT

## 2022-03-14 PROCEDURE — 84100 ASSAY OF PHOSPHORUS: CPT

## 2022-03-14 PROCEDURE — C1894 INTRO/SHEATH, NON-LASER: HCPCS

## 2022-03-14 PROCEDURE — 74011250636 HC RX REV CODE- 250/636: Performed by: INTERNAL MEDICINE

## 2022-03-14 PROCEDURE — 77030002916 HC SUT ETHLN J&J -A

## 2022-03-14 PROCEDURE — 85025 COMPLETE CBC W/AUTO DIFF WBC: CPT

## 2022-03-14 PROCEDURE — 80053 COMPREHEN METABOLIC PANEL: CPT

## 2022-03-14 PROCEDURE — 77030018719 HC DRSG PTCH ANTIMIC J&J -A

## 2022-03-14 PROCEDURE — 36556 INSERT NON-TUNNEL CV CATH: CPT

## 2022-03-14 PROCEDURE — 86367 STEM CELLS TOTAL COUNT: CPT

## 2022-03-14 PROCEDURE — 74011250636 HC RX REV CODE- 250/636: Performed by: RADIOLOGY

## 2022-03-14 RX ORDER — SODIUM CHLORIDE 0.9 % (FLUSH) 0.9 %
10-40 SYRINGE (ML) INJECTION AS NEEDED
Status: ACTIVE | OUTPATIENT
Start: 2022-03-14 | End: 2022-03-14

## 2022-03-14 RX ORDER — HEPARIN SODIUM 1000 [USP'U]/ML
1600 INJECTION, SOLUTION INTRAVENOUS; SUBCUTANEOUS
Status: DISCONTINUED | OUTPATIENT
Start: 2022-03-14 | End: 2022-03-14

## 2022-03-14 RX ORDER — HEPARIN SODIUM 1000 [USP'U]/ML
1500 INJECTION, SOLUTION INTRAVENOUS; SUBCUTANEOUS
Status: DISCONTINUED | OUTPATIENT
Start: 2022-03-14 | End: 2022-03-18 | Stop reason: HOSPADM

## 2022-03-14 RX ORDER — LIDOCAINE HYDROCHLORIDE 20 MG/ML
20-300 INJECTION, SOLUTION INFILTRATION; PERINEURAL
Status: DISCONTINUED | OUTPATIENT
Start: 2022-03-14 | End: 2022-03-18 | Stop reason: HOSPADM

## 2022-03-14 RX ADMIN — SODIUM CHLORIDE, PRESERVATIVE FREE 10 ML: 5 INJECTION INTRAVENOUS at 07:36

## 2022-03-14 RX ADMIN — HEPARIN SODIUM 1500 UNITS: 1000 INJECTION, SOLUTION INTRAVENOUS; SUBCUTANEOUS at 14:35

## 2022-03-14 RX ADMIN — HEPARIN SODIUM 1500 UNITS: 1000 INJECTION, SOLUTION INTRAVENOUS; SUBCUTANEOUS at 14:34

## 2022-03-14 RX ADMIN — FILGRASTIM-AAFI 960 MCG: 480 INJECTION, SOLUTION INTRAVENOUS; SUBCUTANEOUS at 08:34

## 2022-03-14 RX ADMIN — PLERIXAFOR 20.8 MG: 24 SOLUTION SUBCUTANEOUS at 18:05

## 2022-03-14 NOTE — PROGRESS NOTES
TRANSFER - OUT REPORT:    Verbal report given to [facility] on Emperatriz Cartagena  being transferred to IR prep/recovery room #1 for routine progression of care       Report consisted of patients Situation, Background, Assessment and   Recommendations(SBAR). Information from the following report(s) SBAR, Kardex, Procedure Summary and MAR was reviewed with the receiving nurse. Lines:   Venous Access Device 12/06/21 (Active)        Opportunity for questions and clarification was provided.       Patient transported with:   Registered Nurse

## 2022-03-14 NOTE — PROCEDURES
Department of Interventional Radiology  [phone]        Interventional Radiology Brief Procedure Note    Patient: Lis Boogie MRN: [id number]  SSN: [id number]    YOB: 1965  Age: 64 y.o.   Sex: male      Date of Procedure: 3/14/2022    Pre-Procedure Diagnosis: Sten cell transplant    Post-Procedure Diagnosis: SAME    Procedure(s): Temporary Central Venous Catheter    Brief Description of Procedure: pheresis catheter    Performed By: Elizabeth Michelle MD     Assistants: None    Anesthesia:Lidocaine    Estimated Blood Loss: None    Specimens:  None    Implants:  Temporary Apheresis Catheter    Findings: patent left IJ    Complications: None    Recommendations: ok to use     Follow Up: as needed    Signed By: Elizabeth Michelle MD     March 14, 2022

## 2022-03-14 NOTE — PROGRESS NOTES
Mobilization Day +4. Labs needed at next visit: See orders. Next Appointment scheduled today @ 1800 for Mozobil. WBC/ANC= 36.2/28.6  Apheresis catheter placement scheduled for today. New orders received :  No.  Issues:  None  Patient arrived ambulatory. Assessment completed. Labs drawn & reviewed. No replacements needed. Nivestym given. Discharged ambulatory in stable condition.

## 2022-03-14 NOTE — PROGRESS NOTES
Peripheral CD 34 count: 2  Predictive CD 34 @ 20 liters is 0.14 x 106/kg  Predictive CD 34 @ 40 liters is 0.21 x 106/kg  Results received from Pretty Duong at [facility]  Mozobil required yes/no:  Patient notified

## 2022-03-14 NOTE — PROGRESS NOTES
Arrived to the [facility]. Mozobil completed. Patient tolerated well. Observed patient x 30 minutes, no reactions. Any issues or concerns during appointment: None. Patient aware of next infusion appointment on 3/15 (date) at [address] [place] [address] (time). Patient aware of next lab and [facility] office visit on 3/15 during infusion appointment. Patient instructed to call provider with temperature of 100.4 or greater or nausea/vomiting/ diarrhea or pain not controlled by medications  Discharged ambulatory in stable condition.

## 2022-03-14 NOTE — DISCHARGE INSTRUCTIONS
[address]  Department of Interventional Radiology  [facility]  [phone] Office  [phone] Fax    Tunneled or Non Tunneled Catheter Discharge Instructions    General Information:   A catheter, either tunneled (permanent) or non-tunneled (temporary) catheter was inserted into your neck today for the purpose of Cancer treatment (apheresis) or dialysis. Your catheter will be used for the length of your apheresis, or until you get a fistula placed in surgery for dialysis. After this time, your catheter may be removed. You may return to our department for the catheter removal.  A non-tunneled catheter will exit at the neck. There will be three ports, two of which will be used for dialysis or apheresis. The one smaller port in the middle can be used like a regular IV. It ideally is used only for about two weeks. A tunneled catheter will exit lower down on the chest wall, and can be used for a longer period of time. There is no IV port on these. The tunneled catheter is used only for dialysis. In case of emergencies only can drugs be given through these catheters and only with written permission from your doctor. Home Care Instructions: You can resume your regular diet. Do not drink alcohol, drive, or make any important legal decisions in the next 24 hours. Watch the site carefully for signs of infection, like fever, drainage, redness, and/or swelling. Your catheter should be \"packed\" with heparin after each use or at least once a week if it is not being used. This \"packing\" should only be done by nurses experienced with caring for this type of catheter. You may shower in 24 hours, but you need to cover the catheter with plastic wrap and tape to keep it dry while you are showering. Keep the site clean, dry, and protected. Do not immerse yourself in water like in the case of tub baths or swimming as long as you have the catheter. Call If:   You should call your Physician and/or the Radiology Nurse if you have any signs of infection, shortness of breath, or if the dressing should come off or become moist.  You will be instructed on where to go for a new dressing. You should not have to change the dressings yourself, as that will be done by the nurses who access the catheter. Follow-Up Instructions:  Please see your ordering doctor as he/she has requested. To Reach Us: If you have any questions about your procedure, please call the Interventional Radiology department at [phone]. After business hours (5pm) and weekends, call the answering service at [phone] and ask for the Radiologist on call to be paged. Si tiene Preguntas acerca del procedimiento, por favor llame al departamento de Radiología Intervencional al [phone]. Después de horas de oficina (5 pm) y los fines de [place], llamar al Mike Sorto al [phone] y pregunte por el Radiologo de [facility]. Interventional Radiology General Nurse Discharge    After general anesthesia or intravenous sedation, for 24 hours or while taking prescription Narcotics:  · Limit your activities  · Do not drive and operate hazardous machinery  · Do not make important personal or business decisions  · Do  not drink alcoholic beverages  · If you have not urinated within 8 hours after discharge, please contact your surgeon on call. * Please give a list of your current medications to your Primary Care Provider. * Please update this list whenever your medications are discontinued, doses are     changed, or new medications (including over-the-counter products) are added. * Please carry medication information at all times in case of emergency situations.     These are general instructions for a healthy lifestyle:    No smoking/ No tobacco products/ Avoid exposure to second hand smoke  Surgeon General's Warning:  Quitting smoking now greatly reduces serious risk to your health. Obesity, smoking, and sedentary lifestyle greatly increases your risk for illness  A healthy diet, regular physical exercise & weight monitoring are important for maintaining a healthy lifestyle    You may be retaining fluid if you have a history of heart failure or if you experience any of the following symptoms:  Weight gain of 3 pounds or more overnight or 5 pounds in a week, increased swelling in our hands or feet or shortness of breath while lying flat in bed. Please call your doctor as soon as you notice any of these symptoms; do not wait until your next office visit. Recognize signs and symptoms of STROKE:  F-face looks uneven    A-arms unable to move or move unevenly    S-speech slurred or non-existent    T-time-call 911 as soon as signs and symptoms begin-DO NOT go       Back to bed or wait to see if you get better-TIME IS BRAIN.

## 2022-03-15 ENCOUNTER — HOSPITAL ENCOUNTER (OUTPATIENT)
Dept: INFUSION THERAPY | Age: 57
Discharge: HOME | End: 2022-03-15
Payer: COMMERCIAL

## 2022-03-15 VITALS
DIASTOLIC BLOOD PRESSURE: 74 MMHG | RESPIRATION RATE: 20 BRPM | SYSTOLIC BLOOD PRESSURE: 135 MMHG | TEMPERATURE: 98 F | HEART RATE: 74 BPM | OXYGEN SATURATION: 100 %

## 2022-03-15 VITALS
SYSTOLIC BLOOD PRESSURE: 147 MMHG | DIASTOLIC BLOOD PRESSURE: 71 MMHG | HEART RATE: 72 BPM | RESPIRATION RATE: 18 BRPM | TEMPERATURE: 97.7 F | OXYGEN SATURATION: 100 %

## 2022-03-15 DIAGNOSIS — C83.30 DIFFUSE LARGE B-CELL LYMPHOMA, UNSPECIFIED BODY REGION (HCC): Primary | ICD-10-CM

## 2022-03-15 LAB
ABO + RH BLD: NORMAL
ANION GAP SERPL CALC-SCNC: 2 MMOL/L (ref 7–16)
BASOPHILS # BLD: 0 K/UL (ref 0–0.2)
BASOPHILS # BLD: 0 K/UL (ref 0–0.2)
BASOPHILS NFR BLD: 0 % (ref 0–2)
BASOPHILS NFR BLD: 0 % (ref 0–2)
BUN SERPL-MCNC: 10 MG/DL (ref 6–23)
CALCIUM SERPL-MCNC: 9.2 MG/DL (ref 8.3–10.4)
CHLORIDE SERPL-SCNC: 108 MMOL/L (ref 98–107)
CO2 SERPL-SCNC: 31 MMOL/L (ref 21–32)
CREAT SERPL-MCNC: 0.7 MG/DL (ref 0.8–1.5)
DIFFERENTIAL METHOD BLD: ABNORMAL
DIFFERENTIAL METHOD BLD: ABNORMAL
EOSINOPHIL # BLD: 0.3 K/UL (ref 0–0.8)
EOSINOPHIL # BLD: 0.9 K/UL (ref 0–0.8)
EOSINOPHIL NFR BLD: 1 % (ref 0.5–7.8)
EOSINOPHIL NFR BLD: 2 % (ref 0.5–7.8)
ERYTHROCYTE [DISTWIDTH] IN BLOOD BY AUTOMATED COUNT: 14.8 % (ref 11.9–14.6)
ERYTHROCYTE [DISTWIDTH] IN BLOOD BY AUTOMATED COUNT: 14.8 % (ref 11.9–14.6)
GLUCOSE SERPL-MCNC: 108 MG/DL (ref 65–100)
HCT VFR BLD AUTO: 35.1 %
HCT VFR BLD AUTO: 35.3 %
HGB BLD-MCNC: 11.1 G/DL (ref 13.6–17.2)
HGB BLD-MCNC: 11.3 G/DL (ref 13.6–17.2)
IMM GRANULOCYTES # BLD AUTO: 6.8 K/UL (ref 0–0.5)
IMM GRANULOCYTES # BLD AUTO: 7.4 K/UL (ref 0–0.5)
IMM GRANULOCYTES NFR BLD AUTO: 15 % (ref 0–5)
IMM GRANULOCYTES NFR BLD AUTO: 24 % (ref 0–5)
LYMPHOCYTES # BLD: 0.9 K/UL (ref 0.5–4.6)
LYMPHOCYTES # BLD: 2.7 K/UL (ref 0.5–4.6)
LYMPHOCYTES NFR BLD: 3 % (ref 13–44)
LYMPHOCYTES NFR BLD: 6 % (ref 13–44)
MAGNESIUM SERPL-MCNC: 2 MG/DL (ref 1.8–2.4)
MCH RBC QN AUTO: 31.4 PG (ref 26.1–32.9)
MCH RBC QN AUTO: 31.8 PG (ref 26.1–32.9)
MCHC RBC AUTO-ENTMCNC: 31.6 G/DL (ref 31.4–35)
MCHC RBC AUTO-ENTMCNC: 32 G/DL (ref 31.4–35)
MCV RBC AUTO: 99.4 FL (ref 79.6–97.8)
MCV RBC AUTO: 99.4 FL (ref 79.6–97.8)
MONOCYTES # BLD: 2.5 K/UL (ref 0.1–1.3)
MONOCYTES # BLD: 7.2 K/UL (ref 0.1–1.3)
MONOCYTES NFR BLD: 16 % (ref 4–12)
MONOCYTES NFR BLD: 8 % (ref 4–12)
NEUTS SEG # BLD: 19.9 K/UL (ref 1.7–8.2)
NEUTS SEG # BLD: 27.4 K/UL (ref 1.7–8.2)
NEUTS SEG NFR BLD: 61 % (ref 43–78)
NEUTS SEG NFR BLD: 64 % (ref 43–78)
NRBC # BLD: 0 K/UL (ref 0–0.2)
NRBC # BLD: 0 K/UL (ref 0–0.2)
PLATELET # BLD AUTO: 167 K/UL (ref 150–450)
PLATELET # BLD AUTO: 84 K/UL (ref 150–450)
PLATELET COMMENTS,PCOM: ABNORMAL
PLATELET COMMENTS,PCOM: ADEQUATE
PMV BLD AUTO: 10 FL (ref 9.4–12.3)
PMV BLD AUTO: 10.6 FL (ref 9.4–12.3)
POTASSIUM SERPL-SCNC: 3.7 MMOL/L (ref 3.5–5.1)
RBC # BLD AUTO: 3.53 M/UL (ref 4.23–5.6)
RBC # BLD AUTO: 3.55 M/UL (ref 4.23–5.6)
RBC MORPH BLD: ABNORMAL
SODIUM SERPL-SCNC: 141 MMOL/L (ref 136–145)
WBC # BLD AUTO: 31 K/UL (ref 4.3–11.1)
WBC # BLD AUTO: 45 K/UL (ref 4.3–11.1)
WBC MORPH BLD: ABNORMAL
WBC MORPH BLD: ABNORMAL

## 2022-03-15 PROCEDURE — 86900 BLOOD TYPING SEROLOGIC ABO: CPT

## 2022-03-15 PROCEDURE — 96372 THER/PROPH/DIAG INJ SC/IM: CPT

## 2022-03-15 PROCEDURE — 83735 ASSAY OF MAGNESIUM: CPT

## 2022-03-15 PROCEDURE — 74011250636 HC RX REV CODE- 250/636: Performed by: INTERNAL MEDICINE

## 2022-03-15 PROCEDURE — 85025 COMPLETE CBC W/AUTO DIFF WBC: CPT

## 2022-03-15 PROCEDURE — 80048 BASIC METABOLIC PNL TOTAL CA: CPT

## 2022-03-15 PROCEDURE — 74011000250 HC RX REV CODE- 250: Performed by: INTERNAL MEDICINE

## 2022-03-15 PROCEDURE — 86367 STEM CELLS TOTAL COUNT: CPT

## 2022-03-15 PROCEDURE — 38206 HARVEST AUTO STEM CELLS: CPT

## 2022-03-15 RX ORDER — SODIUM CHLORIDE 9 MG/ML
1000 INJECTION, SOLUTION INTRAVENOUS CONTINUOUS
Status: ACTIVE | OUTPATIENT
Start: 2022-03-15 | End: 2022-03-15

## 2022-03-15 RX ORDER — SODIUM CHLORIDE 0.9 % (FLUSH) 0.9 %
10-40 SYRINGE (ML) INJECTION AS NEEDED
Status: ACTIVE | OUTPATIENT
Start: 2022-03-15 | End: 2022-03-15

## 2022-03-15 RX ADMIN — SODIUM CHLORIDE, PRESERVATIVE FREE 40 ML: 5 INJECTION INTRAVENOUS at 15:32

## 2022-03-15 RX ADMIN — SODIUM CHLORIDE 1000 ML: 9 INJECTION, SOLUTION INTRAVENOUS at 08:11

## 2022-03-15 RX ADMIN — SODIUM CHLORIDE, PRESERVATIVE FREE 10 ML: 5 INJECTION INTRAVENOUS at 07:20

## 2022-03-15 RX ADMIN — SODIUM CHLORIDE, PRESERVATIVE FREE 10 ML: 5 INJECTION INTRAVENOUS at 07:21

## 2022-03-15 RX ADMIN — CALCIUM GLUCONATE 6 G: 98 INJECTION, SOLUTION INTRAVENOUS at 08:11

## 2022-03-15 RX ADMIN — PLERIXAFOR 20.8 MG: 24 SOLUTION SUBCUTANEOUS at 17:59

## 2022-03-15 RX ADMIN — FILGRASTIM-AAFI 960 MCG: 480 INJECTION, SOLUTION SUBCUTANEOUS at 08:08

## 2022-03-15 NOTE — PROGRESS NOTES
Apheresis Day: 1  Dx: DLBCL  Pre-collection/mobilization form was reviewed prior to initiating collection. Granix was given per policy. Mozobil used: yes (yes/no)  Type of catheter (temp or tunneled): temp  Calcium 6 g used during procedure. CD34: 22  Predictive for 20 liters: 1.6 x 106/kg    30 liters: 2.4 x 106/kg  Pre WBC 45  (critical value does not fall outside of expected limits as documented in the medical protocol)  Pre-hgb: 11.3  /  Plt: 167  Post WBC 31.0  Post-hgb: 11.1  /  Plt: 84  Blood/electrolyte replacements: none  Whole blood processed: 31012  Collection results received at 1634. Patient collected 2.41 x 106/kg for cumulative 2.41 x 106/kg.   Collection goal: 5x 106/kg   Hematocrit in collection bag 2.8  Pt seen by: Sera Heller NP  Issues: none  Line issues:none  Next procedure date: 3/15 at 1800      Target CD34+/Kg 5.0           Kg recipient 85.3   CD34+ pre-count 22   WB liters to process 54.2

## 2022-03-16 ENCOUNTER — HOSPITAL ENCOUNTER (OUTPATIENT)
Dept: INFUSION THERAPY | Age: 57
Discharge: HOME | End: 2022-03-16

## 2022-03-16 ENCOUNTER — HOSPITAL ENCOUNTER (OUTPATIENT)
Dept: INFUSION THERAPY | Age: 57
Discharge: HOME | End: 2022-03-16
Payer: COMMERCIAL

## 2022-03-16 VITALS
RESPIRATION RATE: 18 BRPM | HEART RATE: 76 BPM | WEIGHT: 192 LBS | TEMPERATURE: 97.6 F | BODY MASS INDEX: 26.04 KG/M2 | OXYGEN SATURATION: 100 % | SYSTOLIC BLOOD PRESSURE: 145 MMHG | DIASTOLIC BLOOD PRESSURE: 62 MMHG

## 2022-03-16 DIAGNOSIS — C83.30 DIFFUSE LARGE B-CELL LYMPHOMA, UNSPECIFIED BODY REGION (HCC): Primary | ICD-10-CM

## 2022-03-16 LAB
ALBUMIN SERPL-MCNC: 3.7 G/DL (ref 3.5–5)
ALBUMIN/GLOB SERPL: 1.4 {RATIO} (ref 1.2–3.5)
ALP SERPL-CCNC: 194 U/L (ref 50–136)
ALT SERPL-CCNC: 21 U/L (ref 12–65)
ANION GAP SERPL CALC-SCNC: 2 MMOL/L (ref 7–16)
AST SERPL-CCNC: 49 U/L (ref 15–37)
BASOPHILS # BLD: 0 K/UL (ref 0–0.2)
BASOPHILS # BLD: 0 K/UL (ref 0–0.2)
BASOPHILS NFR BLD: 0 % (ref 0–2)
BASOPHILS NFR BLD: 0 % (ref 0–2)
BILIRUB SERPL-MCNC: 0.3 MG/DL (ref 0.2–1.1)
BUN SERPL-MCNC: 10 MG/DL (ref 6–23)
CALCIUM SERPL-MCNC: 8.8 MG/DL (ref 8.3–10.4)
CHLORIDE SERPL-SCNC: 107 MMOL/L (ref 98–107)
CO2 SERPL-SCNC: 33 MMOL/L (ref 21–32)
CREAT SERPL-MCNC: 0.8 MG/DL (ref 0.8–1.5)
DIFFERENTIAL METHOD BLD: ABNORMAL
DIFFERENTIAL METHOD BLD: ABNORMAL
EOSINOPHIL # BLD: 0.4 K/UL (ref 0–0.8)
EOSINOPHIL # BLD: 0.8 K/UL (ref 0–0.8)
EOSINOPHIL NFR BLD: 1 % (ref 0.5–7.8)
EOSINOPHIL NFR BLD: 2 % (ref 0.5–7.8)
ERYTHROCYTE [DISTWIDTH] IN BLOOD BY AUTOMATED COUNT: 14.6 % (ref 11.9–14.6)
ERYTHROCYTE [DISTWIDTH] IN BLOOD BY AUTOMATED COUNT: 14.7 % (ref 11.9–14.6)
GLOBULIN SER CALC-MCNC: 2.6 G/DL (ref 2.3–3.5)
GLUCOSE SERPL-MCNC: 123 MG/DL (ref 65–100)
HCT VFR BLD AUTO: 33.4 %
HCT VFR BLD AUTO: 33.6 %
HGB BLD-MCNC: 10.6 G/DL (ref 13.6–17.2)
HGB BLD-MCNC: 10.7 G/DL (ref 13.6–17.2)
IMM GRANULOCYTES # BLD AUTO: 10.8 K/UL (ref 0–0.5)
IMM GRANULOCYTES # BLD AUTO: 5.6 K/UL (ref 0–0.5)
IMM GRANULOCYTES NFR BLD AUTO: 16 % (ref 0–5)
IMM GRANULOCYTES NFR BLD AUTO: 27 % (ref 0–5)
LYMPHOCYTES # BLD: 0.7 K/UL (ref 0.5–4.6)
LYMPHOCYTES # BLD: 1.6 K/UL (ref 0.5–4.6)
LYMPHOCYTES NFR BLD: 2 % (ref 13–44)
LYMPHOCYTES NFR BLD: 4 % (ref 13–44)
MAGNESIUM SERPL-MCNC: 1.8 MG/DL (ref 1.8–2.4)
MCH RBC QN AUTO: 31.6 PG (ref 26.1–32.9)
MCH RBC QN AUTO: 31.6 PG (ref 26.1–32.9)
MCHC RBC AUTO-ENTMCNC: 31.7 G/DL (ref 31.4–35)
MCHC RBC AUTO-ENTMCNC: 31.8 G/DL (ref 31.4–35)
MCV RBC AUTO: 99.1 FL (ref 79.6–97.8)
MCV RBC AUTO: 99.7 FL (ref 79.6–97.8)
MONOCYTES # BLD: 2.1 K/UL (ref 0.1–1.3)
MONOCYTES # BLD: 5.6 K/UL (ref 0.1–1.3)
MONOCYTES NFR BLD: 14 % (ref 4–12)
MONOCYTES NFR BLD: 6 % (ref 4–12)
NEUTS SEG # BLD: 21.2 K/UL (ref 1.7–8.2)
NEUTS SEG # BLD: 26.5 K/UL (ref 1.7–8.2)
NEUTS SEG NFR BLD: 53 % (ref 43–78)
NEUTS SEG NFR BLD: 75 % (ref 43–78)
NRBC # BLD: 0 K/UL (ref 0–0.2)
NRBC # BLD: 0 K/UL (ref 0–0.2)
PHOSPHATE SERPL-MCNC: 3 MG/DL (ref 2.5–4.5)
PLATELET # BLD AUTO: 45 K/UL (ref 150–450)
PLATELET # BLD AUTO: 86 K/UL (ref 150–450)
PLATELET COMMENTS,PCOM: ABNORMAL
PLATELET COMMENTS,PCOM: ABNORMAL
PMV BLD AUTO: 10.5 FL (ref 9.4–12.3)
PMV BLD AUTO: 9.4 FL (ref 9.4–12.3)
POTASSIUM SERPL-SCNC: 3.3 MMOL/L (ref 3.5–5.1)
PROT SERPL-MCNC: 6.3 G/DL (ref 6.3–8.2)
RBC # BLD AUTO: 3.35 M/UL (ref 4.23–5.6)
RBC # BLD AUTO: 3.39 M/UL (ref 4.23–5.6)
RBC MORPH BLD: ABNORMAL
SODIUM SERPL-SCNC: 142 MMOL/L (ref 136–145)
WBC # BLD AUTO: 35.3 K/UL (ref 4.3–11.1)
WBC # BLD AUTO: 40 K/UL (ref 4.3–11.1)
WBC MORPH BLD: ABNORMAL
WBC MORPH BLD: ABNORMAL

## 2022-03-16 PROCEDURE — 96365 THER/PROPH/DIAG IV INF INIT: CPT

## 2022-03-16 PROCEDURE — 38206 HARVEST AUTO STEM CELLS: CPT

## 2022-03-16 PROCEDURE — 86367 STEM CELLS TOTAL COUNT: CPT

## 2022-03-16 PROCEDURE — 85025 COMPLETE CBC W/AUTO DIFF WBC: CPT

## 2022-03-16 PROCEDURE — 74011000250 HC RX REV CODE- 250: Performed by: INTERNAL MEDICINE

## 2022-03-16 PROCEDURE — 83735 ASSAY OF MAGNESIUM: CPT

## 2022-03-16 PROCEDURE — 74011250636 HC RX REV CODE- 250/636: Performed by: INTERNAL MEDICINE

## 2022-03-16 PROCEDURE — 96366 THER/PROPH/DIAG IV INF ADDON: CPT

## 2022-03-16 PROCEDURE — 84100 ASSAY OF PHOSPHORUS: CPT

## 2022-03-16 PROCEDURE — 80053 COMPREHEN METABOLIC PANEL: CPT

## 2022-03-16 RX ORDER — SODIUM CHLORIDE 0.9 % (FLUSH) 0.9 %
10-40 SYRINGE (ML) INJECTION AS NEEDED
Status: ACTIVE | OUTPATIENT
Start: 2022-03-16 | End: 2022-03-16

## 2022-03-16 RX ORDER — POTASSIUM CHLORIDE 29.8 MG/ML
40 INJECTION INTRAVENOUS ONCE
Status: COMPLETED | OUTPATIENT
Start: 2022-03-16 | End: 2022-03-16

## 2022-03-16 RX ORDER — SODIUM CHLORIDE 9 MG/ML
1000 INJECTION, SOLUTION INTRAVENOUS CONTINUOUS
Status: ACTIVE | OUTPATIENT
Start: 2022-03-16 | End: 2022-03-16

## 2022-03-16 RX ADMIN — SODIUM CHLORIDE, PRESERVATIVE FREE 40 ML: 5 INJECTION INTRAVENOUS at 08:05

## 2022-03-16 RX ADMIN — FILGRASTIM-AAFI 960 MCG: 480 INJECTION, SOLUTION SUBCUTANEOUS at 07:56

## 2022-03-16 RX ADMIN — SODIUM CHLORIDE 1000 ML: 9 INJECTION, SOLUTION INTRAVENOUS at 08:55

## 2022-03-16 RX ADMIN — CALCIUM GLUCONATE 6 G: 98 INJECTION, SOLUTION INTRAVENOUS at 08:55

## 2022-03-16 RX ADMIN — SODIUM CHLORIDE, PRESERVATIVE FREE 40 ML: 5 INJECTION INTRAVENOUS at 14:35

## 2022-03-16 RX ADMIN — POTASSIUM CHLORIDE 40 MEQ: 400 INJECTION, SOLUTION INTRAVENOUS at 08:55

## 2022-03-16 RX ADMIN — SODIUM CHLORIDE, PRESERVATIVE FREE 40 ML: 5 INJECTION INTRAVENOUS at 08:55

## 2022-03-16 NOTE — PROGRESS NOTES
Apheresis Day: 2  Dx: DLBCL  Pre-collection/mobilization form was reviewed prior to initiating collection. Granix was given per policy. Mozobil used: yes (yes/no)  Type of catheter (temp or tunneled): temp  Calcium 6 g used during procedure. CD34: 30  Predictive for 20 liters: 2 x 106/kg    30 liters: 3 x 106/kg  Pre WBC 40  (critical value does not fall outside of expected limits as documented in the medical protocol)  Pre-hgb: 10.6  /  Plt: 86  Post WBC 35.3  Post-hgb: 10.7  /  Plt: 45  Blood/electrolyte replacements: Potassium 40meq  Whole blood processed: 71814  Collection results received at 1545. Patient collected 2.41 x 106/kg for cumulative 4.82 x 106/kg. Collection goal: 5x 106/kg   Hematocrit in collection bag 2%  Pt seen by: Jackie Lee NP  Issues: Pt c/o nausea after procedure, stated he had felt like that the last hour. Refused antiemetics and said he would take his nausea medication when he got home, pt left ambulatory in stable condition  Line issues:none  Next procedure date: 3/17/22 at 0800  Pt to receive 1 unit Platelets and Apheresis Line pulled after platelets are completed.  Dr Rachid Ferreira approved stopping collection at 4.82 per Navigator      Target CD34+/Kg 2.6           Kg recipient 87.1   CD34+ pre-count 30   WB liters to process 21.6

## 2022-03-17 ENCOUNTER — HOSPITAL ENCOUNTER (OUTPATIENT)
Dept: INFUSION THERAPY | Age: 57
End: 2022-03-17

## 2022-03-17 ENCOUNTER — HOSPITAL ENCOUNTER (OUTPATIENT)
Dept: INFUSION THERAPY | Age: 57
Discharge: HOME | End: 2022-03-17
Payer: COMMERCIAL

## 2022-03-17 VITALS
SYSTOLIC BLOOD PRESSURE: 138 MMHG | OXYGEN SATURATION: 97 % | RESPIRATION RATE: 16 BRPM | DIASTOLIC BLOOD PRESSURE: 72 MMHG | HEART RATE: 74 BPM | TEMPERATURE: 98.1 F

## 2022-03-17 LAB — CD34,XCD34T: NORMAL

## 2022-03-17 PROCEDURE — 36430 TRANSFUSION BLD/BLD COMPNT: CPT

## 2022-03-17 PROCEDURE — P9035 PLATELET PHERES LEUKOREDUCED: HCPCS

## 2022-03-17 PROCEDURE — 77030018667 ADMN ST IV BLD FENW -A

## 2022-03-17 PROCEDURE — 74011250636 HC RX REV CODE- 250/636: Performed by: INTERNAL MEDICINE

## 2022-03-17 PROCEDURE — 99212 OFFICE O/P EST SF 10 MIN: CPT

## 2022-03-17 PROCEDURE — 74011250637 HC RX REV CODE- 250/637: Performed by: INTERNAL MEDICINE

## 2022-03-17 RX ORDER — SODIUM CHLORIDE 9 MG/ML
250 INJECTION, SOLUTION INTRAVENOUS AS NEEDED
Status: DISCONTINUED | OUTPATIENT
Start: 2022-03-17 | End: 2022-03-19 | Stop reason: HOSPADM

## 2022-03-17 RX ORDER — ACETAMINOPHEN 325 MG/1
650 TABLET ORAL ONCE
Status: COMPLETED | OUTPATIENT
Start: 2022-03-17 | End: 2022-03-17

## 2022-03-17 RX ORDER — DIPHENHYDRAMINE HCL 25 MG
25 CAPSULE ORAL ONCE
Status: COMPLETED | OUTPATIENT
Start: 2022-03-17 | End: 2022-03-17

## 2022-03-17 RX ADMIN — ACETAMINOPHEN 650 MG: 325 TABLET ORAL at 08:22

## 2022-03-17 RX ADMIN — DIPHENHYDRAMINE HYDROCHLORIDE 25 MG: 25 CAPSULE ORAL at 08:21

## 2022-03-17 RX ADMIN — SODIUM CHLORIDE 250 ML: 900 INJECTION, SOLUTION INTRAVENOUS at 08:22

## 2022-03-17 NOTE — PROGRESS NOTES
Arrived to the [facility]. 1 unit PLTS completed. Apheresis catheter removed after platelets given. Minimal bleeding noted. Gauze pressure dressing place over site. Pt laid flat for 30 mins post removal. No bleeding noted on discharge. VSS. Sent patient home with extra gauze and tegaderm in case of bleeding. Instructed pt to hold pressure until he can seek emergency attention. Patient verbalized understanding. Any issues or concerns during appointment: none. No further infusion appts needed at this time. Discharged ambulatory.

## 2022-03-18 PROBLEM — I34.0 MITRAL REGURGITATION: Status: ACTIVE | Noted: 2021-12-09

## 2022-03-18 PROBLEM — Q23.88 ABNORMALITY OF CHORDAE TENDINEAE OF MITRAL VALVE: Status: ACTIVE | Noted: 2021-12-17

## 2022-03-18 PROBLEM — Q23.8 ABNORMALITY OF CHORDAE TENDINEAE OF MITRAL VALVE: Status: ACTIVE | Noted: 2021-12-17

## 2022-03-18 LAB
BLD PROD TYP BPU: NORMAL
BPU ID: NORMAL
STATUS OF UNIT,%ST: NORMAL
UNIT DIVISION, %UDIV: 0

## 2022-03-19 PROBLEM — C83.30 DIFFUSE LARGE B CELL LYMPHOMA (HCC): Status: ACTIVE | Noted: 2021-12-09

## 2022-03-19 PROBLEM — I10 HYPERTENSION: Status: ACTIVE | Noted: 2021-12-09

## 2022-03-20 PROBLEM — Z51.11 ADMISSION FOR ANTINEOPLASTIC CHEMOTHERAPY: Status: ACTIVE | Noted: 2021-12-06

## 2022-03-25 LAB
CD34,XCD34T: NORMAL
CD34,XCD34T: NORMAL

## 2022-04-04 ENCOUNTER — PATIENT OUTREACH (OUTPATIENT)
Dept: CASE MANAGEMENT | Age: 57
End: 2022-04-04

## 2022-04-04 ENCOUNTER — HOSPITAL ENCOUNTER (OUTPATIENT)
Dept: LAB | Age: 57
Discharge: HOME | End: 2022-04-04
Payer: COMMERCIAL

## 2022-04-04 DIAGNOSIS — C83.38 DIFFUSE LARGE B-CELL LYMPHOMA OF LYMPH NODES OF MULTIPLE REGIONS (HCC): ICD-10-CM

## 2022-04-04 DIAGNOSIS — Z94.84 STEM CELLS TRANSPLANT STATUS (HCC): ICD-10-CM

## 2022-04-04 LAB
ALBUMIN SERPL-MCNC: 3.9 G/DL (ref 3.5–5)
ALBUMIN/GLOB SERPL: 1.3 {RATIO} (ref 1.2–3.5)
ALP SERPL-CCNC: 138 U/L (ref 50–136)
ALT SERPL-CCNC: 27 U/L (ref 12–65)
ANION GAP SERPL CALC-SCNC: 2 MMOL/L (ref 7–16)
APPEARANCE UR: CLEAR
AST SERPL-CCNC: 22 U/L (ref 15–37)
BACTERIA URNS QL MICRO: 0 /HPF
BASOPHILS # BLD: 0.1 K/UL (ref 0–0.2)
BASOPHILS NFR BLD: 1 % (ref 0–2)
BILIRUB SERPL-MCNC: 0.3 MG/DL (ref 0.2–1.1)
BILIRUB UR QL: NEGATIVE
BUN SERPL-MCNC: 15 MG/DL (ref 6–23)
CALCIUM SERPL-MCNC: 9.3 MG/DL (ref 8.3–10.4)
CASTS URNS QL MICRO: 0 /LPF
CHLORIDE SERPL-SCNC: 108 MMOL/L (ref 98–107)
CO2 SERPL-SCNC: 32 MMOL/L (ref 21–32)
COLOR UR: YELLOW
COVID-19 RAPID TEST, COVR: NOT DETECTED
CREAT SERPL-MCNC: 0.9 MG/DL (ref 0.8–1.5)
CRYSTALS URNS QL MICRO: 0 /LPF
DIFFERENTIAL METHOD BLD: ABNORMAL
EOSINOPHIL # BLD: 0.2 K/UL (ref 0–0.8)
EOSINOPHIL NFR BLD: 5 % (ref 0.5–7.8)
EPI CELLS #/AREA URNS HPF: 0 /HPF
ERYTHROCYTE [DISTWIDTH] IN BLOOD BY AUTOMATED COUNT: 12.3 % (ref 11.9–14.6)
FERRITIN SERPL-MCNC: 141 NG/ML (ref 8–388)
FLUAV AG NPH QL IA: NEGATIVE
FLUBV AG NPH QL IA: NEGATIVE
GLOBULIN SER CALC-MCNC: 3 G/DL (ref 2.3–3.5)
GLUCOSE SERPL-MCNC: 89 MG/DL (ref 65–100)
GLUCOSE UR STRIP.AUTO-MCNC: NEGATIVE MG/DL
HCT VFR BLD AUTO: 36.5 %
HGB BLD-MCNC: 12.2 G/DL (ref 13.6–17.2)
HGB UR QL STRIP: ABNORMAL
IMM GRANULOCYTES # BLD AUTO: 0 K/UL (ref 0–0.5)
IMM GRANULOCYTES NFR BLD AUTO: 0 % (ref 0–5)
KETONES UR QL STRIP.AUTO: NEGATIVE MG/DL
LEUKOCYTE ESTERASE UR QL STRIP.AUTO: NEGATIVE
LYMPHOCYTES # BLD: 0.8 K/UL (ref 0.5–4.6)
LYMPHOCYTES NFR BLD: 16 % (ref 13–44)
MAGNESIUM SERPL-MCNC: 2.1 MG/DL (ref 1.8–2.4)
MCH RBC QN AUTO: 31 PG (ref 26.1–32.9)
MCHC RBC AUTO-ENTMCNC: 33.4 G/DL (ref 31.4–35)
MCV RBC AUTO: 92.9 FL (ref 79.6–97.8)
MONOCYTES # BLD: 0.5 K/UL (ref 0.1–1.3)
MONOCYTES NFR BLD: 10 % (ref 4–12)
MUCOUS THREADS URNS QL MICRO: ABNORMAL /LPF
NEUTS SEG # BLD: 3.5 K/UL (ref 1.7–8.2)
NEUTS SEG NFR BLD: 68 % (ref 43–78)
NITRITE UR QL STRIP.AUTO: NEGATIVE
NRBC # BLD: 0 K/UL (ref 0–0.2)
PH UR STRIP: 5.5 [PH] (ref 5–9)
PLATELET # BLD AUTO: 192 K/UL (ref 150–450)
PMV BLD AUTO: 9.8 FL (ref 9.4–12.3)
POTASSIUM SERPL-SCNC: 4.1 MMOL/L (ref 3.5–5.1)
PROT SERPL-MCNC: 6.9 G/DL (ref 6.3–8.2)
PROT UR STRIP-MCNC: NEGATIVE MG/DL
RBC # BLD AUTO: 3.93 M/UL (ref 4.23–5.6)
RBC #/AREA URNS HPF: ABNORMAL /HPF
SODIUM SERPL-SCNC: 142 MMOL/L (ref 136–145)
SOURCE, COVRS: NORMAL
SP GR UR REFRACTOMETRY: 1.02 (ref 1–1.02)
SPECIMEN SOURCE: NORMAL
UA: UC IF INDICATED,UAUC: ABNORMAL
UROBILINOGEN UR QL STRIP.AUTO: 0.2 EU/DL (ref 0.2–1)
WBC # BLD AUTO: 5.2 K/UL (ref 4.3–11.1)
WBC URNS QL MICRO: 0 /HPF

## 2022-04-04 PROCEDURE — 87804 INFLUENZA ASSAY W/OPTIC: CPT

## 2022-04-04 PROCEDURE — 87635 SARS-COV-2 COVID-19 AMP PRB: CPT

## 2022-04-04 PROCEDURE — 36415 COLL VENOUS BLD VENIPUNCTURE: CPT

## 2022-04-04 PROCEDURE — 83735 ASSAY OF MAGNESIUM: CPT

## 2022-04-04 PROCEDURE — 85025 COMPLETE CBC W/AUTO DIFF WBC: CPT

## 2022-04-04 PROCEDURE — 81001 URINALYSIS AUTO W/SCOPE: CPT

## 2022-04-04 PROCEDURE — U0005 INFEC AGEN DETEC AMPLI PROBE: HCPCS

## 2022-04-04 PROCEDURE — 80053 COMPREHEN METABOLIC PANEL: CPT

## 2022-04-04 PROCEDURE — 82728 ASSAY OF FERRITIN: CPT

## 2022-04-04 NOTE — PROGRESS NOTES
4/4  Did pt HD eval today. He is doing great. Tomorrow (4/5) to start huis BEAM tx. Plan to receive his cell on 4/11.

## 2022-04-05 ENCOUNTER — HOSPITAL ENCOUNTER (INPATIENT)
Age: 57
LOS: 19 days | Discharge: HOME | DRG: 016 | End: 2022-04-24
Attending: INTERNAL MEDICINE | Admitting: INTERNAL MEDICINE
Payer: COMMERCIAL

## 2022-04-05 DIAGNOSIS — Z94.81 BONE MARROW TRANSPLANT STATUS (HCC): ICD-10-CM

## 2022-04-05 DIAGNOSIS — Z51.11 ADMISSION FOR ANTINEOPLASTIC CHEMOTHERAPY: ICD-10-CM

## 2022-04-05 DIAGNOSIS — D84.9 IMMUNOCOMPROMISED (HCC): ICD-10-CM

## 2022-04-05 DIAGNOSIS — T45.1X5A CHEMOTHERAPY INDUCED DIARRHEA: ICD-10-CM

## 2022-04-05 DIAGNOSIS — C83.30 DIFFUSE LARGE B-CELL LYMPHOMA, UNSPECIFIED BODY REGION (HCC): Primary | ICD-10-CM

## 2022-04-05 DIAGNOSIS — E44.0 MALNUTRITION OF MODERATE DEGREE (HCC): ICD-10-CM

## 2022-04-05 DIAGNOSIS — K52.1 CHEMOTHERAPY INDUCED DIARRHEA: ICD-10-CM

## 2022-04-05 DIAGNOSIS — I34.0 NONRHEUMATIC MITRAL VALVE REGURGITATION: ICD-10-CM

## 2022-04-05 LAB
ALBUMIN SERPL-MCNC: 3.7 G/DL (ref 3.5–5)
ALBUMIN/GLOB SERPL: 1.4 {RATIO} (ref 1.2–3.5)
ALP SERPL-CCNC: 142 U/L (ref 50–136)
ALT SERPL-CCNC: 24 U/L (ref 12–65)
ANION GAP SERPL CALC-SCNC: 1 MMOL/L (ref 7–16)
AST SERPL-CCNC: 25 U/L (ref 15–37)
BASOPHILS # BLD: 0.1 K/UL (ref 0–0.2)
BASOPHILS NFR BLD: 1 % (ref 0–2)
BILIRUB SERPL-MCNC: 0.2 MG/DL (ref 0.2–1.1)
BUN SERPL-MCNC: 19 MG/DL (ref 6–23)
CALCIUM SERPL-MCNC: 9 MG/DL (ref 8.3–10.4)
CHLORIDE SERPL-SCNC: 108 MMOL/L (ref 98–107)
CO2 SERPL-SCNC: 32 MMOL/L (ref 21–32)
CREAT SERPL-MCNC: 0.7 MG/DL (ref 0.8–1.5)
DIFFERENTIAL METHOD BLD: ABNORMAL
EOSINOPHIL # BLD: 0.3 K/UL (ref 0–0.8)
EOSINOPHIL NFR BLD: 7 % (ref 0.5–7.8)
ERYTHROCYTE [DISTWIDTH] IN BLOOD BY AUTOMATED COUNT: 12.3 % (ref 11.9–14.6)
GLOBULIN SER CALC-MCNC: 2.6 G/DL (ref 2.3–3.5)
GLUCOSE SERPL-MCNC: 102 MG/DL (ref 65–100)
HCT VFR BLD AUTO: 34.7 % (ref 41.1–50.3)
HGB BLD-MCNC: 11.2 G/DL (ref 13.6–17.2)
IMM GRANULOCYTES # BLD AUTO: 0 K/UL (ref 0–0.5)
IMM GRANULOCYTES NFR BLD AUTO: 0 % (ref 0–5)
LYMPHOCYTES # BLD: 0.7 K/UL (ref 0.5–4.6)
LYMPHOCYTES NFR BLD: 14 % (ref 13–44)
MAGNESIUM SERPL-MCNC: 2.2 MG/DL (ref 1.8–2.4)
MCH RBC QN AUTO: 30.1 PG (ref 26.1–32.9)
MCHC RBC AUTO-ENTMCNC: 32.3 G/DL (ref 31.4–35)
MCV RBC AUTO: 93.3 FL (ref 79.6–97.8)
MONOCYTES # BLD: 0.7 K/UL (ref 0.1–1.3)
MONOCYTES NFR BLD: 14 % (ref 4–12)
NEUTS SEG # BLD: 3.2 K/UL (ref 1.7–8.2)
NEUTS SEG NFR BLD: 64 % (ref 43–78)
NRBC # BLD: 0 K/UL (ref 0–0.2)
PLATELET # BLD AUTO: 179 K/UL (ref 150–450)
PMV BLD AUTO: 9.9 FL (ref 9.4–12.3)
POTASSIUM SERPL-SCNC: 4.1 MMOL/L (ref 3.5–5.1)
PROT SERPL-MCNC: 6.3 G/DL (ref 6.3–8.2)
RBC # BLD AUTO: 3.72 M/UL (ref 4.23–5.6)
SARS-COV-2, COV2: NOT DETECTED
SODIUM SERPL-SCNC: 141 MMOL/L (ref 138–145)
SPECIMEN SOURCE, FCOV2M: NORMAL
WBC # BLD AUTO: 5.1 K/UL (ref 4.3–11.1)

## 2022-04-05 PROCEDURE — 74011000258 HC RX REV CODE- 258: Performed by: INTERNAL MEDICINE

## 2022-04-05 PROCEDURE — 86705 HEP B CORE ANTIBODY IGM: CPT

## 2022-04-05 PROCEDURE — 86706 HEP B SURFACE ANTIBODY: CPT

## 2022-04-05 PROCEDURE — 74011250636 HC RX REV CODE- 250/636: Performed by: INTERNAL MEDICINE

## 2022-04-05 PROCEDURE — 65270000015 HC RM PRIVATE ONCOLOGY

## 2022-04-05 PROCEDURE — 36591 DRAW BLOOD OFF VENOUS DEVICE: CPT

## 2022-04-05 PROCEDURE — 82784 ASSAY IGA/IGD/IGG/IGM EACH: CPT

## 2022-04-05 PROCEDURE — 80074 ACUTE HEPATITIS PANEL: CPT

## 2022-04-05 PROCEDURE — 86704 HEP B CORE ANTIBODY TOTAL: CPT

## 2022-04-05 PROCEDURE — APPSS60 APP SPLIT SHARED TIME 46-60 MINUTES: Performed by: NURSE PRACTITIONER

## 2022-04-05 PROCEDURE — 85025 COMPLETE CBC W/AUTO DIFF WBC: CPT

## 2022-04-05 PROCEDURE — 3E04305 INTRODUCTION OF OTHER ANTINEOPLASTIC INTO CENTRAL VEIN, PERCUTANEOUS APPROACH: ICD-10-PCS | Performed by: INTERNAL MEDICINE

## 2022-04-05 PROCEDURE — 83735 ASSAY OF MAGNESIUM: CPT

## 2022-04-05 PROCEDURE — 99223 1ST HOSP IP/OBS HIGH 75: CPT | Performed by: INTERNAL MEDICINE

## 2022-04-05 PROCEDURE — 81003 URINALYSIS AUTO W/O SCOPE: CPT

## 2022-04-05 PROCEDURE — 86707 HEPATITIS BE ANTIBODY: CPT

## 2022-04-05 PROCEDURE — 80053 COMPREHEN METABOLIC PANEL: CPT

## 2022-04-05 RX ORDER — ENOXAPARIN SODIUM 100 MG/ML
40 INJECTION SUBCUTANEOUS EVERY 24 HOURS
Status: DISCONTINUED | OUTPATIENT
Start: 2022-04-05 | End: 2022-04-24 | Stop reason: HOSPADM

## 2022-04-05 RX ORDER — DEXAMETHASONE SODIUM PHOSPHATE 100 MG/10ML
10 INJECTION INTRAMUSCULAR; INTRAVENOUS ONCE
Status: COMPLETED | OUTPATIENT
Start: 2022-04-05 | End: 2022-04-05

## 2022-04-05 RX ORDER — DEXTROSE, SODIUM CHLORIDE, AND POTASSIUM CHLORIDE 5; .45; .15 G/100ML; G/100ML; G/100ML
1000 INJECTION INTRAVENOUS
Status: ACTIVE | OUTPATIENT
Start: 2022-04-05 | End: 2022-04-06

## 2022-04-05 RX ORDER — ATENOLOL 50 MG/1
25 TABLET ORAL DAILY
Status: DISCONTINUED | OUTPATIENT
Start: 2022-04-05 | End: 2022-04-24 | Stop reason: HOSPADM

## 2022-04-05 RX ORDER — CETIRIZINE HYDROCHLORIDE 5 MG/1
5 TABLET ORAL DAILY
Status: DISCONTINUED | OUTPATIENT
Start: 2022-04-05 | End: 2022-04-24 | Stop reason: HOSPADM

## 2022-04-05 RX ORDER — ONDANSETRON 4 MG/1
8 TABLET, ORALLY DISINTEGRATING ORAL
Status: DISCONTINUED | OUTPATIENT
Start: 2022-04-05 | End: 2022-04-24 | Stop reason: HOSPADM

## 2022-04-05 RX ORDER — DEXTROSE MONOHYDRATE AND SODIUM CHLORIDE 5; .45 G/100ML; G/100ML
1000 INJECTION, SOLUTION INTRAVENOUS
Status: ACTIVE | OUTPATIENT
Start: 2022-04-05 | End: 2022-04-06

## 2022-04-05 RX ORDER — SODIUM CHLORIDE 0.9 % (FLUSH) 0.9 %
10 SYRINGE (ML) INJECTION AS NEEDED
Status: DISCONTINUED | OUTPATIENT
Start: 2022-04-05 | End: 2022-04-24 | Stop reason: HOSPADM

## 2022-04-05 RX ORDER — HEPARIN 100 UNIT/ML
300 SYRINGE INTRAVENOUS AS NEEDED
Status: DISCONTINUED | OUTPATIENT
Start: 2022-04-05 | End: 2022-04-24 | Stop reason: HOSPADM

## 2022-04-05 RX ORDER — ONDANSETRON 2 MG/ML
8 INJECTION INTRAMUSCULAR; INTRAVENOUS ONCE
Status: COMPLETED | OUTPATIENT
Start: 2022-04-05 | End: 2022-04-05

## 2022-04-05 RX ORDER — LISINOPRIL 5 MG/1
10 TABLET ORAL DAILY
Status: DISCONTINUED | OUTPATIENT
Start: 2022-04-05 | End: 2022-04-09

## 2022-04-05 RX ORDER — SODIUM CHLORIDE 9 MG/ML
25 INJECTION, SOLUTION INTRAVENOUS CONTINUOUS
Status: DISCONTINUED | OUTPATIENT
Start: 2022-04-05 | End: 2022-04-05

## 2022-04-05 RX ORDER — ACETAMINOPHEN 325 MG/1
650 TABLET ORAL
Status: DISCONTINUED | OUTPATIENT
Start: 2022-04-05 | End: 2022-04-17

## 2022-04-05 RX ADMIN — CARMUSTINE 600 MG: KIT at 15:20

## 2022-04-05 RX ADMIN — Medication 10 ML: at 21:10

## 2022-04-05 RX ADMIN — ONDANSETRON 8 MG: 2 INJECTION INTRAMUSCULAR; INTRAVENOUS at 14:41

## 2022-04-05 RX ADMIN — DEXAMETHASONE SODIUM PHOSPHATE 10 MG: 10 INJECTION INTRAMUSCULAR; INTRAVENOUS at 14:41

## 2022-04-05 RX ADMIN — FOSAPREPITANT 150 MG: 150 INJECTION, POWDER, LYOPHILIZED, FOR SOLUTION INTRAVENOUS at 14:41

## 2022-04-05 NOTE — PROGRESS NOTES
Problem: Falls - Risk of  Goal: *Absence of Falls  Description: Document Marta Hurt Fall Risk and appropriate interventions in the flowsheet.   Outcome: Progressing Towards Goal  Note: Fall Risk Interventions:            Medication Interventions: Teach patient to arise slowly

## 2022-04-05 NOTE — PROGRESS NOTES
04/05/22 0915   Dual Skin Pressure Injury Assessment   Dual Skin Pressure Injury Assessment WDL   Second Care Provider (Based on [address] [place] [address]) Nolan Arevalo RN   No wounds or breakdown noted. Wears left eye patch.

## 2022-04-05 NOTE — H&P
[facility] Hematology & Oncology        Inpatient Hematology / Oncology History and Physical    Reason for Admission:  Diffuse large B cell lymphoma ([facility] [C83.30]  Admission for antineoplastic chemotherapy [Z51.11]  Bone marrow transplant status ([facility] [Z94.81]    History of Present Illness:  Mr. Ingrid Link is a 64 y.o. male admitted on 4/5/2022. The primary encounter diagnosis was Diffuse large B-cell lymphoma, unspecified body region [facility]). A diagnosis of Admission for antineoplastic chemotherapy was also pertinent to this visit. 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 He is a patient of Dr Kristie Gonzalez treated for relapsed DLBCL. He was initially treated by Dr Jose Juan Suarez in [facility] with R-CHOP 4/21-7/21. However, he was noted to have relapsed disease in L supraclavicular LN. He was then evaluated by Dr Kristie Gonzalez and has now completed R-ICE x3 with IT MTX x4 with CR. He has been evaluated for ASCT and collected 4.8x10^6/kg CD34+ cells. He is now admitted for BEAM/ASCT. He was seen by Dr Kristie Gonzalez day prior to admission and rapid COVID and flu testing negative. He was previously cleared by cardiology to proceed with transplant given mitral valve regurgitation. He is feeling well and ready to begin treatment today. Review of Systems:  Constitutional Denies fever, chills, weight loss, appetite changes, fatigue, night sweats. HEENT Denies trauma, blurry vision, hearing loss, ear pain, nosebleeds, sore throat, neck pain and ear discharge. Skin Denies lesions or rashes. Lungs Denies dyspnea, cough, sputum production or hemoptysis. Cardiovascular Denies chest pain, palpitations, or lower extremity edema. Gastrointestinal Denies nausea, vomiting, changes in bowel habits, bloody or black stools, abdominal pain.  Denies dysuria, frequency or hesitancy of urination. Neuro Denies headaches, visual changes or ataxia.  Denies dizziness, tingling, tremors, sensory change, speech change, focal weakness or headaches. Hematology Denies easy bruising or bleeding, denies gingival bleeding or epistaxis. Endo Denies heat/cold intolerance, denies diabetes or thyroid abnormalities. MSK Denies back pain, arthralgias, myalgias or frequent falls. Psychiatric/Behavioral Denies depression and substance abuse. The patient is not nervous/anxious.          No Known Allergies  Past Medical History:   Diagnosis Date    Cancer ([facility]     Hypertension     Valvular heart disease      Past Surgical History:   Procedure Laterality Date    HX BACK SURGERY      26 years ago    HX VASCULAR ACCESS      IR BX BONE MARROW DIAGNOSTIC  2/24/2022    IR CHOLECYSTOSTOMY PERCUTANEOUS      IR INSERT NON TUNL CVC OVER 5 YRS  3/14/2022    IR INTRATHECAL CHEMO INJECTION CNS  12/8/2021    IR INTRATHECAL CHEMO INJECTION CNS  12/28/2021    IR INTRATHECAL CHEMO INJECTION CNS  1/28/2022    IR SPINAL PUNCTURE CSF TREAT / DRAIN  2/4/2022     Family History   Problem Relation Age of Onset    Diabetes Mother     Hypertension Mother     Diabetes Father     Hypertension Father     Hypertension Brother      Social History     Socioeconomic History    Marital status:      Spouse name: Not on file    Number of children: Not on file    Years of education: Not on file    Highest education level: Not on file   Occupational History    Not on file   Tobacco Use    Smoking status: Never Smoker    Smokeless tobacco: Never Used   Vaping Use    Vaping Use: Never used   Substance and Sexual Activity    Alcohol use: Not Currently    Drug use: Not Currently    Sexual activity: Not on file   Other Topics Concern     Service Not Asked    Blood Transfusions Not Asked    Caffeine Concern Not Asked    Occupational Exposure Not Asked    Hobby Hazards Not Asked    Sleep Concern Not Asked    Stress Concern Not Asked    Weight Concern Not Asked    Special Diet Not Asked    Back Care Not Asked    Exercise Not Asked    Bike Helmet Not Asked   [address] Not Asked    Self-Exams Not Asked   Social History Narrative    Not on file     Social Determinants of Health     Financial Resource Strain:     Difficulty of Paying Living Expenses: Not on file   Food Insecurity:     Worried About Running Out of Food in the Last Year: Not on file    Sayra of Food in the Last Year: Not on file   Transportation Needs:     Lack of Transportation (Medical): Not on file    Lack of Transportation (Non-Medical):  Not on file   Physical Activity:     Days of Exercise per Week: Not on file    Minutes of Exercise per Session: Not on file   Stress:     Feeling of Stress : Not on file   Social Connections:     Frequency of Communication with Friends and Family: Not on file    Frequency of Social Gatherings with Friends and Family: Not on file    Attends Oriental orthodox Services: Not on file    Active Member of [address] [place] [address] or Organizations: Not on file    Attends Club or Organization Meetings: Not on file    Marital Status: Not on file   Intimate Partner Violence:     Fear of Current or Ex-Partner: Not on file    Emotionally Abused: Not on file    Physically Abused: Not on file    Sexually Abused: Not on file   Housing Stability:     Unable to Pay for Housing in the Last Year: Not on file    Number of [place] in the Last Year: Not on file    Unstable Housing in the Last Year: Not on file     Current Facility-Administered Medications   Medication Dose Route Frequency Provider Last Rate Last Admin    acetaminophen (TYLENOL) tablet 650 mg  650 mg Oral Q4H PRN Volanda Betters, FNP        atenoloL (TENORMIN) tablet 25 mg  25 mg Oral DAILY Volanda Betters, FNP        lisinopriL (PRINIVIL, ZESTRIL) tablet 10 mg  10 mg Oral DAILY Volanda Betters, FNP        cetirizine (ZYRTEC) tablet 5 mg  5 mg Oral DAILY Volanda Betters, FNP        ondansetron (ZOFRAN ODT) tablet 8 mg  8 mg Oral Q8H PRN Jenny Piggs M, FNP        enoxaparin (LOVENOX) injection 40 mg  40 mg SubCUTAneous Q24H Tyrone Thakur M, FNP        dextrose 5% - 0.45% NaCl with KCl 20 mEq/L infusion 1,000 mL  1,000 mL IntraVENous ONCE PRN Ann-Marie Guerra MD        dextrose 5 % - 0.45% NaCl infusion 1,000 mL  1,000 mL IntraVENous ONCE PRN Ann-Marie Guerra MD        fosaprepitant (EMEND) 150 mg in 0.9% sodium chloride 150 mL IVPB  150 mg IntraVENous ONCE Ann-Marie Guerra MD        ondansetron [facility]) injection 8 mg  8 mg IntraVENous ONCE Ann-Marie Guerra MD        dexamethasone (DECADRON) 10 mg/mL injection 10 mg  10 mg IntraVENous ONCE Ann-Marie Guerra MD        carmustine (BICNU) 600 mg in dextrose 5% 250 mL chemo infusion  600 mg IntraVENous Zay Lazaro MD           OBJECTIVE:  Patient Vitals for the past 8 hrs:   BP Temp Pulse Resp SpO2   22 0915 (!) 175/82 98.2 °F (36.8 °C) 63 18 99 %     Temp (24hrs), Av.2 °F (36.8 °C), Min:98.2 °F (36.8 °C), Max:98.2 °F (36.8 °C)    No intake/output data recorded. Physical Exam:  Constitutional: Well developed, well nourished male in no acute distress, sitting comfortably in the hospital bed. HEENT: +eye patch in place. Normocephalic and atraumatic. Oropharynx is clear, mucous membranes are moist. Extraocular muscles are intact. Sclerae anicteric. Neck supple without JVD. No thyromegaly present. Lymph node   No palpable submandibular, cervical, supraclavicular, axillary or inguinal lymph nodes. Skin Warm and dry. No bruising and no rash noted. No erythema. No pallor. Respiratory Lungs are clear to auscultation bilaterally without wheezes, rales or rhonchi, normal air exchange without accessory muscle use. CVS Normal rate, regular rhythm and normal S1 and S2. No murmurs, gallops, or rubs. Abdomen Soft, nontender and nondistended, normoactive bowel sounds. No palpable mass. No hepatosplenomegaly. Neuro Grossly nonfocal with no obvious sensory or motor deficits.    MSK Normal range of motion in general.  No edema and no tenderness. Psych Appropriate mood and affect. Labs:    Recent Results (from the past 24 hour(s))   CBC WITH AUTOMATED DIFF    Collection Time: 04/04/22  1:34 PM   Result Value Ref Range    WBC 5.2 4.3 - 11.1 K/uL    RBC 3.93 (L) 4.23 - 5.6 M/uL    HGB 12.2 (L) 13.6 - 17.2 g/dL    HCT 36.5 %    MCV 92.9 79.6 - 97.8 FL    MCH 31.0 26.1 - 32.9 PG    MCHC 33.4 31.4 - 35.0 g/dL    RDW 12.3 11.9 - 14.6 %    PLATELET 422 617 - 219 K/uL    MPV 9.8 9.4 - 12.3 FL    ABSOLUTE NRBC 0.00 0.0 - 0.2 K/uL    NEUTROPHILS 68 43 - 78 %    LYMPHOCYTES 16 13 - 44 %    MONOCYTES 10 4.0 - 12.0 %    EOSINOPHILS 5 0.5 - 7.8 %    BASOPHILS 1 0.0 - 2.0 %    IMMATURE GRANULOCYTES 0 0.0 - 5.0 %    ABS. NEUTROPHILS 3.5 1.7 - 8.2 K/UL    ABS. LYMPHOCYTES 0.8 0.5 - 4.6 K/UL    ABS. MONOCYTES 0.5 0.1 - 1.3 K/UL    ABS. EOSINOPHILS 0.2 0.0 - 0.8 K/UL    ABS. BASOPHILS 0.1 0.0 - 0.2 K/UL    ABS. IMM. GRANS. 0.0 0.0 - 0.5 K/UL    DF AUTOMATED     METABOLIC PANEL, COMPREHENSIVE    Collection Time: 04/04/22  1:34 PM   Result Value Ref Range    Sodium 142 136 - 145 mmol/L    Potassium 4.1 3.5 - 5.1 mmol/L    Chloride 108 (H) 98 - 107 mmol/L    CO2 32 21 - 32 mmol/L    Anion gap 2 (L) 7 - 16 mmol/L    Glucose 89 65 - 100 mg/dL    BUN 15 6 - 23 MG/DL    Creatinine 0.90 0.8 - 1.5 MG/DL    GFR est AA >60 >60 ml/min/1.73m2    GFR est non-AA >60 >60 ml/min/1.73m2    Calcium 9.3 8.3 - 10.4 MG/DL    Bilirubin, total 0.3 0.2 - 1.1 MG/DL    ALT (SGPT) 27 12 - 65 U/L    AST (SGOT) 22 15 - 37 U/L    Alk.  phosphatase 138 (H) 50 - 136 U/L    Protein, total 6.9 6.3 - 8.2 g/dL    Albumin 3.9 3.5 - 5.0 g/dL    Globulin 3.0 2.3 - 3.5 g/dL    A-G Ratio 1.3 1.2 - 3.5     MAGNESIUM    Collection Time: 04/04/22  1:34 PM   Result Value Ref Range    Magnesium 2.1 1.8 - 2.4 mg/dL   URINALYSIS W/ REFLEX CULTURE    Collection Time: 04/04/22  1:34 PM    Specimen: Urine   Result Value Ref Range    Color YELLOW      Appearance CLEAR Specific gravity 1.025 (H) 1.001 - 1.023      pH (UA) 5.5 5.0 - 9.0      Protein Negative NEG mg/dL    Glucose Negative NEG mg/dL    Ketone Negative NEG mg/dL    Bilirubin Negative NEG      Blood SMALL (A) NEG      Urobilinogen 0.2 0.2 - 1.0 EU/dL    Nitrites Negative NEG      Leukocyte Esterase Negative NEG      WBC 0 0 /hpf    RBC 0-3 0 /hpf    Bacteria 0 0 /hpf    UA:UC IF INDICATED CULTURE NOT INDICATED BY UA RESULT      Epithelial cells 0 0 /hpf    Casts 0 0 /lpf    Crystals, urine 0 0 /LPF    Mucus 2+ (H) 0 /lpf   FERRITIN    Collection Time: 04/04/22  1:34 PM   Result Value Ref Range    Ferritin 141 8 - 388 NG/ML   COVID-19 RAPID TEST    Collection Time: 04/04/22  2:00 PM   Result Value Ref Range    Specimen source NASAL SWAB      COVID-19 rapid test Not detected NOTD     INFLUENZA A & B AG (RAPID TEST)    Collection Time: 04/04/22  2:00 PM   Result Value Ref Range    Influenza A Ag Negative NEG      Influenza B Ag Negative NEG      Source Nasopharyngeal     METABOLIC PANEL, COMPREHENSIVE    Collection Time: 04/05/22  9:21 AM   Result Value Ref Range    Sodium 141 138 - 145 mmol/L    Potassium 4.1 3.5 - 5.1 mmol/L    Chloride 108 (H) 98 - 107 mmol/L    CO2 32 21 - 32 mmol/L    Anion gap 1 (L) 7 - 16 mmol/L    Glucose 102 (H) 65 - 100 mg/dL    BUN 19 6 - 23 MG/DL    Creatinine 0.70 (L) 0.8 - 1.5 MG/DL    GFR est AA >60 >60 ml/min/1.73m2    GFR est non-AA >60 >60 ml/min/1.73m2    Calcium 9.0 8.3 - 10.4 MG/DL    Bilirubin, total 0.2 0.2 - 1.1 MG/DL    ALT (SGPT) 24 12 - 65 U/L    AST (SGOT) 25 15 - 37 U/L    Alk.  phosphatase 142 (H) 50 - 136 U/L    Protein, total 6.3 6.3 - 8.2 g/dL    Albumin 3.7 3.5 - 5.0 g/dL    Globulin 2.6 2.3 - 3.5 g/dL    A-G Ratio 1.4 1.2 - 3.5     MAGNESIUM    Collection Time: 04/05/22  9:21 AM   Result Value Ref Range    Magnesium 2.2 1.8 - 2.4 mg/dL   CBC WITH AUTOMATED DIFF    Collection Time: 04/05/22  9:21 AM   Result Value Ref Range    WBC 5.1 4.3 - 11.1 K/uL    RBC 3.72 (L) 4.23 - 5.6 M/uL    HGB 11.2 (L) 13.6 - 17.2 g/dL    HCT 34.7 (L) 41.1 - 50.3 %    MCV 93.3 79.6 - 97.8 FL    MCH 30.1 26.1 - 32.9 PG    MCHC 32.3 31.4 - 35.0 g/dL    RDW 12.3 11.9 - 14.6 %    PLATELET 908 716 - 785 K/uL    MPV 9.9 9.4 - 12.3 FL    ABSOLUTE NRBC 0.00 0.0 - 0.2 K/uL    DF AUTOMATED      NEUTROPHILS 64 43 - 78 %    LYMPHOCYTES 14 13 - 44 %    MONOCYTES 14 (H) 4.0 - 12.0 %    EOSINOPHILS 7 0.5 - 7.8 %    BASOPHILS 1 0.0 - 2.0 %    IMMATURE GRANULOCYTES 0 0.0 - 5.0 %    ABS. NEUTROPHILS 3.2 1.7 - 8.2 K/UL    ABS. LYMPHOCYTES 0.7 0.5 - 4.6 K/UL    ABS. MONOCYTES 0.7 0.1 - 1.3 K/UL    ABS. EOSINOPHILS 0.3 0.0 - 0.8 K/UL    ABS. BASOPHILS 0.1 0.0 - 0.2 K/UL    ABS. IMM. GRANS. 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 ICD-10-CM: Q23.8  ICD-9-CM: 746.89  12/17/2021        Diffuse large B cell lymphoma ([facility] ICD-10-CM: C83.30  ICD-9-CM: 202.80  12/9/2021        Hypertension ICD-10-CM: I10  ICD-9-CM: 401.9  12/9/2021        Mitral regurgitation ICD-10-CM: I34.0  ICD-9-CM: 424.0  12/9/2021        Admission for antineoplastic chemotherapy ICD-10-CM: Z51.11  ICD-9-CM: V58.11  12/6/2021                PLAN:    Relapsed DLBCL / ASCT  - Admit for BEAM/ASCT - D-6 today  - Prophylactic antibiotics to begin D+1  - G-CSF to begin D+6    Mitral regurgitation  - Evaluated by cardiology, cleared to proceed with ASCT  - Monitor for fluid overload    Continue home meds  Rhea SOPs  VTE prophylaxis - Lovenox (hold for plts <50k)    Dispo - DC home upon engraftment. Lab studies and imaging studies were personally reviewed. Pertinent old records were reviewed from [address] [place] [address] Rd. Thank you for allowing us to participate in the care of Mr. Elpidio Benoit.               Hannah Short [facility] Hematology & Oncology  [address]  Office : [phone]  Fax : [phone]     Attending Addendum:  I have personally performed a face to face diagnostic evaluation on this patient. I have reviewed and agree with the care plan as documented above by  Fatou Gloria N.P.  My findings are as follows: Patient appears stable, heart rate regular without murmurs, abdomen is non-tender, bowel sounds are positive. 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 He is now being admitted for conditioning BEAM regimen followed by ASCT. Patient is known to have mitral valve flail of anterior leaflet, and has been cleared by cardiology. He will be monitored for volume overload. Closely monitor for toxicity. IV fluids and electrolyte replacement as needed. Prophylactic antibiotics with Levaquin, Augmentin, acyclovir and Diflucan starting day +1. Granix support starting day +6. Blood transfusion support as needed. He will remain in-house through engraftment.               Toni Brandt MD  [facility] Hematology/Oncology  [address] [address]  Office : [phone]  Fax : [phone]

## 2022-04-06 LAB
ALBUMIN SERPL-MCNC: 3.9 G/DL (ref 3.5–5)
ALBUMIN/GLOB SERPL: 1.2 {RATIO} (ref 1.2–3.5)
ALP SERPL-CCNC: 136 U/L (ref 50–136)
ALT SERPL-CCNC: 25 U/L (ref 12–65)
ANION GAP SERPL CALC-SCNC: 5 MMOL/L (ref 7–16)
APPEARANCE UR: CLEAR
AST SERPL-CCNC: 27 U/L (ref 15–37)
BACTERIA URNS QL MICRO: ABNORMAL /HPF
BASOPHILS # BLD: 0 K/UL (ref 0–0.2)
BASOPHILS NFR BLD: 0 % (ref 0–2)
BILIRUB SERPL-MCNC: 0.3 MG/DL (ref 0.2–1.1)
BILIRUB UR QL: NEGATIVE
BUN SERPL-MCNC: 15 MG/DL (ref 6–23)
CALCIUM SERPL-MCNC: 9.5 MG/DL (ref 8.3–10.4)
CHLORIDE SERPL-SCNC: 106 MMOL/L (ref 98–107)
CO2 SERPL-SCNC: 30 MMOL/L (ref 21–32)
COLOR UR: YELLOW
CREAT SERPL-MCNC: 0.7 MG/DL (ref 0.8–1.5)
DIFFERENTIAL METHOD BLD: ABNORMAL
EOSINOPHIL # BLD: 0 K/UL (ref 0–0.8)
EOSINOPHIL NFR BLD: 0 % (ref 0.5–7.8)
ERYTHROCYTE [DISTWIDTH] IN BLOOD BY AUTOMATED COUNT: 12 % (ref 11.9–14.6)
GGT SERPL-CCNC: 54 U/L (ref 15–85)
GLOBULIN SER CALC-MCNC: 3.2 G/DL (ref 2.3–3.5)
GLUCOSE SERPL-MCNC: 157 MG/DL (ref 65–100)
GLUCOSE UR STRIP.AUTO-MCNC: 250 MG/DL
HAV IGM SER QL: NONREACTIVE
HBV CORE IGM SER QL: NONREACTIVE
HBV SURFACE AB SERPL IA-ACNC: <3.1 MIU/ML
HBV SURFACE AG SER QL: NONREACTIVE
HCT VFR BLD AUTO: 36.9 % (ref 41.1–50.3)
HCV AB SER QL: NONREACTIVE
HGB BLD-MCNC: 12.2 G/DL (ref 13.6–17.2)
HGB UR QL STRIP: ABNORMAL
IMM GRANULOCYTES # BLD AUTO: 0 K/UL (ref 0–0.5)
IMM GRANULOCYTES NFR BLD AUTO: 0 % (ref 0–5)
KETONES UR QL STRIP.AUTO: NEGATIVE MG/DL
LDH SERPL L TO P-CCNC: 279 U/L (ref 100–190)
LEUKOCYTE ESTERASE UR QL STRIP.AUTO: NEGATIVE
LYMPHOCYTES # BLD: 0.5 K/UL (ref 0.5–4.6)
LYMPHOCYTES NFR BLD: 6 % (ref 13–44)
MAGNESIUM SERPL-MCNC: 2.1 MG/DL (ref 1.8–2.4)
MCH RBC QN AUTO: 30.2 PG (ref 26.1–32.9)
MCHC RBC AUTO-ENTMCNC: 33.1 G/DL (ref 31.4–35)
MCV RBC AUTO: 91.3 FL (ref 79.6–97.8)
MONOCYTES # BLD: 0.2 K/UL (ref 0.1–1.3)
MONOCYTES NFR BLD: 2 % (ref 4–12)
MUCOUS THREADS URNS QL MICRO: ABNORMAL /LPF
NEUTS SEG # BLD: 6.4 K/UL (ref 1.7–8.2)
NEUTS SEG NFR BLD: 91 % (ref 43–78)
NITRITE UR QL STRIP.AUTO: NEGATIVE
NRBC # BLD: 0 K/UL (ref 0–0.2)
OTHER OBSERVATIONS,UCOM: ABNORMAL
PH UR STRIP: 7 [PH] (ref 5–9)
PHOSPHATE SERPL-MCNC: 2.7 MG/DL (ref 2.5–4.5)
PLATELET # BLD AUTO: 199 K/UL (ref 150–450)
PMV BLD AUTO: 9.9 FL (ref 9.4–12.3)
POTASSIUM SERPL-SCNC: 4.3 MMOL/L (ref 3.5–5.1)
PROT SERPL-MCNC: 7.1 G/DL (ref 6.3–8.2)
PROT UR STRIP-MCNC: NEGATIVE MG/DL
RBC # BLD AUTO: 4.04 M/UL (ref 4.23–5.6)
RBC #/AREA URNS HPF: ABNORMAL /HPF
SODIUM SERPL-SCNC: 141 MMOL/L (ref 136–145)
SP GR UR REFRACTOMETRY: 1.01 (ref 1–1.02)
UROBILINOGEN UR QL STRIP.AUTO: 0.2 EU/DL (ref 0.2–1)
WBC # BLD AUTO: 7 K/UL (ref 4.3–11.1)

## 2022-04-06 PROCEDURE — 83735 ASSAY OF MAGNESIUM: CPT

## 2022-04-06 PROCEDURE — 82977 ASSAY OF GGT: CPT

## 2022-04-06 PROCEDURE — 65270000015 HC RM PRIVATE ONCOLOGY

## 2022-04-06 PROCEDURE — 80053 COMPREHEN METABOLIC PANEL: CPT

## 2022-04-06 PROCEDURE — 36591 DRAW BLOOD OFF VENOUS DEVICE: CPT

## 2022-04-06 PROCEDURE — 85025 COMPLETE CBC W/AUTO DIFF WBC: CPT

## 2022-04-06 PROCEDURE — 74011000258 HC RX REV CODE- 258: Performed by: INTERNAL MEDICINE

## 2022-04-06 PROCEDURE — 83615 LACTATE (LD) (LDH) ENZYME: CPT

## 2022-04-06 PROCEDURE — 84100 ASSAY OF PHOSPHORUS: CPT

## 2022-04-06 PROCEDURE — 74011250636 HC RX REV CODE- 250/636: Performed by: INTERNAL MEDICINE

## 2022-04-06 PROCEDURE — 74011250637 HC RX REV CODE- 250/637: Performed by: NURSE PRACTITIONER

## 2022-04-06 PROCEDURE — 99233 SBSQ HOSP IP/OBS HIGH 50: CPT | Performed by: INTERNAL MEDICINE

## 2022-04-06 RX ORDER — ONDANSETRON 2 MG/ML
8 INJECTION INTRAMUSCULAR; INTRAVENOUS EVERY 12 HOURS
Status: COMPLETED | OUTPATIENT
Start: 2022-04-06 | End: 2022-04-09

## 2022-04-06 RX ORDER — DEXAMETHASONE SODIUM PHOSPHATE 100 MG/10ML
10 INJECTION INTRAMUSCULAR; INTRAVENOUS EVERY 24 HOURS
Status: COMPLETED | OUTPATIENT
Start: 2022-04-06 | End: 2022-04-09

## 2022-04-06 RX ADMIN — ATENOLOL 25 MG: 50 TABLET ORAL at 08:06

## 2022-04-06 RX ADMIN — DEXAMETHASONE SODIUM PHOSPHATE 10 MG: 10 INJECTION, SOLUTION INTRAMUSCULAR; INTRAVENOUS at 11:36

## 2022-04-06 RX ADMIN — LISINOPRIL 10 MG: 5 TABLET ORAL at 08:06

## 2022-04-06 RX ADMIN — CETIRIZINE HYDROCHLORIDE 5 MG: 5 TABLET ORAL at 08:05

## 2022-04-06 RX ADMIN — CYTARABINE 400 MG: 100 INJECTION, SOLUTION INTRATHECAL; INTRAVENOUS; SUBCUTANEOUS at 12:15

## 2022-04-06 RX ADMIN — ONDANSETRON 8 MG: 2 INJECTION INTRAMUSCULAR; INTRAVENOUS at 22:42

## 2022-04-06 RX ADMIN — ONDANSETRON 8 MG: 2 INJECTION INTRAMUSCULAR; INTRAVENOUS at 11:36

## 2022-04-06 RX ADMIN — ETOPOSIDE 400 MG: 20 INJECTION INTRAVENOUS at 12:45

## 2022-04-06 RX ADMIN — CYTARABINE 400 MG: 100 INJECTION, SOLUTION INTRATHECAL; INTRAVENOUS; SUBCUTANEOUS at 23:00

## 2022-04-06 NOTE — PROGRESS NOTES
Diagnosis: DLBCL  Transplant Date: scheduled for 4/11/22  Day: -5  Chemo regimen used: BEAM  Labs not resulted that need follow-up:  IGG done 4/5/22  BMT assessments and toxicities completed. 4/5/22  WBC/ANC= 7.0/6.4  Prophylactic medications start on D+1  G-CSF start on D+6  Toxicities greater than 2 : none  Patient seen by MD/NP daily & prn while inpatient & until engraftment. New orders received: none  Issues: no new issues    END OF SHIFT NOTE:    Intake/Output  04/05 1901 - 04/06 0700  In: 840 [P.O.:840]  Out: 2700 [Urine:2700]   Voiding: YES  Catheter: NO  Drain:              Stool:  0 occurrences. Emesis:  0 occurrences. VITAL SIGNS  Patient Vitals for the past 12 hrs:   Temp Pulse Resp BP SpO2   04/06/22 0245 97.8 °F (36.6 °C) 72 16 135/74 97 %   04/05/22 2230 98.1 °F (36.7 °C) 76 15 (!) 153/84 97 %   04/05/22 1958 98 °F (36.7 °C) 75 16 (!) 156/75 96 %       Pain Assessment  Pain 1  Pain Scale 1: Numeric (0 - 10) (04/06/22 0245)  Pain Intensity 1: 0 (04/06/22 0245)  Patient Stated Pain Goal: 0 (04/06/22 0245)    Ambulating  Yes    Additional Information:   D-5 today. D2 BEAM chemo. No new complaints. Compliant w/ mouth care. Transplant date 4/11    Shift report given to oncoming nurse Giulia RN at the bedside.     Yang House RN

## 2022-04-06 NOTE — PROGRESS NOTES
Initial visit by  to convey care and concern and to explore spiritual needs. No needs were voiced during the visit. Mr. Matt Nieves is expecting to complete his chemo before having a bone marrow transplant. Mr. Matt Nieves has significant support, he says. Chaplains remain available for follow-up care.      Randi Byrnes 68  Board Certified

## 2022-04-06 NOTE — PROGRESS NOTES
Diagnosis: DLBCL  Transplant Date: 4/11/2022  Day: (+/-) -5. Chemo regimen used: BEAM  BMT assessments and toxicities completed. WBC/ANC= 7/6.4. Prophylactic medications to start D+1 4/12/2022. G-CSF to start on D+6 4/17/2022 . Toxicities greater than 2 :none. Patient seen by MD/NP daily while IP or until engraftment. New orders received: none. Issues:none         04/06/22 0804   Toxicity Grading Criteria   Hemorrhage (GI) 0   Infection 0   Fever in the Absence of Neutropenia (ANC greater than or equal to 1.0 0   Febrile Neutropenia (ANC less than 1.0) 0   Rigors/Chills 0   Mucositis Oral 0   Esophagitis 0   Dry Mouth 0   Dysphagia 0   Nausea 0   Vomiting 0   Diarrhea 0   Constipation 0   Bilirubin increased 0   VOD- Weight Gain 0   ALT (SGPT) 0   Alkaline Phosphatase increased 0   AST (SGOT) 0   GGT increased 0   Weight Loss 0   Weight Gain 0   Dehydration 0   Urine Protein- Proteinuria Not able to assess   Urine Blood - Hematuria 0   Creatinine increased 0   Bladder Spasms 0   Cystitis non-infective 0   Urinary Frequency 0   Urinary Retention 0   Cardiac Arrhythmia Not able to assess   Conduction Abnormality/AV Heart Block   (SAVITA)   Palpitations 0   Prolonged QTc    (SAVITA)   Supraventricular and Lorena Arrhythmia   (SAVITA)   Vasovagal Episode 0   Ventricular Arrhythmia   (SAVITA)   Hypotension 0   Hypertension Baseline; History of   Neuropathy / Sensory 0   Headache 0   Vision Baseline; History of   Dizziness 0   Syncope 0   Anxiety 0   Agitation 0   Depression 0   Fatigue 0   Insomnia 0   Pain 0   Dyspnea 0   Cough 0   Hiccups 0   Alopecia 0   Nail Changes 0   Allergic Reaction 0   Rash 0   Urticaria (Hives, Welts, Wheals) 0

## 2022-04-06 NOTE — PROGRESS NOTES
[employer] [facility]        Inpatient Hematology / Oncology Progress Note    Reason for Admission:  Diffuse large B cell lymphoma ([facility] [C83.30]  Admission for antineoplastic chemotherapy [Z51.11]  Bone marrow transplant status ([facility] [Z94.81]    24 Hour Events:  VSS, afebrile  Hypertensive overnight, better this AM  D-5 BEAM/ASCT  Tolerating treatment well  No complaints / overnight events        ROS:  Constitutional: Negative for fever, chills, weakness, malaise, fatigue. CV: Negative for chest pain, palpitations, edema. Respiratory: Negative for dyspnea, cough, wheezing. GI: Negative for nausea, abdominal pain, diarrhea. 10 point review of systems is otherwise negative with the exception of the elements mentioned above in the HPI.        No Known Allergies  Past Medical History:   Diagnosis Date    Cancer ([facility]     Hypertension     Valvular heart disease      Past Surgical History:   Procedure Laterality Date    HX BACK SURGERY      26 years ago    HX VASCULAR ACCESS      IR BX BONE MARROW DIAGNOSTIC  2/24/2022    IR CHOLECYSTOSTOMY PERCUTANEOUS      IR INSERT NON TUNL CVC OVER 5 YRS  3/14/2022    IR INTRATHECAL CHEMO INJECTION CNS  12/8/2021    IR INTRATHECAL CHEMO INJECTION CNS  12/28/2021    IR INTRATHECAL CHEMO INJECTION CNS  1/28/2022    IR SPINAL PUNCTURE CSF TREAT / DRAIN  2/4/2022     Family History   Problem Relation Age of Onset    Diabetes Mother     Hypertension Mother     Diabetes Father     Hypertension Father     Hypertension Brother      Social History     Socioeconomic History    Marital status:      Spouse name: Not on file    Number of children: Not on file    Years of education: Not on file    Highest education level: Not on file   Occupational History    Not on file   Tobacco Use    Smoking status: Never Smoker    Smokeless tobacco: Never Used   Vaping Use    Vaping Use: Never used   Substance and Sexual Activity    Alcohol use: Not Currently    Drug use: Not Currently    Sexual activity: Not on file   Other Topics Concern     Service Not Asked    Blood Transfusions Not Asked    Caffeine Concern Not Asked    Occupational Exposure Not Asked    Hobby Hazards Not Asked    Sleep Concern Not Asked    Stress Concern Not Asked    Weight Concern Not Asked    Special Diet Not Asked    Back Care Not Asked    Exercise Not Asked    Bike Helmet Not Asked   [address] Not Asked    Self-Exams Not Asked   Social History Narrative    Not on file     Social Determinants of Health     Financial Resource Strain:     Difficulty of Paying Living Expenses: Not on file   Food Insecurity:     Worried About Running Out of Food in the Last Year: Not on file    Sayra of Food in the Last Year: Not on file   Transportation Needs:     Lack of Transportation (Medical): Not on file    Lack of Transportation (Non-Medical):  Not on file   Physical Activity:     Days of Exercise per Week: Not on file    Minutes of Exercise per Session: Not on file   Stress:     Feeling of Stress : Not on file   Social Connections:     Frequency of Communication with Friends and Family: Not on file    Frequency of Social Gatherings with Friends and Family: Not on file    Attends Judaism Services: Not on file    Active Member of [address] [place] [address] [employer] or Organizations: Not on file    Attends Club or Organization Meetings: Not on file    Marital Status: Not on file   Intimate Partner Violence:     Fear of Current or Ex-Partner: Not on file    Emotionally Abused: Not on file    Physically Abused: Not on file    Sexually Abused: Not on file   Housing Stability:     Unable to Pay for Housing in the Last Year: Not on file    Number of [place] in the Last Year: Not on file    Unstable Housing in the Last Year: Not on file     Current Facility-Administered Medications   Medication Dose Route Frequency Provider Last Rate Last Admin    acetaminophen (TYLENOL) tablet 650 mg  650 mg Oral Q4H PRN Simón Mercado, [facility]        atenoloL (TENORMIN) tablet 25 mg  25 mg Oral DAILY ARLEN Arredondo   25 mg at 22 6909    lisinopriL (PRINIVIL, ZESTRIL) tablet 10 mg  10 mg Oral DAILY ARLEN ArredondoP   10 mg at 22 7279    cetirizine (ZYRTEC) tablet 5 mg  5 mg Oral DAILY ARLEN ArredondoP   5 mg at 22 0805    ondansetron (ZOFRAN ODT) tablet 8 mg  8 mg Oral Q8H PRN Simón Mercado, ARLEN        enoxaparin (LOVENOX) injection 40 mg  40 mg SubCUTAneous Q24H Anola Butts , FNP        dextrose 5% - 0.45% NaCl with KCl 20 mEq/L infusion 1,000 mL  1,000 mL IntraVENous ONCE PRN Ivonne Pedro MD        dextrose 5 % - 0.45% NaCl infusion 1,000 mL  1,000 mL IntraVENous ONCE PRN Ivonne Pedro MD        heparin (porcine) pf 300 Units  300 Units InterCATHeter PRN Ivonne Pedro MD        central line flush (saline) syringe 10 mL  10 mL InterCATHeter PRN Ivonne Pedro MD   10 mL at 220       OBJECTIVE:  Patient Vitals for the past 8 hrs:   BP Temp Pulse Resp SpO2   22 0804 130/74 97.8 °F (36.6 °C) 73 16 97 %   22 0245 135/74 97.8 °F (36.6 °C) 72 16 97 %     Temp (24hrs), Av.9 °F (36.6 °C), Min:97.6 °F (36.4 °C), Max:98.2 °F (36.8 °C)    No intake/output data recorded. Physical Exam:  Constitutional: Well developed, well nourished male in no acute distress, sitting comfortably in the hospital bed. HEENT: +eye patch in place. Normocephalic and atraumatic. Oropharynx is clear, mucous membranes are moist. Extraocular muscles are intact. Sclerae anicteric. Neck supple without JVD. No thyromegaly present. Lymph node   No palpable submandibular, cervical, supraclavicular, axillary or inguinal lymph nodes. Skin Warm and dry. No bruising and no rash noted. No erythema. No pallor. Respiratory Lungs are clear to auscultation bilaterally without wheezes, rales or rhonchi, normal air exchange without accessory muscle use.     CVS Normal rate, regular rhythm and normal S1 and S2. No murmurs, gallops, or rubs. Abdomen Soft, nontender and nondistended, normoactive bowel sounds. No palpable mass. No hepatosplenomegaly. Neuro Grossly nonfocal with no obvious sensory or motor deficits. MSK Normal range of motion in general.  No edema and no tenderness. Psych Appropriate mood and affect. Labs:    Recent Results (from the past 24 hour(s))   METABOLIC PANEL, COMPREHENSIVE    Collection Time: 04/05/22  9:21 AM   Result Value Ref Range    Sodium 141 138 - 145 mmol/L    Potassium 4.1 3.5 - 5.1 mmol/L    Chloride 108 (H) 98 - 107 mmol/L    CO2 32 21 - 32 mmol/L    Anion gap 1 (L) 7 - 16 mmol/L    Glucose 102 (H) 65 - 100 mg/dL    BUN 19 6 - 23 MG/DL    Creatinine 0.70 (L) 0.8 - 1.5 MG/DL    GFR est AA >60 >60 ml/min/1.73m2    GFR est non-AA >60 >60 ml/min/1.73m2    Calcium 9.0 8.3 - 10.4 MG/DL    Bilirubin, total 0.2 0.2 - 1.1 MG/DL    ALT (SGPT) 24 12 - 65 U/L    AST (SGOT) 25 15 - 37 U/L    Alk. phosphatase 142 (H) 50 - 136 U/L    Protein, total 6.3 6.3 - 8.2 g/dL    Albumin 3.7 3.5 - 5.0 g/dL    Globulin 2.6 2.3 - 3.5 g/dL    A-G Ratio 1.4 1.2 - 3.5     MAGNESIUM    Collection Time: 04/05/22  9:21 AM   Result Value Ref Range    Magnesium 2.2 1.8 - 2.4 mg/dL   CBC WITH AUTOMATED DIFF    Collection Time: 04/05/22  9:21 AM   Result Value Ref Range    WBC 5.1 4.3 - 11.1 K/uL    RBC 3.72 (L) 4.23 - 5.6 M/uL    HGB 11.2 (L) 13.6 - 17.2 g/dL    HCT 34.7 (L) 41.1 - 50.3 %    MCV 93.3 79.6 - 97.8 FL    MCH 30.1 26.1 - 32.9 PG    MCHC 32.3 31.4 - 35.0 g/dL    RDW 12.3 11.9 - 14.6 %    PLATELET 526 846 - 201 K/uL    MPV 9.9 9.4 - 12.3 FL    ABSOLUTE NRBC 0.00 0.0 - 0.2 K/uL    DF AUTOMATED      NEUTROPHILS 64 43 - 78 %    LYMPHOCYTES 14 13 - 44 %    MONOCYTES 14 (H) 4.0 - 12.0 %    EOSINOPHILS 7 0.5 - 7.8 %    BASOPHILS 1 0.0 - 2.0 %    IMMATURE GRANULOCYTES 0 0.0 - 5.0 %    ABS. NEUTROPHILS 3.2 1.7 - 8.2 K/UL    ABS.  LYMPHOCYTES 0.7 0.5 - 4.6 K/UL    ABS. MONOCYTES 0.7 0.1 - 1.3 K/UL    ABS. EOSINOPHILS 0.3 0.0 - 0.8 K/UL    ABS. BASOPHILS 0.1 0.0 - 0.2 K/UL    ABS. IMM. GRANS. 0.0 0.0 - 0.5 K/UL   HEPATITIS PANEL, ACUTE    Collection Time: 04/05/22 10:20 PM   Result Value Ref Range    Hepatitis A, IgM NONREACTIVE NR      Hepatitis B core, IgM NONREACTIVE NR      Hep B Surface Ag NONREACTIVE NR      Hepatitis C virus Ab NONREACTIVE NR     HEP B SURFACE AB    Collection Time: 04/05/22 10:20 PM   Result Value Ref Range    Hepatitis B surface Ab <3.10 mIU/mL   URINALYSIS W/ RFLX MICROSCOPIC    Collection Time: 04/05/22 11:26 PM   Result Value Ref Range    Color YELLOW      Appearance CLEAR      Specific gravity 1.010 1.001 - 1.023      pH (UA) 7.0 5.0 - 9.0      Protein Negative NEG mg/dL    Glucose 250 (A) NEG mg/dL    Ketone Negative NEG mg/dL    Bilirubin Negative NEG      Blood SMALL (A) NEG      Urobilinogen 0.2 0.2 - 1.0 EU/dL    Nitrites Negative NEG      Leukocyte Esterase Negative NEG      RBC 0-3 0 /hpf    Bacteria TRACE 0 /hpf    Mucus TRACE 0 /lpf    Other observations RESULTS VERIFIED MANUALLY     METABOLIC PANEL, COMPREHENSIVE    Collection Time: 04/06/22  2:42 AM   Result Value Ref Range    Sodium 141 136 - 145 mmol/L    Potassium 4.3 3.5 - 5.1 mmol/L    Chloride 106 98 - 107 mmol/L    CO2 30 21 - 32 mmol/L    Anion gap 5 (L) 7 - 16 mmol/L    Glucose 157 (H) 65 - 100 mg/dL    BUN 15 6 - 23 MG/DL    Creatinine 0.70 (L) 0.8 - 1.5 MG/DL    GFR est AA >60 >60 ml/min/1.73m2    GFR est non-AA >60 >60 ml/min/1.73m2    Calcium 9.5 8.3 - 10.4 MG/DL    Bilirubin, total 0.3 0.2 - 1.1 MG/DL    ALT (SGPT) 25 12 - 65 U/L    AST (SGOT) 27 15 - 37 U/L    Alk.  phosphatase 136 50 - 136 U/L    Protein, total 7.1 6.3 - 8.2 g/dL    Albumin 3.9 3.5 - 5.0 g/dL    Globulin 3.2 2.3 - 3.5 g/dL    A-G Ratio 1.2 1.2 - 3.5     MAGNESIUM    Collection Time: 04/06/22  2:42 AM   Result Value Ref Range    Magnesium 2.1 1.8 - 2.4 mg/dL   CBC WITH AUTOMATED DIFF    Collection Time: 04/06/22  2:42 AM   Result Value Ref Range    WBC 7.0 4.3 - 11.1 K/uL    RBC 4.04 (L) 4.23 - 5.6 M/uL    HGB 12.2 (L) 13.6 - 17.2 g/dL    HCT 36.9 (L) 41.1 - 50.3 %    MCV 91.3 79.6 - 97.8 FL    MCH 30.2 26.1 - 32.9 PG    MCHC 33.1 31.4 - 35.0 g/dL    RDW 12.0 11.9 - 14.6 %    PLATELET 717 995 - 071 K/uL    MPV 9.9 9.4 - 12.3 FL    ABSOLUTE NRBC 0.00 0.0 - 0.2 K/uL    DF AUTOMATED      NEUTROPHILS 91 (H) 43 - 78 %    LYMPHOCYTES 6 (L) 13 - 44 %    MONOCYTES 2 (L) 4.0 - 12.0 %    EOSINOPHILS 0 (L) 0.5 - 7.8 %    BASOPHILS 0 0.0 - 2.0 %    IMMATURE GRANULOCYTES 0 0.0 - 5.0 %    ABS. NEUTROPHILS 6.4 1.7 - 8.2 K/UL    ABS. LYMPHOCYTES 0.5 0.5 - 4.6 K/UL    ABS. MONOCYTES 0.2 0.1 - 1.3 K/UL    ABS. EOSINOPHILS 0.0 0.0 - 0.8 K/UL    ABS. BASOPHILS 0.0 0.0 - 0.2 K/UL    ABS. IMM. GRANS. 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 Maribel Giles is a 64 y.o. male admitted on 4/5/2022. The primary encounter diagnosis was Diffuse large B-cell lymphoma, unspecified body region [facility]).  Diagnoses of Admission for antineoplastic chemotherapy, Bone marrow transplant status ([facility] and Nonrheumatic mitral valve regurgitation were also pertinent to this visit. 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 He is a patient of Dr Kt Azar treated for relapsed DLBCL. He was initially treated by Dr Kirby Helm in [facility] with R-CHOP 4/21-7/21. However, he was noted to have relapsed disease in L supraclavicular LN. He was then evaluated by Dr Kt Azar and has now completed R-ICE x3 with IT MTX x4 with CR. He has been evaluated for ASCT and collected 4.8x10^6/kg CD34+ cells. He is now admitted for BEAM/ASCT. He was seen by Dr Kt Azar day prior to admission and rapid COVID and flu testing negative. He was previously cleared by cardiology to proceed with transplant given mitral valve regurgitation. He is feeling well and ready to begin treatment today. PLAN:    Relapsed DLBCL / ASCT  - Admit for BEAM/ASCT - D-6 today  - Prophylactic antibiotics to begin D+1  - G-CSF to begin D+6  4/6 D-5 BEAM/ASCT - tolerating treatment, no complaints. Mitral regurgitation  - Evaluated by cardiology, cleared to proceed with ASCT  - Monitor for fluid overload    Hypertension  - Continue home meds    Continue home meds  Rhea SOPs  VTE prophylaxis - Lovenox (hold for plts <50k)    Dispo - DC home upon engraftment. Lab studies and imaging studies were personally reviewed. Pertinent old records were reviewed from [address] [place] [address] Rd. Thank you for allowing us to participate in the care of Mr. Vianey Bejarano. Hannah Jimenez  Hematology & Oncology  [address] [address]  Office : [phone]  Fax : [phone]         Attending Addendum:  I have personally performed a face to face diagnostic evaluation on this patient.  I have reviewed and agree with the care plan as documented above by  Majo Manrique N.P.  My findings are as follows: Patient appears stable, heart rate regular without murmurs, abdomen is non-tender, bowel sounds are positive. 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 He is now being admitted for conditioning BEAM regimen followed by ASCT. D -5 today. Patient is known to have mitral valve flail of anterior leaflet, and has been cleared by cardiology. He will be monitored for volume overload. Closely monitor for toxicity. IV fluids and electrolyte replacement as needed. Prophylactic antibiotics with Levaquin, Augmentin, acyclovir and Diflucan starting day +1. Granix support starting day +6. Blood transfusion support as needed.   He will remain in-house through engraftment.                Jacque Valencia MD  [facility] Hematology/Oncology  [address] [place] [address]  Office : [phone]  Fax : [phone]

## 2022-04-06 NOTE — PROGRESS NOTES
[address] for BEAM/ASCT   lives at home with spouse and children (25 and 21). Pt is independent, drives and works. DC home upon engraftment. No needs identified at this time. Will continue to follow for discharge planning needs  Please consult  if any new issues arise    Discharge plan is undetermined at this time. Care Management Interventions  PCP Verified by CM: Yes  Mode of Transport at Discharge:  Other (see comment)  Transition of Care Consult (CM Consult): Discharge Planning  Discharge Durable Medical Equipment: No  Physical Therapy Consult: No  Occupational Therapy Consult: No  Speech Therapy Consult: No  Support Systems: Spouse/Significant Other  Confirm Follow Up Transport: Family  The Plan for Transition of Care is Related to the Following Treatment Goals : return to baseline  The Patient and/or Patient Representative was Provided with a Choice of Provider and Agrees with the Discharge Plan?: Yes  Freedom of Choice List was Provided with Basic Dialogue that Supports the Patient's Individualized Plan of Care/Goals, Treatment Preferences and Shares the Quality Data Associated with the Providers?: Yes  [place] Resource Information Provided?: No  Discharge Location  Patient Expects to be Discharged to[de-identified] Unable to determine at this time

## 2022-04-07 LAB
ALBUMIN SERPL-MCNC: 3.4 G/DL (ref 3.5–5)
ALBUMIN/GLOB SERPL: 1.2 {RATIO} (ref 1.2–3.5)
ALP SERPL-CCNC: 104 U/L (ref 50–136)
ALT SERPL-CCNC: 28 U/L (ref 12–65)
ANION GAP SERPL CALC-SCNC: 3 MMOL/L (ref 7–16)
AST SERPL-CCNC: 14 U/L (ref 15–37)
BASOPHILS # BLD: 0 K/UL (ref 0–0.2)
BASOPHILS NFR BLD: 0 % (ref 0–2)
BILIRUB SERPL-MCNC: 0.3 MG/DL (ref 0.2–1.1)
BUN SERPL-MCNC: 20 MG/DL (ref 6–23)
CALCIUM SERPL-MCNC: 8.8 MG/DL (ref 8.3–10.4)
CHLORIDE SERPL-SCNC: 108 MMOL/L (ref 98–107)
CO2 SERPL-SCNC: 31 MMOL/L (ref 21–32)
CREAT SERPL-MCNC: 0.7 MG/DL (ref 0.8–1.5)
DIFFERENTIAL METHOD BLD: ABNORMAL
EOSINOPHIL # BLD: 0 K/UL (ref 0–0.8)
EOSINOPHIL NFR BLD: 0 % (ref 0.5–7.8)
ERYTHROCYTE [DISTWIDTH] IN BLOOD BY AUTOMATED COUNT: 12.3 % (ref 11.9–14.6)
GLOBULIN SER CALC-MCNC: 2.8 G/DL (ref 2.3–3.5)
GLUCOSE SERPL-MCNC: 128 MG/DL (ref 65–100)
HBV CORE AB SERPL QL IA: NEGATIVE
HBV CORE IGM SERPL QL IA: NEGATIVE
HBV E AB SERPL QL IA: NEGATIVE
HCT VFR BLD AUTO: 35 % (ref 41.1–50.3)
HGB BLD-MCNC: 11.4 G/DL (ref 13.6–17.2)
IGG SERPL-MCNC: 647 MG/DL (ref 603–1613)
IMM GRANULOCYTES # BLD AUTO: 0.2 K/UL (ref 0–0.5)
IMM GRANULOCYTES NFR BLD AUTO: 2 % (ref 0–5)
LYMPHOCYTES # BLD: 0.5 K/UL (ref 0.5–4.6)
LYMPHOCYTES NFR BLD: 5 % (ref 13–44)
MAGNESIUM SERPL-MCNC: 2.1 MG/DL (ref 1.8–2.4)
MCH RBC QN AUTO: 30 PG (ref 26.1–32.9)
MCHC RBC AUTO-ENTMCNC: 32.6 G/DL (ref 31.4–35)
MCV RBC AUTO: 92.1 FL (ref 79.6–97.8)
MONOCYTES # BLD: 0.5 K/UL (ref 0.1–1.3)
MONOCYTES NFR BLD: 5 % (ref 4–12)
NEUTS SEG # BLD: 9.6 K/UL (ref 1.7–8.2)
NEUTS SEG NFR BLD: 89 % (ref 43–78)
NRBC # BLD: 0 K/UL (ref 0–0.2)
PLATELET # BLD AUTO: 185 K/UL (ref 150–450)
PMV BLD AUTO: 9.9 FL (ref 9.4–12.3)
POTASSIUM SERPL-SCNC: 4.1 MMOL/L (ref 3.5–5.1)
PROT SERPL-MCNC: 6.2 G/DL (ref 6.3–8.2)
RBC # BLD AUTO: 3.8 M/UL (ref 4.23–5.6)
SODIUM SERPL-SCNC: 142 MMOL/L (ref 136–145)
WBC # BLD AUTO: 10.9 K/UL (ref 4.3–11.1)

## 2022-04-07 PROCEDURE — 80053 COMPREHEN METABOLIC PANEL: CPT

## 2022-04-07 PROCEDURE — 74011250637 HC RX REV CODE- 250/637: Performed by: NURSE PRACTITIONER

## 2022-04-07 PROCEDURE — 65270000015 HC RM PRIVATE ONCOLOGY

## 2022-04-07 PROCEDURE — 74011000258 HC RX REV CODE- 258: Performed by: INTERNAL MEDICINE

## 2022-04-07 PROCEDURE — 83735 ASSAY OF MAGNESIUM: CPT

## 2022-04-07 PROCEDURE — 99233 SBSQ HOSP IP/OBS HIGH 50: CPT | Performed by: INTERNAL MEDICINE

## 2022-04-07 PROCEDURE — 85025 COMPLETE CBC W/AUTO DIFF WBC: CPT

## 2022-04-07 PROCEDURE — 74011250636 HC RX REV CODE- 250/636: Performed by: INTERNAL MEDICINE

## 2022-04-07 PROCEDURE — 36591 DRAW BLOOD OFF VENOUS DEVICE: CPT

## 2022-04-07 PROCEDURE — APPSS60 APP SPLIT SHARED TIME 46-60 MINUTES: Performed by: NURSE PRACTITIONER

## 2022-04-07 RX ORDER — AMOXICILLIN 250 MG
1 CAPSULE ORAL
Status: DISCONTINUED | OUTPATIENT
Start: 2022-04-07 | End: 2022-04-24 | Stop reason: HOSPADM

## 2022-04-07 RX ORDER — POLYETHYLENE GLYCOL 3350 17 G/17G
17 POWDER, FOR SOLUTION ORAL
Status: DISCONTINUED | OUTPATIENT
Start: 2022-04-07 | End: 2022-04-24 | Stop reason: HOSPADM

## 2022-04-07 RX ADMIN — CYTARABINE 400 MG: 100 INJECTION, SOLUTION INTRATHECAL; INTRAVENOUS; SUBCUTANEOUS at 11:39

## 2022-04-07 RX ADMIN — LISINOPRIL 10 MG: 5 TABLET ORAL at 08:24

## 2022-04-07 RX ADMIN — SENNOSIDES AND DOCUSATE SODIUM 1 TABLET: 8.6; 5 TABLET ORAL at 20:59

## 2022-04-07 RX ADMIN — DEXAMETHASONE SODIUM PHOSPHATE 10 MG: 10 INJECTION, SOLUTION INTRAMUSCULAR; INTRAVENOUS at 11:00

## 2022-04-07 RX ADMIN — CETIRIZINE HYDROCHLORIDE 5 MG: 5 TABLET ORAL at 08:24

## 2022-04-07 RX ADMIN — ONDANSETRON 8 MG: 2 INJECTION INTRAMUSCULAR; INTRAVENOUS at 11:00

## 2022-04-07 RX ADMIN — ETOPOSIDE 400 MG: 20 INJECTION INTRAVENOUS at 12:12

## 2022-04-07 RX ADMIN — ONDANSETRON 8 MG: 2 INJECTION INTRAMUSCULAR; INTRAVENOUS at 22:35

## 2022-04-07 RX ADMIN — ATENOLOL 25 MG: 50 TABLET ORAL at 08:24

## 2022-04-07 RX ADMIN — CYTARABINE 400 MG: 100 INJECTION, SOLUTION INTRATHECAL; INTRAVENOUS; SUBCUTANEOUS at 23:04

## 2022-04-07 NOTE — PROGRESS NOTES
Diagnosis: DLBCL  Transplant Date: Scheduled for 4/11/22  Day:  - 4. Chemo regimen used: BEAM  Labs not resulted that need follow-up: IGG, HEP BE AB  BMT assessments and toxicities completed. 4/6/22  WBC/ANC= 10.9/9.6. Prophylactic medications start on D+1  G-CSF start on D+6   Toxicities greater than 2 :none  Patient seen by MD/NP daily while in patient until engraftment. New orders received: None.   Issues:none

## 2022-04-07 NOTE — PROGRESS NOTES
[employer] [facility]ology [facility] On[facility]        Inpatient Hematology / Oncology Progress Note    Reason for Admission:  Diffuse large B cell lymphoma ([facility] [C83.30]  Admission for antineoplastic chemotherapy [Z51.11]  Bone marrow transplant status ([facility] [Z94.81]    24 Hour Events:  VSS, afebrile  HTN improving  D -4 BEAM/ASCT  Tolerating treatment well  No complaints / overnight events        ROS:  Constitutional: Negative for fever, chills, weakness, malaise, fatigue. CV: Negative for chest pain, palpitations, edema. Respiratory: Negative for dyspnea, cough, wheezing. GI: Negative for nausea, abdominal pain, diarrhea. 10 point review of systems is otherwise negative with the exception of the elements mentioned above in the HPI.        No Known Allergies  Past Medical History:   Diagnosis Date    Cancer ([facility]     Hypertension     Valvular heart disease      Past Surgical History:   Procedure Laterality Date    HX BACK SURGERY      26 years ago    HX VASCULAR ACCESS      IR BX BONE MARROW DIAGNOSTIC  2/24/2022    IR CHOLECYSTOSTOMY PERCUTANEOUS      IR INSERT NON TUNL CVC OVER 5 YRS  3/14/2022    IR INTRATHECAL CHEMO INJECTION CNS  12/8/2021    IR INTRATHECAL CHEMO INJECTION CNS  12/28/2021    IR INTRATHECAL CHEMO INJECTION CNS  1/28/2022    IR SPINAL PUNCTURE CSF TREAT / DRAIN  2/4/2022     Family History   Problem Relation Age of Onset    Diabetes Mother     Hypertension Mother     Diabetes Father     Hypertension Father     Hypertension Brother      Social History     Socioeconomic History    Marital status:      Spouse name: Not on file    Number of children: Not on file    Years of education: Not on file    Highest education level: Not on file   Occupational History    Not on file   Tobacco Use    Smoking status: Never Smoker    Smokeless tobacco: Never Used   Vaping Use    Vaping Use: Never used   Substance and Sexual Activity    Alcohol use: Not Currently    Drug use: Not Currently    Sexual activity: Not on file   Other Topics Concern     Service Not Asked    Blood Transfusions Not Asked    Caffeine Concern Not Asked    Occupational Exposure Not Asked    Hobby Hazards Not Asked    Sleep Concern Not Asked    Stress Concern Not Asked    Weight Concern Not Asked    Special Diet Not Asked    Back Care Not Asked    Exercise Not Asked    Bike Helmet Not Asked   [address] Not Asked    Self-Exams Not Asked   Social History Narrative    Not on file     Social Determinants of Health     Financial Resource Strain:     Difficulty of Paying Living Expenses: Not on file   Food Insecurity:     Worried About Running Out of Food in the Last Year: Not on file    Sayra of Food in the Last Year: Not on file   Transportation Needs:     Lack of Transportation (Medical): Not on file    Lack of Transportation (Non-Medical):  Not on file   Physical Activity:     Days of Exercise per Week: Not on file    Minutes of Exercise per Session: Not on file   Stress:     Feeling of Stress : Not on file   Social Connections:     Frequency of Communication with Friends and Family: Not on file    Frequency of Social Gatherings with Friends and Family: Not on file    Attends Baptism Services: Not on file    Active Member of [address] [place] [address] [place] or Organizations: Not on file    Attends Club or Organization Meetings: Not on file    Marital Status: Not on file   Intimate Partner Violence:     Fear of Current or Ex-Partner: Not on file    Emotionally Abused: Not on file    Physically Abused: Not on file    Sexually Abused: Not on file   Housing Stability:     Unable to Pay for Housing in the Last Year: Not on file    Number of [place] in the Last Year: Not on file    Unstable Housing in the Last Year: Not on file     Current Facility-Administered Medications   Medication Dose Route Frequency Provider Last Rate Last Admin    senna-docusate (PERICOLACE) 8.6-50 mg per tablet 1 Tablet  1 Tablet Oral DAILY PRN Nesha Hough, FNP        polyethylene glycol [facility]) packet 17 g  17 g Oral DAILY PRN Nesha Hough, FNP        ondansetron [facility]) injection 8 mg  8 mg IntraVENous Q12H Shari Carrera MD   8 mg at 22 2242    dexamethasone (DECADRON) 10 mg/mL injection 10 mg  10 mg IntraVENous Q24H Shari Carrera MD   10 mg at 22 1136    cytarabine (CYTOSAR) 400 mg in 0.9% sodium chloride 100 mL chemo infusion  400 mg IntraVENous Q12H Shari Carrera  mL/hr at 22 2300 400 mg at 22 2300    etoposide (VEPESID) 400 mg in 0.9% sodium chloride 1,000 mL chemo infusion  400 mg IntraVENous Q24H Shari Carrera  mL/hr at 22 1245 400 mg at 22 1245    acetaminophen (TYLENOL) tablet 650 mg  650 mg Oral Q4H PRN Nesha Hough, FNP        atenoloL (TENORMIN) tablet 25 mg  25 mg Oral DAILY Nesha France, FNP   25 mg at 22 0806    lisinopriL (PRINIVIL, ZESTRIL) tablet 10 mg  10 mg Oral DAILY Nesha France, FNP   10 mg at 22 2628    cetirizine (ZYRTEC) tablet 5 mg  5 mg Oral DAILY Nesha France, FNP   5 mg at 22 0805    ondansetron (ZOFRAN ODT) tablet 8 mg  8 mg Oral Q8H PRN Nesha Hough, FNP        enoxaparin (LOVENOX) injection 40 mg  40 mg SubCUTAneous Q24H ARLEN SalgadoP        heparin (porcine) pf 300 Units  300 Units InterCATHeter PRN Shari Carrera MD        central line flush (saline) syringe 10 mL  10 mL InterCATHeter PRN Shari Carrera MD   10 mL at 22 2110       OBJECTIVE:  Patient Vitals for the past 8 hrs:   BP Temp Pulse Resp SpO2   22 0808 (!) 156/72 97.7 °F (36.5 °C) 61 18 98 %   22 0310 127/70 98 °F (36.7 °C) 74 20 98 %     Temp (24hrs), Av.7 °F (36.5 °C), Min:97.4 °F (36.3 °C), Max:98 °F (36.7 °C)    No intake/output data recorded. Physical Exam:  Constitutional: Well developed, well nourished male in no acute distress, sitting comfortably in the hospital bed.     HEENT: +eye patch in place. Normocephalic and atraumatic. Oropharynx is clear, mucous membranes are moist. Extraocular muscles are intact. Sclerae anicteric. Neck supple without JVD. No thyromegaly present. Lymph node   No palpable submandibular, cervical, supraclavicular, axillary or inguinal lymph nodes. Skin Warm and dry. No bruising and no rash noted. No erythema. No pallor. Respiratory Lungs are clear to auscultation bilaterally without wheezes, rales or rhonchi, normal air exchange without accessory muscle use. CVS Normal rate, regular rhythm and normal S1 and S2. No murmurs, gallops, or rubs. Abdomen Soft, nontender and nondistended, normoactive bowel sounds. No palpable mass. No hepatosplenomegaly. Neuro Grossly nonfocal with no obvious sensory or motor deficits. MSK Normal range of motion in general.  No edema and no tenderness. Psych Appropriate mood and affect. Labs:    Recent Results (from the past 24 hour(s))   METABOLIC PANEL, COMPREHENSIVE    Collection Time: 04/07/22  2:55 AM   Result Value Ref Range    Sodium 142 136 - 145 mmol/L    Potassium 4.1 3.5 - 5.1 mmol/L    Chloride 108 (H) 98 - 107 mmol/L    CO2 31 21 - 32 mmol/L    Anion gap 3 (L) 7 - 16 mmol/L    Glucose 128 (H) 65 - 100 mg/dL    BUN 20 6 - 23 MG/DL    Creatinine 0.70 (L) 0.8 - 1.5 MG/DL    GFR est AA >60 >60 ml/min/1.73m2    GFR est non-AA >60 >60 ml/min/1.73m2    Calcium 8.8 8.3 - 10.4 MG/DL    Bilirubin, total 0.3 0.2 - 1.1 MG/DL    ALT (SGPT) 28 12 - 65 U/L    AST (SGOT) 14 (L) 15 - 37 U/L    Alk.  phosphatase 104 50 - 136 U/L    Protein, total 6.2 (L) 6.3 - 8.2 g/dL    Albumin 3.4 (L) 3.5 - 5.0 g/dL    Globulin 2.8 2.3 - 3.5 g/dL    A-G Ratio 1.2 1.2 - 3.5     MAGNESIUM    Collection Time: 04/07/22  2:55 AM   Result Value Ref Range    Magnesium 2.1 1.8 - 2.4 mg/dL   CBC WITH AUTOMATED DIFF    Collection Time: 04/07/22  2:55 AM   Result Value Ref Range    WBC 10.9 4.3 - 11.1 K/uL    RBC 3.80 (L) 4.23 - 5.6 M/uL HGB 11.4 (L) 13.6 - 17.2 g/dL    HCT 35.0 (L) 41.1 - 50.3 %    MCV 92.1 79.6 - 97.8 FL    MCH 30.0 26.1 - 32.9 PG    MCHC 32.6 31.4 - 35.0 g/dL    RDW 12.3 11.9 - 14.6 %    PLATELET 541 591 - 831 K/uL    MPV 9.9 9.4 - 12.3 FL    ABSOLUTE NRBC 0.00 0.0 - 0.2 K/uL    DF AUTOMATED      NEUTROPHILS 89 (H) 43 - 78 %    LYMPHOCYTES 5 (L) 13 - 44 %    MONOCYTES 5 4.0 - 12.0 %    EOSINOPHILS 0 (L) 0.5 - 7.8 %    BASOPHILS 0 0.0 - 2.0 %    IMMATURE GRANULOCYTES 2 0.0 - 5.0 %    ABS. NEUTROPHILS 9.6 (H) 1.7 - 8.2 K/UL    ABS. LYMPHOCYTES 0.5 0.5 - 4.6 K/UL    ABS. MONOCYTES 0.5 0.1 - 1.3 K/UL    ABS. EOSINOPHILS 0.0 0.0 - 0.8 K/UL    ABS. BASOPHILS 0.0 0.0 - 0.2 K/UL    ABS. IMM. GRANS. 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 Maribel Giles is a 64 y.o. male admitted on 4/5/2022. The primary encounter diagnosis was Diffuse large B-cell lymphoma, unspecified body region [facility]). Diagnoses of Admission for antineoplastic chemotherapy, Bone marrow transplant status ([facility] and Nonrheumatic mitral valve regurgitation were also pertinent to this visit. 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 He is a patient of Dr Guillermo Holliday treated for relapsed DLBCL. He was initially treated by Dr Leon Billings in [place] with R-CHOP 4/21-7/21.  However, he was noted to have relapsed disease in L supraclavicular LN. He was then evaluated by Dr Deandre Juarez and has now completed R-ICE x3 with IT MTX x4 with CR. He has been evaluated for ASCT and collected 4.8x10^6/kg CD34+ cells. He is now admitted for BEAM/ASCT. He was seen by Dr Deandre Juarez day prior to admission and rapid COVID and flu testing negative. He was previously cleared by cardiology to proceed with transplant given mitral valve regurgitation. He is feeling well and ready to begin treatment today. PLAN:    Relapsed DLBCL / ASCT  - Admit for BEAM/ASCT - D-6 today  - Prophylactic antibiotics to begin D+1  - G-CSF to begin D+6  4/7 D -4 BEAM/ASCT - tolerating treatment, no complaints. Mitral regurgitation  - Evaluated by cardiology, cleared to proceed with ASCT  - Monitor for fluid overload    Hypertension  - Continue home meds    Continue home meds  Rhea SOPs  VTE prophylaxis - Lovenox (hold for plts <50k)    Dispo - DC home upon engraftment. Lab studies and imaging studies were personally reviewed. Pertinent old records were reviewed from [address] [place] [address]. Thank you for allowing us to participate in the care of Mr. Elpidio Benoit. Hannah Short  Hematology & Oncology  [address] [place] [address]  Office : [phone]  Fax : [phone]       Attending Addendum:  I have personally performed a face to face diagnostic evaluation on this patient. I have reviewed and agree with the care plan as documented above by  Maria A Sullivan, N.MARVIN.  My findings are as follows: Patient appears stable, heart rate regular without murmurs, abdomen is non-tender, bowel sounds are positive.  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  D -4 today.  Patient is known to have mitral valve flail of anterior leaflet, and has been cleared by cardiology. Marilyn Best will be monitored for volume overload.  Closely monitor for toxicity, tolerating reasonably.  IV fluids and electrolyte replacement as needed.  Prophylactic antibiotics with Levaquin, Augmentin, acyclovir and Diflucan starting day +1.  Granix support starting day +6.  Blood transfusion support as needed. Joaquín Magaña will remain in-house through engraftment.                Rodriguez Hameed MD  [address] [place] [address] Hematology/Oncology  [address] [place] [address]  Office : [phone]  Fax : [phone]

## 2022-04-07 NOTE — PROGRESS NOTES
Diagnosis: DLBCL  Transplant Date: 4/11/2022  Day: (+/-) -4    . Chemo regimen used: BEAM  BMT assessments and toxicities completed. WBC/ANC= 10.9/9.6  Prophylactic medications to start D+1 4/12/2022. G-CSF to start on D+6 4/17/2022 . Toxicities greater than 2 :none. Patient seen by MD/NP daily while IP or until engraftment. New orders received: none. Issues:none    END OF SHIFT NOTE:    Intake/Output  04/07 0701 - 04/07 1900  In: 720 [P.O.:720]  Out: -    Voiding: YES  Catheter: NO  Drain:              Stool:  0 occurrences. Emesis:  0 occurrences. VITAL SIGNS  Patient Vitals for the past 12 hrs:   Temp Pulse Resp BP SpO2   04/07/22 1207 98.1 °F (36.7 °C) (!) 59 18 127/65 99 %   04/07/22 0808 97.7 °F (36.5 °C) 61 18 (!) 156/72 98 %       Pain Assessment  Pain 1  Pain Scale 1: Numeric (0 - 10) (04/07/22 1453)  Pain Intensity 1: 0 (04/07/22 1453)  Patient Stated Pain Goal: 0 (04/07/22 1453)    Ambulating  Yes    Additional Information:   - Pt received day 3 BEAM.  - VSS. No needs voiced. Shift report given to oncoming nurse at the bedside.     Charly Sandoval

## 2022-04-07 NOTE — PROGRESS NOTES
Problem: Falls - Risk of  Goal: *Absence of Falls  Description: Document Christ Grace Fall Risk and appropriate interventions in the flowsheet.   Outcome: Progressing Towards Goal  Note: Fall Risk Interventions:            Medication Interventions: Assess postural VS orthostatic hypotension,Patient to call before getting OOB,Teach patient to arise slowly

## 2022-04-08 LAB
ALBUMIN SERPL-MCNC: 3.3 G/DL (ref 3.5–5)
ALBUMIN/GLOB SERPL: 1.2 {RATIO} (ref 1.2–3.5)
ALP SERPL-CCNC: 102 U/L (ref 50–136)
ALT SERPL-CCNC: 27 U/L (ref 12–65)
ANION GAP SERPL CALC-SCNC: 3 MMOL/L (ref 7–16)
AST SERPL-CCNC: 16 U/L (ref 15–37)
BASOPHILS # BLD: 0 K/UL (ref 0–0.2)
BASOPHILS NFR BLD: 0 % (ref 0–2)
BILIRUB SERPL-MCNC: 0.3 MG/DL (ref 0.2–1.1)
BUN SERPL-MCNC: 20 MG/DL (ref 6–23)
CALCIUM SERPL-MCNC: 8.8 MG/DL (ref 8.3–10.4)
CHLORIDE SERPL-SCNC: 108 MMOL/L (ref 98–107)
CO2 SERPL-SCNC: 31 MMOL/L (ref 21–32)
CREAT SERPL-MCNC: 0.6 MG/DL (ref 0.8–1.5)
DIFFERENTIAL METHOD BLD: ABNORMAL
EOSINOPHIL # BLD: 0 K/UL (ref 0–0.8)
EOSINOPHIL NFR BLD: 0 % (ref 0.5–7.8)
ERYTHROCYTE [DISTWIDTH] IN BLOOD BY AUTOMATED COUNT: 12.3 % (ref 11.9–14.6)
GGT SERPL-CCNC: 46 U/L (ref 15–85)
GLOBULIN SER CALC-MCNC: 2.8 G/DL (ref 2.3–3.5)
GLUCOSE SERPL-MCNC: 115 MG/DL (ref 65–100)
HCT VFR BLD AUTO: 34.5 % (ref 41.1–50.3)
HGB BLD-MCNC: 11.3 G/DL (ref 13.6–17.2)
IMM GRANULOCYTES # BLD AUTO: 0.2 K/UL (ref 0–0.5)
IMM GRANULOCYTES NFR BLD AUTO: 2 % (ref 0–5)
LDH SERPL L TO P-CCNC: 183 U/L (ref 100–190)
LYMPHOCYTES # BLD: 0.5 K/UL (ref 0.5–4.6)
LYMPHOCYTES NFR BLD: 5 % (ref 13–44)
MAGNESIUM SERPL-MCNC: 2.3 MG/DL (ref 1.8–2.4)
MCH RBC QN AUTO: 30.5 PG (ref 26.1–32.9)
MCHC RBC AUTO-ENTMCNC: 32.8 G/DL (ref 31.4–35)
MCV RBC AUTO: 93.2 FL (ref 79.6–97.8)
MONOCYTES # BLD: 0.4 K/UL (ref 0.1–1.3)
MONOCYTES NFR BLD: 5 % (ref 4–12)
NEUTS SEG # BLD: 7.8 K/UL (ref 1.7–8.2)
NEUTS SEG NFR BLD: 88 % (ref 43–78)
NRBC # BLD: 0 K/UL (ref 0–0.2)
PHOSPHATE SERPL-MCNC: 2.8 MG/DL (ref 2.5–4.5)
PLATELET # BLD AUTO: 173 K/UL (ref 150–450)
PMV BLD AUTO: 10.6 FL (ref 9.4–12.3)
POTASSIUM SERPL-SCNC: 4 MMOL/L (ref 3.5–5.1)
PROT SERPL-MCNC: 6.1 G/DL (ref 6.3–8.2)
RBC # BLD AUTO: 3.7 M/UL (ref 4.23–5.6)
SODIUM SERPL-SCNC: 142 MMOL/L (ref 138–145)
WBC # BLD AUTO: 8.9 K/UL (ref 4.3–11.1)

## 2022-04-08 PROCEDURE — APPSS60 APP SPLIT SHARED TIME 46-60 MINUTES: Performed by: NURSE PRACTITIONER

## 2022-04-08 PROCEDURE — 83735 ASSAY OF MAGNESIUM: CPT

## 2022-04-08 PROCEDURE — 65270000015 HC RM PRIVATE ONCOLOGY

## 2022-04-08 PROCEDURE — 83615 LACTATE (LD) (LDH) ENZYME: CPT

## 2022-04-08 PROCEDURE — 84100 ASSAY OF PHOSPHORUS: CPT

## 2022-04-08 PROCEDURE — 74011250637 HC RX REV CODE- 250/637: Performed by: NURSE PRACTITIONER

## 2022-04-08 PROCEDURE — 80053 COMPREHEN METABOLIC PANEL: CPT

## 2022-04-08 PROCEDURE — 82977 ASSAY OF GGT: CPT

## 2022-04-08 PROCEDURE — 74011000258 HC RX REV CODE- 258: Performed by: INTERNAL MEDICINE

## 2022-04-08 PROCEDURE — 85025 COMPLETE CBC W/AUTO DIFF WBC: CPT

## 2022-04-08 PROCEDURE — 74011250636 HC RX REV CODE- 250/636: Performed by: INTERNAL MEDICINE

## 2022-04-08 PROCEDURE — 99233 SBSQ HOSP IP/OBS HIGH 50: CPT | Performed by: INTERNAL MEDICINE

## 2022-04-08 RX ORDER — MORPHINE SULFATE 2 MG/ML
2 INJECTION, SOLUTION INTRAMUSCULAR; INTRAVENOUS
Status: DISCONTINUED | OUTPATIENT
Start: 2022-04-08 | End: 2022-04-24 | Stop reason: HOSPADM

## 2022-04-08 RX ORDER — ONDANSETRON 2 MG/ML
8 INJECTION INTRAMUSCULAR; INTRAVENOUS
Status: DISCONTINUED | OUTPATIENT
Start: 2022-04-08 | End: 2022-04-14

## 2022-04-08 RX ORDER — HYDROCODONE BITARTRATE AND ACETAMINOPHEN 5; 325 MG/1; MG/1
1 TABLET ORAL
Status: DISCONTINUED | OUTPATIENT
Start: 2022-04-08 | End: 2022-04-24 | Stop reason: HOSPADM

## 2022-04-08 RX ADMIN — ATENOLOL 25 MG: 50 TABLET ORAL at 09:14

## 2022-04-08 RX ADMIN — DEXAMETHASONE SODIUM PHOSPHATE 10 MG: 10 INJECTION, SOLUTION INTRAMUSCULAR; INTRAVENOUS at 11:35

## 2022-04-08 RX ADMIN — CYTARABINE 400 MG: 100 INJECTION, SOLUTION INTRATHECAL; INTRAVENOUS; SUBCUTANEOUS at 23:16

## 2022-04-08 RX ADMIN — LISINOPRIL 10 MG: 5 TABLET ORAL at 09:14

## 2022-04-08 RX ADMIN — CETIRIZINE HYDROCHLORIDE 5 MG: 5 TABLET ORAL at 09:14

## 2022-04-08 RX ADMIN — ONDANSETRON 8 MG: 2 INJECTION INTRAMUSCULAR; INTRAVENOUS at 22:55

## 2022-04-08 RX ADMIN — ONDANSETRON 8 MG: 2 INJECTION INTRAMUSCULAR; INTRAVENOUS at 11:34

## 2022-04-08 RX ADMIN — CYTARABINE 400 MG: 100 INJECTION, SOLUTION INTRATHECAL; INTRAVENOUS; SUBCUTANEOUS at 12:08

## 2022-04-08 RX ADMIN — ETOPOSIDE 400 MG: 20 INJECTION INTRAVENOUS at 12:51

## 2022-04-08 NOTE — PROGRESS NOTES
Problem: Chemotherapy, Day 2  Goal: Activity/Safety  Outcome: Resolved/Met  Goal: Consults, if ordered  Outcome: Resolved/Met  Goal: Diagnostic Test/Procedures  Outcome: Resolved/Met  Goal: Nutrition/Diet  Outcome: Resolved/Met  Goal: Discharge Planning  Outcome: Resolved/Met  Goal: Medications  Outcome: Resolved/Met  Goal: Respiratory  Outcome: Resolved/Met  Goal: Treatments/Interventions/Procedures  Outcome: Resolved/Met  Goal: *Optimal pain control at patient's stated goal  Outcome: Resolved/Met  Goal: *Hemodynamically stable  Outcome: Resolved/Met  Goal: *Adequate oxygenation  Outcome: Resolved/Met  Goal: *Chemotherapy regimen is initiated  Outcome: Resolved/Met  Goal: *Effective side effect management  Outcome: Resolved/Met

## 2022-04-08 NOTE — PROGRESS NOTES
Problem: Chemotherapy, Day 2  Goal: Activity/Safety  Outcome: Resolved/Met  Goal: Consults, if ordered  Outcome: Resolved/Met  Goal: Diagnostic Test/Procedures  Outcome: Resolved/Met  Goal: Nutrition/Diet  Outcome: Resolved/Met  Goal: Discharge Planning  Outcome: Resolved/Met  Goal: Medications  Outcome: Resolved/Met  Goal: Respiratory  Outcome: Resolved/Met  Goal: Treatments/Interventions/Procedures  Outcome: Resolved/Met  Goal: Psychosocial  Outcome: Resolved/Met  Goal: *Optimal pain control at patient's stated goal  Outcome: Resolved/Met  Goal: *Hemodynamically stable  Outcome: Resolved/Met  Goal: *Adequate oxygenation  Outcome: Resolved/Met  Goal: *Chemotherapy regimen is initiated  Outcome: Resolved/Met  Goal: *Effective side effect management  Outcome: Resolved/Met

## 2022-04-08 NOTE — PROGRESS NOTES
Follow-up visit to convey care and concern. During the visit Mr. Gary Bradshaw voiced no complaints and spiritual/emotinal needs.     Randi Pablo 68  Board Certified

## 2022-04-08 NOTE — PROGRESS NOTES
Diagnosis: DLBCL  Transplant Date: 4/11/22  Day: (+/-) -3. Chemo regimen used: BEAM  BMT assessments and toxicities completed. WBC/ANC= 8.9/7.8  Prophylactic medications started D+1 4/12/22  G-CSF started on D+6 4/17/22  Toxicities greater than 2 :none. Patient seen by MD/NP Daily while inpatient until engraftment. New orders received: miralax and pericolace .   Issues:none

## 2022-04-08 NOTE — PROGRESS NOTES
[facility] Hematology & Oncology        Inpatient Hematology / Oncology Progress Note    Reason for Admission:  Diffuse large B cell lymphoma ([facility] [C83.30]  Admission for antineoplastic chemotherapy [Z51.11]  Bone marrow transplant status ([facility] [Z94.81]    24 Hour Events:  VSS, afebrile  Intermittent HTN  D -3 BEAM/ASCT  Tolerating treatment well  Mild constipation    ROS:  Constitutional: Negative for fever, chills, weakness, malaise, fatigue. CV: Negative for chest pain, palpitations, edema. Respiratory: Negative for dyspnea, cough, wheezing. GI: +constipation. Negative for nausea, abdominal pain, diarrhea. 10 point review of systems is otherwise negative with the exception of the elements mentioned above in the HPI.        No Known Allergies  Past Medical History:   Diagnosis Date    Cancer ([facility]     Hypertension     Valvular heart disease      Past Surgical History:   Procedure Laterality Date    HX BACK SURGERY      26 years ago    HX VASCULAR ACCESS      IR BX BONE MARROW DIAGNOSTIC  2/24/2022    IR CHOLECYSTOSTOMY PERCUTANEOUS      IR INSERT NON TUNL CVC OVER 5 YRS  3/14/2022    IR INTRATHECAL CHEMO INJECTION CNS  12/8/2021    IR INTRATHECAL CHEMO INJECTION CNS  12/28/2021    IR INTRATHECAL CHEMO INJECTION CNS  1/28/2022    IR SPINAL PUNCTURE CSF TREAT / DRAIN  2/4/2022     Family History   Problem Relation Age of Onset    Diabetes Mother     Hypertension Mother     Diabetes Father     Hypertension Father     Hypertension Brother      Social History     Socioeconomic History    Marital status:      Spouse name: Not on file    Number of children: Not on file    Years of education: Not on file    Highest education level: Not on file   Occupational History    Not on file   Tobacco Use    Smoking status: Never Smoker    Smokeless tobacco: Never Used   Vaping Use    Vaping Use: Never used   Substance and Sexual Activity    Alcohol use: Not Currently    Drug use: Not Currently    Sexual activity: Not on file   Other Topics Concern     Service Not Asked    Blood Transfusions Not Asked    Caffeine Concern Not Asked    Occupational Exposure Not Asked    Hobby Hazards Not Asked    Sleep Concern Not Asked    Stress Concern Not Asked    Weight Concern Not Asked    Special Diet Not Asked    Back Care Not Asked    Exercise Not Asked    Bike Helmet Not Asked   [address] Not Asked    Self-Exams Not Asked   Social History Narrative    Not on file     Social Determinants of Health     Financial Resource Strain:     Difficulty of Paying Living Expenses: Not on file   Food Insecurity:     Worried About Running Out of Food in the Last Year: Not on file    Sayra of Food in the Last Year: Not on file   Transportation Needs:     Lack of Transportation (Medical): Not on file    Lack of Transportation (Non-Medical):  Not on file   Physical Activity:     Days of Exercise per Week: Not on file    Minutes of Exercise per Session: Not on file   Stress:     Feeling of Stress : Not on file   Social Connections:     Frequency of Communication with Friends and Family: Not on file    Frequency of Social Gatherings with Friends and Family: Not on file    Attends Jain Services: Not on file    Active Member of [address] [place] [address] [employer] or Organizations: Not on file    Attends Club or Organization Meetings: Not on file    Marital Status: Not on file   Intimate Partner Violence:     Fear of Current or Ex-Partner: Not on file    Emotionally Abused: Not on file    Physically Abused: Not on file    Sexually Abused: Not on file   Housing Stability:     Unable to Pay for Housing in the Last Year: Not on file    Number of [place] in the Last Year: Not on file    Unstable Housing in the Last Year: Not on file     Current Facility-Administered Medications   Medication Dose Route Frequency Provider Last Rate Last Admin    senna-docusate (PERICOLACE) 8.6-50 mg per tablet 1 Tablet  1 Tablet Oral DAILY PRN ARLEN BrownP   1 Tablet at 22    polyethylene glycol (MIRALAX) packet 17 g  17 g Oral DAILY PRN Ravi Mercedes FNP        ondansetron [facility]) injection 8 mg  8 mg IntraVENous Q12H Monster Meade MD   8 mg at 225    dexamethasone (DECADRON) 10 mg/mL injection 10 mg  10 mg IntraVENous Q24H Monster Meade MD   10 mg at 22 1100    cytarabine (CYTOSAR) 400 mg in 0.9% sodium chloride 100 mL chemo infusion  400 mg IntraVENous Q12H Monster Meade  mL/hr at 22 2304 400 mg at 22 2304    etoposide (VEPESID) 400 mg in 0.9% sodium chloride 1,000 mL chemo infusion  400 mg IntraVENous Q24H Monster Meade  mL/hr at 22 1212 400 mg at 22 1212    acetaminophen (TYLENOL) tablet 650 mg  650 mg Oral Q4H PRN Ravi Mercedes, FNP        atenoloL (TENORMIN) tablet 25 mg  25 mg Oral DAILY Ravi Mercedes, FNP   25 mg at 22 0914    lisinopriL (PRINIVIL, ZESTRIL) tablet 10 mg  10 mg Oral DAILY Ravi Reegreyson, FNP   10 mg at 22 0914    cetirizine (ZYRTEC) tablet 5 mg  5 mg Oral DAILY Ravi Reel, FNP   5 mg at 22 0914    ondansetron (ZOFRAN ODT) tablet 8 mg  8 mg Oral Q8H PRN Ravi Mercedes, FNP        enoxaparin (LOVENOX) injection 40 mg  40 mg SubCUTAneous Q24H JW Baron        heparin (porcine) pf 300 Units  300 Units InterCATHeter PRN Monster Meade MD        central line flush (saline) syringe 10 mL  10 mL InterCATHeter PRN Monster Meade MD   10 mL at 220       OBJECTIVE:  Patient Vitals for the past 8 hrs:   BP Temp Pulse Resp SpO2   22 0742 (!) 157/81 97.5 °F (36.4 °C) 65 18 99 %   22 0305 (!) 144/78 98 °F (36.7 °C) 61 18 98 %     Temp (24hrs), Av.7 °F (36.5 °C), Min:97.4 °F (36.3 °C), Max:98.1 °F (36.7 °C)    701 - 1900  In: -   Out: 400 [Urine:400]    Physical Exam:  Constitutional: Well developed, well nourished male in no acute distress, sitting comfortably in the hospital bed. HEENT: +eye patch in place. Normocephalic and atraumatic. Oropharynx is clear, mucous membranes are moist. Extraocular muscles are intact. Sclerae anicteric. Neck supple without JVD. No thyromegaly present. Lymph node   No palpable submandibular, cervical, supraclavicular, axillary or inguinal lymph nodes. Skin Warm and dry. No bruising and no rash noted. No erythema. No pallor. Respiratory Lungs are clear to auscultation bilaterally without wheezes, rales or rhonchi, normal air exchange without accessory muscle use. CVS Normal rate, regular rhythm and normal S1 and S2. No murmurs, gallops, or rubs. Abdomen Soft, nontender and nondistended, normoactive bowel sounds. No palpable mass. No hepatosplenomegaly. Neuro Grossly nonfocal with no obvious sensory or motor deficits. MSK Normal range of motion in general.  No edema and no tenderness. Psych Appropriate mood and affect. Labs:    Recent Results (from the past 24 hour(s))   METABOLIC PANEL, COMPREHENSIVE    Collection Time: 04/08/22  3:04 AM   Result Value Ref Range    Sodium 142 138 - 145 mmol/L    Potassium 4.0 3.5 - 5.1 mmol/L    Chloride 108 (H) 98 - 107 mmol/L    CO2 31 21 - 32 mmol/L    Anion gap 3 (L) 7 - 16 mmol/L    Glucose 115 (H) 65 - 100 mg/dL    BUN 20 6 - 23 MG/DL    Creatinine 0.60 (L) 0.8 - 1.5 MG/DL    GFR est AA >60 >60 ml/min/1.73m2    GFR est non-AA >60 >60 ml/min/1.73m2    Calcium 8.8 8.3 - 10.4 MG/DL    Bilirubin, total 0.3 0.2 - 1.1 MG/DL    ALT (SGPT) 27 12 - 65 U/L    AST (SGOT) 16 15 - 37 U/L    Alk.  phosphatase 102 50 - 136 U/L    Protein, total 6.1 (L) 6.3 - 8.2 g/dL    Albumin 3.3 (L) 3.5 - 5.0 g/dL    Globulin 2.8 2.3 - 3.5 g/dL    A-G Ratio 1.2 1.2 - 3.5     MAGNESIUM    Collection Time: 04/08/22  3:04 AM   Result Value Ref Range    Magnesium 2.3 1.8 - 2.4 mg/dL   CBC WITH AUTOMATED DIFF    Collection Time: 04/08/22  3:04 AM   Result Value Ref Range    WBC 8.9 4.3 - 11.1 K/uL    RBC 3.70 (L) 4.23 - 5.6 M/uL    HGB 11.3 (L) 13.6 - 17.2 g/dL    HCT 34.5 (L) 41.1 - 50.3 %    MCV 93.2 79.6 - 97.8 FL    MCH 30.5 26.1 - 32.9 PG    MCHC 32.8 31.4 - 35.0 g/dL    RDW 12.3 11.9 - 14.6 %    PLATELET 511 029 - 015 K/uL    MPV 10.6 9.4 - 12.3 FL    ABSOLUTE NRBC 0.00 0.0 - 0.2 K/uL    DF AUTOMATED      NEUTROPHILS 88 (H) 43 - 78 %    LYMPHOCYTES 5 (L) 13 - 44 %    MONOCYTES 5 4.0 - 12.0 %    EOSINOPHILS 0 (L) 0.5 - 7.8 %    BASOPHILS 0 0.0 - 2.0 %    IMMATURE GRANULOCYTES 2 0.0 - 5.0 %    ABS. NEUTROPHILS 7.8 1.7 - 8.2 K/UL    ABS. LYMPHOCYTES 0.5 0.5 - 4.6 K/UL    ABS. MONOCYTES 0.4 0.1 - 1.3 K/UL    ABS. EOSINOPHILS 0.0 0.0 - 0.8 K/UL    ABS. BASOPHILS 0.0 0.0 - 0.2 K/UL    ABS. IMM. GRANS. 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 Madhu Wang is a 64 y.o. male admitted on 4/5/2022. The primary encounter diagnosis was Diffuse large B-cell lymphoma, unspecified body region [facility]).  Diagnoses of Admission for antineoplastic chemotherapy, Bone marrow transplant status ([facility] and Nonrheumatic mitral valve regurgitation were also pertinent to this visit. 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 He is a patient of Dr Mayela Laughlin treated for relapsed DLBCL. He was initially treated by Dr Trish Harrell in [facility] of [facility] with R-CHOP 4/21-7/21. However, he was noted to have relapsed disease in L supraclavicular LN. He was then evaluated by Dr Mayela Laughlin and has now completed R-ICE x3 with IT MTX x4 with CR. He has been evaluated for ASCT and collected 4.8x10^6/kg CD34+ cells. He is now admitted for BEAM/ASCT. He was seen by Dr Mayela Laughlin day prior to admission and rapid COVID and flu testing negative. He was previously cleared by cardiology to proceed with transplant given mitral valve regurgitation. He is feeling well and ready to begin treatment today. PLAN:    Relapsed DLBCL / ASCT  - Admit for BEAM/ASCT - D-6 today  - Prophylactic antibiotics to begin D+1  - G-CSF to begin D+6  4/8 D -3 BEAM/ASCT - tolerating treatment, no complaints. Mitral regurgitation  - Evaluated by cardiology, cleared to proceed with ASCT  - Monitor for fluid overload  4/8 No evidence of fluid overloax    Hypertension  - Continue home meds    Constipation  4/8 Continue PRN pericolace/miralax for now    Continue home meds  Rhea SOPs  VTE prophylaxis - Lovenox (hold for plts <50k)    Dispo - DC home upon engraftment. Lab studies and imaging studies were personally reviewed. Pertinent old records were reviewed from [address] [place] [address] Rd. Thank you for allowing us to participate in the care of Mr. Mary Lopez. [facility] Hematology [facility] Oncology  [address] [employer] [address]  Office : [phone]  Fax : [phone]          Attending Addendum:  I have personally performed a face to face diagnostic evaluation on this patient.  I have reviewed and agree with the care plan as documented above by  Maria A Crocker N.P.  My findings are as follows: Patient appears stable, heart rate regular without murmurs, abdomen is non-tender, bowel sounds are positive.  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  D -3 today. Patient is known to have mitral valve flail of anterior leaflet, and has been cleared by cardiology. Jenny Herrera will be monitored for volume overload.  Closely monitor for toxicity, tolerating reasonably.  IV fluids and electrolyte replacement as needed.  Prophylactic antibiotics with Levaquin, Augmentin, acyclovir and Diflucan starting day +1.  Granix support starting day +6.  Blood transfusion support as needed. Jenny Herrera will remain in-house through engraftment.  Ambulation encouraged.                 Ede Burciaga MD  [facility] Hematology/Oncology  [address] [address]  Office : [phone]  Fax : [phone]

## 2022-04-08 NOTE — PROGRESS NOTES
Diagnosis: DLBCL  Transplant Date: 4/11/2022  Day: (+/-) -3                                         . Chemo regimen used: BEAM  BMT assessments and toxicities completed. WBC/ANC= 8.9/7.8   Prophylactic medications to start D+1 4/12/2022. G-CSF to start on D+6 4/17/2022 . Toxicities greater than 2 :none. Patient seen by MD/NP daily while IP or until engraftment. New orders received: none. Issues:none     END OF SHIFT NOTE:    Intake/Output  04/08 0701 - 04/08 1900  In: 1400 [I.V.:1400]  Out: 1600 [Urine:1600]   Voiding: YES  Catheter: NO  Drain:              Stool:  1 occurrences. Emesis:  0 occurrences. VITAL SIGNS  Patient Vitals for the past 12 hrs:   Temp Pulse Resp BP SpO2   04/08/22 1511 97.6 °F (36.4 °C) (!) 59 16 (!) 149/71 100 %   04/08/22 1123 97.8 °F (36.6 °C) 60 16 (!) 147/73 100 %   04/08/22 0742 97.5 °F (36.4 °C) 65 18 (!) 157/81 99 %       Pain Assessment  Pain 1  Pain Scale 1: Numeric (0 - 10) (04/08/22 1439)  Pain Intensity 1: 0 (04/08/22 1439)  Patient Stated Pain Goal: 0 (04/08/22 1439)    Ambulating  Yes    Additional Information:   - D-3  - Cytarabine tonight  - Mouth care charted  - VSS, no needs stated     Shift report given to oncoming nurse at the bedside.     Meaganha Relic

## 2022-04-09 LAB
ALBUMIN SERPL-MCNC: 2.5 G/DL (ref 3.5–5)
ALBUMIN/GLOB SERPL: 1.1 {RATIO} (ref 1.2–3.5)
ALP SERPL-CCNC: 69 U/L (ref 50–136)
ALT SERPL-CCNC: 20 U/L (ref 12–65)
ANION GAP SERPL CALC-SCNC: 6 MMOL/L (ref 7–16)
AST SERPL-CCNC: 11 U/L (ref 15–37)
BASOPHILS # BLD: 0 K/UL (ref 0–0.2)
BASOPHILS NFR BLD: 0 % (ref 0–2)
BILIRUB SERPL-MCNC: 0.2 MG/DL (ref 0.2–1.1)
BUN SERPL-MCNC: 14 MG/DL (ref 6–23)
CALCIUM SERPL-MCNC: 6.7 MG/DL (ref 8.3–10.4)
CHLORIDE SERPL-SCNC: 118 MMOL/L (ref 98–107)
CO2 SERPL-SCNC: 22 MMOL/L (ref 21–32)
CREAT SERPL-MCNC: 0.5 MG/DL (ref 0.8–1.5)
DIFFERENTIAL METHOD BLD: ABNORMAL
EOSINOPHIL # BLD: 0 K/UL (ref 0–0.8)
EOSINOPHIL NFR BLD: 0 % (ref 0.5–7.8)
ERYTHROCYTE [DISTWIDTH] IN BLOOD BY AUTOMATED COUNT: 12 % (ref 11.9–14.6)
GLOBULIN SER CALC-MCNC: 2.3 G/DL (ref 2.3–3.5)
GLUCOSE SERPL-MCNC: 77 MG/DL (ref 65–100)
HCT VFR BLD AUTO: 34.1 % (ref 41.1–50.3)
HGB BLD-MCNC: 11.3 G/DL (ref 13.6–17.2)
IMM GRANULOCYTES # BLD AUTO: 0.2 K/UL (ref 0–0.5)
IMM GRANULOCYTES NFR BLD AUTO: 2 % (ref 0–5)
LYMPHOCYTES # BLD: 0.4 K/UL (ref 0.5–4.6)
LYMPHOCYTES NFR BLD: 6 % (ref 13–44)
MAGNESIUM SERPL-MCNC: 1.7 MG/DL (ref 1.8–2.4)
MCH RBC QN AUTO: 30.4 PG (ref 26.1–32.9)
MCHC RBC AUTO-ENTMCNC: 33.1 G/DL (ref 31.4–35)
MCV RBC AUTO: 91.7 FL (ref 79.6–97.8)
MONOCYTES # BLD: 0.1 K/UL (ref 0.1–1.3)
MONOCYTES NFR BLD: 2 % (ref 4–12)
NEUTS SEG # BLD: 6.1 K/UL (ref 1.7–8.2)
NEUTS SEG NFR BLD: 90 % (ref 43–78)
NRBC # BLD: 0 K/UL (ref 0–0.2)
PLATELET # BLD AUTO: 167 K/UL (ref 150–450)
PMV BLD AUTO: 10.3 FL (ref 9.4–12.3)
POTASSIUM SERPL-SCNC: 3.1 MMOL/L (ref 3.5–5.1)
PROT SERPL-MCNC: 4.8 G/DL (ref 6.3–8.2)
RBC # BLD AUTO: 3.72 M/UL (ref 4.23–5.6)
SODIUM SERPL-SCNC: 146 MMOL/L (ref 138–145)
WBC # BLD AUTO: 6.8 K/UL (ref 4.3–11.1)

## 2022-04-09 PROCEDURE — 36591 DRAW BLOOD OFF VENOUS DEVICE: CPT

## 2022-04-09 PROCEDURE — 74011250636 HC RX REV CODE- 250/636: Performed by: INTERNAL MEDICINE

## 2022-04-09 PROCEDURE — 74011000258 HC RX REV CODE- 258: Performed by: INTERNAL MEDICINE

## 2022-04-09 PROCEDURE — 74011250637 HC RX REV CODE- 250/637: Performed by: NURSE PRACTITIONER

## 2022-04-09 PROCEDURE — 83735 ASSAY OF MAGNESIUM: CPT

## 2022-04-09 PROCEDURE — 2709999900 HC NON-CHARGEABLE SUPPLY

## 2022-04-09 PROCEDURE — 65270000015 HC RM PRIVATE ONCOLOGY

## 2022-04-09 PROCEDURE — 80053 COMPREHEN METABOLIC PANEL: CPT

## 2022-04-09 PROCEDURE — 85025 COMPLETE CBC W/AUTO DIFF WBC: CPT

## 2022-04-09 PROCEDURE — APPSS45 APP SPLIT SHARED TIME 31-45 MINUTES: Performed by: NURSE PRACTITIONER

## 2022-04-09 PROCEDURE — 74011250637 HC RX REV CODE- 250/637: Performed by: INTERNAL MEDICINE

## 2022-04-09 PROCEDURE — 99233 SBSQ HOSP IP/OBS HIGH 50: CPT | Performed by: INTERNAL MEDICINE

## 2022-04-09 RX ORDER — LANOLIN ALCOHOL/MO/W.PET/CERES
400 CREAM (GRAM) TOPICAL 2 TIMES DAILY
Status: DISCONTINUED | OUTPATIENT
Start: 2022-04-09 | End: 2022-04-12

## 2022-04-09 RX ORDER — CALCIUM CARBONATE 500(1250)
500 TABLET ORAL
Status: DISCONTINUED | OUTPATIENT
Start: 2022-04-09 | End: 2022-04-09

## 2022-04-09 RX ORDER — CALCIUM CARBONATE 500(1250)
500 TABLET ORAL
Status: DISCONTINUED | OUTPATIENT
Start: 2022-04-09 | End: 2022-04-18

## 2022-04-09 RX ORDER — CALCIUM GLUCONATE 20 MG/ML
1 INJECTION, SOLUTION INTRAVENOUS ONCE
Status: COMPLETED | OUTPATIENT
Start: 2022-04-09 | End: 2022-04-09

## 2022-04-09 RX ORDER — LISINOPRIL 20 MG/1
20 TABLET ORAL DAILY
Status: DISCONTINUED | OUTPATIENT
Start: 2022-04-09 | End: 2022-04-24 | Stop reason: HOSPADM

## 2022-04-09 RX ORDER — POTASSIUM CHLORIDE 20 MEQ/1
20 TABLET, EXTENDED RELEASE ORAL 2 TIMES DAILY
Status: DISCONTINUED | OUTPATIENT
Start: 2022-04-09 | End: 2022-04-18

## 2022-04-09 RX ADMIN — CALCIUM GLUCONATE 1000 MG: 20 INJECTION, SOLUTION INTRAVENOUS at 06:40

## 2022-04-09 RX ADMIN — POTASSIUM CHLORIDE 20 MEQ: 20 TABLET, EXTENDED RELEASE ORAL at 08:06

## 2022-04-09 RX ADMIN — CETIRIZINE HYDROCHLORIDE 5 MG: 5 TABLET ORAL at 08:06

## 2022-04-09 RX ADMIN — Medication 400 MG: at 08:06

## 2022-04-09 RX ADMIN — ONDANSETRON 8 MG: 2 INJECTION INTRAMUSCULAR; INTRAVENOUS at 11:33

## 2022-04-09 RX ADMIN — Medication 500 MG: at 08:06

## 2022-04-09 RX ADMIN — ETOPOSIDE 400 MG: 20 INJECTION INTRAVENOUS at 13:21

## 2022-04-09 RX ADMIN — ATENOLOL 25 MG: 50 TABLET ORAL at 08:06

## 2022-04-09 RX ADMIN — Medication 500 MG: at 17:32

## 2022-04-09 RX ADMIN — CYTARABINE 400 MG: 100 INJECTION, SOLUTION INTRATHECAL; INTRAVENOUS; SUBCUTANEOUS at 23:12

## 2022-04-09 RX ADMIN — Medication 10 ML: at 04:23

## 2022-04-09 RX ADMIN — CYTARABINE 400 MG: 100 INJECTION, SOLUTION INTRATHECAL; INTRAVENOUS; SUBCUTANEOUS at 12:51

## 2022-04-09 RX ADMIN — ONDANSETRON 8 MG: 2 INJECTION INTRAMUSCULAR; INTRAVENOUS at 22:55

## 2022-04-09 RX ADMIN — LISINOPRIL 20 MG: 20 TABLET ORAL at 08:06

## 2022-04-09 RX ADMIN — POTASSIUM CHLORIDE 20 MEQ: 20 TABLET, EXTENDED RELEASE ORAL at 17:32

## 2022-04-09 RX ADMIN — Medication 500 MG: at 12:55

## 2022-04-09 RX ADMIN — Medication 400 MG: at 17:32

## 2022-04-09 RX ADMIN — DEXAMETHASONE SODIUM PHOSPHATE 10 MG: 10 INJECTION, SOLUTION INTRAMUSCULAR; INTRAVENOUS at 11:33

## 2022-04-09 NOTE — PROGRESS NOTES
Problem: Falls - Risk of  Goal: *Absence of Falls  Description: Document Yeyo Alcocer Fall Risk and appropriate interventions in the flowsheet.   Outcome: Progressing Towards Goal  Note: Fall Risk Interventions:            Medication Interventions: Assess postural VS orthostatic hypotension,Evaluate medications/consider consulting pharmacy,Patient to call before getting OOB                   Problem: Patient Education: Go to Patient Education Activity  Goal: Patient/Family Education  Outcome: Progressing Towards Goal     Problem: Chemotherapy, Day 3 to Discharge  Goal: Off Pathway (Use only if patient is Off Pathway)  Outcome: Progressing Towards Goal  Goal: Activity/Safety  Outcome: Progressing Towards Goal  Goal: Consults, if ordered  Outcome: Progressing Towards Goal  Goal: Diagnostic Test/Procedures  Outcome: Progressing Towards Goal  Goal: Nutrition/Diet  Outcome: Progressing Towards Goal  Goal: Discharge Planning  Outcome: Progressing Towards Goal  Goal: Medications  Outcome: Progressing Towards Goal  Goal: Treatments/Interventions/Procedures  Outcome: Progressing Towards Goal  Goal: Psychosocial  Outcome: Progressing Towards Goal  Goal: *Optimal pain control at patient's stated goal  Outcome: Progressing Towards Goal  Goal: *Hemodynamically stable  Outcome: Progressing Towards Goal  Goal: *Adequate oxygenation  Outcome: Progressing Towards Goal

## 2022-04-09 NOTE — PROGRESS NOTES
Diagnosis: DLBCL  Transplant Date: scheduled for 4/11/22  Day: -2  Chemo regimen used: BEAM  Labs not resulted that need follow-up:  none  BMT assessments and toxicities completed. 4/8/22  WBC/ANC= 6.8/6.1  Prophylactic medications start on D+1  G-CSF start on D+6  Toxicities greater than 2 : none  Patient seen by MD/NP daily & prn while inpatient & until engraftment. New orders received: KCL & Calcium replacements per SOP  Issues: no new issues    END OF SHIFT NOTE:    Intake/Output  04/08 1901 - 04/09 0700  In: 360 [P.O.:360]  Out: 1300 [Urine:1300]   Voiding: YES  Catheter: NO  Drain:              Stool:  0 occurrences. Emesis:  0 occurrences. VITAL SIGNS  Patient Vitals for the past 12 hrs:   Temp Pulse Resp BP SpO2   04/08/22 2310 97.7 °F (36.5 °C) (!) 54 16 (!) 148/80 97 %   04/08/22 1915 97.7 °F (36.5 °C) 72 18 138/67 100 %   04/08/22 1511 97.6 °F (36.4 °C) (!) 59 16 (!) 149/71 100 %       Pain Assessment  Pain 1  Pain Scale 1: Numeric (0 - 10) (04/08/22 1915)  Pain Intensity 1: 0 (04/08/22 1915)  Patient Stated Pain Goal: 0 (04/08/22 1915)    Ambulating  Yes    Additional Information:   D-2 today. D5 BEAM chemo. No new complaints. Compliant w/ mouth care. PO intake adequate. Tolerated dose of chemo Cytarabine. Transplant date 4/11    Shift report given to oncoming nurse BenítezRN at the bedside.     Sarah Hodges RN

## 2022-04-09 NOTE — PROGRESS NOTES
[facility] Hematology & Oncology        Inpatient Hematology / Oncology Progress Note    Reason for Admission:  Diffuse large B cell lymphoma ([facility] [C83.30]  Admission for antineoplastic chemotherapy [Z51.11]  Bone marrow transplant status ([facility] [Z94.81]    24 Hour Events:  Afebrile, hypertensive  Day -2 BEAM/ASCT  BM x 1 yesterday    Transfusions: None  Replacements: K+, Mg+, Ca++    ROS:  Constitutional: Negative for fever, chills, weakness, malaise, fatigue. CV: Negative for chest pain, palpitations, edema. Respiratory: Negative for dyspnea, cough, wheezing. GI: Negative for nausea, abdominal pain, diarrhea. 10 point review of systems is otherwise negative with the exception of the elements mentioned above in the HPI.        No Known Allergies  Past Medical History:   Diagnosis Date    Cancer ([facility]     Hypertension     Valvular heart disease      Past Surgical History:   Procedure Laterality Date    HX BACK SURGERY      26 years ago    HX VASCULAR ACCESS      IR BX BONE MARROW DIAGNOSTIC  2/24/2022    IR CHOLECYSTOSTOMY PERCUTANEOUS      IR INSERT NON TUNL CVC OVER 5 YRS  3/14/2022    IR INTRATHECAL CHEMO INJECTION CNS  12/8/2021    IR INTRATHECAL CHEMO INJECTION CNS  12/28/2021    IR INTRATHECAL CHEMO INJECTION CNS  1/28/2022    IR SPINAL PUNCTURE CSF TREAT / DRAIN  2/4/2022     Family History   Problem Relation Age of Onset    Diabetes Mother     Hypertension Mother     Diabetes Father     Hypertension Father     Hypertension Brother      Social History     Socioeconomic History    Marital status:      Spouse name: Not on file    Number of children: Not on file    Years of education: Not on file    Highest education level: Not on file   Occupational History    Not on file   Tobacco Use    Smoking status: Never Smoker    Smokeless tobacco: Never Used   Vaping Use    Vaping Use: Never used   Substance and Sexual Activity    Alcohol use: Not Currently    Drug use: Not Currently    Sexual activity: Not on file   Other Topics Concern     Service Not Asked    Blood Transfusions Not Asked    Caffeine Concern Not Asked    Occupational Exposure Not Asked    Hobby Hazards Not Asked    Sleep Concern Not Asked    Stress Concern Not Asked    Weight Concern Not Asked    Special Diet Not Asked    Back Care Not Asked    Exercise Not Asked    Bike Helmet Not Asked   [address] Not Asked    Self-Exams Not Asked   Social History Narrative    Not on file     Social Determinants of Health     Financial Resource Strain:     Difficulty of Paying Living Expenses: Not on file   Food Insecurity:     Worried About Running Out of Food in the Last Year: Not on file    Sayra of Food in the Last Year: Not on file   Transportation Needs:     Lack of Transportation (Medical): Not on file    Lack of Transportation (Non-Medical):  Not on file   Physical Activity:     Days of Exercise per Week: Not on file    Minutes of Exercise per Session: Not on file   Stress:     Feeling of Stress : Not on file   Social Connections:     Frequency of Communication with Friends and Family: Not on file    Frequency of Social Gatherings with Friends and Family: Not on file    Attends [employer] Services: Not on file    Active Member of [address] [place] [address] [employer] or Organizations: Not on file    Attends Club or Organization Meetings: Not on file    Marital Status: Not on file   Intimate Partner Violence:     Fear of Current or Ex-Partner: Not on file    Emotionally Abused: Not on file    Physically Abused: Not on file    Sexually Abused: Not on file   Housing Stability:     Unable to Pay for Housing in the Last Year: Not on file    Number of [place] in the Last Year: Not on file    Unstable Housing in the Last Year: Not on file     Current Facility-Administered Medications   Medication Dose Route Frequency Provider Last Rate Last Admin    potassium chloride (K-DUR, KLOR-CON M20) SR tablet 20 mEq  20 mEq Oral BID Aby Irby MD   20 mEq at 04/09/22 0806    lisinopriL (PRINIVIL, ZESTRIL) tablet 20 mg  20 mg Oral DAILY Dominga Browne NP   20 mg at 04/09/22 0806    calcium carbonate (OS-DARI) tablet 500 mg [elemental]  500 mg Oral TID WITH MEALS Dominga Browne NP   500 mg at 04/09/22 0806    magnesium oxide (MAG-OX) tablet 400 mg  400 mg Oral BID Dominga Browne NP   400 mg at 04/09/22 0806    ondansetron (ZOFRAN) injection 8 mg  8 mg IntraVENous Q8H PRN Dominga Browne NP        prochlorperazine (COMPAZINE) with saline injection 5 mg  5 mg IntraVENous Q6H PRN Dominga Browne NP        HYDROcodone-acetaminophen (NORCO) 5-325 mg per tablet 1 Tablet  1 Tablet Oral Q6H PRN Dominga Browne NP        morphine injection 2 mg  2 mg IntraVENous Q4H PRN Dominga Browne NP        senna-docusate (PERICOLACE) 8.6-50 mg per tablet 1 Tablet  1 Tablet Oral DAILY PRN Nancyletomy Asa, FNP   1 Tablet at 04/07/22 2059    polyethylene glycol (MIRALAX) packet 17 g  17 g Oral DAILY PRN Nancylee Asa, FNP        ondansetron [facility]) injection 8 mg  8 mg IntraVENous Q12H bAy Irby MD   8 mg at 04/08/22 2255    dexamethasone (DECADRON) 10 mg/mL injection 10 mg  10 mg IntraVENous Q24H Quddus, Haydee Essex, MD   10 mg at 04/08/22 1135    cytarabine (CYTOSAR) 400 mg in 0.9% sodium chloride 100 mL chemo infusion  400 mg IntraVENous Q12H Aby Irby MD   IV Completed at 04/08/22 2350    etoposide (VEPESID) 400 mg in 0.9% sodium chloride 1,000 mL chemo infusion  400 mg IntraVENous Q24H Aby Irby  mL/hr at 04/08/22 1251 400 mg at 04/08/22 1251    acetaminophen (TYLENOL) tablet 650 mg  650 mg Oral Q4H PRN Nancylee Asa, FNP        atenoloL (TENORMIN) tablet 25 mg  25 mg Oral DAILY Nancylee Asa, FNP   25 mg at 04/09/22 4196    cetirizine (ZYRTEC) tablet 5 mg  5 mg Oral DAILY Nancylee Asa, FNP   5 mg at 04/09/22 0806    ondansetron (ZOFRAN ODT) tablet 8 mg  8 mg Oral Q8H PRN Nancylee Asa, FNP        enoxaparin (LOVENOX) injection 40 mg  40 mg SubCUTAneous Q24H Natalia PaulJW barnes        heparin (porcine) pf 300 Units  300 Units InterCATHeter PRN Princess Renteria MD        central line flush (saline) syringe 10 mL  10 mL InterCATHeter PRN Princess Renteria MD   10 mL at 22 0423       OBJECTIVE:  Patient Vitals for the past 8 hrs:   BP Temp Pulse Resp SpO2   22 0749 (!) 140/76 97.7 °F (36.5 °C) 62 15 99 %   22 0300 (!) 155/82 98 °F (36.7 °C) (!) 57 16 98 %     Temp (24hrs), Av.8 °F (36.6 °C), Min:97.6 °F (36.4 °C), Max:98 °F (36.7 °C)     07 -  1900  In: 70 [I.V.:70]  Out: -     Physical Exam:  Constitutional: Well developed, well nourished male in no acute distress, sitting comfortably in the bedside chair. HEENT: +eye patch in place. Normocephalic and atraumatic. Oropharynx is clear, mucous membranes are moist. Extraocular muscles are intact. Sclerae anicteric. Neck supple without JVD. No thyromegaly present. Skin Warm and dry. No bruising and no rash noted. No erythema. No pallor. Respiratory Lungs are clear to auscultation bilaterally without wheezes, rales or rhonchi, normal air exchange without accessory muscle use. CVS Normal rate, regular rhythm and normal S1 and S2. No murmurs, gallops, or rubs. Abdomen Soft, nontender and nondistended, normoactive bowel sounds. No palpable mass. No hepatosplenomegaly. Neuro Grossly nonfocal with no obvious sensory or motor deficits. MSK Normal range of motion in general.  No edema and no tenderness. Psych Appropriate mood and affect.         Labs:    Recent Results (from the past 24 hour(s))   METABOLIC PANEL, COMPREHENSIVE    Collection Time: 22  2:57 AM   Result Value Ref Range    Sodium 146 (H) 138 - 145 mmol/L    Potassium 3.1 (L) 3.5 - 5.1 mmol/L    Chloride 118 (H) 98 - 107 mmol/L    CO2 22 21 - 32 mmol/L    Anion gap 6 (L) 7 - 16 mmol/L    Glucose 77 65 - 100 mg/dL    BUN 14 6 - 23 MG/DL    Creatinine 0.50 (L) 0.8 - 1.5 MG/DL    GFR est AA >60 >60 ml/min/1.73m2    GFR est non-AA >60 >60 ml/min/1.73m2    Calcium 6.7 (L) 8.3 - 10.4 MG/DL    Bilirubin, total 0.2 0.2 - 1.1 MG/DL    ALT (SGPT) 20 12 - 65 U/L    AST (SGOT) 11 (L) 15 - 37 U/L    Alk. phosphatase 69 50 - 136 U/L    Protein, total 4.8 (L) 6.3 - 8.2 g/dL    Albumin 2.5 (L) 3.5 - 5.0 g/dL    Globulin 2.3 2.3 - 3.5 g/dL    A-G Ratio 1.1 (L) 1.2 - 3.5     MAGNESIUM    Collection Time: 04/09/22  2:57 AM   Result Value Ref Range    Magnesium 1.7 (L) 1.8 - 2.4 mg/dL   CBC WITH AUTOMATED DIFF    Collection Time: 04/09/22  2:57 AM   Result Value Ref Range    WBC 6.8 4.3 - 11.1 K/uL    RBC 3.72 (L) 4.23 - 5.6 M/uL    HGB 11.3 (L) 13.6 - 17.2 g/dL    HCT 34.1 (L) 41.1 - 50.3 %    MCV 91.7 79.6 - 97.8 FL    MCH 30.4 26.1 - 32.9 PG    MCHC 33.1 31.4 - 35.0 g/dL    RDW 12.0 11.9 - 14.6 %    PLATELET 010 116 - 996 K/uL    MPV 10.3 9.4 - 12.3 FL    ABSOLUTE NRBC 0.00 0.0 - 0.2 K/uL    DF AUTOMATED      NEUTROPHILS 90 (H) 43 - 78 %    LYMPHOCYTES 6 (L) 13 - 44 %    MONOCYTES 2 (L) 4.0 - 12.0 %    EOSINOPHILS 0 (L) 0.5 - 7.8 %    BASOPHILS 0 0.0 - 2.0 %    IMMATURE GRANULOCYTES 2 0.0 - 5.0 %    ABS. NEUTROPHILS 6.1 1.7 - 8.2 K/UL    ABS. LYMPHOCYTES 0.4 (L) 0.5 - 4.6 K/UL    ABS. MONOCYTES 0.1 0.1 - 1.3 K/UL    ABS. EOSINOPHILS 0.0 0.0 - 0.8 K/UL    ABS. BASOPHILS 0.0 0.0 - 0.2 K/UL    ABS. IMM.  GRANS. 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 Faina Landeros is a 64 y.o. male admitted on 4/5/2022. The primary encounter diagnosis was Diffuse large B-cell lymphoma, unspecified body region [facility]). Diagnoses of Admission for antineoplastic chemotherapy, Bone marrow transplant status ([address] and Nonrheumatic mitral valve regurgitation were also pertinent to this visit. 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 He is a patient of Dr Graciela Hoskins treated for relapsed DLBCL. He was initially treated by Dr Annelise Fields in [place] with R-CHOP 4/21-7/21. However, he was noted to have relapsed disease in L supraclavicular LN. He was then evaluated by Dr Graciela Hoskins and has now completed R-ICE x3 with IT MTX x4 with CR. He has been evaluated for ASCT and collected 4.8x10^6/kg CD34+ cells. He is now admitted for BEAM/ASCT. He was seen by Dr Graciela Hoskins day prior to admission and rapid COVID and flu testing negative. He was previously cleared by cardiology to proceed with transplant given mitral valve regurgitation. He is feeling well and ready to begin treatment today. PLAN:    Relapsed DLBCL / ASCT  - Admit for BEAM/ASCT - D-6 today  - Prophylactic antibiotics to begin D+1  - G-CSF to begin D+6  4/9 Day -2 BEAM/ASCT. Tolerating treatment well. Mitral regurgitation  - Evaluated by cardiology, cleared to proceed with ASCT  - Monitor for fluid overload  4/9 No evidence of fluid overload    Hypertension  - Continue home meds  4/9 Pt with persistent HTN. Increase lisinopril to 20mg daily. Constipation  4/8 Continue PRN pericolace/miralax for now  4/9 BM x 1 yesterday    Continue home meds  PPx meds to start on D+1  Rhea SOPs  VTE prophylaxis - Lovenox (hold for plts <50k)  G-CSF to begin on D+6    Goals and plan of care reviewed with the patient. All questions answered to the best of our ability. Disposition:  Anticipate discharge home after engraftment.   Expect hospitalization for ~3 weeks. Brianne Jose NP   [facility] Hematology & Oncology  [address] [place] [address]  Office : [phone]  Fax : [phone]         Attending Addendum:  I have personally performed a face to face diagnostic evaluation on this patient. I have reviewed and agree with the care plan as documented above Tim Mccarty N.P.  My findings are as follows: Patient appears stable, heart rate regular without murmurs, abdomen is non-tender, bowel sounds are positive.  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  D -2 today. Patient is known to have mitral valve flail of anterior leaflet, and has been cleared by cardiology. [facility] will be monitored for volume overload.  Closely monitor for toxicity, tolerating reasonably.  IV fluids and electrolyte replacement as needed.  Prophylactic antibiotics with Levaquin, Augmentin, acyclovir and Diflucan starting day +1.  Granix support starting day +6.  Blood transfusion support as needed. [facility] will remain in-house through engraftment.  Ambulation encouraged.                 Itzel Crawford MD  [facility] Hematology/Oncology  [address] [place] [address]  Office : [phone]  Fax : [phone]

## 2022-04-09 NOTE — PROGRESS NOTES
Diagnosis: DLBCL  Transplant Date: 4/11/2022  Day: (+/-) -2                                         .  Chemo regimen used: BEAM  BMT assessments and toxicities completed. WBC/ANC= 6.8/6.1       Prophylactic medications to start D+1 4/12/2022. G-CSF to start on D+6 4/17/2022 . Toxicities greater than 2 :none. Patient seen by MD/NP daily while IP or until engraftment. New orders received: none. Issues:none    END OF SHIFT NOTE:    Intake/Output  04/09 0701 - 04/09 1900  In: 190 [P.O.:120; I.V.:70]  Out: 1550 [Urine:1550]   Voiding: YES  Catheter: NO  Drain:              Stool:  1 occurrences. Emesis:  0 occurrences. VITAL SIGNS  Patient Vitals for the past 12 hrs:   Temp Pulse Resp BP SpO2   04/09/22 1553 97.8 °F (36.6 °C) 65 18 (!) 148/72 100 %   04/09/22 1105 97.8 °F (36.6 °C) 62 16 130/74 99 %   04/09/22 0749 97.7 °F (36.5 °C) 62 15 (!) 140/76 99 %       Pain Assessment  Pain 1  Pain Scale 1: Numeric (0 - 10) (04/09/22 1438)  Pain Intensity 1: 0 (04/09/22 1438)  Patient Stated Pain Goal: 0 (04/09/22 1438)    Ambulating  Yes    Additional Information:   - Day -2  - VSS  - Continued mouth care   - Tonight is last dose of Cytarabine    Shift report given to oncoming nurse at the bedside.     Canelo Mcdonald

## 2022-04-10 LAB
ALBUMIN SERPL-MCNC: 3.3 G/DL (ref 3.5–5)
ALBUMIN/GLOB SERPL: 1.3 {RATIO} (ref 1.2–3.5)
ALP SERPL-CCNC: 94 U/L (ref 50–136)
ALT SERPL-CCNC: 27 U/L (ref 12–65)
ANION GAP SERPL CALC-SCNC: 3 MMOL/L (ref 7–16)
AST SERPL-CCNC: 13 U/L (ref 15–37)
BASOPHILS # BLD: 0 K/UL (ref 0–0.2)
BASOPHILS NFR BLD: 0 % (ref 0–2)
BILIRUB SERPL-MCNC: 0.4 MG/DL (ref 0.2–1.1)
BUN SERPL-MCNC: 18 MG/DL (ref 6–23)
CALCIUM SERPL-MCNC: 8.9 MG/DL (ref 8.3–10.4)
CHLORIDE SERPL-SCNC: 108 MMOL/L (ref 98–107)
CO2 SERPL-SCNC: 30 MMOL/L (ref 21–32)
CREAT SERPL-MCNC: 0.8 MG/DL (ref 0.8–1.5)
DIFFERENTIAL METHOD BLD: ABNORMAL
EOSINOPHIL # BLD: 0 K/UL (ref 0–0.8)
EOSINOPHIL NFR BLD: 0 % (ref 0.5–7.8)
ERYTHROCYTE [DISTWIDTH] IN BLOOD BY AUTOMATED COUNT: 11.9 % (ref 11.9–14.6)
GLOBULIN SER CALC-MCNC: 2.6 G/DL (ref 2.3–3.5)
GLUCOSE SERPL-MCNC: 94 MG/DL (ref 65–100)
HCT VFR BLD AUTO: 34.2 % (ref 41.1–50.3)
HGB BLD-MCNC: 11.6 G/DL (ref 13.6–17.2)
IMM GRANULOCYTES # BLD AUTO: 0.1 K/UL (ref 0–0.5)
IMM GRANULOCYTES NFR BLD AUTO: 1 % (ref 0–5)
LYMPHOCYTES # BLD: 0.4 K/UL (ref 0.5–4.6)
LYMPHOCYTES NFR BLD: 6 % (ref 13–44)
MAGNESIUM SERPL-MCNC: 2.3 MG/DL (ref 1.8–2.4)
MCH RBC QN AUTO: 30.6 PG (ref 26.1–32.9)
MCHC RBC AUTO-ENTMCNC: 33.9 G/DL (ref 31.4–35)
MCV RBC AUTO: 90.2 FL (ref 79.6–97.8)
MONOCYTES # BLD: 0 K/UL (ref 0.1–1.3)
MONOCYTES NFR BLD: 0 % (ref 4–12)
NEUTS SEG # BLD: 5.3 K/UL (ref 1.7–8.2)
NEUTS SEG NFR BLD: 92 % (ref 43–78)
NRBC # BLD: 0 K/UL (ref 0–0.2)
PLATELET # BLD AUTO: 163 K/UL (ref 150–450)
PMV BLD AUTO: 10.1 FL (ref 9.4–12.3)
POTASSIUM SERPL-SCNC: 4.3 MMOL/L (ref 3.5–5.1)
PROT SERPL-MCNC: 5.9 G/DL (ref 6.3–8.2)
RBC # BLD AUTO: 3.79 M/UL (ref 4.23–5.6)
SODIUM SERPL-SCNC: 141 MMOL/L (ref 138–145)
WBC # BLD AUTO: 5.8 K/UL (ref 4.3–11.1)

## 2022-04-10 PROCEDURE — 36591 DRAW BLOOD OFF VENOUS DEVICE: CPT

## 2022-04-10 PROCEDURE — 85025 COMPLETE CBC W/AUTO DIFF WBC: CPT

## 2022-04-10 PROCEDURE — 83735 ASSAY OF MAGNESIUM: CPT

## 2022-04-10 PROCEDURE — 80053 COMPREHEN METABOLIC PANEL: CPT

## 2022-04-10 PROCEDURE — APPSS30 APP SPLIT SHARED TIME 16-30 MINUTES: Performed by: NURSE PRACTITIONER

## 2022-04-10 PROCEDURE — 65270000015 HC RM PRIVATE ONCOLOGY

## 2022-04-10 PROCEDURE — 74011250637 HC RX REV CODE- 250/637: Performed by: NURSE PRACTITIONER

## 2022-04-10 PROCEDURE — 99233 SBSQ HOSP IP/OBS HIGH 50: CPT | Performed by: INTERNAL MEDICINE

## 2022-04-10 PROCEDURE — 74011000258 HC RX REV CODE- 258: Performed by: INTERNAL MEDICINE

## 2022-04-10 PROCEDURE — 74011250636 HC RX REV CODE- 250/636: Performed by: INTERNAL MEDICINE

## 2022-04-10 PROCEDURE — 74011250637 HC RX REV CODE- 250/637: Performed by: INTERNAL MEDICINE

## 2022-04-10 RX ORDER — ONDANSETRON 2 MG/ML
8 INJECTION INTRAMUSCULAR; INTRAVENOUS ONCE
Status: COMPLETED | OUTPATIENT
Start: 2022-04-10 | End: 2022-04-10

## 2022-04-10 RX ORDER — CLONIDINE HYDROCHLORIDE 0.2 MG/1
0.1 TABLET ORAL
Status: DISCONTINUED | OUTPATIENT
Start: 2022-04-10 | End: 2022-04-24 | Stop reason: HOSPADM

## 2022-04-10 RX ADMIN — CLONIDINE HYDROCHLORIDE 0.1 MG: 0.2 TABLET ORAL at 12:00

## 2022-04-10 RX ADMIN — MELPHALAN HYDROCHLORIDE 283 MG: KIT INTRAVENOUS at 10:23

## 2022-04-10 RX ADMIN — ATENOLOL 25 MG: 50 TABLET ORAL at 08:09

## 2022-04-10 RX ADMIN — ONDANSETRON 8 MG: 2 INJECTION INTRAMUSCULAR; INTRAVENOUS at 09:24

## 2022-04-10 RX ADMIN — CETIRIZINE HYDROCHLORIDE 5 MG: 5 TABLET ORAL at 08:09

## 2022-04-10 RX ADMIN — Medication 500 MG: at 11:45

## 2022-04-10 RX ADMIN — POTASSIUM CHLORIDE 20 MEQ: 20 TABLET, EXTENDED RELEASE ORAL at 16:04

## 2022-04-10 RX ADMIN — POTASSIUM CHLORIDE 20 MEQ: 20 TABLET, EXTENDED RELEASE ORAL at 08:09

## 2022-04-10 RX ADMIN — Medication 500 MG: at 16:04

## 2022-04-10 RX ADMIN — Medication 400 MG: at 08:09

## 2022-04-10 RX ADMIN — Medication 400 MG: at 16:04

## 2022-04-10 RX ADMIN — Medication 500 MG: at 08:09

## 2022-04-10 RX ADMIN — LISINOPRIL 20 MG: 20 TABLET ORAL at 08:09

## 2022-04-10 RX ADMIN — FOSAPREPITANT 150 MG: 150 INJECTION, POWDER, LYOPHILIZED, FOR SOLUTION INTRAVENOUS at 09:25

## 2022-04-10 NOTE — PROGRESS NOTES
Diagnosis: MM  Transplant Date: 04/11/22  Day: (+/-) -1. Chemo regimen used: Melphalan  BMT assessments and toxicities completed. WBC/ANC= 5.8/5.3. Prophylactic medications to start D+1 04/12/22. G-CSF to start on D+6 04/14/22. Toxicities greater than 2 :none. Patient seen by MD/NP daily until engraftment. New orders received: none. Issues:none      END OF SHIFT NOTE:    Intake/Output  04/10 0701 - 04/10 1900  In: 1200 [P.O.:1200]  Out: 900 [Urine:900]   Voiding: YES  Catheter: NO  Drain:              Stool:  0 occurrences. Emesis:  0 occurrences. VITAL SIGNS  Patient Vitals for the past 12 hrs:   Temp Pulse Resp BP SpO2   04/10/22 1606 98.1 °F (36.7 °C) 75 18 117/63 99 %   04/10/22 1145 97 °F (36.1 °C) 60 18 (!) 155/82 100 %   04/10/22 0809 97.5 °F (36.4 °C) 66 18 (!) 161/81 99 %       Pain Assessment  Pain 1  Pain Scale 1: Numeric (0 - 10) (04/10/22 1400)  Pain Intensity 1: 0 (04/10/22 1400)  Patient Stated Pain Goal: 0 (04/10/22 1400)    Ambulating  Yes    Additional Information: Patient completed Melphalan today. Mouth Wash completed 3 times today. VSS. No needs voiced. Cells given tomorrow. Shift report given to oncoming nurse at the bedside.     Bryant Moreno

## 2022-04-10 NOTE — PROGRESS NOTES
Diagnosis: DLBCL  Transplant Date: scheduled for 4/11/22  Day: -1  Chemo regimen used: BEAM  Labs not resulted that need follow-up:  none  BMT assessments and toxicities completed. 4/9/22  WBC/ANC= 5.8/5.3  Prophylactic medications start on D+1  G-CSF start on D+6  Toxicities greater than 2 : none  Patient seen by MD/NP daily & prn while inpatient & until engraftment. New orders received:no new orders  Issues: no new issues    END OF SHIFT NOTE:    Intake/Output  04/09 1901 - 04/10 0700  In: 855 [P.O.:720; I.V.:135]  Out: 2350 [Urine:2350]   Voiding: YES  Catheter: NO  Drain:              Stool:  0 occurrences. Emesis:  0 occurrences. VITAL SIGNS  Patient Vitals for the past 12 hrs:   Temp Pulse Resp BP SpO2   04/10/22 0315 98.4 °F (36.9 °C) 71 15 139/81 97 %   04/09/22 2307 97.5 °F (36.4 °C) (!) 58 16 (!) 146/73 98 %   04/09/22 1915 97.7 °F (36.5 °C) 60 16 137/75 100 %   04/09/22 1553 97.8 °F (36.6 °C) 65 18 (!) 148/72 100 %       Pain Assessment  Pain 1  Pain Scale 1: Numeric (0 - 10) (04/10/22 0315)  Pain Intensity 1: 0 (04/10/22 0315)  Patient Stated Pain Goal: 0 (04/10/22 0315)    Ambulating  Yes    Additional Information:   D-1 today. D6 BEAM chemo. To receive Melphalan today then cells 4/11. Last dose of  Cytarabine given last night;no adverse reactions. Compliant w/ mouth care. PO intake adequate. Shift report given to oncoming nurse TRAV Gomes at the bedside.     Savannah Bentley RN

## 2022-04-10 NOTE — PROGRESS NOTES
Diagnosis: MM  Transplant Date: 04/11/2022  Day: (+/-) -6. Chemo regimen used: BEAM  BMT assessments and toxicities completed. WBC/ANC= 5.1/3.2. Prophylactic medications started D+1 04/12/2022. G-CSF started on D+6 045/17/2022. Toxicities greater than 2 :none. Patient seen by MD/NP daily until engraftment. New orders received: none.   Issues:none

## 2022-04-10 NOTE — PROGRESS NOTES
[facility] Hematology & Oncology        Inpatient Hematology / Oncology Progress Note    Reason for Admission:  Diffuse large B cell lymphoma ([facility] [C83.30]  Admission for antineoplastic chemotherapy [Z51.11]  Bone marrow transplant status ([facility] [Z94.81]    24 Hour Events:  Afebrile, hypertensive at times  Day -1 BEAM/ASCT  Stem cells tomorrow  Tolerating treatment well  Family at bedside    Transfusions: None  Replacements: None    ROS:  Constitutional: Negative for fever, chills, weakness, malaise, fatigue. CV: Negative for chest pain, palpitations, edema. Respiratory: Negative for dyspnea, cough, wheezing. GI: Negative for nausea, abdominal pain, diarrhea. 10 point review of systems is otherwise negative with the exception of the elements mentioned above in the HPI.        No Known Allergies  Past Medical History:   Diagnosis Date    Cancer ([facility]     Hypertension     Valvular heart disease      Past Surgical History:   Procedure Laterality Date    HX BACK SURGERY      26 years ago    HX VASCULAR ACCESS      IR BX BONE MARROW DIAGNOSTIC  2/24/2022    IR CHOLECYSTOSTOMY PERCUTANEOUS      IR INSERT NON TUNL CVC OVER 5 YRS  3/14/2022    IR INTRATHECAL CHEMO INJECTION CNS  12/8/2021    IR INTRATHECAL CHEMO INJECTION CNS  12/28/2021    IR INTRATHECAL CHEMO INJECTION CNS  1/28/2022    IR SPINAL PUNCTURE CSF TREAT / DRAIN  2/4/2022     Family History   Problem Relation Age of Onset    Diabetes Mother     Hypertension Mother     Diabetes Father     Hypertension Father     Hypertension Brother      Social History     Socioeconomic History    Marital status:      Spouse name: Not on file    Number of children: Not on file    Years of education: Not on file    Highest education level: Not on file   Occupational History    Not on file   Tobacco Use    Smoking status: Never Smoker    Smokeless tobacco: Never Used   Vaping Use    Vaping Use: Never used   Substance and Sexual Activity  Alcohol use: Not Currently    Drug use: Not Currently    Sexual activity: Not on file   Other Topics Concern     Service Not Asked    Blood Transfusions Not Asked    Caffeine Concern Not Asked    Occupational Exposure Not Asked    Hobby Hazards Not Asked    Sleep Concern Not Asked    Stress Concern Not Asked    Weight Concern Not Asked    Special Diet Not Asked    Back Care Not Asked    Exercise Not Asked    Bike Helmet Not Asked   [address] Not Asked    Self-Exams Not Asked   Social History Narrative    Not on file     Social Determinants of Health     Financial Resource Strain:     Difficulty of Paying Living Expenses: Not on file   Food Insecurity:     Worried About Running Out of Food in the Last Year: Not on file    Sayra of Food in the Last Year: Not on file   Transportation Needs:     Lack of Transportation (Medical): Not on file    Lack of Transportation (Non-Medical):  Not on file   Physical Activity:     Days of Exercise per Week: Not on file    Minutes of Exercise per Session: Not on file   Stress:     Feeling of Stress : Not on file   Social Connections:     Frequency of Communication with Friends and Family: Not on file    Frequency of Social Gatherings with Friends and Family: Not on file    Attends Jainism Services: Not on file    Active Member of [address] [place] [address] [employer] or Organizations: Not on file    Attends Club or Organization Meetings: Not on file    Marital Status: Not on file   Intimate Partner Violence:     Fear of Current or Ex-Partner: Not on file    Emotionally Abused: Not on file    Physically Abused: Not on file    Sexually Abused: Not on file   Housing Stability:     Unable to Pay for Housing in the Last Year: Not on file    Number of [place] in the Last Year: Not on file    Unstable Housing in the Last Year: Not on file     Current Facility-Administered Medications   Medication Dose Route Frequency Provider Last Rate Last Admin    fosaprepitant (EMEND) 150 mg in 0.9% sodium chloride 150 mL IVPB  150 mg IntraVENous Lakeshia Dietrich  mL/hr at 04/10/22 0925 150 mg at 04/10/22 0925    melphalan HCl (ALKERAN) 283 mg in 0.9% sodium chloride 650 mL chemo infusion  140 mg/m2 (Treatment Plan Adjusted) IntraVENous Lakeshia Dietrich MD        potassium chloride (K-DUR, KLOR-CON M20) SR tablet 20 mEq  20 mEq Oral BID Ivonne Pedro MD   20 mEq at 04/10/22 0809    lisinopriL (PRINIVIL, ZESTRIL) tablet 20 mg  20 mg Oral DAILY Soila Moreno NP   20 mg at 04/10/22 0809    calcium carbonate (OS-DARI) tablet 500 mg [elemental]  500 mg Oral TID WITH MEALS Soila Moreno NP   500 mg at 04/10/22 0809    magnesium oxide (MAG-OX) tablet 400 mg  400 mg Oral BID Soila Moreno NP   400 mg at 04/10/22 0809    ondansetron (ZOFRAN) injection 8 mg  8 mg IntraVENous Q8H PRN Soila Moreno NP        prochlorperazine (COMPAZINE) with saline injection 5 mg  5 mg IntraVENous Q6H PRN Soila Moreno NP        HYDROcodone-acetaminophen (NORCO) 5-325 mg per tablet 1 Tablet  1 Tablet Oral Q6H PRN Soila Moreno NP        morphine injection 2 mg  2 mg IntraVENous Q4H PRN Soila Moreno NP        senna-docusate (PERICOLACE) 8.6-50 mg per tablet 1 Tablet  1 Tablet Oral DAILY PRN Ivonne Pedro MD   1 Tablet at 04/07/22 2059    polyethylene glycol (MIRALAX) packet 17 g  17 g Oral DAILY PRN Ivonne Pedro MD        acetaminophen (TYLENOL) tablet 650 mg  650 mg Oral Q4H PRN JW Arredondo        atenoloL (TENORMIN) tablet 25 mg  25 mg Oral DAILY WJ Arredondo   25 mg at 04/10/22 0809    cetirizine (ZYRTEC) tablet 5 mg  5 mg Oral DAILY JW Arredondo   5 mg at 04/10/22 0809    ondansetron (ZOFRAN ODT) tablet 8 mg  8 mg Oral Q8H PRN JW Arredondo        enoxaparin (LOVENOX) injection 40 mg  40 mg SubCUTAneous Q24H Anola Butts M, FNP        heparin (porcine) pf 300 Units  300 Units InterCATHeter PRN Ivonne Pedro MD        central line flush (saline) syringe 10 mL  10 mL InterCATHeter MISA Cummins MD   10 mL at 22 0423       OBJECTIVE:  Patient Vitals for the past 8 hrs:   BP Temp Pulse Resp SpO2 Height   04/10/22 0809 (!) 161/81 97.5 °F (36.4 °C) 66 18 99 % --   04/10/22 0707 -- -- -- -- -- 6' (1.829 m)   04/10/22 0315 139/81 98.4 °F (36.9 °C) 71 15 97 % --     Temp (24hrs), Av.8 °F (36.6 °C), Min:97.5 °F (36.4 °C), Max:98.4 °F (36.9 °C)    No intake/output data recorded. Physical Exam:  Constitutional: Well developed, well nourished male in no acute distress, sitting comfortably in the bedside chair. HEENT: +eye patch in place. Normocephalic and atraumatic. Oropharynx is clear, mucous membranes are moist. Extraocular muscles are intact. Sclerae anicteric. Neck supple without JVD. No thyromegaly present. Skin Warm and dry. No bruising and no rash noted. No erythema. No pallor. Respiratory Lungs are clear to auscultation bilaterally without wheezes, rales or rhonchi, normal air exchange without accessory muscle use. CVS Normal rate, regular rhythm and normal S1 and S2. +murmur   Abdomen Soft, nontender and nondistended, normoactive bowel sounds. No palpable mass. No hepatosplenomegaly. Neuro Grossly nonfocal with no obvious sensory or motor deficits. MSK Normal range of motion in general.  No edema and no tenderness. Psych Appropriate mood and affect.         Labs:    Recent Results (from the past 24 hour(s))   METABOLIC PANEL, COMPREHENSIVE    Collection Time: 04/10/22  3:16 AM   Result Value Ref Range    Sodium 141 138 - 145 mmol/L    Potassium 4.3 3.5 - 5.1 mmol/L    Chloride 108 (H) 98 - 107 mmol/L    CO2 30 21 - 32 mmol/L    Anion gap 3 (L) 7 - 16 mmol/L    Glucose 94 65 - 100 mg/dL    BUN 18 6 - 23 MG/DL    Creatinine 0.80 0.8 - 1.5 MG/DL    GFR est AA >60 >60 ml/min/1.73m2    GFR est non-AA >60 >60 ml/min/1.73m2    Calcium 8.9 8.3 - 10.4 MG/DL    Bilirubin, total 0.4 0.2 - 1.1 MG/DL    ALT (SGPT) 27 12 - 65 U/L AST (SGOT) 13 (L) 15 - 37 U/L    Alk. phosphatase 94 50 - 136 U/L    Protein, total 5.9 (L) 6.3 - 8.2 g/dL    Albumin 3.3 (L) 3.5 - 5.0 g/dL    Globulin 2.6 2.3 - 3.5 g/dL    A-G Ratio 1.3 1.2 - 3.5     MAGNESIUM    Collection Time: 04/10/22  3:16 AM   Result Value Ref Range    Magnesium 2.3 1.8 - 2.4 mg/dL   CBC WITH AUTOMATED DIFF    Collection Time: 04/10/22  3:16 AM   Result Value Ref Range    WBC 5.8 4.3 - 11.1 K/uL    RBC 3.79 (L) 4.23 - 5.6 M/uL    HGB 11.6 (L) 13.6 - 17.2 g/dL    HCT 34.2 (L) 41.1 - 50.3 %    MCV 90.2 79.6 - 97.8 FL    MCH 30.6 26.1 - 32.9 PG    MCHC 33.9 31.4 - 35.0 g/dL    RDW 11.9 11.9 - 14.6 %    PLATELET 352 134 - 932 K/uL    MPV 10.1 9.4 - 12.3 FL    ABSOLUTE NRBC 0.00 0.0 - 0.2 K/uL    DF AUTOMATED      NEUTROPHILS 92 (H) 43 - 78 %    LYMPHOCYTES 6 (L) 13 - 44 %    MONOCYTES 0 (L) 4.0 - 12.0 %    EOSINOPHILS 0 (L) 0.5 - 7.8 %    BASOPHILS 0 0.0 - 2.0 %    IMMATURE GRANULOCYTES 1 0.0 - 5.0 %    ABS. NEUTROPHILS 5.3 1.7 - 8.2 K/UL    ABS. LYMPHOCYTES 0.4 (L) 0.5 - 4.6 K/UL    ABS. MONOCYTES 0.0 (L) 0.1 - 1.3 K/UL    ABS. EOSINOPHILS 0.0 0.0 - 0.8 K/UL    ABS. BASOPHILS 0.0 0.0 - 0.2 K/UL    ABS. IMM. GRANS. 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 Alexander Zhou is a 64 y.o. male admitted on 4/5/2022. The primary encounter diagnosis was Diffuse large B-cell lymphoma, unspecified body region [facility]). Diagnoses of Admission for antineoplastic chemotherapy, Bone marrow transplant status ([facility] and Nonrheumatic mitral valve regurgitation were also pertinent to this visit. 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 He is a patient of Dr Bev Carmichael treated for relapsed DLBCL. He was initially treated by Dr Genny Gaytan in [facility] with R-CHOP 4/21-7/21. However, he was noted to have relapsed disease in L supraclavicular LN. He was then evaluated by Dr Bev Carmichael and has now completed R-ICE x3 with IT MTX x4 with CR. He has been evaluated for ASCT and collected 4.8x10^6/kg CD34+ cells. He is now admitted for BEAM/ASCT. He was seen by Dr Bev Carmichael day prior to admission and rapid COVID and flu testing negative. He was previously cleared by cardiology to proceed with transplant given mitral valve regurgitation. He is feeling well and ready to begin treatment today. PLAN:    Relapsed DLBCL / ASCT  - Admit for BEAM/ASCT - D-6 today  - Prophylactic antibiotics to begin D+1  - G-CSF to begin D+6  4/10 Day -1 BEAM/ASCT. Stem cells tomorrow. Tolerating treatment well. Mitral regurgitation  - Evaluated by cardiology, cleared to proceed with ASCT  - Monitor for fluid overload  4/10 No evidence of fluid overload    Hypertension  - Continue home meds  4/9 Pt with persistent HTN. Increase lisinopril to 20mg daily. Constipation  4/8 Continue PRN pericolace/miralax for now  4/9 BM x 1 yesterday    Continue home meds  PPx meds to start on D+1  Rhea SOPs  VTE prophylaxis - Lovenox (hold for plts <50k)  G-CSF to begin on D+6    Goals and plan of care reviewed with the patient. All questions answered to the best of our ability. Disposition:  Anticipate discharge home after engraftment. Expect hospitalization for ~3 weeks.               Emmalene Najjar, NP   [facility] Hematology & Oncology  [address]  Office : [phone]  Fax : [phone] Attending Addendum:  I have personally performed a face to face diagnostic evaluation on this patient.  I have reviewed and agree with the care plan as documented above Patrice Simons N.P.  My findings are as follows: Patient appears stable, heart rate regular without murmurs, abdomen is non-tender, bowel sounds are positive.  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  D -1 today. Patient is known to have mitral valve flail of anterior leaflet, and has been cleared by cardiology. Nery Newsome will be monitored for volume overload.  Closely monitor for toxicity, tolerating reasonably.  IV fluids and electrolyte replacement as needed.  Prophylactic antibiotics with Levaquin, Augmentin, acyclovir and Diflucan starting day +1.  Granix support starting day +6.  Blood transfusion support as needed. Nery Newsome will remain in-house through engraftment. Ambulation encouraged.                 Jhonatan Turcios MD  [facility] Hematology/Oncology  [id number] [place] [address]  Office : [phone]  Fax : [phone]

## 2022-04-11 LAB
ALBUMIN SERPL-MCNC: 3.6 G/DL (ref 3.5–5)
ALBUMIN/GLOB SERPL: 1.4 {RATIO} (ref 1.2–3.5)
ALP SERPL-CCNC: 97 U/L (ref 50–136)
ALT SERPL-CCNC: 29 U/L (ref 12–65)
ANION GAP SERPL CALC-SCNC: 6 MMOL/L (ref 7–16)
AST SERPL-CCNC: 17 U/L (ref 15–37)
BASOPHILS # BLD: 0 K/UL (ref 0–0.2)
BASOPHILS NFR BLD: 1 % (ref 0–2)
BILIRUB SERPL-MCNC: 0.4 MG/DL (ref 0.2–1.1)
BUN SERPL-MCNC: 19 MG/DL (ref 6–23)
CALCIUM SERPL-MCNC: 9.1 MG/DL (ref 8.3–10.4)
CHLORIDE SERPL-SCNC: 102 MMOL/L (ref 98–107)
CO2 SERPL-SCNC: 32 MMOL/L (ref 21–32)
CREAT SERPL-MCNC: 0.7 MG/DL (ref 0.8–1.5)
DIFFERENTIAL METHOD BLD: ABNORMAL
EOSINOPHIL # BLD: 0.2 K/UL (ref 0–0.8)
EOSINOPHIL NFR BLD: 4 % (ref 0.5–7.8)
ERYTHROCYTE [DISTWIDTH] IN BLOOD BY AUTOMATED COUNT: 11.9 % (ref 11.9–14.6)
GGT SERPL-CCNC: 58 U/L (ref 15–85)
GLOBULIN SER CALC-MCNC: 2.6 G/DL (ref 2.3–3.5)
GLUCOSE SERPL-MCNC: 87 MG/DL (ref 65–100)
HCT VFR BLD AUTO: 36.1 % (ref 41.1–50.3)
HGB BLD-MCNC: 12.1 G/DL (ref 13.6–17.2)
IMM GRANULOCYTES # BLD AUTO: 0.1 K/UL (ref 0–0.5)
IMM GRANULOCYTES NFR BLD AUTO: 1 % (ref 0–5)
LDH SERPL L TO P-CCNC: 178 U/L (ref 100–190)
LYMPHOCYTES # BLD: 0.3 K/UL (ref 0.5–4.6)
LYMPHOCYTES NFR BLD: 6 % (ref 13–44)
MAGNESIUM SERPL-MCNC: 2.2 MG/DL (ref 1.8–2.4)
MCH RBC QN AUTO: 29.8 PG (ref 26.1–32.9)
MCHC RBC AUTO-ENTMCNC: 33.5 G/DL (ref 31.4–35)
MCV RBC AUTO: 88.9 FL (ref 79.6–97.8)
MONOCYTES # BLD: 0 K/UL (ref 0.1–1.3)
MONOCYTES NFR BLD: 0 % (ref 4–12)
NEUTS SEG # BLD: 3.9 K/UL (ref 1.7–8.2)
NEUTS SEG NFR BLD: 88 % (ref 43–78)
NRBC # BLD: 0 K/UL (ref 0–0.2)
PHOSPHATE SERPL-MCNC: 3.5 MG/DL (ref 2.5–4.5)
PLATELET # BLD AUTO: 154 K/UL (ref 150–450)
PMV BLD AUTO: 10.2 FL (ref 9.4–12.3)
POTASSIUM SERPL-SCNC: 4.2 MMOL/L (ref 3.5–5.1)
PROT SERPL-MCNC: 6.2 G/DL (ref 6.3–8.2)
RBC # BLD AUTO: 4.06 M/UL (ref 4.23–5.6)
SODIUM SERPL-SCNC: 140 MMOL/L (ref 136–145)
WBC # BLD AUTO: 4.5 K/UL (ref 4.3–11.1)

## 2022-04-11 PROCEDURE — 74011250636 HC RX REV CODE- 250/636: Performed by: NURSE PRACTITIONER

## 2022-04-11 PROCEDURE — 83735 ASSAY OF MAGNESIUM: CPT

## 2022-04-11 PROCEDURE — 30233Y0 TRANSFUSION OF AUTOLOGOUS HEMATOPOIETIC STEM CELLS INTO PERIPHERAL VEIN, PERCUTANEOUS APPROACH: ICD-10-PCS | Performed by: INTERNAL MEDICINE

## 2022-04-11 PROCEDURE — 82977 ASSAY OF GGT: CPT

## 2022-04-11 PROCEDURE — 74011250637 HC RX REV CODE- 250/637: Performed by: NURSE PRACTITIONER

## 2022-04-11 PROCEDURE — 2709999900 HC NON-CHARGEABLE SUPPLY

## 2022-04-11 PROCEDURE — 74011250637 HC RX REV CODE- 250/637: Performed by: INTERNAL MEDICINE

## 2022-04-11 PROCEDURE — APPSS60 APP SPLIT SHARED TIME 46-60 MINUTES: Performed by: NURSE PRACTITIONER

## 2022-04-11 PROCEDURE — 74011250636 HC RX REV CODE- 250/636: Performed by: INTERNAL MEDICINE

## 2022-04-11 PROCEDURE — 99232 SBSQ HOSP IP/OBS MODERATE 35: CPT | Performed by: INTERNAL MEDICINE

## 2022-04-11 PROCEDURE — 83615 LACTATE (LD) (LDH) ENZYME: CPT

## 2022-04-11 PROCEDURE — 84100 ASSAY OF PHOSPHORUS: CPT

## 2022-04-11 PROCEDURE — 87305 ASPERGILLUS AG IA: CPT

## 2022-04-11 PROCEDURE — 85025 COMPLETE CBC W/AUTO DIFF WBC: CPT

## 2022-04-11 PROCEDURE — 87449 NOS EACH ORGANISM AG IA: CPT

## 2022-04-11 PROCEDURE — 38241 TRANSPLT AUTOL HCT/DONOR: CPT

## 2022-04-11 PROCEDURE — 65270000015 HC RM PRIVATE ONCOLOGY

## 2022-04-11 PROCEDURE — 80053 COMPREHEN METABOLIC PANEL: CPT

## 2022-04-11 PROCEDURE — 74011000250 HC RX REV CODE- 250: Performed by: NURSE PRACTITIONER

## 2022-04-11 RX ORDER — ACETAMINOPHEN 325 MG/1
650 TABLET ORAL ONCE
Status: COMPLETED | OUTPATIENT
Start: 2022-04-11 | End: 2022-04-11

## 2022-04-11 RX ORDER — ONDANSETRON 2 MG/ML
8 INJECTION INTRAMUSCULAR; INTRAVENOUS ONCE
Status: COMPLETED | OUTPATIENT
Start: 2022-04-11 | End: 2022-04-11

## 2022-04-11 RX ORDER — ACYCLOVIR 800 MG/1
400 TABLET ORAL 2 TIMES DAILY
Status: DISCONTINUED | OUTPATIENT
Start: 2022-04-12 | End: 2022-04-24 | Stop reason: HOSPADM

## 2022-04-11 RX ORDER — LEVOFLOXACIN 500 MG/1
500 TABLET, FILM COATED ORAL EVERY 24 HOURS
Status: DISCONTINUED | OUTPATIENT
Start: 2022-04-12 | End: 2022-04-24 | Stop reason: HOSPADM

## 2022-04-11 RX ORDER — AMOXICILLIN AND CLAVULANATE POTASSIUM 875; 125 MG/1; MG/1
1 TABLET, FILM COATED ORAL EVERY 12 HOURS
Status: DISCONTINUED | OUTPATIENT
Start: 2022-04-12 | End: 2022-04-24 | Stop reason: HOSPADM

## 2022-04-11 RX ORDER — DIPHENHYDRAMINE HYDROCHLORIDE 50 MG/ML
25 INJECTION, SOLUTION INTRAMUSCULAR; INTRAVENOUS ONCE
Status: COMPLETED | OUTPATIENT
Start: 2022-04-11 | End: 2022-04-11

## 2022-04-11 RX ORDER — LORAZEPAM 2 MG/ML
0.5 INJECTION INTRAMUSCULAR ONCE
Status: COMPLETED | OUTPATIENT
Start: 2022-04-11 | End: 2022-04-11

## 2022-04-11 RX ORDER — FLUCONAZOLE 100 MG/1
200 TABLET ORAL DAILY
Status: DISCONTINUED | OUTPATIENT
Start: 2022-04-12 | End: 2022-04-24 | Stop reason: HOSPADM

## 2022-04-11 RX ADMIN — CETIRIZINE HYDROCHLORIDE 5 MG: 5 TABLET ORAL at 07:37

## 2022-04-11 RX ADMIN — PROCHLORPERAZINE EDISYLATE 5 MG: 5 INJECTION INTRAMUSCULAR; INTRAVENOUS at 14:08

## 2022-04-11 RX ADMIN — ATENOLOL 25 MG: 50 TABLET ORAL at 07:37

## 2022-04-11 RX ADMIN — ONDANSETRON 8 MG: 2 INJECTION INTRAMUSCULAR; INTRAVENOUS at 20:52

## 2022-04-11 RX ADMIN — LISINOPRIL 20 MG: 20 TABLET ORAL at 07:37

## 2022-04-11 RX ADMIN — ONDANSETRON 8 MG: 2 INJECTION INTRAMUSCULAR; INTRAVENOUS at 04:30

## 2022-04-11 RX ADMIN — PROCHLORPERAZINE EDISYLATE 5 MG: 5 INJECTION INTRAMUSCULAR; INTRAVENOUS at 07:49

## 2022-04-11 RX ADMIN — LORAZEPAM 0.5 MG: 2 INJECTION INTRAMUSCULAR; INTRAVENOUS at 10:14

## 2022-04-11 RX ADMIN — POTASSIUM CHLORIDE 20 MEQ: 20 TABLET, EXTENDED RELEASE ORAL at 07:37

## 2022-04-11 RX ADMIN — POTASSIUM CHLORIDE 20 MEQ: 20 TABLET, EXTENDED RELEASE ORAL at 17:03

## 2022-04-11 RX ADMIN — ACETAMINOPHEN 650 MG: 325 TABLET ORAL at 10:59

## 2022-04-11 RX ADMIN — Medication 400 MG: at 07:37

## 2022-04-11 RX ADMIN — Medication 500 MG: at 17:03

## 2022-04-11 RX ADMIN — DIPHENHYDRAMINE HYDROCHLORIDE 25 MG: 50 INJECTION, SOLUTION INTRAMUSCULAR; INTRAVENOUS at 10:58

## 2022-04-11 RX ADMIN — ONDANSETRON 8 MG: 2 INJECTION INTRAMUSCULAR; INTRAVENOUS at 10:58

## 2022-04-11 RX ADMIN — Medication 400 MG: at 17:03

## 2022-04-11 RX ADMIN — Medication 500 MG: at 07:37

## 2022-04-11 NOTE — PROGRESS NOTES
LOS 6d  BMT patient (pending engraftment)    Chart reviewed for discharge planning    No needs identified at this time.   Will continue to follow for discharge planning needs  Please consult  if any new issues arise

## 2022-04-11 NOTE — PROGRESS NOTES
Problem: Falls - Risk of  Goal: *Absence of Falls  Description: Document Benjy Boyer Fall Risk and appropriate interventions in the flowsheet.   Outcome: Progressing Towards Goal  Note: Fall Risk Interventions:            Medication Interventions: Teach patient to arise slowly,Patient to call before getting OOB

## 2022-04-11 NOTE — PROGRESS NOTES
Patient is day 0 for autologous stem cell transplant. Prior to reinfusion of stem cells, patient identifiers including name,  verified with patient. Medical record number reviewed with both nurses. Arm band with this information was placed on patient. Education regarding reinfusion done with patient. Education included potential side effects from reinfusion, symptoms to report during reinfusion as well as process for reinfusion. Patient allowed time for questions, all questions answered. Patient was premedication with Benadryl, tylenol, Zofran, and Ativan. Stem cells reinfused per policy and as ordered. The following reactions/side effects were noted and reported: none. Vital signs throughout reinfusion are documented in the VS flowsheet. Diagnosis: MM  Transplant Date: 2022  Day: (+/-) 0. Chemo regimen used: BEAM.  BMT assessments and toxicities completed. WBC/ANC= 4.5/3.9. Prophylactic medications to start D+1 2022. G-CSF to start on D+6 2022. Toxicities greater than 2 :none. Patient seen by MD/NP daily until engraftment. New orders received: If needed, ativan 1mg IV one time dose for nausea. Issues:Nausea. END OF SHIFT NOTE:    Intake/Output   0701 -  1900  In: 805.8 [P.O.:250]  Out: 1400 [Urine:1400]   Voiding: YES  Catheter: NO  Drain:              Stool:  1 occurrences. Stool Assessment  Stool Color: Daun Linen (22)  Stool Appearance: Soft (started becoming loose at the end) (22)  Stool Amount: Medium (22)  Stool Source/Status: Rectum (22)    Emesis:  0 occurrences.           VITAL SIGNS  Patient Vitals for the past 12 hrs:   Temp Pulse Resp BP SpO2   22 1444 97.9 °F (36.6 °C) 77 14 (!) 150/72 100 %   22 1429 97.5 °F (36.4 °C) 73 14 138/67 100 %   22 1406 98.1 °F (36.7 °C) 71 14 127/65 100 %   22 1347 97.9 °F (36.6 °C) 69 14 133/72 100 %   22 1325 98 °F (36.7 °C) 63 12 (!) 119/56 99 % 04/11/22 1305 97.8 °F (36.6 °C) 64 16 121/63 100 %   04/11/22 1249 98 °F (36.7 °C) 64 14 115/67 100 %   04/11/22 1232 97.7 °F (36.5 °C) 62 14 122/61 100 %   04/11/22 1212 98.2 °F (36.8 °C) 61 16 (!) 110/58 100 %   04/11/22 1148 97.7 °F (36.5 °C) 65 16 117/62 100 %   04/11/22 1133 97.8 °F (36.6 °C) 60 18 124/65 100 %   04/11/22 1104 97.8 °F (36.6 °C) 66 16 (!) 141/71 100 %   04/11/22 0737 97.8 °F (36.6 °C) 73 16 (!) 155/78 96 %       Pain Assessment  Pain 1  Pain Scale 1: Numeric (0 - 10) (04/11/22 1520)  Pain Intensity 1: 0 (04/11/22 1520)  Patient Stated Pain Goal: 0 (04/11/22 1520)  Pain Reassessment 1: Patient resting w/respiratory rate greater than 10 (04/11/22 0248)    Ambulating  Yes    Additional Information: Patient with a lot of nausea today. Stem cells infused today without any side effects or issues. Given compazine, zofran, and ativan for nausea with some relief. Very little intake today. Had a BM that he stated was looser towards the end. Mouth Care completed 3 times today. VSS. Shift report given to oncoming nurse at the bedside.     Marv Gardner

## 2022-04-11 NOTE — PROGRESS NOTES
[facility]        Inpatient Hematology / Oncology Progress Note    Reason for Admission:  Diffuse large B cell lymphoma ([facility] [C83.30]  Admission for antineoplastic chemotherapy [Z51.11]  Bone marrow transplant status ([facility] [Z94.81]    24 Hour Events:  Afebrile, hypertensive at times  Day 0 BEAM/ASCT  Stem cells today  Tolerating treatment well, reports nausea last PM    Transfusions: None  Replacements: None    ROS:  Constitutional: Negative for fever, chills, weakness, malaise, fatigue. CV: Negative for chest pain, palpitations, edema. Respiratory: Negative for dyspnea, cough, wheezing. GI: +nausea. Negative for abdominal pain, diarrhea. 10 point review of systems is otherwise negative with the exception of the elements mentioned above in the HPI.        No Known Allergies  Past Medical History:   Diagnosis Date    Cancer ([facility]     Hypertension     Valvular heart disease      Past Surgical History:   Procedure Laterality Date    HX BACK SURGERY      26 years ago    HX VASCULAR ACCESS      IR BX BONE MARROW DIAGNOSTIC  2/24/2022    IR CHOLECYSTOSTOMY PERCUTANEOUS      IR INSERT NON TUNL CVC OVER 5 YRS  3/14/2022    IR INTRATHECAL CHEMO INJECTION CNS  12/8/2021    IR INTRATHECAL CHEMO INJECTION CNS  12/28/2021    IR INTRATHECAL CHEMO INJECTION CNS  1/28/2022    IR SPINAL PUNCTURE CSF TREAT / DRAIN  2/4/2022     Family History   Problem Relation Age of Onset    Diabetes Mother     Hypertension Mother     Diabetes Father     Hypertension Father     Hypertension Brother      Social History     Socioeconomic History    Marital status:      Spouse name: Not on file    Number of children: Not on file    Years of education: Not on file    Highest education level: Not on file   Occupational History    Not on file   Tobacco Use    Smoking status: Never Smoker    Smokeless tobacco: Never Used   Vaping Use    Vaping Use: Never used   Substance and Sexual Activity  Alcohol use: Not Currently    Drug use: Not Currently    Sexual activity: Not on file   Other Topics Concern     Service Not Asked    Blood Transfusions Not Asked    Caffeine Concern Not Asked    Occupational Exposure Not Asked    Hobby Hazards Not Asked    Sleep Concern Not Asked    Stress Concern Not Asked    Weight Concern Not Asked    Special Diet Not Asked    Back Care Not Asked    Exercise Not Asked    Bike Helmet Not Asked   [address] Not Asked    Self-Exams Not Asked   Social History Narrative    Not on file     Social Determinants of Health     Financial Resource Strain:     Difficulty of Paying Living Expenses: Not on file   Food Insecurity:     Worried About Running Out of Food in the Last Year: Not on file    Sayra of Food in the Last Year: Not on file   Transportation Needs:     Lack of Transportation (Medical): Not on file    Lack of Transportation (Non-Medical):  Not on file   Physical Activity:     Days of Exercise per Week: Not on file    Minutes of Exercise per Session: Not on file   Stress:     Feeling of Stress : Not on file   Social Connections:     Frequency of Communication with Friends and Family: Not on file    Frequency of Social Gatherings with Friends and Family: Not on file    Attends Nondenominational Services: Not on file    Active Member of [address] [place] [address] [employer] or Organizations: Not on file    Attends Club or Organization Meetings: Not on file    Marital Status: Not on file   Intimate Partner Violence:     Fear of Current or Ex-Partner: Not on file    Emotionally Abused: Not on file    Physically Abused: Not on file    Sexually Abused: Not on file   Housing Stability:     Unable to Pay for Housing in the Last Year: Not on file    Number of [place] in the Last Year: Not on file    Unstable Housing in the Last Year: Not on file     Current Facility-Administered Medications   Medication Dose Route Frequency Provider Last Rate Last Admin    ondansetron (ZOFRAN) injection 8 mg  8 mg IntraVENous ONCE Valerio Cummins MD        acetaminophen (TYLENOL) tablet 650 mg  650 mg Oral ONCE Valerio Cummins MD        diphenhydrAMINE (BENADRYL) injection 25 mg  25 mg IntraVENous ONCE Valerio Cummins MD        LORazepam (ATIVAN) injection 0.5 mg  0.5 mg IntraVENous Lynda Recio MD        [START ON 4/12/2022] amoxicillin-clavulanate (AUGMENTIN) 875-125 mg per tablet 1 Tablet  1 Tablet Oral Q12H An Hope NP       [facility] [START ON 4/12/2022] levoFLOXacin (LEVAQUIN) tablet 500 mg  500 mg Oral Q24H An Hope NP       [facility] Florence Thai ON 4/12/2022] acyclovir (ZOVIRAX) tablet 400 mg  400 mg Oral BID An Hope NP       [facility] [START ON 4/12/2022] fluconazole (DIFLUCAN) tablet 200 mg  200 mg Oral DAILY An Hope NP       [facility] [START ON 4/17/2022] filgrastim-aafi (NIVESTYM) injection syringe 300 mcg  300 mcg SubCUTAneous DAILY An Hope NP        cloNIDine HCL (CATAPRES) tablet 0.1 mg  0.1 mg Oral Q6H PRN An Hope NP   0.1 mg at 04/10/22 1200    potassium chloride (K-DUR, KLOR-CON M20) SR tablet 20 mEq  20 mEq Oral BID Valerio Cummins MD   20 mEq at 04/11/22 0737    lisinopriL (PRINIVIL, ZESTRIL) tablet 20 mg  20 mg Oral DAILY An Hope NP   20 mg at 04/11/22 0737    calcium carbonate (OS-DARI) tablet 500 mg [elemental]  500 mg Oral TID WITH MEALS An Hope NP   500 mg at 04/11/22 0737    magnesium oxide (MAG-OX) tablet 400 mg  400 mg Oral BID An Hope NP   400 mg at 04/11/22 0737    ondansetron (ZOFRAN) injection 8 mg  8 mg IntraVENous Q8H PRN An Hope NP   8 mg at 04/11/22 0430    prochlorperazine (COMPAZINE) with saline injection 5 mg  5 mg IntraVENous Q6H PRN An Herminia, NP   5 mg at 04/11/22 0749    HYDROcodone-acetaminophen (NORCO) 5-325 mg per tablet 1 Tablet  1 Tablet Oral Q6H PRN An Herminia, NP        morphine injection 2 mg  2 mg IntraVENous Q4H PRN An Herminia, NP        senna-docusate (PERICOLACE) 8.6-50 mg per tablet 1 Tablet  1 Tablet Oral DAILY PRN Rashida Arenas MD   1 Tablet at 22    polyethylene glycol (MIRALAX) packet 17 g  17 g Oral DAILY PRN Rashida Arenas MD        acetaminophen (TYLENOL) tablet 650 mg  650 mg Oral Q4H PRN JW Green        atenoloL (TENORMIN) tablet 25 mg  25 mg Oral DAILY JW Green   25 mg at 22 07    cetirizine (ZYRTEC) tablet 5 mg  5 mg Oral DAILY JW Green   5 mg at 22 0737    ondansetron (ZOFRAN ODT) tablet 8 mg  8 mg Oral Q8H PRN JW Green        enoxaparin (LOVENOX) injection 40 mg  40 mg SubCUTAneous Q24H JW Green        heparin (porcine) pf 300 Units  300 Units InterCATHeter PRN Rashida Arenas MD        central line flush (saline) syringe 10 mL  10 mL InterCATHeter PRN Rashida Arenas MD   10 mL at 22 0423       OBJECTIVE:  Patient Vitals for the past 8 hrs:   BP Temp Pulse Resp SpO2   22 0737 (!) 155/78 97.8 °F (36.6 °C) 73 16 96 %     Temp (24hrs), Av.7 °F (36.5 °C), Min:97 °F (36.1 °C), Max:98.2 °F (36.8 °C)    No intake/output data recorded. Physical Exam:  Constitutional: Well developed, well nourished male in no acute distress, sitting comfortably in the hospital bed. HEENT: +L eye patch. Normocephalic and atraumatic. Oropharynx is clear, mucous membranes are moist. Extraocular muscles are intact. Sclerae anicteric. Neck supple without JVD. No thyromegaly present. Skin Warm and dry. No bruising and no rash noted. No erythema. No pallor. Respiratory Lungs are clear to auscultation bilaterally without wheezes, rales or rhonchi, normal air exchange without accessory muscle use. CVS Normal rate, regular rhythm and normal S1 and S2. +murmur   Abdomen Soft, nontender and nondistended, normoactive bowel sounds. No palpable mass. No hepatosplenomegaly. Neuro Grossly nonfocal with no obvious sensory or motor deficits. MSK Normal range of motion in general.  No edema and no tenderness.    Psych Appropriate mood and affect. Labs:    Recent Results (from the past 24 hour(s))   METABOLIC PANEL, COMPREHENSIVE    Collection Time: 04/11/22  4:15 AM   Result Value Ref Range    Sodium 140 136 - 145 mmol/L    Potassium 4.2 3.5 - 5.1 mmol/L    Chloride 102 98 - 107 mmol/L    CO2 32 21 - 32 mmol/L    Anion gap 6 (L) 7 - 16 mmol/L    Glucose 87 65 - 100 mg/dL    BUN 19 6 - 23 MG/DL    Creatinine 0.70 (L) 0.8 - 1.5 MG/DL    GFR est AA >60 >60 ml/min/1.73m2    GFR est non-AA >60 >60 ml/min/1.73m2    Calcium 9.1 8.3 - 10.4 MG/DL    Bilirubin, total 0.4 0.2 - 1.1 MG/DL    ALT (SGPT) 29 12 - 65 U/L    AST (SGOT) 17 15 - 37 U/L    Alk. phosphatase 97 50 - 136 U/L    Protein, total 6.2 (L) 6.3 - 8.2 g/dL    Albumin 3.6 3.5 - 5.0 g/dL    Globulin 2.6 2.3 - 3.5 g/dL    A-G Ratio 1.4 1.2 - 3.5     MAGNESIUM    Collection Time: 04/11/22  4:15 AM   Result Value Ref Range    Magnesium 2.2 1.8 - 2.4 mg/dL   CBC WITH AUTOMATED DIFF    Collection Time: 04/11/22  4:15 AM   Result Value Ref Range    WBC 4.5 4.3 - 11.1 K/uL    RBC 4.06 (L) 4.23 - 5.6 M/uL    HGB 12.1 (L) 13.6 - 17.2 g/dL    HCT 36.1 (L) 41.1 - 50.3 %    MCV 88.9 79.6 - 97.8 FL    MCH 29.8 26.1 - 32.9 PG    MCHC 33.5 31.4 - 35.0 g/dL    RDW 11.9 11.9 - 14.6 %    PLATELET 154 723 - 148 K/uL    MPV 10.2 9.4 - 12.3 FL    ABSOLUTE NRBC 0.00 0.0 - 0.2 K/uL    DF AUTOMATED      NEUTROPHILS 88 (H) 43 - 78 %    LYMPHOCYTES 6 (L) 13 - 44 %    MONOCYTES 0 (L) 4.0 - 12.0 %    EOSINOPHILS 4 0.5 - 7.8 %    BASOPHILS 1 0.0 - 2.0 %    IMMATURE GRANULOCYTES 1 0.0 - 5.0 %    ABS. NEUTROPHILS 3.9 1.7 - 8.2 K/UL    ABS. LYMPHOCYTES 0.3 (L) 0.5 - 4.6 K/UL    ABS. MONOCYTES 0.0 (L) 0.1 - 1.3 K/UL    ABS. EOSINOPHILS 0.2 0.0 - 0.8 K/UL    ABS. BASOPHILS 0.0 0.0 - 0.2 K/UL    ABS. IMM.  GRANS. 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 Faina Landeros is a 64 y.o. male admitted on 4/5/2022. The primary encounter diagnosis was Diffuse large B-cell lymphoma, unspecified body region [facility]). Diagnoses of Admission for antineoplastic chemotherapy, Bone marrow transplant status ([facility] and Nonrheumatic mitral valve regurgitation were also pertinent to this visit. 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 He is a patient of Dr Graciela Hoskins treated for relapsed DLBCL. He was initially treated by Dr Annelise Fields in [place] with R-CHOP 4/21-7/21. However, he was noted to have relapsed disease in L supraclavicular LN. He was then evaluated by Dr Graciela Hoskins and has now completed R-ICE x3 with IT MTX x4 with CR. He has been evaluated for ASCT and collected 4.8x10^6/kg CD34+ cells. He is now admitted for BEAM/ASCT. He was seen by Dr Graciela Hoskins day prior to admission and rapid COVID and flu testing negative. He was previously cleared by cardiology to proceed with transplant given mitral valve regurgitation. He is feeling well and ready to begin treatment today.     PLAN:    Relapsed DLBCL / ASCT  - Admit for BEAM/ASCT - D-6 today  - Prophylactic antibiotics to begin D+1  - G-CSF to begin D+6  4/10 Day -1 BEAM/ASCT. Stem cells tomorrow. Tolerating treatment well. 4/11 Day 0. Stem cells today. Mitral regurgitation  - Evaluated by cardiology, cleared to proceed with ASCT  - Monitor for fluid overload  4/11 No evidence of fluid overload    Hypertension  - Continue home meds  4/9 Pt with persistent HTN. Increase lisinopril to 20mg daily. 4/11 BPs improved overall    Constipation  4/8 Continue PRN pericolace/miralax for now  4/9 BM x 1 yesterday    Continue home meds  PPx meds: Acyclovir, Diflucan, Levaquin, Augmentin (to start on D+1)  Rhea SOPs  VTE prophylaxis - Lovenox (hold for plts <50k)  G-CSF to begin on D+6    Goals and plan of care reviewed with the patient. All questions answered to the best of our ability. Disposition:  Anticipate discharge home after engraftment. Expect hospitalization for ~3 weeks.               Redd Cartagena NP   [facility]  [address] [address]  Office : [phone]  Fax : [phone]

## 2022-04-12 LAB
ALBUMIN SERPL-MCNC: 3.7 G/DL (ref 3.5–5)
ALBUMIN/GLOB SERPL: 1.3 {RATIO} (ref 1.2–3.5)
ALP SERPL-CCNC: 112 U/L (ref 50–136)
ALT SERPL-CCNC: 29 U/L (ref 12–65)
ANION GAP SERPL CALC-SCNC: 6 MMOL/L (ref 7–16)
AST SERPL-CCNC: 42 U/L (ref 15–37)
BASOPHILS # BLD: 0 K/UL (ref 0–0.2)
BASOPHILS NFR BLD: 1 % (ref 0–2)
BILIRUB SERPL-MCNC: 0.5 MG/DL (ref 0.2–1.1)
BUN SERPL-MCNC: 16 MG/DL (ref 6–23)
CALCIUM SERPL-MCNC: 9.2 MG/DL (ref 8.3–10.4)
CHLORIDE SERPL-SCNC: 103 MMOL/L (ref 98–107)
CO2 SERPL-SCNC: 31 MMOL/L (ref 21–32)
CREAT SERPL-MCNC: 0.7 MG/DL (ref 0.8–1.5)
DIFFERENTIAL METHOD BLD: ABNORMAL
EOSINOPHIL # BLD: 0.1 K/UL (ref 0–0.8)
EOSINOPHIL NFR BLD: 3 % (ref 0.5–7.8)
ERYTHROCYTE [DISTWIDTH] IN BLOOD BY AUTOMATED COUNT: 12 % (ref 11.9–14.6)
GLOBULIN SER CALC-MCNC: 2.9 G/DL (ref 2.3–3.5)
GLUCOSE SERPL-MCNC: 99 MG/DL (ref 65–100)
HCT VFR BLD AUTO: 36.3 % (ref 41.1–50.3)
HGB BLD-MCNC: 12.4 G/DL (ref 13.6–17.2)
IMM GRANULOCYTES # BLD AUTO: 0.1 K/UL (ref 0–0.5)
IMM GRANULOCYTES NFR BLD AUTO: 2 % (ref 0–5)
LYMPHOCYTES # BLD: 0.2 K/UL (ref 0.5–4.6)
LYMPHOCYTES NFR BLD: 4 % (ref 13–44)
MAGNESIUM SERPL-MCNC: 2.5 MG/DL (ref 1.8–2.4)
MCH RBC QN AUTO: 30.2 PG (ref 26.1–32.9)
MCHC RBC AUTO-ENTMCNC: 34.2 G/DL (ref 31.4–35)
MCV RBC AUTO: 88.3 FL (ref 79.6–97.8)
MONOCYTES # BLD: 0.1 K/UL (ref 0.1–1.3)
MONOCYTES NFR BLD: 2 % (ref 4–12)
NEUTS SEG # BLD: 4.5 K/UL (ref 1.7–8.2)
NEUTS SEG NFR BLD: 89 % (ref 43–78)
NRBC # BLD: 0 K/UL (ref 0–0.2)
PLATELET # BLD AUTO: 129 K/UL (ref 150–450)
PMV BLD AUTO: 10.6 FL (ref 9.4–12.3)
POTASSIUM SERPL-SCNC: 4.1 MMOL/L (ref 3.5–5.1)
PROT SERPL-MCNC: 6.6 G/DL (ref 6.3–8.2)
RBC # BLD AUTO: 4.11 M/UL (ref 4.23–5.6)
SODIUM SERPL-SCNC: 140 MMOL/L (ref 136–145)
WBC # BLD AUTO: 5.1 K/UL (ref 4.3–11.1)

## 2022-04-12 PROCEDURE — 74011000250 HC RX REV CODE- 250: Performed by: NURSE PRACTITIONER

## 2022-04-12 PROCEDURE — 99232 SBSQ HOSP IP/OBS MODERATE 35: CPT | Performed by: INTERNAL MEDICINE

## 2022-04-12 PROCEDURE — APPSS45 APP SPLIT SHARED TIME 31-45 MINUTES: Performed by: NURSE PRACTITIONER

## 2022-04-12 PROCEDURE — 36591 DRAW BLOOD OFF VENOUS DEVICE: CPT

## 2022-04-12 PROCEDURE — 74011250637 HC RX REV CODE- 250/637: Performed by: INTERNAL MEDICINE

## 2022-04-12 PROCEDURE — 80053 COMPREHEN METABOLIC PANEL: CPT

## 2022-04-12 PROCEDURE — 74011250637 HC RX REV CODE- 250/637: Performed by: NURSE PRACTITIONER

## 2022-04-12 PROCEDURE — 83735 ASSAY OF MAGNESIUM: CPT

## 2022-04-12 PROCEDURE — 85025 COMPLETE CBC W/AUTO DIFF WBC: CPT

## 2022-04-12 PROCEDURE — 74011000250 HC RX REV CODE- 250: Performed by: INTERNAL MEDICINE

## 2022-04-12 PROCEDURE — 65270000015 HC RM PRIVATE ONCOLOGY

## 2022-04-12 PROCEDURE — 74011250636 HC RX REV CODE- 250/636: Performed by: NURSE PRACTITIONER

## 2022-04-12 RX ORDER — LORAZEPAM 2 MG/ML
1 INJECTION INTRAMUSCULAR
Status: DISCONTINUED | OUTPATIENT
Start: 2022-04-12 | End: 2022-04-24 | Stop reason: HOSPADM

## 2022-04-12 RX ORDER — LOPERAMIDE HYDROCHLORIDE 2 MG/1
2 CAPSULE ORAL
Status: DISCONTINUED | OUTPATIENT
Start: 2022-04-12 | End: 2022-04-24 | Stop reason: HOSPADM

## 2022-04-12 RX ORDER — DEXTROSE MONOHYDRATE AND SODIUM CHLORIDE 5; .45 G/100ML; G/100ML
100 INJECTION, SOLUTION INTRAVENOUS CONTINUOUS
Status: DISCONTINUED | OUTPATIENT
Start: 2022-04-12 | End: 2022-04-17

## 2022-04-12 RX ADMIN — FLUCONAZOLE 200 MG: 100 TABLET ORAL at 08:09

## 2022-04-12 RX ADMIN — LORAZEPAM 1 MG: 2 INJECTION INTRAMUSCULAR; INTRAVENOUS at 21:54

## 2022-04-12 RX ADMIN — Medication 500 MG: at 08:09

## 2022-04-12 RX ADMIN — LISINOPRIL 20 MG: 20 TABLET ORAL at 08:09

## 2022-04-12 RX ADMIN — ACYCLOVIR 400 MG: 800 TABLET ORAL at 08:09

## 2022-04-12 RX ADMIN — DEXTROSE MONOHYDRATE AND SODIUM CHLORIDE 125 ML/HR: 5; .45 INJECTION, SOLUTION INTRAVENOUS at 21:58

## 2022-04-12 RX ADMIN — ATENOLOL 25 MG: 50 TABLET ORAL at 08:09

## 2022-04-12 RX ADMIN — CETIRIZINE HYDROCHLORIDE 5 MG: 5 TABLET ORAL at 08:09

## 2022-04-12 RX ADMIN — AMOXICILLIN AND CLAVULANATE POTASSIUM 1 TABLET: 875; 125 TABLET, FILM COATED ORAL at 08:09

## 2022-04-12 RX ADMIN — ONDANSETRON 8 MG: 2 INJECTION INTRAMUSCULAR; INTRAVENOUS at 19:28

## 2022-04-12 RX ADMIN — POTASSIUM CHLORIDE 20 MEQ: 20 TABLET, EXTENDED RELEASE ORAL at 16:40

## 2022-04-12 RX ADMIN — Medication 500 MG: at 16:40

## 2022-04-12 RX ADMIN — PROCHLORPERAZINE EDISYLATE 5 MG: 5 INJECTION INTRAMUSCULAR; INTRAVENOUS at 11:41

## 2022-04-12 RX ADMIN — ACYCLOVIR 400 MG: 800 TABLET ORAL at 16:40

## 2022-04-12 RX ADMIN — ONDANSETRON 8 MG: 2 INJECTION INTRAMUSCULAR; INTRAVENOUS at 09:35

## 2022-04-12 RX ADMIN — AMOXICILLIN AND CLAVULANATE POTASSIUM 1 TABLET: 875; 125 TABLET, FILM COATED ORAL at 21:45

## 2022-04-12 RX ADMIN — Medication 500 MG: at 11:38

## 2022-04-12 RX ADMIN — LEVOFLOXACIN 500 MG: 500 TABLET, FILM COATED ORAL at 08:09

## 2022-04-12 RX ADMIN — POTASSIUM CHLORIDE 20 MEQ: 20 TABLET, EXTENDED RELEASE ORAL at 08:09

## 2022-04-12 NOTE — PROGRESS NOTES
[facility]        Inpatient Hematology / Oncology Progress Note    Reason for Admission:  Diffuse large B cell lymphoma ([facility] [C83.30]  Admission for antineoplastic chemotherapy [Z51.11]  Bone marrow transplant status ([facility] [Z94.81]    24 Hour Events:  Afebrile, hypertensive at times  Day +1 BEAM/ASCT  Rec'd stem cell yesterday  Tolerating treatment well, reports nausea yesterday, improved this AM    Transfusions: None  Replacements: None    ROS:  Constitutional: Negative for fever, chills, weakness, malaise, fatigue. CV: Negative for chest pain, palpitations, edema. Respiratory: Negative for dyspnea, cough, wheezing. GI: +nausea, diarrhea. Negative for abdominal pain. 10 point review of systems is otherwise negative with the exception of the elements mentioned above in the HPI.        No Known Allergies  Past Medical History:   Diagnosis Date    Cancer ([facility]     Hypertension     Valvular heart disease      Past Surgical History:   Procedure Laterality Date    HX BACK SURGERY      26 years ago    HX VASCULAR ACCESS      IR BX BONE MARROW DIAGNOSTIC  2/24/2022    IR CHOLECYSTOSTOMY PERCUTANEOUS      IR INSERT NON TUNL CVC OVER 5 YRS  3/14/2022    IR INTRATHECAL CHEMO INJECTION CNS  12/8/2021    IR INTRATHECAL CHEMO INJECTION CNS  12/28/2021    IR INTRATHECAL CHEMO INJECTION CNS  1/28/2022    IR SPINAL PUNCTURE CSF TREAT / DRAIN  2/4/2022     Family History   Problem Relation Age of Onset    Diabetes Mother     Hypertension Mother     Diabetes Father     Hypertension Father     Hypertension Brother      Social History     Socioeconomic History    Marital status:      Spouse name: Not on file    Number of children: Not on file    Years of education: Not on file    Highest education level: Not on file   Occupational History    Not on file   Tobacco Use    Smoking status: Never Smoker    Smokeless tobacco: Never Used   Vaping Use    Vaping Use: Never used Substance and Sexual Activity    Alcohol use: Not Currently    Drug use: Not Currently    Sexual activity: Not on file   Other Topics Concern     Service Not Asked    Blood Transfusions Not Asked    Caffeine Concern Not Asked    Occupational Exposure Not Asked    Hobby Hazards Not Asked    Sleep Concern Not Asked    Stress Concern Not Asked    Weight Concern Not Asked    Special Diet Not Asked    Back Care Not Asked    Exercise Not Asked    Bike Helmet Not Asked   [address] Not Asked    Self-Exams Not Asked   Social History Narrative    Not on file     Social Determinants of Health     Financial Resource Strain:     Difficulty of Paying Living Expenses: Not on file   Food Insecurity:     Worried About Running Out of Food in the Last Year: Not on file    Sayra of Food in the Last Year: Not on file   Transportation Needs:     Lack of Transportation (Medical): Not on file    Lack of Transportation (Non-Medical):  Not on file   Physical Activity:     Days of Exercise per Week: Not on file    Minutes of Exercise per Session: Not on file   Stress:     Feeling of Stress : Not on file   Social Connections:     Frequency of Communication with Friends and Family: Not on file    Frequency of Social Gatherings with Friends and Family: Not on file    Attends Cheondoism Services: Not on file    Active Member of [address] [place] [address] [employer] or Organizations: Not on file    Attends Club or Organization Meetings: Not on file    Marital Status: Not on file   Intimate Partner Violence:     Fear of Current or Ex-Partner: Not on file    Emotionally Abused: Not on file    Physically Abused: Not on file    Sexually Abused: Not on file   Housing Stability:     Unable to Pay for Housing in the Last Year: Not on file    Number of [place] in the Last Year: Not on file    Unstable Housing in the Last Year: Not on file     Current Facility-Administered Medications   Medication Dose Route Frequency Provider Last Rate Last Admin    amoxicillin-clavulanate (AUGMENTIN) 875-125 mg per tablet 1 Tablet  1 Tablet Oral Q12H Rise Slay, NP   1 Tablet at 04/12/22 0809    levoFLOXacin (LEVAQUIN) tablet 500 mg  500 mg Oral Q24H Rise Slay, NP   500 mg at 04/12/22 0809    acyclovir (ZOVIRAX) tablet 400 mg  400 mg Oral BID Rise Slay, NP   400 mg at 04/12/22 0809    fluconazole (DIFLUCAN) tablet 200 mg  200 mg Oral DAILY Rise Slay, NP   200 mg at 04/12/22 0809    [START ON 4/17/2022] filgrastim-aafi (NIVESTYM) injection syringe 300 mcg  300 mcg SubCUTAneous Q24H Elizabeth Tavares MD        cloNIDine HCL (CATAPRES) tablet 0.1 mg  0.1 mg Oral Q6H PRN Rise Slay, NP   0.1 mg at 04/10/22 1200    potassium chloride (K-DUR, KLOR-CON M20) SR tablet 20 mEq  20 mEq Oral BID Raffi Chester MD   20 mEq at 04/12/22 0809    lisinopriL (PRINIVIL, ZESTRIL) tablet 20 mg  20 mg Oral DAILY Rise Slay, NP   20 mg at 04/12/22 0809    calcium carbonate (OS-DARI) tablet 500 mg [elemental]  500 mg Oral TID WITH MEALS Rise Slay, NP   500 mg at 04/12/22 0809    ondansetron (ZOFRAN) injection 8 mg  8 mg IntraVENous Q8H PRN Rise Slay, NP   8 mg at 04/11/22 2052    prochlorperazine (COMPAZINE) with saline injection 5 mg  5 mg IntraVENous Q6H PRN Rise Slay, NP   5 mg at 04/11/22 1408    HYDROcodone-acetaminophen (NORCO) 5-325 mg per tablet 1 Tablet  1 Tablet Oral Q6H PRN Rise Slay, NP        morphine injection 2 mg  2 mg IntraVENous Q4H PRN Rise Slay, NP        senna-docusate (PERICOLACE) 8.6-50 mg per tablet 1 Tablet  1 Tablet Oral DAILY PRN Raffi Chester MD   1 Tablet at 04/07/22 2059    polyethylene glycol (MIRALAX) packet 17 g  17 g Oral DAILY PRN Raffi Chester MD        acetaminophen (TYLENOL) tablet 650 mg  650 mg Oral Q4H PRN Kristie Ayers, FNP        atenoloL (TENORMIN) tablet 25 mg  25 mg Oral DAILY Kristie Ayers, FNP   25 mg at 04/12/22 0809    cetirizine (ZYRTEC) tablet 5 mg  5 mg Oral DAILY Kristie Ayers, FNP 5 mg at 22 0809    ondansetron (ZOFRAN ODT) tablet 8 mg  8 mg Oral Q8H PRN JW Potter        enoxaparin (LOVENOX) injection 40 mg  40 mg SubCUTAneous Q24H JW Potter        heparin (porcine) pf 300 Units  300 Units InterCATHeter PRN Naif Ave, MD        central line flush (saline) syringe 10 mL  10 mL InterCATHeter PRN Naif Johnston MD   10 mL at 22 0423       OBJECTIVE:  Patient Vitals for the past 8 hrs:   BP Temp Pulse Resp SpO2   22 0809 (!) 141/75 -- 93 -- --   22 0313 (!) 143/72 98.3 °F (36.8 °C) 89 20 98 %     Temp (24hrs), Av.9 °F (36.6 °C), Min:97.5 °F (36.4 °C), Max:98.6 °F (37 °C)    No intake/output data recorded. Physical Exam:  Constitutional: Well developed, well nourished male in no acute distress, sitting comfortably in the hospital bed. HEENT: +L eye patch. Normocephalic and atraumatic. Oropharynx is clear, mucous membranes are moist. Extraocular muscles are intact. Sclerae anicteric. Neck supple without JVD. No thyromegaly present. Skin Warm and dry. No bruising and no rash noted. No erythema. No pallor. Respiratory Lungs are clear to auscultation bilaterally without wheezes, rales or rhonchi, normal air exchange without accessory muscle use. CVS Normal rate, regular rhythm and normal S1 and S2. +murmur   Abdomen Soft, nontender and nondistended, normoactive bowel sounds. No palpable mass. No hepatosplenomegaly. Neuro Grossly nonfocal with no obvious sensory or motor deficits. MSK Normal range of motion in general.  No edema and no tenderness. Psych Appropriate mood and affect.         Labs:    Recent Results (from the past 24 hour(s))   METABOLIC PANEL, COMPREHENSIVE    Collection Time: 22  3:16 AM   Result Value Ref Range    Sodium 140 136 - 145 mmol/L    Potassium 4.1 3.5 - 5.1 mmol/L    Chloride 103 98 - 107 mmol/L    CO2 31 21 - 32 mmol/L    Anion gap 6 (L) 7 - 16 mmol/L    Glucose 99 65 - 100 mg/dL BUN 16 6 - 23 MG/DL    Creatinine 0.70 (L) 0.8 - 1.5 MG/DL    GFR est AA >60 >60 ml/min/1.73m2    GFR est non-AA >60 >60 ml/min/1.73m2    Calcium 9.2 8.3 - 10.4 MG/DL    Bilirubin, total 0.5 0.2 - 1.1 MG/DL    ALT (SGPT) 29 12 - 65 U/L    AST (SGOT) 42 (H) 15 - 37 U/L    Alk. phosphatase 112 50 - 136 U/L    Protein, total 6.6 6.3 - 8.2 g/dL    Albumin 3.7 3.5 - 5.0 g/dL    Globulin 2.9 2.3 - 3.5 g/dL    A-G Ratio 1.3 1.2 - 3.5     MAGNESIUM    Collection Time: 04/12/22  3:16 AM   Result Value Ref Range    Magnesium 2.5 (H) 1.8 - 2.4 mg/dL   CBC WITH AUTOMATED DIFF    Collection Time: 04/12/22  3:16 AM   Result Value Ref Range    WBC 5.1 4.3 - 11.1 K/uL    RBC 4.11 (L) 4.23 - 5.6 M/uL    HGB 12.4 (L) 13.6 - 17.2 g/dL    HCT 36.3 (L) 41.1 - 50.3 %    MCV 88.3 79.6 - 97.8 FL    MCH 30.2 26.1 - 32.9 PG    MCHC 34.2 31.4 - 35.0 g/dL    RDW 12.0 11.9 - 14.6 %    PLATELET 121 (L) 700 - 450 K/uL    MPV 10.6 9.4 - 12.3 FL    ABSOLUTE NRBC 0.00 0.0 - 0.2 K/uL    DF AUTOMATED      NEUTROPHILS 89 (H) 43 - 78 %    LYMPHOCYTES 4 (L) 13 - 44 %    MONOCYTES 2 (L) 4.0 - 12.0 %    EOSINOPHILS 3 0.5 - 7.8 %    BASOPHILS 1 0.0 - 2.0 %    IMMATURE GRANULOCYTES 2 0.0 - 5.0 %    ABS. NEUTROPHILS 4.5 1.7 - 8.2 K/UL    ABS. LYMPHOCYTES 0.2 (L) 0.5 - 4.6 K/UL    ABS. MONOCYTES 0.1 0.1 - 1.3 K/UL    ABS. EOSINOPHILS 0.1 0.0 - 0.8 K/UL    ABS. BASOPHILS 0.0 0.0 - 0.2 K/UL    ABS. IMM.  GRANS. 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 Geraldo Kee is a 64 y.o. male admitted on 4/5/2022. The primary encounter diagnosis was Diffuse large B-cell lymphoma, unspecified body region [facility]). Diagnoses of Admission for antineoplastic chemotherapy, Bone marrow transplant status ([facility] and Nonrheumatic mitral valve regurgitation were also pertinent to this visit. 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 He is a patient of Dr John Maldonado treated for relapsed DLBCL. He was initially treated by Dr Valery Head in [place] with R-CHOP 4/21-7/21. However, he was noted to have relapsed disease in L supraclavicular LN. He was then evaluated by Dr John Maldonado and has now completed R-ICE x3 with IT MTX x4 with CR. He has been evaluated for ASCT and collected 4.8x10^6/kg CD34+ cells. He is now admitted for BEAM/ASCT. He was seen by Dr John Maldonado day prior to admission and rapid COVID and flu testing negative. He was previously cleared by cardiology to proceed with transplant given mitral valve regurgitation. He is feeling well and ready to begin treatment today. PLAN:    Relapsed DLBCL / ASCT  - Admit for BEAM/ASCT - D-6 today  - Prophylactic antibiotics to begin D+1  - G-CSF to begin D+6  4/10 Day -1 BEAM/ASCT. Stem cells tomorrow. Tolerating treatment well. 4/11 Day 0. Stem cells today. 4/12 Day +1. PPx antibx begin today. Mitral regurgitation  - Evaluated by cardiology, cleared to proceed with ASCT  - Monitor for fluid overload  4/12 No evidence of fluid overload    Hypertension  - Continue home meds  4/9 Pt with persistent HTN. Increase lisinopril to 20mg daily.   4/11 BPs improved overall    Constipation / Diarrhea  4/8 Continue PRN pericolace/miralax for now  4/9 BM x 1 yesterday  4/12 BM x 3 yesterday, reports loose stools    Nausea  4/11 antiemetics prn    Continue home meds  PPx meds: Acyclovir, Diflucan, Levaquin, Corrine Wilkerson SOPs  VTE prophylaxis - Lovenox (hold for plts <50k)  G-CSF to begin on D+6    Goals and plan of care reviewed with the patient. All questions answered to the best of our ability. Disposition:  Anticipate discharge home after engraftment. Expect hospitalization for ~3 weeks.               Edward Oliveros NP   [facility]  [address] [place] [address]  Office : [phone]  Fax : [phone]

## 2022-04-12 NOTE — PROGRESS NOTES
Diagnosis: MM  Transplant Date: 04/11/2022  Day: (+/-) +1. Chemo regimen used: BEAM  BMT assessments and toxicities completed. WBC/ANC= 5.1/4.5. Prophylactic medications started 04/12/2022. G-CSF will be started on D+6. Toxicities greater than 2 :none. Patient seen by MD/NP daily until engraftment. New orders received: ativan and increased compazine. Issues:nausea and diarrhea      END OF SHIFT NOTE:    Intake/Output  04/12 0701 - 04/12 1900  In: 300 [P.O.:300]  Out: 400 [Urine:400]   Voiding: YES  Catheter: NO  Drain:              Stool:  1 occurrences. Stool Assessment  Stool Color: Philip Harmon (04/12/22 0313)  Stool Appearance: Mucous; Soft (04/12/22 0313)  Stool Amount: Medium (04/12/22 0313)  Stool Source/Status: Rectum (04/12/22 0313)    Emesis:  0 occurrences. Emesis Assessment  Appearance: Yellow (04/12/22 1138)  Emesis Amount: Small (04/12/22 1138)    VITAL SIGNS  Patient Vitals for the past 12 hrs:   Temp Pulse Resp BP SpO2   04/12/22 1512 97.6 °F (36.4 °C) 75 18 (!) 147/73 98 %   04/12/22 1138 98.4 °F (36.9 °C) 72 18 (!) 149/85 98 %   04/12/22 0809 98 °F (36.7 °C) 93 18 (!) 141/75 96 %       Pain Assessment  Pain 1  Pain Scale 1: Numeric (0 - 10) (04/12/22 1414)  Pain Intensity 1: 0 (04/12/22 1414)  Patient Stated Pain Goal: 0 (04/12/22 1414)  Pain Reassessment 1: Patient resting w/respiratory rate greater than 10 (04/11/22 4434)    Ambulating  Yes    Additional Information: Patient had frequent nausea throughout the day. No loose bowel movements since early this morning. Did have one episode of emesis but a very small amount. Encouraged him to take in fluids. Mouth care completed 3 times today. Not much intake on food. Ambulated in room. VSS. Shift report given to oncoming nurse at the bedside.     Marvin Rojas Never smoker

## 2022-04-12 NOTE — PROGRESS NOTES
Diagnosis: DLBCL  Transplant Date: 4/11/22  Day: (+/-) Day + 1. Chemo regimen used: BEAM  Labs not resulted that need follow-up: NONE.  BMT assessments and toxicities completed: 4/11/22  WBC/ANC= 5.1/4.5. Prophylactic medications start D + 1 4/12/22   G-CSF started on D+6   Toxicities greater than 2 :None. Patient seen by MD/NP Daily while inpatient until engraftment. New orders received: NONE. Issues:Some nausea,  Gave Zofran 8 mg x 1 (4/11, 2052) Loose stools X 2      END OF SHIFT NOTE:    Intake/Output  04/11 1901 - 04/12 0700  In: -   Out: 1100 [Urine:1100]   Voiding: YES  Catheter: NO  Drain:              Stool:  2 occurrences. Stool Assessment  Stool Color: Brown (04/11/22 1711)  Stool Appearance: Soft (per pt) (04/11/22 1931)  Stool Amount: Medium (04/11/22 1711)  Stool Source/Status: Rectum (04/11/22 1711)    Emesis:  0 occurrences. VITAL SIGNS  Patient Vitals for the past 12 hrs:   Temp Pulse Resp BP SpO2   04/12/22 0313 98.3 °F (36.8 °C) 89 20 (!) 143/72 98 %   04/11/22 2314 97.9 °F (36.6 °C) 74 16 133/70 97 %   04/11/22 1931 98.6 °F (37 °C) 88 18 138/64 98 %       Pain Assessment  Pain 1  Pain Scale 1: Numeric (0 - 10) (04/12/22 0313)  Pain Intensity 1: 0 (04/12/22 0313)  Patient Stated Pain Goal: 0 (04/12/22 0313)  Pain Reassessment 1: Patient resting w/respiratory rate greater than 10 (04/11/22 2314)    Ambulating  Yes    Additional Information: Pt with complaints of loose stools x 2, stools are small, brown and appear to be gelatin like. Pt complained of nausea and received 8 mg of Zofran IV x 8    Shift report given to oncoming nurse at the bedside.     Johana Joy RN

## 2022-04-12 NOTE — PROGRESS NOTES
Problem: Falls - Risk of  Goal: *Absence of Falls  Description: Document Kamala Aldridge Fall Risk and appropriate interventions in the flowsheet.   Outcome: Progressing Towards Goal  Note: Fall Risk Interventions:            Medication Interventions: Teach patient to arise slowly

## 2022-04-13 LAB
ALBUMIN SERPL-MCNC: 3.8 G/DL (ref 3.5–5)
ALBUMIN/GLOB SERPL: 1.4 {RATIO} (ref 1.2–3.5)
ALP SERPL-CCNC: 101 U/L (ref 50–136)
ALT SERPL-CCNC: 27 U/L (ref 12–65)
ANION GAP SERPL CALC-SCNC: 4 MMOL/L (ref 7–16)
AST SERPL-CCNC: 25 U/L (ref 15–37)
BASOPHILS # BLD: 0 K/UL (ref 0–0.2)
BASOPHILS NFR BLD: 2 % (ref 0–2)
BILIRUB SERPL-MCNC: 0.7 MG/DL (ref 0.2–1.1)
BUN SERPL-MCNC: 14 MG/DL (ref 6–23)
C DIFF GDH STL QL: NORMAL
C DIFF TOX A+B STL QL IA: NORMAL
CALCIUM SERPL-MCNC: 9.4 MG/DL (ref 8.3–10.4)
CHLORIDE SERPL-SCNC: 103 MMOL/L (ref 98–107)
CLINICAL CONSIDERATION: NORMAL
CO2 SERPL-SCNC: 31 MMOL/L (ref 21–32)
CREAT SERPL-MCNC: 0.7 MG/DL (ref 0.8–1.5)
DIFFERENTIAL METHOD BLD: ABNORMAL
EOSINOPHIL # BLD: 0 K/UL (ref 0–0.8)
EOSINOPHIL NFR BLD: 1 % (ref 0.5–7.8)
ERYTHROCYTE [DISTWIDTH] IN BLOOD BY AUTOMATED COUNT: 11.8 % (ref 11.9–14.6)
GGT SERPL-CCNC: 63 U/L (ref 15–85)
GLOBULIN SER CALC-MCNC: 2.8 G/DL (ref 2.3–3.5)
GLUCOSE SERPL-MCNC: 121 MG/DL (ref 65–100)
HCT VFR BLD AUTO: 35.7 % (ref 41.1–50.3)
HGB BLD-MCNC: 12.1 G/DL (ref 13.6–17.2)
IMM GRANULOCYTES # BLD AUTO: 0.1 K/UL (ref 0–0.5)
IMM GRANULOCYTES NFR BLD AUTO: 7 % (ref 0–5)
INTERPRETATION: NORMAL
LDH SERPL L TO P-CCNC: 358 U/L (ref 100–190)
LYMPHOCYTES # BLD: 0.2 K/UL (ref 0.5–4.6)
LYMPHOCYTES NFR BLD: 10 % (ref 13–44)
MAGNESIUM SERPL-MCNC: 2.3 MG/DL (ref 1.8–2.4)
MCH RBC QN AUTO: 30.1 PG (ref 26.1–32.9)
MCHC RBC AUTO-ENTMCNC: 33.9 G/DL (ref 31.4–35)
MCV RBC AUTO: 88.8 FL (ref 79.6–97.8)
MONOCYTES # BLD: 0.1 K/UL (ref 0.1–1.3)
MONOCYTES NFR BLD: 3 % (ref 4–12)
NEUTS SEG # BLD: 1.5 K/UL (ref 1.7–8.2)
NEUTS SEG NFR BLD: 78 % (ref 43–78)
NRBC # BLD: 0 K/UL (ref 0–0.2)
PCR REFLEX: NORMAL
PHOSPHATE SERPL-MCNC: 2.5 MG/DL (ref 2.5–4.5)
PLATELET # BLD AUTO: 99 K/UL (ref 150–450)
PMV BLD AUTO: 9.8 FL (ref 9.4–12.3)
POTASSIUM SERPL-SCNC: 3.8 MMOL/L (ref 3.5–5.1)
PROT SERPL-MCNC: 6.6 G/DL (ref 6.3–8.2)
RBC # BLD AUTO: 4.02 M/UL (ref 4.23–5.6)
SODIUM SERPL-SCNC: 138 MMOL/L (ref 136–145)
WBC # BLD AUTO: 2 K/UL (ref 4.3–11.1)

## 2022-04-13 PROCEDURE — 65270000015 HC RM PRIVATE ONCOLOGY

## 2022-04-13 PROCEDURE — 83615 LACTATE (LD) (LDH) ENZYME: CPT

## 2022-04-13 PROCEDURE — 74011000250 HC RX REV CODE- 250: Performed by: NURSE PRACTITIONER

## 2022-04-13 PROCEDURE — 74011250637 HC RX REV CODE- 250/637: Performed by: NURSE PRACTITIONER

## 2022-04-13 PROCEDURE — 74011250637 HC RX REV CODE- 250/637: Performed by: INTERNAL MEDICINE

## 2022-04-13 PROCEDURE — 82977 ASSAY OF GGT: CPT

## 2022-04-13 PROCEDURE — 83735 ASSAY OF MAGNESIUM: CPT

## 2022-04-13 PROCEDURE — 85025 COMPLETE CBC W/AUTO DIFF WBC: CPT

## 2022-04-13 PROCEDURE — 99232 SBSQ HOSP IP/OBS MODERATE 35: CPT | Performed by: INTERNAL MEDICINE

## 2022-04-13 PROCEDURE — APPSS45 APP SPLIT SHARED TIME 31-45 MINUTES: Performed by: NURSE PRACTITIONER

## 2022-04-13 PROCEDURE — 74011250636 HC RX REV CODE- 250/636: Performed by: NURSE PRACTITIONER

## 2022-04-13 PROCEDURE — 36591 DRAW BLOOD OFF VENOUS DEVICE: CPT

## 2022-04-13 PROCEDURE — 84100 ASSAY OF PHOSPHORUS: CPT

## 2022-04-13 PROCEDURE — 87324 CLOSTRIDIUM AG IA: CPT

## 2022-04-13 PROCEDURE — 80053 COMPREHEN METABOLIC PANEL: CPT

## 2022-04-13 PROCEDURE — 74011000250 HC RX REV CODE- 250: Performed by: INTERNAL MEDICINE

## 2022-04-13 RX ADMIN — PROCHLORPERAZINE EDISYLATE 10 MG: 5 INJECTION INTRAMUSCULAR; INTRAVENOUS at 12:13

## 2022-04-13 RX ADMIN — CETIRIZINE HYDROCHLORIDE 5 MG: 5 TABLET ORAL at 08:24

## 2022-04-13 RX ADMIN — ACYCLOVIR 400 MG: 800 TABLET ORAL at 08:24

## 2022-04-13 RX ADMIN — ONDANSETRON 8 MG: 2 INJECTION INTRAMUSCULAR; INTRAVENOUS at 22:33

## 2022-04-13 RX ADMIN — AMOXICILLIN AND CLAVULANATE POTASSIUM 1 TABLET: 875; 125 TABLET, FILM COATED ORAL at 08:24

## 2022-04-13 RX ADMIN — POTASSIUM CHLORIDE 20 MEQ: 20 TABLET, EXTENDED RELEASE ORAL at 17:49

## 2022-04-13 RX ADMIN — POTASSIUM CHLORIDE 20 MEQ: 20 TABLET, EXTENDED RELEASE ORAL at 08:24

## 2022-04-13 RX ADMIN — ATENOLOL 25 MG: 50 TABLET ORAL at 08:24

## 2022-04-13 RX ADMIN — Medication 500 MG: at 08:24

## 2022-04-13 RX ADMIN — LEVOFLOXACIN 500 MG: 500 TABLET, FILM COATED ORAL at 08:24

## 2022-04-13 RX ADMIN — ONDANSETRON 8 MG: 4 TABLET, ORALLY DISINTEGRATING ORAL at 08:23

## 2022-04-13 RX ADMIN — LISINOPRIL 20 MG: 20 TABLET ORAL at 08:24

## 2022-04-13 RX ADMIN — DEXTROSE MONOHYDRATE AND SODIUM CHLORIDE 75 ML/HR: 5; .45 INJECTION, SOLUTION INTRAVENOUS at 17:49

## 2022-04-13 RX ADMIN — FLUCONAZOLE 200 MG: 100 TABLET ORAL at 08:24

## 2022-04-13 RX ADMIN — ACYCLOVIR 400 MG: 800 TABLET ORAL at 17:49

## 2022-04-13 RX ADMIN — AMOXICILLIN AND CLAVULANATE POTASSIUM 1 TABLET: 875; 125 TABLET, FILM COATED ORAL at 20:19

## 2022-04-13 RX ADMIN — DEXTROSE MONOHYDRATE AND SODIUM CHLORIDE 125 ML/HR: 5; .45 INJECTION, SOLUTION INTRAVENOUS at 05:58

## 2022-04-13 RX ADMIN — Medication 500 MG: at 17:49

## 2022-04-13 NOTE — PROGRESS NOTES
Diagnosis: DLBCL  Transplant Date: 4/11/22  Day: (+/-) Day + 2. Chemo regimen used Beam   Labs not resulted that need follow-up: Aspergillus, Fungitell   BMT assessments and toxicities completed. 4/12/22  WBC/ANC= 2.0/1.5. Prophylactic medications started 4/12/22  G-CSF started on D+6 . Toxicities greater than 2 :None. Patient seen by MD/NP Daily until engraftment. New orders received: IVF D51/2NS at 125 x 2 liters . Issues:pt output decreased, po in take has decreased due to nausea. END OF SHIFT NOTE:    Intake/Output  04/12 1901 - 04/13 0700  In: 200 [P.O.:200]  Out: 1450 [Urine:800]   Voiding: YES  Catheter: NO  Drain:              Stool:  3 occurrences. Stool Assessment  Stool Color: Jolie Rafael (04/12/22 2158)  Stool Appearance: Mucous; Soft (04/13/22 0899)  Stool Amount: Small (04/12/22 2158)  Stool Source/Status: Rectum (04/12/22 2158)    Emesis:  0 occurrences. Emesis Assessment  Appearance: Yellow (04/12/22 1138)  Emesis Amount: Small (04/12/22 1138)    VITAL SIGNS  Patient Vitals for the past 12 hrs:   Temp Pulse Resp BP SpO2   04/13/22 0337 98.6 °F (37 °C) 82 18 134/80 98 %   04/12/22 2240 97.4 °F (36.3 °C) 77 16 (!) 145/82 98 %   04/12/22 1921 97.7 °F (36.5 °C) 76 16 (!) 148/76 98 %       Pain Assessment  Pain 1  Pain Scale 1: Numeric (0 - 10) (04/13/22 0337)  Pain Intensity 1: 0 (04/13/22 0337)  Patient Stated Pain Goal: 0 (04/13/22 1851)  Pain Reassessment 1: Patient resting w/respiratory rate greater than 10 (04/11/22 2234)    Ambulating  Yes    Additional Information: Pt appeared to have decreased out put, gave d51/2@ 125 per order, pt states \" I feel better \"     Shift report given to oncoming nurse at the bedside.     Alicia Vega RN

## 2022-04-13 NOTE — PROGRESS NOTES
Diagnosis: DLBCL  Transplant Date: 4/11/22  Chemo regimen used: BEAM  Day: (+/-) +2. BMT assessments and toxicities completed. Mouth Care completed 3 times this shift. WBC/ANC= 2/1.5. Prophylactic medications started D+1 (4/12/22- Augmentin, LVQ, ACV, diflucan). G-CSF to start on D+6 4/17/22. Toxicities greater than 2 :none. Patient seen by MD/NP daily until engraftment or while IP. Labs not resulted that need follow-up: Fungitell, Aspergillus. New orders received: IVFs, cdiff specimen- neg.   Issues: diarrhea, N/V         04/13/22 0815   Toxicity Grading Criteria   Hemorrhage (GI) 0   Infection 0   Fever in the Absence of Neutropenia (ANC greater than or equal to 1.0 0   Febrile Neutropenia (ANC less than 1.0) 0   Rigors/Chills 0   Mucositis Oral 0   Esophagitis 0   Dry Mouth 0   Dysphagia 0   Nausea 2   Vomiting 0   Diarrhea 1   Constipation 0   Bilirubin increased 0   VOD- Weight Gain 0   ALT (SGPT) 0   Alkaline Phosphatase increased 0   AST (SGOT) 0   GGT increased 0   Weight Loss 0   Weight Gain 0   Dehydration 0   Urine Protein- Proteinuria Not able to assess   Urine Blood - Hematuria 0   Creatinine increased 0   Bladder Spasms 0   Cystitis non-infective 0   Urinary Frequency 0   Urinary Retention 0   Cardiac Arrhythmia Not able to assess   Conduction Abnormality/AV Heart Block   (SAVIAT)   Palpitations 0   Prolonged QTc    (SAVITA)   Supraventricular and Lorena Arrhythmia   (SAVITA)   Vasovagal Episode 0   Ventricular Arrhythmia   (SAVITA)   Hypotension 0   Hypertension History of   Neuropathy / Sensory 0   Headache 0   Vision Baseline   Dizziness 0   Syncope 0   Anxiety 0   Agitation 0   Depression 0   Fatigue 0   Insomnia 0   Pain 0   Dyspnea 0   Cough 0   Hiccups 0   Alopecia 0   Nail Changes 0   Allergic Reaction 0   Rash 0   Urticaria (Hives, Welts, Wheals) 0

## 2022-04-13 NOTE — PROGRESS NOTES
[facility]        Inpatient Hematology / Oncology Progress Note    Reason for Admission:  Diffuse large B cell lymphoma ([facility] [C83.30]  Admission for antineoplastic chemotherapy [Z51.11]  Bone marrow transplant status ([facility] [Z94.81]    24 Hour Events:  Afebrile, hypertensive at times  Day +2 BEAM/ASCT  C/o nausea and diarrhea  States he feels better today    Transfusions: None  Replacements: None    ROS:  Constitutional: Negative for fever, chills. CV: Negative for chest pain, palpitations, edema. Respiratory: Negative for dyspnea, cough, wheezing. GI: +nausea, vomiting, diarrhea. Negative for abdominal pain. 10 point review of systems is otherwise negative with the exception of the elements mentioned above in the HPI.        No Known Allergies  Past Medical History:   Diagnosis Date    Cancer ([facility]     Hypertension     Valvular heart disease      Past Surgical History:   Procedure Laterality Date    HX BACK SURGERY      26 years ago    HX VASCULAR ACCESS      IR BX BONE MARROW DIAGNOSTIC  2/24/2022    IR CHOLECYSTOSTOMY PERCUTANEOUS      IR INSERT NON TUNL CVC OVER 5 YRS  3/14/2022    IR INTRATHECAL CHEMO INJECTION CNS  12/8/2021    IR INTRATHECAL CHEMO INJECTION CNS  12/28/2021    IR INTRATHECAL CHEMO INJECTION CNS  1/28/2022    IR SPINAL PUNCTURE CSF TREAT / DRAIN  2/4/2022     Family History   Problem Relation Age of Onset    Diabetes Mother     Hypertension Mother     Diabetes Father     Hypertension Father     Hypertension Brother      Social History     Socioeconomic History    Marital status:      Spouse name: Not on file    Number of children: Not on file    Years of education: Not on file    Highest education level: Not on file   Occupational History    Not on file   Tobacco Use    Smoking status: Never Smoker    Smokeless tobacco: Never Used   Vaping Use    Vaping Use: Never used   Substance and Sexual Activity    Alcohol use: Not Currently  Drug use: Not Currently    Sexual activity: Not on file   Other Topics Concern     Service Not Asked    Blood Transfusions Not Asked    Caffeine Concern Not Asked    Occupational Exposure Not Asked    Hobby Hazards Not Asked    Sleep Concern Not Asked    Stress Concern Not Asked    Weight Concern Not Asked    Special Diet Not Asked    Back Care Not Asked    Exercise Not Asked    Bike Helmet Not Asked   [address] Not Asked    Self-Exams Not Asked   Social History Narrative    Not on file     Social Determinants of Health     Financial Resource Strain:     Difficulty of Paying Living Expenses: Not on file   Food Insecurity:     Worried About Running Out of Food in the Last Year: Not on file    Sayra of Food in the Last Year: Not on file   Transportation Needs:     Lack of Transportation (Medical): Not on file    Lack of Transportation (Non-Medical):  Not on file   Physical Activity:     Days of Exercise per Week: Not on file    Minutes of Exercise per Session: Not on file   Stress:     Feeling of Stress : Not on file   Social Connections:     Frequency of Communication with Friends and Family: Not on file    Frequency of Social Gatherings with Friends and Family: Not on file    Attends Mandaen Services: Not on file    Active Member of [address] [place] [address] [employer] or Organizations: Not on file    Attends Club or Organization Meetings: Not on file    Marital Status: Not on file   Intimate Partner Violence:     Fear of Current or Ex-Partner: Not on file    Emotionally Abused: Not on file    Physically Abused: Not on file    Sexually Abused: Not on file   Housing Stability:     Unable to Pay for Housing in the Last Year: Not on file    Number of [place] in the Last Year: Not on file    Unstable Housing in the Last Year: Not on file     Current Facility-Administered Medications   Medication Dose Route Frequency Provider Last Rate Last Admin    loperamide (IMODIUM) capsule 2 mg  2 mg Oral Q4H PRN Francis Cuellar NP        LORazepam (ATIVAN) injection 1 mg  1 mg IntraVENous Q6H PRN Francis Cuellar NP   1 mg at 04/12/22 2154    prochlorperazine (COMPAZINE) with saline injection 10 mg  10 mg IntraVENous Q6H PRN Francis Cuellar NP        dextrose 5 % - 0.45% NaCl infusion  75 mL/hr IntraVENous CONTINUOUS Francis Cuellar  mL/hr at 04/13/22 0558 125 mL/hr at 04/13/22 0558    amoxicillin-clavulanate (AUGMENTIN) 875-125 mg per tablet 1 Tablet  1 Tablet Oral Q12H Francis Cuellar NP   1 Tablet at 04/12/22 2145    levoFLOXacin (LEVAQUIN) tablet 500 mg  500 mg Oral Q24H Francis Cuellar NP   500 mg at 04/12/22 0809    acyclovir (ZOVIRAX) tablet 400 mg  400 mg Oral BID Francis Cuellar NP   400 mg at 04/12/22 1640    fluconazole (DIFLUCAN) tablet 200 mg  200 mg Oral DAILY Francis Cuellar NP   200 mg at 04/12/22 0809    [START ON 4/17/2022] filgrastim-aafi (NIVESTYM) injection syringe 300 mcg  300 mcg SubCUTAneous Q24H Harvey Vaughn MD        cloNIDine HCL (CATAPRES) tablet 0.1 mg  0.1 mg Oral Q6H PRN Francis Cuellar NP   0.1 mg at 04/10/22 1200    potassium chloride (K-DUR, KLOR-CON M20) SR tablet 20 mEq  20 mEq Oral BID Rashida Arenas MD   20 mEq at 04/12/22 1640    lisinopriL (PRINIVIL, ZESTRIL) tablet 20 mg  20 mg Oral DAILY Francis Cuellar NP   20 mg at 04/12/22 0809    calcium carbonate (OS-DARI) tablet 500 mg [elemental]  500 mg Oral TID WITH MEALS Francis Cuellar NP   500 mg at 04/12/22 1640    ondansetron (ZOFRAN) injection 8 mg  8 mg IntraVENous Q8H PRN Francis Cuellar NP   8 mg at 04/12/22 1928    HYDROcodone-acetaminophen (NORCO) 5-325 mg per tablet 1 Tablet  1 Tablet Oral Q6H PRN Francis Cuellar NP        morphine injection 2 mg  2 mg IntraVENous Q4H PRN Francis Cuellar NP        senna-docusate (PERICOLACE) 8.6-50 mg per tablet 1 Tablet  1 Tablet Oral DAILY PRN Rashida Arenas MD   1 Tablet at 04/07/22 2059    polyethylene glycol (MIRALAX) packet 17 g  17 g Oral DAILY PRN Rashida Arenas MD        acetaminophen (TYLENOL) tablet 650 mg  650 mg Oral Q4H PRN Stephon White, FNP        atenoloL (TENORMIN) tablet 25 mg  25 mg Oral DAILY Stephon White, FNP   25 mg at 22 0809    cetirizine (ZYRTEC) tablet 5 mg  5 mg Oral DAILY Stephon White, FNP   5 mg at 22 0809    ondansetron (ZOFRAN ODT) tablet 8 mg  8 mg Oral Q8H PRN Stephon White, FNP        enoxaparin (LOVENOX) injection 40 mg  40 mg SubCUTAneous Q24H Stephon White, FNP        heparin (porcine) pf 300 Units  300 Units InterCATHeter PRN Mary Carmona MD        central line flush (saline) syringe 10 mL  10 mL InterCATHeter PRN Mary Carmona MD   10 mL at 22 0423       OBJECTIVE:  Patient Vitals for the past 8 hrs:   BP Temp Pulse Resp SpO2   22 0337 134/80 98.6 °F (37 °C) 82 18 98 %     Temp (24hrs), Av °F (36.7 °C), Min:97.4 °F (36.3 °C), Max:98.6 °F (37 °C)    No intake/output data recorded. Physical Exam:  Constitutional: Well developed, well nourished male in no acute distress, sitting comfortably in the hospital bed. HEENT: +L eye patch. Normocephalic and atraumatic. Oropharynx is clear, mucous membranes are moist. Extraocular muscles are intact. Sclerae anicteric. Neck supple without JVD. No thyromegaly present. Skin Warm and dry. No bruising and no rash noted. No erythema. No pallor. Respiratory Lungs are clear to auscultation bilaterally without wheezes, rales or rhonchi, normal air exchange without accessory muscle use. CVS Normal rate, regular rhythm and normal S1 and S2. +murmur   Abdomen Soft, nontender and nondistended, normoactive bowel sounds. No palpable mass. No hepatosplenomegaly. Neuro Grossly nonfocal with no obvious sensory or motor deficits. MSK Normal range of motion in general.  No edema and no tenderness. Psych Appropriate mood and affect.         Labs:    Recent Results (from the past 24 hour(s))   METABOLIC PANEL, COMPREHENSIVE    Collection Time: 22  3:46 AM   Result Value Ref Range    Sodium 138 136 - 145 mmol/L    Potassium 3.8 3.5 - 5.1 mmol/L    Chloride 103 98 - 107 mmol/L    CO2 31 21 - 32 mmol/L    Anion gap 4 (L) 7 - 16 mmol/L    Glucose 121 (H) 65 - 100 mg/dL    BUN 14 6 - 23 MG/DL    Creatinine 0.70 (L) 0.8 - 1.5 MG/DL    GFR est AA >60 >60 ml/min/1.73m2    GFR est non-AA >60 >60 ml/min/1.73m2    Calcium 9.4 8.3 - 10.4 MG/DL    Bilirubin, total 0.7 0.2 - 1.1 MG/DL    ALT (SGPT) 27 12 - 65 U/L    AST (SGOT) 25 15 - 37 U/L    Alk. phosphatase 101 50 - 136 U/L    Protein, total 6.6 6.3 - 8.2 g/dL    Albumin 3.8 3.5 - 5.0 g/dL    Globulin 2.8 2.3 - 3.5 g/dL    A-G Ratio 1.4 1.2 - 3.5     MAGNESIUM    Collection Time: 04/13/22  3:46 AM   Result Value Ref Range    Magnesium 2.3 1.8 - 2.4 mg/dL   CBC WITH AUTOMATED DIFF    Collection Time: 04/13/22  3:46 AM   Result Value Ref Range    WBC 2.0 (LL) 4.3 - 11.1 K/uL    RBC 4.02 (L) 4.23 - 5.6 M/uL    HGB 12.1 (L) 13.6 - 17.2 g/dL    HCT 35.7 (L) 41.1 - 50.3 %    MCV 88.8 79.6 - 97.8 FL    MCH 30.1 26.1 - 32.9 PG    MCHC 33.9 31.4 - 35.0 g/dL    RDW 11.8 (L) 11.9 - 14.6 %    PLATELET 99 (L) 916 - 450 K/uL    MPV 9.8 9.4 - 12.3 FL    ABSOLUTE NRBC 0.00 0.0 - 0.2 K/uL    DF AUTOMATED      NEUTROPHILS 78 43 - 78 %    LYMPHOCYTES 10 (L) 13 - 44 %    MONOCYTES 3 (L) 4.0 - 12.0 %    EOSINOPHILS 1 0.5 - 7.8 %    BASOPHILS 2 0.0 - 2.0 %    IMMATURE GRANULOCYTES 7 (H) 0.0 - 5.0 %    ABS. NEUTROPHILS 1.5 (L) 1.7 - 8.2 K/UL    ABS. LYMPHOCYTES 0.2 (L) 0.5 - 4.6 K/UL    ABS. MONOCYTES 0.1 0.1 - 1.3 K/UL    ABS. EOSINOPHILS 0.0 0.0 - 0.8 K/UL    ABS. BASOPHILS 0.0 0.0 - 0.2 K/UL    ABS. IMM.  GRANS. 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 Jaylyn Prescott is a 64 y.o. male admitted on 4/5/2022. The primary encounter diagnosis was Diffuse large B-cell lymphoma, unspecified body region [facility]). Diagnoses of Admission for antineoplastic chemotherapy, Bone marrow transplant status ([facility] and Nonrheumatic mitral valve regurgitation were also pertinent to this visit. 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 He is a patient of Dr Lynnette Ricardo treated for relapsed DLBCL. He was initially treated by Dr Yordy Curran in [place] with R-CHOP 4/21-7/21. However, he was noted to have relapsed disease in L supraclavicular LN. He was then evaluated by Dr Lynnette Ricardo and has now completed R-ICE x3 with IT MTX x4 with CR. He has been evaluated for ASCT and collected 4.8x10^6/kg CD34+ cells. He is now admitted for BEAM/ASCT. He was seen by Dr Lynnette Ricardo day prior to admission and rapid COVID and flu testing negative. He was previously cleared by cardiology to proceed with transplant given mitral valve regurgitation. He is feeling well and ready to begin treatment today. PLAN:    Relapsed DLBCL / ASCT  - Admit for BEAM/ASCT - D-6 today  - Prophylactic antibiotics to begin D+1  - G-CSF to begin D+6  4/10 Day -1 BEAM/ASCT. Stem cells tomorrow. Tolerating treatment well. 4/11 Day 0. Stem cells today. 4/12 Day +1.   PPx antibx begin today. 4/13 Day +2. Fungitell and aspergillus pending. Mitral regurgitation  - Evaluated by cardiology, cleared to proceed with ASCT  - Monitor for fluid overload  4/13 No evidence of fluid overload. On gentle hydration d/t decreased intake 2/2 nausea. Con't to monitor for fluid overload. Hypertension  - Continue home meds  4/9 Pt with persistent HTN. Increase lisinopril to 20mg daily. 4/11 BPs improved overall    Constipation / Diarrhea  4/8 Continue PRN pericolace/miralax for now  4/9 BM x 1 yesterday  4/12 BM x 3 yesterday, reports loose stools  4/13 650mL diarrhea yesterday. Check for Cdiff. If neg, can utilize antidiarrheals. Nausea  4/11 antiemetics prn    Continue home meds  PPx meds: Acyclovir, Diflucan, Levaquin, Augmentin  Rhea SOPs  VTE prophylaxis - Lovenox (hold for plts <50k)  G-CSF to begin on D+6    Goals and plan of care reviewed with the patient. All questions answered to the best of our ability. Disposition:  Anticipate discharge home after engraftment. Expect hospitalization for ~3 weeks.               John Fajardo NP   [facility]  [address] [address]  Office : [phone]  Fax : [phone]

## 2022-04-14 LAB
ALBUMIN SERPL-MCNC: 3.6 G/DL (ref 3.5–5)
ALBUMIN/GLOB SERPL: 1.3 {RATIO} (ref 1.2–3.5)
ALP SERPL-CCNC: 96 U/L (ref 50–136)
ALT SERPL-CCNC: 22 U/L (ref 12–65)
ANION GAP SERPL CALC-SCNC: 3 MMOL/L (ref 7–16)
AST SERPL-CCNC: 19 U/L (ref 15–37)
BASOPHILS # BLD: 0 K/UL (ref 0–0.2)
BASOPHILS NFR BLD: 0 % (ref 0–2)
BILIRUB SERPL-MCNC: 0.6 MG/DL (ref 0.2–1.1)
BUN SERPL-MCNC: 10 MG/DL (ref 6–23)
CALCIUM SERPL-MCNC: 9.3 MG/DL (ref 8.3–10.4)
CHLORIDE SERPL-SCNC: 105 MMOL/L (ref 98–107)
CO2 SERPL-SCNC: 30 MMOL/L (ref 21–32)
CREAT SERPL-MCNC: 0.7 MG/DL (ref 0.8–1.5)
DIFFERENTIAL METHOD BLD: ABNORMAL
EOSINOPHIL # BLD: 0 K/UL (ref 0–0.8)
EOSINOPHIL NFR BLD: 0 % (ref 0.5–7.8)
ERYTHROCYTE [DISTWIDTH] IN BLOOD BY AUTOMATED COUNT: 11.7 % (ref 11.9–14.6)
GALACTOMANNAN AG SPEC IA-ACNC: 0.07 INDEX (ref 0–0.49)
GLOBULIN SER CALC-MCNC: 2.8 G/DL (ref 2.3–3.5)
GLUCOSE SERPL-MCNC: 105 MG/DL (ref 65–100)
HCT VFR BLD AUTO: 35.1 % (ref 41.1–50.3)
HGB BLD-MCNC: 12 G/DL (ref 13.6–17.2)
IMM GRANULOCYTES # BLD AUTO: 0.1 K/UL (ref 0–0.5)
IMM GRANULOCYTES NFR BLD AUTO: 11 % (ref 0–5)
LYMPHOCYTES # BLD: 0.1 K/UL (ref 0.5–4.6)
LYMPHOCYTES NFR BLD: 23 % (ref 13–44)
MAGNESIUM SERPL-MCNC: 2.2 MG/DL (ref 1.8–2.4)
MCH RBC QN AUTO: 30 PG (ref 26.1–32.9)
MCHC RBC AUTO-ENTMCNC: 34.2 G/DL (ref 31.4–35)
MCV RBC AUTO: 87.8 FL (ref 79.6–97.8)
MONOCYTES # BLD: 0 K/UL (ref 0.1–1.3)
MONOCYTES NFR BLD: 7 % (ref 4–12)
NEUTS SEG # BLD: 0.4 K/UL (ref 1.7–8.2)
NEUTS SEG NFR BLD: 59 % (ref 43–78)
NRBC # BLD: 0 K/UL (ref 0–0.2)
PLATELET # BLD AUTO: 80 K/UL (ref 150–450)
PLATELET COMMENTS,PCOM: ABNORMAL
PMV BLD AUTO: 10.5 FL (ref 9.4–12.3)
POTASSIUM SERPL-SCNC: 3.8 MMOL/L (ref 3.5–5.1)
PROT SERPL-MCNC: 6.4 G/DL (ref 6.3–8.2)
RBC # BLD AUTO: 4 M/UL (ref 4.23–5.6)
RBC MORPH BLD: ABNORMAL
SODIUM SERPL-SCNC: 138 MMOL/L (ref 136–145)
WBC # BLD AUTO: 0.6 K/UL (ref 4.3–11.1)
WBC MORPH BLD: ABNORMAL

## 2022-04-14 PROCEDURE — 74011000250 HC RX REV CODE- 250: Performed by: NURSE PRACTITIONER

## 2022-04-14 PROCEDURE — 99232 SBSQ HOSP IP/OBS MODERATE 35: CPT | Performed by: INTERNAL MEDICINE

## 2022-04-14 PROCEDURE — 74011250636 HC RX REV CODE- 250/636: Performed by: NURSE PRACTITIONER

## 2022-04-14 PROCEDURE — 85025 COMPLETE CBC W/AUTO DIFF WBC: CPT

## 2022-04-14 PROCEDURE — 80053 COMPREHEN METABOLIC PANEL: CPT

## 2022-04-14 PROCEDURE — 36591 DRAW BLOOD OFF VENOUS DEVICE: CPT

## 2022-04-14 PROCEDURE — 83735 ASSAY OF MAGNESIUM: CPT

## 2022-04-14 PROCEDURE — 65270000015 HC RM PRIVATE ONCOLOGY

## 2022-04-14 PROCEDURE — APPSS45 APP SPLIT SHARED TIME 31-45 MINUTES: Performed by: NURSE PRACTITIONER

## 2022-04-14 PROCEDURE — 74011250637 HC RX REV CODE- 250/637: Performed by: NURSE PRACTITIONER

## 2022-04-14 PROCEDURE — 74011250637 HC RX REV CODE- 250/637: Performed by: INTERNAL MEDICINE

## 2022-04-14 RX ORDER — DRONABINOL 2.5 MG/1
5 CAPSULE ORAL 2 TIMES DAILY
Status: DISCONTINUED | OUTPATIENT
Start: 2022-04-14 | End: 2022-04-18

## 2022-04-14 RX ORDER — ONDANSETRON 2 MG/ML
8 INJECTION INTRAMUSCULAR; INTRAVENOUS EVERY 8 HOURS
Status: DISCONTINUED | OUTPATIENT
Start: 2022-04-14 | End: 2022-04-20

## 2022-04-14 RX ORDER — PANTOPRAZOLE SODIUM 40 MG/1
40 TABLET, DELAYED RELEASE ORAL
Status: DISCONTINUED | OUTPATIENT
Start: 2022-04-14 | End: 2022-04-24 | Stop reason: HOSPADM

## 2022-04-14 RX ADMIN — ACYCLOVIR 400 MG: 800 TABLET ORAL at 08:43

## 2022-04-14 RX ADMIN — FLUCONAZOLE 200 MG: 100 TABLET ORAL at 08:42

## 2022-04-14 RX ADMIN — AMOXICILLIN AND CLAVULANATE POTASSIUM 1 TABLET: 875; 125 TABLET, FILM COATED ORAL at 22:21

## 2022-04-14 RX ADMIN — AMOXICILLIN AND CLAVULANATE POTASSIUM 1 TABLET: 875; 125 TABLET, FILM COATED ORAL at 08:43

## 2022-04-14 RX ADMIN — LOPERAMIDE HYDROCHLORIDE 2 MG: 2 CAPSULE ORAL at 12:49

## 2022-04-14 RX ADMIN — Medication 500 MG: at 08:43

## 2022-04-14 RX ADMIN — LORAZEPAM 1 MG: 2 INJECTION INTRAMUSCULAR; INTRAVENOUS at 12:49

## 2022-04-14 RX ADMIN — ONDANSETRON 8 MG: 2 INJECTION INTRAMUSCULAR; INTRAVENOUS at 22:21

## 2022-04-14 RX ADMIN — ACYCLOVIR 400 MG: 800 TABLET ORAL at 17:52

## 2022-04-14 RX ADMIN — POTASSIUM CHLORIDE 20 MEQ: 20 TABLET, EXTENDED RELEASE ORAL at 08:43

## 2022-04-14 RX ADMIN — Medication 500 MG: at 17:52

## 2022-04-14 RX ADMIN — LEVOFLOXACIN 500 MG: 500 TABLET, FILM COATED ORAL at 08:42

## 2022-04-14 RX ADMIN — ONDANSETRON 8 MG: 2 INJECTION INTRAMUSCULAR; INTRAVENOUS at 06:29

## 2022-04-14 RX ADMIN — CETIRIZINE HYDROCHLORIDE 5 MG: 5 TABLET ORAL at 08:43

## 2022-04-14 RX ADMIN — ONDANSETRON 8 MG: 2 INJECTION INTRAMUSCULAR; INTRAVENOUS at 14:30

## 2022-04-14 RX ADMIN — ATENOLOL 25 MG: 50 TABLET ORAL at 08:43

## 2022-04-14 RX ADMIN — DEXTROSE MONOHYDRATE AND SODIUM CHLORIDE 75 ML/HR: 5; .45 INJECTION, SOLUTION INTRAVENOUS at 06:29

## 2022-04-14 RX ADMIN — PANTOPRAZOLE SODIUM 40 MG: 40 TABLET, DELAYED RELEASE ORAL at 14:30

## 2022-04-14 RX ADMIN — LISINOPRIL 20 MG: 20 TABLET ORAL at 08:43

## 2022-04-14 RX ADMIN — POTASSIUM CHLORIDE 20 MEQ: 20 TABLET, EXTENDED RELEASE ORAL at 17:53

## 2022-04-14 RX ADMIN — DRONABINOL 5 MG: 2.5 CAPSULE ORAL at 17:52

## 2022-04-14 RX ADMIN — LOPERAMIDE HYDROCHLORIDE 2 MG: 2 CAPSULE ORAL at 08:42

## 2022-04-14 NOTE — PROGRESS NOTES
Diagnosis: DLBCL   Transplant Date: 4/11/22  Chemo regimen used: Beam  Day: (+/-) +3. BMT assessments and toxicities completed. Mouth Care completed 2 times this shift. WBC/ANC= 0.6/0.4. Prophylactic medications started 4/12/22. G-CSF started on D+6. Toxicities greater than 2 :None. Patient seen by MD/NP daily until engraftment or while IP. Labs not resulted that need follow-up: none. New orders received: none. Issues:nausea and diarrhea continue    Intake/Output  04/13 1901 - 04/14 0700  In: 857 [P.O.:120;  I.V.:737]  Out: 1525 [Urine:1025]

## 2022-04-14 NOTE — PROGRESS NOTES
[employer] [facility]        Inpatient Hematology / Oncology Progress Note    Reason for Admission:  Diffuse large B cell lymphoma ([facility] [C83.30]  Admission for antineoplastic chemotherapy [Z51.11]  Bone marrow transplant status ([facility] [Z94.81]    24 Hour Events:  Afebrile, hypertensive at times  Day +3 BEAM/ASCT  Poor PO intake  Diarrhea stable - C diff negative    Transfusions: None  Replacements: None    ROS:  Constitutional: Negative for fever, chills. CV: Negative for chest pain, palpitations, edema. Respiratory: Negative for dyspnea, cough, wheezing. GI: +nausea, vomiting, diarrhea. Negative for abdominal pain. 10 point review of systems is otherwise negative with the exception of the elements mentioned above in the HPI.        No Known Allergies  Past Medical History:   Diagnosis Date    Cancer ([facility]     Hypertension     Valvular heart disease      Past Surgical History:   Procedure Laterality Date    HX BACK SURGERY      26 years ago    HX VASCULAR ACCESS      IR BX BONE MARROW DIAGNOSTIC  2/24/2022    IR CHOLECYSTOSTOMY PERCUTANEOUS      IR INSERT NON TUNL CVC OVER 5 YRS  3/14/2022    IR INTRATHECAL CHEMO INJECTION CNS  12/8/2021    IR INTRATHECAL CHEMO INJECTION CNS  12/28/2021    IR INTRATHECAL CHEMO INJECTION CNS  1/28/2022    IR SPINAL PUNCTURE CSF TREAT / DRAIN  2/4/2022     Family History   Problem Relation Age of Onset    Diabetes Mother     Hypertension Mother     Diabetes Father     Hypertension Father     Hypertension Brother      Social History     Socioeconomic History    Marital status:      Spouse name: Not on file    Number of children: Not on file    Years of education: Not on file    Highest education level: Not on file   Occupational History    Not on file   Tobacco Use    Smoking status: Never Smoker    Smokeless tobacco: Never Used   Vaping Use    Vaping Use: Never used   Substance and Sexual Activity    Alcohol use: Not Currently    Drug use: Not Currently    Sexual activity: Not on file   Other Topics Concern     Service Not Asked    Blood Transfusions Not Asked    Caffeine Concern Not Asked    Occupational Exposure Not Asked    Hobby Hazards Not Asked    Sleep Concern Not Asked    Stress Concern Not Asked    Weight Concern Not Asked    Special Diet Not Asked    Back Care Not Asked    Exercise Not Asked    Bike Helmet Not Asked   [address] Not Asked    Self-Exams Not Asked   Social History Narrative    Not on file     Social Determinants of Health     Financial Resource Strain:     Difficulty of Paying Living Expenses: Not on file   Food Insecurity:     Worried About Running Out of Food in the Last Year: Not on file    Sayra of Food in the Last Year: Not on file   Transportation Needs:     Lack of Transportation (Medical): Not on file    Lack of Transportation (Non-Medical):  Not on file   Physical Activity:     Days of Exercise per Week: Not on file    Minutes of Exercise per Session: Not on file   Stress:     Feeling of Stress : Not on file   Social Connections:     Frequency of Communication with Friends and Family: Not on file    Frequency of Social Gatherings with Friends and Family: Not on file    Attends Yarsani Services: Not on file    Active Member of [address] [place] [address] [employer] or Organizations: Not on file    Attends Club or Organization Meetings: Not on file    Marital Status: Not on file   Intimate Partner Violence:     Fear of Current or Ex-Partner: Not on file    Emotionally Abused: Not on file    Physically Abused: Not on file    Sexually Abused: Not on file   Housing Stability:     Unable to Pay for Housing in the Last Year: Not on file    Number of [place] in the Last Year: Not on file    Unstable Housing in the Last Year: Not on file     Current Facility-Administered Medications   Medication Dose Route Frequency Provider Last Rate Last Admin    loperamide (IMODIUM) capsule 2 mg  2 mg Oral Q4H PRN Bryanna Ferries, NP   2 mg at 04/14/22 0842    LORazepam (ATIVAN) injection 1 mg  1 mg IntraVENous Q6H PRN Bryanna Betinaies, NP   1 mg at 04/12/22 2154    prochlorperazine (COMPAZINE) with saline injection 10 mg  10 mg IntraVENous Q6H PRN Bryanna Ferries, NP   10 mg at 04/13/22 1213    dextrose 5 % - 0.45% NaCl infusion  75 mL/hr IntraVENous CONTINUOUS Bryanna Betinaies, NP 75 mL/hr at 04/14/22 0629 75 mL/hr at 04/14/22 0629    amoxicillin-clavulanate (AUGMENTIN) 875-125 mg per tablet 1 Tablet  1 Tablet Oral Q12H Bryanna Aureliano, NP   1 Tablet at 04/14/22 0843    levoFLOXacin (LEVAQUIN) tablet 500 mg  500 mg Oral Q24H Bryanna Aureliano, NP   500 mg at 04/14/22 5205    acyclovir (ZOVIRAX) tablet 400 mg  400 mg Oral BID Bryanna Aureliano, NP   400 mg at 04/14/22 7307    fluconazole (DIFLUCAN) tablet 200 mg  200 mg Oral DAILY Bryanna Aureliano, NP   200 mg at 04/14/22 0842    [START ON 4/17/2022] filgrastim-aafi (NIVESTYM) injection syringe 300 mcg  300 mcg SubCUTAneous Q24H Kaylene Johnson MD        cloNIDine HCL (CATAPRES) tablet 0.1 mg  0.1 mg Oral Q6H PRN Bryanna Aureliano, NP   0.1 mg at 04/10/22 1200    potassium chloride (K-DUR, KLOR-CON M20) SR tablet 20 mEq  20 mEq Oral BID Brandin Macario MD   20 mEq at 04/14/22 0843    lisinopriL (PRINIVIL, ZESTRIL) tablet 20 mg  20 mg Oral DAILY Bryanna Aureliano, NP   20 mg at 04/14/22 0843    calcium carbonate (OS-DARI) tablet 500 mg [elemental]  500 mg Oral TID WITH MEALS Bryanna Aureliano, NP   500 mg at 04/14/22 0843    ondansetron (ZOFRAN) injection 8 mg  8 mg IntraVENous Q8H PRN Bryanna Betinaies, NP   8 mg at 04/14/22 0629    HYDROcodone-acetaminophen (NORCO) 5-325 mg per tablet 1 Tablet  1 Tablet Oral Q6H PRN Bryanna Bedoya NP        morphine injection 2 mg  2 mg IntraVENous Q4H PRN Bryanna Bedoya NP        senna-docusate (PERICOLACE) 8.6-50 mg per tablet 1 Tablet  1 Tablet Oral DAILY PRN Brandin Macario MD   1 Tablet at 04/07/22 2059    polyethylene glycol (MIRALAX) packet 17 g  17 g Oral DAILY PRN Brandin Macario MD        acetaminophen (TYLENOL) tablet 650 mg  650 mg Oral Q4H PRN [place] Wabeno, FNP        atenoloL (TENORMIN) tablet 25 mg  25 mg Oral DAILY Eneida Wabeno, FNP   25 mg at 22 9180    cetirizine (ZYRTEC) tablet 5 mg  5 mg Oral DAILY Eneida Wabeno, FNP   5 mg at 22 0843    ondansetron (ZOFRAN ODT) tablet 8 mg  8 mg Oral Q8H PRN [place] Wabeno, FNP   8 mg at 22 2237    enoxaparin (LOVENOX) injection 40 mg  40 mg SubCUTAneous Q24H [place] Wabeno, FNP        heparin (porcine) pf 300 Units  300 Units InterCATHeter PRN Juliette Boast, MD        central line flush (saline) syringe 10 mL  10 mL InterCATHeter PRN Juliette Boast, MD   10 mL at 22 0423       OBJECTIVE:  Patient Vitals for the past 8 hrs:   BP Temp Pulse Resp SpO2 Height   22 1127 (!) 156/78 97.8 °F (36.6 °C) 93 17 100 % --   22 1026 -- -- -- -- -- 6' (1.829 m)   22 0830 (!) 146/79 98.2 °F (36.8 °C) 86 16 97 % --     Temp (24hrs), Av.3 °F (36.8 °C), Min:97.8 °F (36.6 °C), Max:98.6 °F (37 °C)     07 -  1900  In: 488 [P.O.:100; I.V.:388]  Out: 200     Physical Exam:  Constitutional: Well developed, well nourished male in no acute distress, sitting comfortably in the hospital bed. HEENT: +L eye patch. Normocephalic and atraumatic. Oropharynx is clear, mucous membranes are moist. Extraocular muscles are intact. Sclerae anicteric. Neck supple without JVD. No thyromegaly present. Skin Warm and dry. No bruising and no rash noted. No erythema. No pallor. Respiratory Lungs are clear to auscultation bilaterally without wheezes, rales or rhonchi, normal air exchange without accessory muscle use. CVS Normal rate, regular rhythm and normal S1 and S2. +murmur   Abdomen Soft, nontender and nondistended, normoactive bowel sounds. No palpable mass. No hepatosplenomegaly. Neuro Grossly nonfocal with no obvious sensory or motor deficits.    MSK Normal range of motion in general. No edema and no tenderness. Psych Appropriate mood and affect. Labs:    Recent Results (from the past 24 hour(s))   METABOLIC PANEL, COMPREHENSIVE    Collection Time: 04/14/22  3:33 AM   Result Value Ref Range    Sodium 138 136 - 145 mmol/L    Potassium 3.8 3.5 - 5.1 mmol/L    Chloride 105 98 - 107 mmol/L    CO2 30 21 - 32 mmol/L    Anion gap 3 (L) 7 - 16 mmol/L    Glucose 105 (H) 65 - 100 mg/dL    BUN 10 6 - 23 MG/DL    Creatinine 0.70 (L) 0.8 - 1.5 MG/DL    GFR est AA >60 >60 ml/min/1.73m2    GFR est non-AA >60 >60 ml/min/1.73m2    Calcium 9.3 8.3 - 10.4 MG/DL    Bilirubin, total 0.6 0.2 - 1.1 MG/DL    ALT (SGPT) 22 12 - 65 U/L    AST (SGOT) 19 15 - 37 U/L    Alk. phosphatase 96 50 - 136 U/L    Protein, total 6.4 6.3 - 8.2 g/dL    Albumin 3.6 3.5 - 5.0 g/dL    Globulin 2.8 2.3 - 3.5 g/dL    A-G Ratio 1.3 1.2 - 3.5     MAGNESIUM    Collection Time: 04/14/22  3:33 AM   Result Value Ref Range    Magnesium 2.2 1.8 - 2.4 mg/dL   CBC WITH AUTOMATED DIFF    Collection Time: 04/14/22  3:33 AM   Result Value Ref Range    WBC 0.6 (LL) 4.3 - 11.1 K/uL    RBC 4.00 (L) 4.23 - 5.6 M/uL    HGB 12.0 (L) 13.6 - 17.2 g/dL    HCT 35.1 (L) 41.1 - 50.3 %    MCV 87.8 79.6 - 97.8 FL    MCH 30.0 26.1 - 32.9 PG    MCHC 34.2 31.4 - 35.0 g/dL    RDW 11.7 (L) 11.9 - 14.6 %    PLATELET 80 (L) 566 - 450 K/uL    MPV 10.5 9.4 - 12.3 FL    ABSOLUTE NRBC 0.00 0.0 - 0.2 K/uL    NEUTROPHILS 59 43 - 78 %    LYMPHOCYTES 23 13 - 44 %    MONOCYTES 7 4.0 - 12.0 %    EOSINOPHILS 0 (L) 0.5 - 7.8 %    BASOPHILS 0 0.0 - 2.0 %    IMMATURE GRANULOCYTES 11 (H) 0.0 - 5.0 %    ABS. NEUTROPHILS 0.4 (L) 1.7 - 8.2 K/UL    ABS. LYMPHOCYTES 0.1 (L) 0.5 - 4.6 K/UL    ABS. MONOCYTES 0.0 (L) 0.1 - 1.3 K/UL    ABS. EOSINOPHILS 0.0 0.0 - 0.8 K/UL    ABS. BASOPHILS 0.0 0.0 - 0.2 K/UL    ABS. IMM.  GRANS. 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 Anette Angeles is a 64 y.o. male admitted on 4/5/2022. The primary encounter diagnosis was Diffuse large B-cell lymphoma, unspecified body region [facility]). Diagnoses of Admission for antineoplastic chemotherapy, Bone marrow transplant status ([facility] and Nonrheumatic mitral valve regurgitation were also pertinent to this visit. 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 He is a patient of Dr Shahida Gonzalez treated for relapsed DLBCL. He was initially treated by Dr Huey Krishna in [facility] with R-CHOP 4/21-7/21. However, he was noted to have relapsed disease in L supraclavicular LN. He was then evaluated by Dr Shahida Gonzalez and has now completed R-ICE x3 with IT MTX x4 with CR. He has been evaluated for ASCT and collected 4.8x10^6/kg CD34+ cells. He is now admitted for BEAM/ASCT. He was seen by Dr Shahida Gonzalez day prior to admission and rapid COVID and flu testing negative. He was previously cleared by cardiology to proceed with transplant given mitral valve regurgitation. He is feeling well and ready to begin treatment today. PLAN:    Relapsed DLBCL / ASCT  - Admit for BEAM/ASCT - D-6 today  - Prophylactic antibiotics to begin D+1  - G-CSF to begin D+6  4/10 Day -1 BEAM/ASCT. Stem cells tomorrow. Tolerating treatment well. 4/11 Day 0. Stem cells today. 4/12 Day +1.   PPx antibx begin today. 4/14 Day +3. Fungitell and aspergillus pending. Mitral regurgitation  - Evaluated by cardiology, cleared to proceed with ASCT  - Monitor for fluid overload  4/14 No evidence of fluid overload. On gentle hydration d/t decreased intake 2/2 nausea. Con't to monitor for fluid overload. Hypertension  - Continue home meds  4/9 Pt with persistent HTN. Increase lisinopril to 20mg daily. 4/11 BPs improved overall    Constipation / Diarrhea  4/8 Continue PRN pericolace/miralax for now  4/9 BM x 1 yesterday  4/12 BM x 3 yesterday, reports loose stools  4/13 650mL diarrhea yesterday. Check for Cdiff. If neg, can utilize antidiarrheals. 4/14 C diff negative - starting imodium PRN    Nausea / poor appetite  4/11 antiemetics prn  4/14 Nausea managed - reports poor appetite. Will order Ensure. Continue home meds  PPx meds: Acyclovir, Diflucan, Levaquin, Augmentin  Rhea SOPs  VTE prophylaxis - Lovenox (hold for plts <50k)  G-CSF to begin on D+6    Goals and plan of care reviewed with the patient. All questions answered to the best of our ability. Disposition:  Anticipate discharge home after engraftment. Expect hospitalization for ~3 weeks.               [facility]  [address] [address]  Office : [phone]  Fax : [phone]

## 2022-04-14 NOTE — CONSULTS
Comprehensive Nutrition Assessment    Type and Reason for Visit: Initial,Consult,RD nutrition re-screen/LOS  Poor intake/appetite 5 or more days (Oncology)    Nutrition Recommendations/Plan:   Meals and Snacks:  Continue current diet. Nutrition Supplement Therapy:   Medical food supplement therapy:  Initiate Ensure Enlive three times per day (this provides 350 kcal and 20 grams protein per bottle)     Malnutrition Assessment:  Malnutrition Status: Moderate malnutrition  Context: Acute illness  Findings of clinical characteristics of malnutrition:   Energy Intake:  Mild decrease in energy intake (specify) (less than 50% needs for ~4 days)  Weight Loss:  7 - Greater than 2% over 1 week (14# (7.5%))     Body Fat Loss:  1 - Mild body fat loss, Fat overlying ribs,Triceps   Muscle Mass Loss:  1 - Mild muscle mass loss, Calf,Clavicles (pectoralis & deltoids),Scapula (trapezius),Temples (temporalis)  Fluid Accumulation:  No significant fluid accumulation,     Strength:  Not performed       Nutrition Assessment:   Nutrition History: Patient reports eating well prior to admission. He reports UBW ~240# and that he eats 3 meals per day at baseline. He reports weight loss ~1.5 years ago prior to cancer diagnosis but states that he has been able to maintain at current weight for at least a year. Nutrition Background: Patient with PMH significant for DLBCL s/p initial treatment with R-CHOP 2021 now replapsed and most recently treated with R-ICE x3 + IT MTX x4, HTN, vascular heart disease. He was admitted for chemo + ASCT. Nutrition Interval:  Patient seen up to chair with breakfast tray at bedside. He was only able to tolerate about half of a banana. He endorses + nausea but not always vomiting. He also reports general lack of appetite. Discussed oral nutrition supplements and patient is agreeable to try. Discussed eating at least a portion of each meal and using supplements between meals.  Discussed small frequent PO intake and rationale. Explained the relationship between nausea and hunger and recommended trying to keep something on his stomach at all times. Nutrition Related Findings:   NFPE as above      Current Nutrition Therapies:  ADULT DIET Regular  ADULT ORAL NUTRITION SUPPLEMENT Breakfast, Lunch, Dinner; Standard High Calorie/High Protein    Current Intake:   Average Meal Intake: 1-25% (since 4/10, % prior)        Anthropometric Measures:  Height: 6' (182.9 cm)  Current Body Wt: 77.7 kg (171 lb 4.8 oz) (4/13), Weight source: Standing scale  BMI: 23.2, Normal weight (BMI 18.5-24. 9)  Admission Body Weight: 185 lb (4/7 standing scale)  Ideal Body Weight (lbs) (Calculated): 178 lbs (81 kg), 96.2 %  Usual Body Wt: 83.9 kg (185 lb), Percent weight change: -7.4        Edema: No data recorded  Estimated Daily Nutrient Needs:  Energy (kcal/day): 8766-0878 (Kcal/kg (25-30), Weight Used: Current (77.7 kg (4/13)))  Protein (g/day):  (1.2-1.3 g/kg) Weight Used: (Current)  Fluid (ml/day):   (1 ml/kcal)    Nutrition Diagnosis:   · Inadequate oral intake related to  (lack of appetite, nausea) as evidenced by  (reported barriers to PO, intake as above)    · Moderate malnutrition related to  (lack of appetite, nausea) as evidenced by  (malnutrition criteria as above)    Nutrition Interventions:   Food and/or Nutrient Delivery: Continue current diet,Start oral nutrition supplement     Coordination of Nutrition Care: Continue to monitor while inpatient  Plan of Care discussed with Tae Fountain RN    Goals: Active Goal: Meet at least 50% nutriition needs by RD follow-up    Nutrition Monitoring and Evaluation:      Food/Nutrient Intake Outcomes: Food and nutrient intake,Supplement intake  Physical Signs/Symptoms Outcomes: Nausea/vomiting,Meal time behavior,Weight    Discharge Planning:     Too soon to determine    [address] [place], LD on 4/14/2022 at 10:51 AM  Contact: [phone]

## 2022-04-14 NOTE — PROGRESS NOTES
Problem: Falls - Risk of  Goal: *Absence of Falls  Description: Document Noel Mullins Fall Risk and appropriate interventions in the flowsheet.   Outcome: Progressing Towards Goal  Note: Fall Risk Interventions:            Medication Interventions: Evaluate medications/consider consulting pharmacy,Patient to call before getting OOB,Teach patient to arise slowly         History of Falls Interventions: Consult care management for discharge planning,Evaluate medications/consider consulting pharmacy

## 2022-04-14 NOTE — PROGRESS NOTES
Diagnosis: DLBCL  Transplant Date: 4/11/22  Chemo regimen used: BEAM  Day: (+/-) +3. BMT assessments and toxicities completed. Mouth Care completed 3 times this shift. WBC/ANC= 0.6/0.4. Prophylactic medications started D+1 (4/12/22- Augmentin, LVQ, ACV, diflucan). G-CSF to start on D+6 4/17/22. Toxicities greater than 2 :none. Patient seen by MD/NP daily until engraftment or while IP.   Labs not resulted that need follow-up: Fungitell  Shift Summary: Marinol started for appetite stimulant/Nausea, zofran scheduled, c/o N/V, diarrhea, no appetite       04/14/22 0830   Toxicity Grading Criteria   Hemorrhage (GI) 0   Infection 0   Fever in the Absence of Neutropenia (ANC greater than or equal to 1.0 0   Febrile Neutropenia (ANC less than 1.0) 0   Rigors/Chills 0   Mucositis Oral 0   Esophagitis 0   Dry Mouth 0   Dysphagia 0   Nausea 1   Vomiting 1   Diarrhea 2   Constipation 0   Bilirubin increased 0   VOD- Weight Gain 0   ALT (SGPT) 0   Alkaline Phosphatase increased 0   AST (SGOT) 0   GGT increased Not able to assess   Weight Loss 1   Weight Gain 0   Dehydration 0   Urine Protein- Proteinuria Not able to assess   Urine Blood - Hematuria 0   Creatinine increased 0   Bladder Spasms 0   Cystitis non-infective 0   Urinary Frequency 0   Urinary Retention 0   Cardiac Arrhythmia Not able to assess   Conduction Abnormality/AV Heart Block   (SAVITA)   Palpitations 0   Prolonged QTc    (SAVITA)   Supraventricular and Lorena Arrhythmia   (SAVITA)   Vasovagal Episode 0   Ventricular Arrhythmia   (SAVITA)   Hypotension 0   Hypertension Baseline   Neuropathy / Sensory 0   Headache 0   Vision Baseline   Dizziness 0   Syncope 0   Anxiety 0   Agitation 0   Depression 0   Fatigue 1   Insomnia 0   Pain 0   Dyspnea 0   Cough 0   Hiccups 0   Alopecia 0   Nail Changes 0   Allergic Reaction 0   Rash 0   Urticaria (Hives, Welts, Wheals) 0

## 2022-04-15 PROBLEM — E44.0 MALNUTRITION OF MODERATE DEGREE (HCC): Status: ACTIVE | Noted: 2022-04-15

## 2022-04-15 LAB
ALBUMIN SERPL-MCNC: 3.4 G/DL (ref 3.5–5)
ALBUMIN/GLOB SERPL: 1.2 {RATIO} (ref 1.2–3.5)
ALP SERPL-CCNC: 94 U/L (ref 50–136)
ALT SERPL-CCNC: 23 U/L (ref 12–65)
ANION GAP SERPL CALC-SCNC: 5 MMOL/L (ref 7–16)
AST SERPL-CCNC: 20 U/L (ref 15–37)
BILIRUB SERPL-MCNC: 0.6 MG/DL (ref 0.2–1.1)
BUN SERPL-MCNC: 11 MG/DL (ref 6–23)
CALCIUM SERPL-MCNC: 9.1 MG/DL (ref 8.3–10.4)
CHLORIDE SERPL-SCNC: 106 MMOL/L (ref 98–107)
CO2 SERPL-SCNC: 29 MMOL/L (ref 21–32)
CREAT SERPL-MCNC: 0.7 MG/DL (ref 0.8–1.5)
DIFFERENTIAL METHOD BLD: ABNORMAL
ERYTHROCYTE [DISTWIDTH] IN BLOOD BY AUTOMATED COUNT: 11.6 % (ref 11.9–14.6)
GGT SERPL-CCNC: 84 U/L (ref 15–85)
GLOBULIN SER CALC-MCNC: 2.8 G/DL (ref 2.3–3.5)
GLUCOSE SERPL-MCNC: 103 MG/DL (ref 65–100)
HCT VFR BLD AUTO: 34.2 % (ref 41.1–50.3)
HGB BLD-MCNC: 11.5 G/DL (ref 13.6–17.2)
LDH SERPL L TO P-CCNC: 231 U/L (ref 100–190)
MAGNESIUM SERPL-MCNC: 2.2 MG/DL (ref 1.8–2.4)
MCH RBC QN AUTO: 29.5 PG (ref 26.1–32.9)
MCHC RBC AUTO-ENTMCNC: 33.6 G/DL (ref 31.4–35)
MCV RBC AUTO: 87.7 FL (ref 79.6–97.8)
NRBC # BLD: 0 K/UL (ref 0–0.2)
PHOSPHATE SERPL-MCNC: 2.7 MG/DL (ref 2.5–4.5)
PLATELET # BLD AUTO: 52 K/UL (ref 150–450)
PLATELET COMMENTS,PCOM: ABNORMAL
PMV BLD AUTO: 10.4 FL (ref 9.4–12.3)
POTASSIUM SERPL-SCNC: 3.7 MMOL/L (ref 3.5–5.1)
PROT SERPL-MCNC: 6.2 G/DL (ref 6.3–8.2)
RBC # BLD AUTO: 3.9 M/UL (ref 4.23–5.6)
RBC MORPH BLD: ABNORMAL
SODIUM SERPL-SCNC: 140 MMOL/L (ref 136–145)
WBC # BLD AUTO: 0.2 K/UL (ref 4.3–11.1)
WBC MORPH BLD: ABNORMAL

## 2022-04-15 PROCEDURE — 65270000015 HC RM PRIVATE ONCOLOGY

## 2022-04-15 PROCEDURE — 74011000250 HC RX REV CODE- 250: Performed by: NURSE PRACTITIONER

## 2022-04-15 PROCEDURE — 80053 COMPREHEN METABOLIC PANEL: CPT

## 2022-04-15 PROCEDURE — 74011250637 HC RX REV CODE- 250/637: Performed by: NURSE PRACTITIONER

## 2022-04-15 PROCEDURE — 74011250636 HC RX REV CODE- 250/636: Performed by: NURSE PRACTITIONER

## 2022-04-15 PROCEDURE — 74011250637 HC RX REV CODE- 250/637: Performed by: INTERNAL MEDICINE

## 2022-04-15 PROCEDURE — 99232 SBSQ HOSP IP/OBS MODERATE 35: CPT | Performed by: INTERNAL MEDICINE

## 2022-04-15 PROCEDURE — 84100 ASSAY OF PHOSPHORUS: CPT

## 2022-04-15 PROCEDURE — 36591 DRAW BLOOD OFF VENOUS DEVICE: CPT

## 2022-04-15 PROCEDURE — 82977 ASSAY OF GGT: CPT

## 2022-04-15 PROCEDURE — 83735 ASSAY OF MAGNESIUM: CPT

## 2022-04-15 PROCEDURE — 85025 COMPLETE CBC W/AUTO DIFF WBC: CPT

## 2022-04-15 PROCEDURE — APPSS60 APP SPLIT SHARED TIME 46-60 MINUTES: Performed by: NURSE PRACTITIONER

## 2022-04-15 PROCEDURE — 83615 LACTATE (LD) (LDH) ENZYME: CPT

## 2022-04-15 PROCEDURE — 74011000250 HC RX REV CODE- 250: Performed by: INTERNAL MEDICINE

## 2022-04-15 RX ADMIN — DRONABINOL 5 MG: 2.5 CAPSULE ORAL at 17:35

## 2022-04-15 RX ADMIN — Medication 500 MG: at 08:22

## 2022-04-15 RX ADMIN — PANTOPRAZOLE SODIUM 40 MG: 40 TABLET, DELAYED RELEASE ORAL at 08:23

## 2022-04-15 RX ADMIN — Medication 500 MG: at 17:35

## 2022-04-15 RX ADMIN — LOPERAMIDE HYDROCHLORIDE 2 MG: 2 CAPSULE ORAL at 17:35

## 2022-04-15 RX ADMIN — ONDANSETRON 8 MG: 2 INJECTION INTRAMUSCULAR; INTRAVENOUS at 05:03

## 2022-04-15 RX ADMIN — ONDANSETRON 8 MG: 2 INJECTION INTRAMUSCULAR; INTRAVENOUS at 22:26

## 2022-04-15 RX ADMIN — POTASSIUM CHLORIDE 20 MEQ: 20 TABLET, EXTENDED RELEASE ORAL at 08:23

## 2022-04-15 RX ADMIN — AMOXICILLIN AND CLAVULANATE POTASSIUM 1 TABLET: 875; 125 TABLET, FILM COATED ORAL at 08:23

## 2022-04-15 RX ADMIN — CETIRIZINE HYDROCHLORIDE 5 MG: 5 TABLET ORAL at 08:23

## 2022-04-15 RX ADMIN — LEVOFLOXACIN 500 MG: 500 TABLET, FILM COATED ORAL at 08:22

## 2022-04-15 RX ADMIN — LOPERAMIDE HYDROCHLORIDE 2 MG: 2 CAPSULE ORAL at 08:23

## 2022-04-15 RX ADMIN — DEXTROSE MONOHYDRATE AND SODIUM CHLORIDE 75 ML/HR: 5; .45 INJECTION, SOLUTION INTRAVENOUS at 22:26

## 2022-04-15 RX ADMIN — AMOXICILLIN AND CLAVULANATE POTASSIUM 1 TABLET: 875; 125 TABLET, FILM COATED ORAL at 22:27

## 2022-04-15 RX ADMIN — DIPHENHYDRAMINE HCL 5 ML: 12.5 SOLUTION ORAL at 19:40

## 2022-04-15 RX ADMIN — ONDANSETRON 8 MG: 2 INJECTION INTRAMUSCULAR; INTRAVENOUS at 14:31

## 2022-04-15 RX ADMIN — ACYCLOVIR 400 MG: 800 TABLET ORAL at 08:23

## 2022-04-15 RX ADMIN — FLUCONAZOLE 200 MG: 100 TABLET ORAL at 08:22

## 2022-04-15 RX ADMIN — Medication 500 MG: at 14:31

## 2022-04-15 RX ADMIN — ACYCLOVIR 400 MG: 800 TABLET ORAL at 17:35

## 2022-04-15 RX ADMIN — DRONABINOL 5 MG: 2.5 CAPSULE ORAL at 08:23

## 2022-04-15 RX ADMIN — POTASSIUM CHLORIDE 20 MEQ: 20 TABLET, EXTENDED RELEASE ORAL at 17:35

## 2022-04-15 NOTE — PROGRESS NOTES
[facility] Hematology & Oncology        Inpatient Hematology / Oncology Progress Note    Reason for Admission:  Diffuse large B cell lymphoma ([facility] [C83.30]  Admission for antineoplastic chemotherapy [Z51.11]  Bone marrow transplant status ([facility] [Z94.81]    24 Hour Events:  Afebrile, hypertensive at times  Day +4 BEAM/ASCT  Nausea and PO intake improved  Diarrhea manageable    Transfusions: None  Replacements: None    ROS:  Constitutional: Negative for fever, chills. CV: Negative for chest pain, palpitations, edema. Respiratory: Negative for dyspnea, cough, wheezing. GI: +nausea, vomiting, diarrhea. Negative for abdominal pain. 10 point review of systems is otherwise negative with the exception of the elements mentioned above in the HPI.        No Known Allergies  Past Medical History:   Diagnosis Date    Cancer ([facility]     Hypertension     Valvular heart disease      Past Surgical History:   Procedure Laterality Date    HX BACK SURGERY      26 years ago    HX VASCULAR ACCESS      IR BX BONE MARROW DIAGNOSTIC  2/24/2022    IR CHOLECYSTOSTOMY PERCUTANEOUS      IR INSERT NON TUNL CVC OVER 5 YRS  3/14/2022    IR INTRATHECAL CHEMO INJECTION CNS  12/8/2021    IR INTRATHECAL CHEMO INJECTION CNS  12/28/2021    IR INTRATHECAL CHEMO INJECTION CNS  1/28/2022    IR SPINAL PUNCTURE CSF TREAT / DRAIN  2/4/2022     Family History   Problem Relation Age of Onset    Diabetes Mother     Hypertension Mother     Diabetes Father     Hypertension Father     Hypertension Brother      Social History     Socioeconomic History    Marital status:      Spouse name: Not on file    Number of children: Not on file    Years of education: Not on file    Highest education level: Not on file   Occupational History    Not on file   Tobacco Use    Smoking status: Never Smoker    Smokeless tobacco: Never Used   Vaping Use    Vaping Use: Never used   Substance and Sexual Activity    Alcohol use: Not Currently    Drug use: Not Currently    Sexual activity: Not on file   Other Topics Concern     Service Not Asked    Blood Transfusions Not Asked    Caffeine Concern Not Asked    Occupational Exposure Not Asked    Hobby Hazards Not Asked    Sleep Concern Not Asked    Stress Concern Not Asked    Weight Concern Not Asked    Special Diet Not Asked    Back Care Not Asked    Exercise Not Asked    Bike Helmet Not Asked   [address] Not Asked    Self-Exams Not Asked   Social History Narrative    Not on file     Social Determinants of Health     Financial Resource Strain:     Difficulty of Paying Living Expenses: Not on file   Food Insecurity:     Worried About Running Out of Food in the Last Year: Not on file    Sayra of Food in the Last Year: Not on file   Transportation Needs:     Lack of Transportation (Medical): Not on file    Lack of Transportation (Non-Medical):  Not on file   Physical Activity:     Days of Exercise per Week: Not on file    Minutes of Exercise per Session: Not on file   Stress:     Feeling of Stress : Not on file   Social Connections:     Frequency of Communication with Friends and Family: Not on file    Frequency of Social Gatherings with Friends and Family: Not on file    Attends Nondenominational Services: Not on file    Active Member of [address] [place] [address] [employer] or Organizations: Not on file    Attends Club or Organization Meetings: Not on file    Marital Status: Not on file   Intimate Partner Violence:     Fear of Current or Ex-Partner: Not on file    Emotionally Abused: Not on file    Physically Abused: Not on file    Sexually Abused: Not on file   Housing Stability:     Unable to Pay for Housing in the Last Year: Not on file    Number of [place] in the Last Year: Not on file    Unstable Housing in the Last Year: Not on file     Current Facility-Administered Medications   Medication Dose Route Frequency Provider Last Rate Last Admin    ondansetron (ZOFRAN) injection 8 mg  8 mg IntraVENous Q8H Natalia Rummer, FNP   8 mg at 04/15/22 0503    dronabinoL (MARINOL) capsule 5 mg  5 mg Oral BID Natalia Rummer, FNP   5 mg at 04/15/22 9206    pantoprazole (PROTONIX) tablet 40 mg  40 mg Oral ACB Natalia Rummer, FNP   40 mg at 04/15/22 5067    loperamide (IMODIUM) capsule 2 mg  2 mg Oral Q4H PRN Shantelle Ards, NP   2 mg at 04/15/22 5498    LORazepam (ATIVAN) injection 1 mg  1 mg IntraVENous Q6H PRN Shantelle Ards, NP   1 mg at 04/14/22 1249    prochlorperazine (COMPAZINE) with saline injection 10 mg  10 mg IntraVENous Q6H PRN Shantelle Ards, NP   10 mg at 04/13/22 1213    dextrose 5 % - 0.45% NaCl infusion  75 mL/hr IntraVENous CONTINUOUS Shantelle Ards, NP 75 mL/hr at 04/14/22 0629 75 mL/hr at 04/14/22 0629    amoxicillin-clavulanate (AUGMENTIN) 875-125 mg per tablet 1 Tablet  1 Tablet Oral Q12H Shantelle Ards, NP   1 Tablet at 04/15/22 0823    levoFLOXacin (LEVAQUIN) tablet 500 mg  500 mg Oral Q24H Shantelle Ards, NP   500 mg at 04/15/22 6999    acyclovir (ZOVIRAX) tablet 400 mg  400 mg Oral BID Shantelle Ards, NP   400 mg at 04/15/22 6806    fluconazole (DIFLUCAN) tablet 200 mg  200 mg Oral DAILY Shantelle Ards, NP   200 mg at 04/15/22 3898    [START ON 4/17/2022] filgrastim-aafi (NIVESTYM) injection syringe 300 mcg  300 mcg SubCUTAneous Q24H Sandee Bernheim, MD        cloNIDine HCL (CATAPRES) tablet 0.1 mg  0.1 mg Oral Q6H PRN Shantelle Ards, NP   0.1 mg at 04/10/22 1200    potassium chloride (K-DUR, KLOR-CON M20) SR tablet 20 mEq  20 mEq Oral BID Princess Renteria MD   20 mEq at 04/15/22 3848    lisinopriL (PRINIVIL, ZESTRIL) tablet 20 mg  20 mg Oral DAILY Shantelle Ards, NP   20 mg at 04/14/22 0843    calcium carbonate (OS-DARI) tablet 500 mg [elemental]  500 mg Oral TID WITH MEALS Shantelle Linton, NP   500 mg at 04/15/22 9602    HYDROcodone-acetaminophen (NORCO) 5-325 mg per tablet 1 Tablet  1 Tablet Oral Q6H PRN Shantelle Linton, NP        morphine injection 2 mg  2 mg IntraVENous Q4H PRN Shantelle Linton, NP  senna-docusate (PERICOLACE) 8.6-50 mg per tablet 1 Tablet  1 Tablet Oral DAILY PRN Mary Carmona MD   1 Tablet at 22    polyethylene glycol (MIRALAX) packet 17 g  17 g Oral DAILY PRN Mary Carmona MD        acetaminophen (TYLENOL) tablet 650 mg  650 mg Oral Q4H PRN Stephon White, FNP        atenoloL (TENORMIN) tablet 25 mg  25 mg Oral DAILY Stephon White, FNP   25 mg at 22 2958    cetirizine (ZYRTEC) tablet 5 mg  5 mg Oral DAILY Stephon White, FNP   5 mg at 04/15/22 7949    ondansetron (ZOFRAN ODT) tablet 8 mg  8 mg Oral Q8H PRN Stephon White, FNP   8 mg at 22 6020    [Held by provider] enoxaparin (LOVENOX) injection 40 mg  40 mg SubCUTAneous Q24H Stephon Christopher, FNP        heparin (porcine) pf 300 Units  300 Units InterCATHeter PRN Mary Carmona MD        central line flush (saline) syringe 10 mL  10 mL InterCATHeter PRN Mary Carmona MD   10 mL at 22 0423       OBJECTIVE:  Patient Vitals for the past 8 hrs:   BP Temp Pulse Resp SpO2   04/15/22 0829 107/69 98.3 °F (36.8 °C) 98 16 96 %   04/15/22 0323 130/63 98.8 °F (37.1 °C) 83 16 98 %     Temp (24hrs), Av.4 °F (36.9 °C), Min:97.8 °F (36.6 °C), Max:98.8 °F (37.1 °C)    04/15 07 - 04/15 1900  In: 370 [I.V.:370]  Out: 800 [Urine:400]    Physical Exam:  Constitutional: Well developed, well nourished male in no acute distress, sitting comfortably in the hospital bed. HEENT: +L eye patch. Normocephalic and atraumatic. Oropharynx is clear, mucous membranes are moist. Extraocular muscles are intact. Sclerae anicteric. Neck supple without JVD. No thyromegaly present. Skin Warm and dry. No bruising and no rash noted. No erythema. No pallor. Respiratory Lungs are clear to auscultation bilaterally without wheezes, rales or rhonchi, normal air exchange without accessory muscle use.     CVS Normal rate, regular rhythm and normal S1 and S2. +murmur   Abdomen Soft, nontender and nondistended, normoactive bowel sounds. No palpable mass. No hepatosplenomegaly. Neuro Grossly nonfocal with no obvious sensory or motor deficits. MSK Normal range of motion in general.  No edema and no tenderness. Psych Appropriate mood and affect. Labs:    Recent Results (from the past 24 hour(s))   METABOLIC PANEL, COMPREHENSIVE    Collection Time: 04/15/22  3:30 AM   Result Value Ref Range    Sodium 140 136 - 145 mmol/L    Potassium 3.7 3.5 - 5.1 mmol/L    Chloride 106 98 - 107 mmol/L    CO2 29 21 - 32 mmol/L    Anion gap 5 (L) 7 - 16 mmol/L    Glucose 103 (H) 65 - 100 mg/dL    BUN 11 6 - 23 MG/DL    Creatinine 0.70 (L) 0.8 - 1.5 MG/DL    GFR est AA >60 >60 ml/min/1.73m2    GFR est non-AA >60 >60 ml/min/1.73m2    Calcium 9.1 8.3 - 10.4 MG/DL    Bilirubin, total 0.6 0.2 - 1.1 MG/DL    ALT (SGPT) 23 12 - 65 U/L    AST (SGOT) 20 15 - 37 U/L    Alk.  phosphatase 94 50 - 136 U/L    Protein, total 6.2 (L) 6.3 - 8.2 g/dL    Albumin 3.4 (L) 3.5 - 5.0 g/dL    Globulin 2.8 2.3 - 3.5 g/dL    A-G Ratio 1.2 1.2 - 3.5     MAGNESIUM    Collection Time: 04/15/22  3:30 AM   Result Value Ref Range    Magnesium 2.2 1.8 - 2.4 mg/dL   CBC WITH AUTOMATED DIFF    Collection Time: 04/15/22  3:30 AM   Result Value Ref Range    WBC 0.2 (LL) 4.3 - 11.1 K/uL    RBC 3.90 (L) 4.23 - 5.6 M/uL    HGB 11.5 (L) 13.6 - 17.2 g/dL    HCT 34.2 (L) 41.1 - 50.3 %    MCV 87.7 79.6 - 97.8 FL    MCH 29.5 26.1 - 32.9 PG    MCHC 33.6 31.4 - 35.0 g/dL    RDW 11.6 (L) 11.9 - 14.6 %    PLATELET 52 (L) 168 - 450 K/uL    MPV 10.4 9.4 - 12.3 FL    ABSOLUTE NRBC 0.00 0.0 - 0.2 K/uL    RBC COMMENTS NORMOCYTIC/NORMOCHROMIC      WBC COMMENTS WBC TOO FEW TO DIFFERENTIATE      PLATELET COMMENTS MODERATE      DF MANUAL     PHOSPHORUS    Collection Time: 04/15/22  3:30 AM   Result Value Ref Range    Phosphorus 2.7 2.5 - 4.5 MG/DL   GGT    Collection Time: 04/15/22  3:30 AM   Result Value Ref Range    GGT 84 15 - 85 U/L   LD    Collection Time: 04/15/22  3:30 AM   Result Value Ref Range     (H) 100 - 190 U/L       Imaging:  n/a    ASSESSMENT:  Problem List  Date Reviewed: 3/16/2022          Codes Class Noted    Malnutrition of moderate degree ([facility] ICD-10-CM: E44.0  ICD-9-CM: 263.0  4/15/2022        Bone marrow transplant status ([facility] ICD-10-CM: Z94.81  ICD-9-CM: V42.81  4/5/2022        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 Angel Leach is a 64 y.o. male admitted on 4/5/2022. The primary encounter diagnosis was Diffuse large B-cell lymphoma, unspecified body region [facility]). Diagnoses of Admission for antineoplastic chemotherapy, Bone marrow transplant status ([facility] and Nonrheumatic mitral valve regurgitation were also pertinent to this visit. 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 He is a patient of Dr Courtney Ponce treated for relapsed DLBCL. He was initially treated by Dr Jennifer Shelton in [facility] with R-CHOP 4/21-7/21. However, he was noted to have relapsed disease in L supraclavicular LN. He was then evaluated by Dr Courtney Ponce and has now completed R-ICE x3 with IT MTX x4 with CR. He has been evaluated for ASCT and collected 4.8x10^6/kg CD34+ cells. He is now admitted for BEAM/ASCT. He was seen by Dr Courtney Goes day prior to admission and rapid COVID and flu testing negative. He was previously cleared by cardiology to proceed with transplant given mitral valve regurgitation. He is feeling well and ready to begin treatment today.     PLAN:    Relapsed DLBCL / ASCT  - Admit for BEAM/ASCT - D-6 today  - Prophylactic antibiotics to begin D+1  - G-CSF to begin D+6  4/10 Day -1 BEAM/ASCT. Stem cells tomorrow. Tolerating treatment well. 4/11 Day 0. Stem cells today. 4/12 Day +1. PPx antibx begin today. 4/15 Day +4. Fungitell pending, aspergillus negative. Counts dropping as expected. Mitral regurgitation  - Evaluated by cardiology, cleared to proceed with ASCT  - Monitor for fluid overload  4/14 No evidence of fluid overload. On gentle hydration d/t decreased intake 2/2 nausea. Con't to monitor for fluid overload. Hypertension  - Continue home meds  4/9 Pt with persistent HTN. Increase lisinopril to 20mg daily. 4/11 BPs improved overall    Constipation / Diarrhea  4/8 Continue PRN pericolace/miralax for now  4/9 BM x 1 yesterday  4/12 BM x 3 yesterday, reports loose stools  4/13 650mL diarrhea yesterday. Check for Cdiff. If neg, can utilize antidiarrheals. 4/14 C diff negative - starting imodium PRN  4/15 Manageable with antidiarrheals    Nausea / poor appetite  4/11 antiemetics prn  4/14 Nausea managed - reports poor appetite. Will order Ensure. 4/15 On scheduled zofran. Added marinol BID as well as PPI. Some improvement today. Continue home meds  PPx meds: Acyclovir, Diflucan, Levaquin, Augmentin  Rhea SOPs  VTE prophylaxis - Lovenox (held for plts <50k)  G-CSF to begin on D+6    Goals and plan of care reviewed with the patient. All questions answered to the best of our ability. Disposition:  Anticipate discharge home after engraftment. Expect hospitalization for ~3 weeks.               Baljinder Valencia, [address] Hematology & Oncology  [address] [address]  Office : [phone]  Fax : [phone]

## 2022-04-15 NOTE — PROGRESS NOTES
Problem: Falls - Risk of  Goal: *Absence of Falls  Description: Document [place] [facility] Fall Risk and appropriate interventions in the flowsheet.   Outcome: Progressing Towards Goal  Note: Fall Risk Interventions:            Medication Interventions: Evaluate medications/consider consulting pharmacy,Teach patient to arise slowly         History of Falls Interventions: Consult care management for discharge planning,Evaluate medications/consider consulting pharmacy

## 2022-04-15 NOTE — PROGRESS NOTES
Diagnosis: DLBCL  Transplant Date: 4/11/22  Chemo regimen used: BEAM  Day: (+/-) +4. BMT assessments and toxicities completed. Mouth Care completed 4 times this shift. WBC/ANC= 0.2/0. Prophylactic medications started D+1 (4/12/22- Augmentin, LVQ, ACV, diflucan). G-CSF to start on D+6 4/17/22. Toxicities greater than 2 :none. Patient seen by MD/NP daily until engraftment or while IP. Labs not resulted that need follow-up: None  Additional Shift Information:  No c/o N/V, stools still loose but firmer, intake slightly improved    Intake/Output  04/15 0701 - 04/15 1900  In: 56 [P.O.:240;  I.V.:370]  Out: 800 [Urine:400]        04/15/22 0815   Toxicity Grading Criteria   Hemorrhage (GI) 0   Infection 0   Fever in the Absence of Neutropenia (ANC greater than or equal to 1.0 0   Febrile Neutropenia (ANC less than 1.0) 0   Rigors/Chills 0   Mucositis Oral 0   Esophagitis 0   Dry Mouth 0   Dysphagia 0   Nausea 2   Vomiting 1   Diarrhea 2   Constipation 0   Bilirubin increased 0   VOD- Weight Gain 0   ALT (SGPT) 0   Alkaline Phosphatase increased 0   AST (SGOT) 0   GGT increased 0   Weight Loss 1   Weight Gain 0   Dehydration 0   Urine Protein- Proteinuria Not able to assess   Urine Blood - Hematuria 0   Creatinine increased 0   Bladder Spasms 0   Cystitis non-infective 0   Urinary Frequency 0   Urinary Retention 0   Cardiac Arrhythmia Not able to assess   Conduction Abnormality/AV Heart Block   (SAVITA)   Palpitations 0   Prolonged QTc    (SAVITA)   Supraventricular and Lorena Arrhythmia   (SAVITA)   Vasovagal Episode 0   Ventricular Arrhythmia   (SAVITA)   Hypotension 0   Hypertension Baseline   Neuropathy / Sensory 0   Headache 0   Vision 0   Dizziness 0   Syncope 0   Anxiety 0   Agitation 0   Depression 0   Fatigue 1   Insomnia 0   Pain 0   Dyspnea 0   Cough 0   Hiccups 0   Alopecia 0   Nail Changes 0   Allergic Reaction 0   Rash 0   Urticaria (Hives, Welts, Wheals) 0

## 2022-04-15 NOTE — PROGRESS NOTES
Problem: Falls - Risk of  Goal: *Absence of Falls  Description: Document [place] Fall Risk and appropriate interventions in the flowsheet.   Outcome: Progressing Towards Goal  Note: Fall Risk Interventions:            Medication Interventions: Evaluate medications/consider consulting pharmacy,Teach patient to arise slowly         History of Falls Interventions: Consult care management for discharge planning,Evaluate medications/consider consulting pharmacy

## 2022-04-15 NOTE — PROGRESS NOTES
Diagnosis: DLBCL  Transplant Date: 4/11/22  Chemo regimen used: BEAM  Day: (+/-) +4. BMT assessments and toxicities completed. Mouth Care completed 2 times this shift. WBC/ANC= 0.2/0  Prophylactic medications started D+1 (4/12/22- Augmentin, LVQ, ACV, diflucan). G-CSF to start on D+6 4/17/22. Toxicities greater than 2 :none. Patient seen by MD/NP daily until engraftment or while IP.   Labs not resulted that need follow-up: Fungitell   Shift Summary: Marinol started for appetite stimulant/Nausea, zofran scheduled, c/o N/V, diarrhea, no appetite

## 2022-04-16 LAB
ALBUMIN SERPL-MCNC: 3.5 G/DL (ref 3.5–5)
ALBUMIN/GLOB SERPL: 1.2 {RATIO} (ref 1.2–3.5)
ALP SERPL-CCNC: 98 U/L (ref 50–136)
ALT SERPL-CCNC: 24 U/L (ref 12–65)
ANION GAP SERPL CALC-SCNC: 3 MMOL/L (ref 7–16)
AST SERPL-CCNC: 16 U/L (ref 15–37)
BILIRUB SERPL-MCNC: 0.4 MG/DL (ref 0.2–1.1)
BUN SERPL-MCNC: 11 MG/DL (ref 6–23)
CALCIUM SERPL-MCNC: 9.1 MG/DL (ref 8.3–10.4)
CHLORIDE SERPL-SCNC: 106 MMOL/L (ref 98–107)
CO2 SERPL-SCNC: 29 MMOL/L (ref 21–32)
CREAT SERPL-MCNC: 0.7 MG/DL (ref 0.8–1.5)
DIFFERENTIAL METHOD BLD: ABNORMAL
ERYTHROCYTE [DISTWIDTH] IN BLOOD BY AUTOMATED COUNT: 11.4 % (ref 11.9–14.6)
GLOBULIN SER CALC-MCNC: 2.9 G/DL (ref 2.3–3.5)
GLUCOSE SERPL-MCNC: 102 MG/DL (ref 65–100)
HCT VFR BLD AUTO: 34.8 % (ref 41.1–50.3)
HGB BLD-MCNC: 12.1 G/DL (ref 13.6–17.2)
MAGNESIUM SERPL-MCNC: 2.4 MG/DL (ref 1.8–2.4)
MCH RBC QN AUTO: 30.4 PG (ref 26.1–32.9)
MCHC RBC AUTO-ENTMCNC: 34.8 G/DL (ref 31.4–35)
MCV RBC AUTO: 87.4 FL (ref 79.6–97.8)
NRBC # BLD: 0 K/UL (ref 0–0.2)
PLATELET # BLD AUTO: 28 K/UL (ref 150–450)
PLATELET COMMENTS,PCOM: ABNORMAL
PMV BLD AUTO: 10.8 FL (ref 9.4–12.3)
POTASSIUM SERPL-SCNC: 4 MMOL/L (ref 3.5–5.1)
PROT SERPL-MCNC: 6.4 G/DL (ref 6.3–8.2)
RBC # BLD AUTO: 3.98 M/UL (ref 4.23–5.6)
RBC MORPH BLD: ABNORMAL
SODIUM SERPL-SCNC: 138 MMOL/L (ref 136–145)
WBC # BLD AUTO: 0.1 K/UL (ref 4.3–11.1)
WBC MORPH BLD: ABNORMAL

## 2022-04-16 PROCEDURE — 74011250637 HC RX REV CODE- 250/637: Performed by: INTERNAL MEDICINE

## 2022-04-16 PROCEDURE — 74011250637 HC RX REV CODE- 250/637: Performed by: NURSE PRACTITIONER

## 2022-04-16 PROCEDURE — 83735 ASSAY OF MAGNESIUM: CPT

## 2022-04-16 PROCEDURE — 36591 DRAW BLOOD OFF VENOUS DEVICE: CPT

## 2022-04-16 PROCEDURE — 74011250636 HC RX REV CODE- 250/636: Performed by: NURSE PRACTITIONER

## 2022-04-16 PROCEDURE — APPSS45 APP SPLIT SHARED TIME 31-45 MINUTES: Performed by: NURSE PRACTITIONER

## 2022-04-16 PROCEDURE — 74011000250 HC RX REV CODE- 250: Performed by: INTERNAL MEDICINE

## 2022-04-16 PROCEDURE — 99232 SBSQ HOSP IP/OBS MODERATE 35: CPT | Performed by: INTERNAL MEDICINE

## 2022-04-16 PROCEDURE — 85025 COMPLETE CBC W/AUTO DIFF WBC: CPT

## 2022-04-16 PROCEDURE — 74011000250 HC RX REV CODE- 250: Performed by: NURSE PRACTITIONER

## 2022-04-16 PROCEDURE — 65270000015 HC RM PRIVATE ONCOLOGY

## 2022-04-16 PROCEDURE — 80053 COMPREHEN METABOLIC PANEL: CPT

## 2022-04-16 RX ORDER — HYDROCORTISONE 1 %
CREAM (GRAM) TOPICAL
Status: DISCONTINUED | OUTPATIENT
Start: 2022-04-16 | End: 2022-04-24 | Stop reason: HOSPADM

## 2022-04-16 RX ORDER — DIPHENOXYLATE HYDROCHLORIDE AND ATROPINE SULFATE 2.5; .025 MG/1; MG/1
1 TABLET ORAL
Status: DISCONTINUED | OUTPATIENT
Start: 2022-04-16 | End: 2022-04-24 | Stop reason: HOSPADM

## 2022-04-16 RX ADMIN — DRONABINOL 5 MG: 2.5 CAPSULE ORAL at 08:17

## 2022-04-16 RX ADMIN — ONDANSETRON 8 MG: 2 INJECTION INTRAMUSCULAR; INTRAVENOUS at 22:57

## 2022-04-16 RX ADMIN — ACYCLOVIR 400 MG: 800 TABLET ORAL at 17:41

## 2022-04-16 RX ADMIN — AMOXICILLIN AND CLAVULANATE POTASSIUM 1 TABLET: 875; 125 TABLET, FILM COATED ORAL at 08:17

## 2022-04-16 RX ADMIN — DEXTROSE MONOHYDRATE AND SODIUM CHLORIDE 125 ML/HR: 5; .45 INJECTION, SOLUTION INTRAVENOUS at 14:20

## 2022-04-16 RX ADMIN — DIPHENOXYLATE HYDROCHLORIDE AND ATROPINE SULFATE 1 TABLET: 2.5; .025 TABLET ORAL at 22:57

## 2022-04-16 RX ADMIN — DIPHENHYDRAMINE HCL 5 ML: 12.5 SOLUTION ORAL at 14:20

## 2022-04-16 RX ADMIN — FLUCONAZOLE 200 MG: 100 TABLET ORAL at 08:17

## 2022-04-16 RX ADMIN — Medication 500 MG: at 08:17

## 2022-04-16 RX ADMIN — ONDANSETRON 8 MG: 2 INJECTION INTRAMUSCULAR; INTRAVENOUS at 05:13

## 2022-04-16 RX ADMIN — ONDANSETRON 8 MG: 2 INJECTION INTRAMUSCULAR; INTRAVENOUS at 14:20

## 2022-04-16 RX ADMIN — CETIRIZINE HYDROCHLORIDE 5 MG: 5 TABLET ORAL at 08:17

## 2022-04-16 RX ADMIN — DRONABINOL 5 MG: 2.5 CAPSULE ORAL at 17:41

## 2022-04-16 RX ADMIN — Medication 500 MG: at 17:41

## 2022-04-16 RX ADMIN — POTASSIUM CHLORIDE 20 MEQ: 20 TABLET, EXTENDED RELEASE ORAL at 17:41

## 2022-04-16 RX ADMIN — POTASSIUM CHLORIDE 20 MEQ: 20 TABLET, EXTENDED RELEASE ORAL at 08:17

## 2022-04-16 RX ADMIN — Medication 500 MG: at 14:20

## 2022-04-16 RX ADMIN — PANTOPRAZOLE SODIUM 40 MG: 40 TABLET, DELAYED RELEASE ORAL at 08:17

## 2022-04-16 RX ADMIN — ACYCLOVIR 400 MG: 800 TABLET ORAL at 08:17

## 2022-04-16 RX ADMIN — LEVOFLOXACIN 500 MG: 500 TABLET, FILM COATED ORAL at 08:17

## 2022-04-16 RX ADMIN — AMOXICILLIN AND CLAVULANATE POTASSIUM 1 TABLET: 875; 125 TABLET, FILM COATED ORAL at 22:57

## 2022-04-16 RX ADMIN — LOPERAMIDE HYDROCHLORIDE 2 MG: 2 CAPSULE ORAL at 08:17

## 2022-04-16 RX ADMIN — DEXTROSE MONOHYDRATE AND SODIUM CHLORIDE 125 ML/HR: 5; .45 INJECTION, SOLUTION INTRAVENOUS at 20:24

## 2022-04-16 RX ADMIN — LOPERAMIDE HYDROCHLORIDE 2 MG: 2 CAPSULE ORAL at 03:09

## 2022-04-16 NOTE — PROGRESS NOTES
Diagnosis: DLBCL  Transplant Date: 4/11/22  Chemo regimen used: BEAM  Day: (+/-) +5. BMT assessments and toxicities completed. Mouth Care completed 3 times this shift. WBC/ANC= 0.1/0. Prophylactic medications started D+1 (4/12/22- Augmentin, LVQ, ACV, diflucan). G-CSF to start on D+6 4/17/22. Toxicities greater than 2 :none. Patient seen by MD/NP daily until engraftment or while IP. Labs not resulted that need follow-up: none. Additional Shift Information:  C/o diarrhea- started lomotil, fatigued, hypotensive in the AM- BP meds held, IVF increased, hypertensive in the PM    Intake/Output  04/16 0701 - 04/16 1900  In: 2034 [P.O.:790;  I.V.:1244]  Out: 975 [Urine:450]        04/16/22 0800   Toxicity Grading Criteria   Hemorrhage (GI) 0   Infection 0   Fever in the Absence of Neutropenia (ANC greater than or equal to 1.0 0   Febrile Neutropenia (ANC less than 1.0) 0   Rigors/Chills 0   Mucositis Oral 1   Esophagitis 0   Dry Mouth 1   Dysphagia 0   Nausea 2   Vomiting 0   Diarrhea 2   Constipation 0   Bilirubin increased 0   VOD- Weight Gain 0   ALT (SGPT) 0   Alkaline Phosphatase increased 0   AST (SGOT) 0   GGT increased Not able to assess   Weight Loss 1   Weight Gain 0   Dehydration 1   Urine Protein- Proteinuria Not able to assess   Urine Blood - Hematuria 0   Creatinine increased 0   Bladder Spasms 0   Cystitis non-infective 0   Urinary Frequency 0   Urinary Retention 0   Cardiac Arrhythmia Not able to assess   Conduction Abnormality/AV Heart Block   (SAVITA)   Palpitations 0   Prolonged QTc    (SAVITA)   Supraventricular and Lorena Arrhythmia   (SAVITA)   Vasovagal Episode 0   Ventricular Arrhythmia   (SAVITA)   Hypotension 1   Hypertension History of   Neuropathy / Sensory 0   Headache 0   Vision 0   Dizziness 0   Syncope 0   Anxiety 0   Agitation 0   Depression 0   Fatigue 1   Insomnia 0   Pain 0   Dyspnea 0   Cough 0   Hiccups 0   Alopecia 0   Nail Changes 0   Allergic Reaction 0   Rash 0   Urticaria (Hives, Welts, Wheals) 0

## 2022-04-16 NOTE — PROGRESS NOTES
[facility]        Inpatient Hematology / Oncology Progress Note    Reason for Admission:  Diffuse large B cell lymphoma ([facility] [C83.30]  Admission for antineoplastic chemotherapy [Z51.11]  Bone marrow transplant status ([facility] [Z94.81]    24 Hour Events:  Afebrile, hypertensive at times  Day +5 BEAM/ASCT  Nausea and PO intake improved  Diarrhea manageable  Rash to BL inner thighs    Transfusions: None  Replacements: None    ROS:  Constitutional: Negative for fever, chills. CV: Negative for chest pain, palpitations, edema. Respiratory: Negative for dyspnea, cough, wheezing. GI: +nausea, vomiting, diarrhea. Negative for abdominal pain. 10 point review of systems is otherwise negative with the exception of the elements mentioned above in the HPI.        No Known Allergies  Past Medical History:   Diagnosis Date    Cancer ([facility]     Hypertension     Valvular heart disease      Past Surgical History:   Procedure Laterality Date    HX BACK SURGERY      26 years ago    HX VASCULAR ACCESS      IR BX BONE MARROW DIAGNOSTIC  2/24/2022    IR CHOLECYSTOSTOMY PERCUTANEOUS      IR INSERT NON TUNL CVC OVER 5 YRS  3/14/2022    IR INTRATHECAL CHEMO INJECTION CNS  12/8/2021    IR INTRATHECAL CHEMO INJECTION CNS  12/28/2021    IR INTRATHECAL CHEMO INJECTION CNS  1/28/2022    IR SPINAL PUNCTURE CSF TREAT / DRAIN  2/4/2022     Family History   Problem Relation Age of Onset    Diabetes Mother     Hypertension Mother     Diabetes Father     Hypertension Father     Hypertension Brother      Social History     Socioeconomic History    Marital status:      Spouse name: Not on file    Number of children: Not on file    Years of education: Not on file    Highest education level: Not on file   Occupational History    Not on file   Tobacco Use    Smoking status: Never Smoker    Smokeless tobacco: Never Used   Vaping Use    Vaping Use: Never used   Substance and Sexual Activity    Alcohol use: Not Currently    Drug use: Not Currently    Sexual activity: Not on file   Other Topics Concern     Service Not Asked    Blood Transfusions Not Asked    Caffeine Concern Not Asked    Occupational Exposure Not Asked    Hobby Hazards Not Asked    Sleep Concern Not Asked    Stress Concern Not Asked    Weight Concern Not Asked    Special Diet Not Asked    Back Care Not Asked    Exercise Not Asked    Bike Helmet Not Asked   [address] Not Asked    Self-Exams Not Asked   Social History Narrative    Not on file     Social Determinants of Health     Financial Resource Strain:     Difficulty of Paying Living Expenses: Not on file   Food Insecurity:     Worried About Running Out of Food in the Last Year: Not on file    Sayra of Food in the Last Year: Not on file   Transportation Needs:     Lack of Transportation (Medical): Not on file    Lack of Transportation (Non-Medical):  Not on file   Physical Activity:     Days of Exercise per Week: Not on file    Minutes of Exercise per Session: Not on file   Stress:     Feeling of Stress : Not on file   Social Connections:     Frequency of Communication with Friends and Family: Not on file    Frequency of Social Gatherings with Friends and Family: Not on file    Attends Rastafari Services: Not on file    Active Member of [address] [place] [address] [employer] or Organizations: Not on file    Attends Club or Organization Meetings: Not on file    Marital Status: Not on file   Intimate Partner Violence:     Fear of Current or Ex-Partner: Not on file    Emotionally Abused: Not on file    Physically Abused: Not on file    Sexually Abused: Not on file   Housing Stability:     Unable to Pay for Housing in the Last Year: Not on file    Number of [place] in the Last Year: Not on file    Unstable Housing in the Last Year: Not on file     Current Facility-Administered Medications   Medication Dose Route Frequency Provider Last Rate Last Admin    magic mouthwash [facility]) oral suspension 5 mL  5 mL Oral Q4H PRN Shannon Potter MD   5 mL at 04/15/22 1940    ondansetron (ZOFRAN) injection 8 mg  8 mg IntraVENous Q8H [facility] [place], FNP   8 mg at 04/16/22 9489    dronabinoL (MARINOL) capsule 5 mg  5 mg Oral BID [facility] [place], FNP   5 mg at 04/16/22 8759    pantoprazole (PROTONIX) tablet 40 mg  40 mg Oral ACB [facility] [place], FNP   40 mg at 04/16/22 1920    loperamide (IMODIUM) capsule 2 mg  2 mg Oral Q4H PRN Mio Less, NP   2 mg at 04/16/22 0817    LORazepam (ATIVAN) injection 1 mg  1 mg IntraVENous Q6H PRN Mio Less, NP   1 mg at 04/14/22 1249    prochlorperazine (COMPAZINE) with saline injection 10 mg  10 mg IntraVENous Q6H PRN Mio Less, NP   10 mg at 04/13/22 1213    dextrose 5 % - 0.45% NaCl infusion  125 mL/hr IntraVENous CONTINUOUS [facility] [place],  mL/hr at 04/16/22 0928 125 mL/hr at 04/16/22 0928    amoxicillin-clavulanate (AUGMENTIN) 875-125 mg per tablet 1 Tablet  1 Tablet Oral Q12H Mio Less, NP   1 Tablet at 04/16/22 0817    levoFLOXacin (LEVAQUIN) tablet 500 mg  500 mg Oral Q24H Mio Less, NP   500 mg at 04/16/22 8629    acyclovir (ZOVIRAX) tablet 400 mg  400 mg Oral BID Mio Less, NP   400 mg at 04/16/22 5120    fluconazole (DIFLUCAN) tablet 200 mg  200 mg Oral DAILY Mio Less, NP   200 mg at 04/16/22 0817    [START ON 4/17/2022] filgrastim-aafi (NIVESTYM) injection syringe 300 mcg  300 mcg SubCUTAneous Q24H Shannon Potter MD        cloNIDine HCL (CATAPRES) tablet 0.1 mg  0.1 mg Oral Q6H PRN Mio Less, NP   0.1 mg at 04/10/22 1200    potassium chloride (K-DUR, KLOR-CON M20) SR tablet 20 mEq  20 mEq Oral BID Naif Johnston MD   20 mEq at 04/16/22 0817    lisinopriL (PRINIVIL, ZESTRIL) tablet 20 mg  20 mg Oral DAILY Mio Less, NP   20 mg at 04/14/22 0843    calcium carbonate (OS-DARI) tablet 500 mg [elemental]  500 mg Oral TID WITH MEALS Mio Less, NP   500 mg at 04/16/22 0817    HYDROcodone-acetaminophen (NORCO) 5-325 mg per tablet 1 Tablet  1 Tablet Oral Q6H PRN Kathi Luke NP        morphine injection 2 mg  2 mg IntraVENous Q4H PRN Kathi Luke NP        senna-docusate (PERICOLACE) 8.6-50 mg per tablet 1 Tablet  1 Tablet Oral DAILY PRN Uyen Awad MD   1 Tablet at 22 205    polyethylene glycol (MIRALAX) packet 17 g  17 g Oral DAILY PRN Uyen Awad MD        acetaminophen (TYLENOL) tablet 650 mg  650 mg Oral Q4H PRN Merritt Scheuermann, FNP        atenoloL (TENORMIN) tablet 25 mg  25 mg Oral DAILY Merritt Scheuermann, FNP   25 mg at 22 6152    cetirizine (ZYRTEC) tablet 5 mg  5 mg Oral DAILY Merritt Scheuermann, FNP   5 mg at 22 0817    ondansetron (ZOFRAN ODT) tablet 8 mg  8 mg Oral Q8H PRN Merritt Scheuermann, FNP   8 mg at 22 2609    [Held by provider] enoxaparin (LOVENOX) injection 40 mg  40 mg SubCUTAneous Q24H Merritt Scheuermann, FNP        heparin (porcine) pf 300 Units  300 Units InterCATHeter PRN Uyen Awad MD        central line flush (saline) syringe 10 mL  10 mL InterCATHeter PRN Uyen Awad MD   10 mL at 22 0423       OBJECTIVE:  Patient Vitals for the past 8 hrs:   BP Temp Pulse Resp SpO2   22 1114 137/78 98.7 °F (37.1 °C) 93 18 98 %   22 0800 91/75 98.8 °F (37.1 °C) 97 18 97 %     Temp (24hrs), Av.5 °F (36.9 °C), Min:97.9 °F (36.6 °C), Max:98.9 °F (37.2 °C)     07 -  1900  In: 5 [P.O.:440; I.V.:372]  Out: 625 [Urine:300]    Physical Exam:  Constitutional: Well developed, well nourished male in no acute distress, sitting comfortably in the hospital bed. HEENT: +L eye patch. Normocephalic and atraumatic. Oropharynx is clear, mucous membranes are moist. Extraocular muscles are intact. Sclerae anicteric. Neck supple without JVD. No thyromegaly present. Skin Erythematous, macular rash to BL inner thigh. Warm and dry. No bruising and no rash noted. No erythema. No pallor.     Respiratory Lungs are clear to auscultation bilaterally without wheezes, rales or rhonchi, normal air exchange without accessory muscle use. CVS Normal rate, regular rhythm and normal S1 and S2. +murmur   Abdomen Soft, nontender and nondistended, normoactive bowel sounds. No palpable mass. No hepatosplenomegaly. Neuro Grossly nonfocal with no obvious sensory or motor deficits. MSK Normal range of motion in general.  No edema and no tenderness. Psych Appropriate mood and affect. Labs:    Recent Results (from the past 24 hour(s))   METABOLIC PANEL, COMPREHENSIVE    Collection Time: 04/16/22  3:16 AM   Result Value Ref Range    Sodium 138 136 - 145 mmol/L    Potassium 4.0 3.5 - 5.1 mmol/L    Chloride 106 98 - 107 mmol/L    CO2 29 21 - 32 mmol/L    Anion gap 3 (L) 7 - 16 mmol/L    Glucose 102 (H) 65 - 100 mg/dL    BUN 11 6 - 23 MG/DL    Creatinine 0.70 (L) 0.8 - 1.5 MG/DL    GFR est AA >60 >60 ml/min/1.73m2    GFR est non-AA >60 >60 ml/min/1.73m2    Calcium 9.1 8.3 - 10.4 MG/DL    Bilirubin, total 0.4 0.2 - 1.1 MG/DL    ALT (SGPT) 24 12 - 65 U/L    AST (SGOT) 16 15 - 37 U/L    Alk.  phosphatase 98 50 - 136 U/L    Protein, total 6.4 6.3 - 8.2 g/dL    Albumin 3.5 3.5 - 5.0 g/dL    Globulin 2.9 2.3 - 3.5 g/dL    A-G Ratio 1.2 1.2 - 3.5     MAGNESIUM    Collection Time: 04/16/22  3:16 AM   Result Value Ref Range    Magnesium 2.4 1.8 - 2.4 mg/dL   CBC WITH AUTOMATED DIFF    Collection Time: 04/16/22  3:16 AM   Result Value Ref Range    WBC 0.1 (LL) 4.3 - 11.1 K/uL    RBC 3.98 (L) 4.23 - 5.6 M/uL    HGB 12.1 (L) 13.6 - 17.2 g/dL    HCT 34.8 (L) 41.1 - 50.3 %    MCV 87.4 79.6 - 97.8 FL    MCH 30.4 26.1 - 32.9 PG    MCHC 34.8 31.4 - 35.0 g/dL    RDW 11.4 (L) 11.9 - 14.6 %    PLATELET 28 (LL) 430 - 450 K/uL    MPV 10.8 9.4 - 12.3 FL    ABSOLUTE NRBC 0.00 0.0 - 0.2 K/uL    RBC COMMENTS NORMOCYTIC/NORMOCHROMIC      WBC COMMENTS WBC TOO FEW TO DIFFERENTIATE      PLATELET COMMENTS MARKED      DF MANUAL         Imaging:  n/a    ASSESSMENT:  Problem List  Date Reviewed: 3/16/2022          Codes Class Noted    Malnutrition of moderate degree ([facility] ICD-10-CM: E44.0  ICD-9-CM: 263.0  4/15/2022        Bone marrow transplant status ([facility] ICD-10-CM: Z94.81  ICD-9-CM: V42.81  4/5/2022        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 Sky Barker is a 64 y.o. male admitted on 4/5/2022. The primary encounter diagnosis was Diffuse large B-cell lymphoma, unspecified body region [facility]). Diagnoses of Admission for antineoplastic chemotherapy, Bone marrow transplant status ([facility] and Nonrheumatic mitral valve regurgitation were also pertinent to this visit. 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 He is a patient of Dr Rahel Jewell treated for relapsed DLBCL. He was initially treated by Dr Janee Horne in [facility] with R-CHOP 4/21-7/21. However, he was noted to have relapsed disease in L supraclavicular LN. He was then evaluated by Dr Rahel Jewell and has now completed R-ICE x3 with IT MTX x4 with CR. He has been evaluated for ASCT and collected 4.8x10^6/kg CD34+ cells. He is now admitted for BEAM/ASCT. He was seen by Dr Rahel Jewell day prior to admission and rapid COVID and flu testing negative. He was previously cleared by cardiology to proceed with transplant given mitral valve regurgitation. He is feeling well and ready to begin treatment today. PLAN:    Relapsed DLBCL / ASCT  - Admit for BEAM/ASCT - D-6 today  - Prophylactic antibiotics to begin D+1  - G-CSF to begin D+6  4/10 Day -1 BEAM/ASCT. Stem cells tomorrow. Tolerating treatment well. 4/11 Day 0.   Stem cells today.  4/12 Day +1. PPx antibx begin today. 4/16 Day +5. Fungitell pending, aspergillus negative. Counts dropping as expected. G-CSF tomorrow. Mitral regurgitation  - Evaluated by cardiology, cleared to proceed with ASCT  - Monitor for fluid overload  4/14 No evidence of fluid overload. On gentle hydration d/t decreased intake 2/2 nausea. Con't to monitor for fluid overload. 4/16 Increasing IVF given hypotension    Hypertension / hypotension  - Continue home meds  4/9 Pt with persistent HTN. Increase lisinopril to 20mg daily. 4/11 BPs improved overall  4/16 Intermittent hypotension with hypertension. Holding antihypertensives. Constipation / Diarrhea / loose stools  4/8 Continue PRN pericolace/miralax for now  4/9 BM x 1 yesterday  4/12 BM x 3 yesterday, reports loose stools  4/13 650mL diarrhea yesterday. Check for Cdiff. If neg, can utilize antidiarrheals. 4/14 C diff negative - starting imodium PRN  4/16 Loose stools - manageable with antidiarrheals    Nausea / poor appetite  4/11 antiemetics prn  4/14 Nausea managed - reports poor appetite. Will order Ensure. 4/15 On scheduled zofran. Added marinol BID as well as PPI. Some improvement today. Rash  4/16 Hydrocortisone cream    Continue home meds  PPx meds: Acyclovir, Diflucan, Levaquin, Augmentin  Rhea SOPs  VTE prophylaxis - Lovenox (held for plts <50k)  G-CSF to begin on D+6    Goals and plan of care reviewed with the patient. All questions answered to the best of our ability. Disposition:  Anticipate discharge home after engraftment. Expect hospitalization for ~3 weeks.               [facility]  [address] [facility] [address]  Office : [phone]  Fax : [phone]

## 2022-04-16 NOTE — PROGRESS NOTES
Diagnosis: DLBCL  Transplant Date: 4/11/22  Chemo regimen used: BEAM  Day: (+/-) +5. BMT assessments and toxicities completed. Mouth Care completed 3 times this shift. WBC/ANC=   Prophylactic medications started D+1 (4/12/22- Augmentin, LVQ, ACV, diflucan). G-CSF to start on D+6 4/17/22. Toxicities greater than 2 :none. Patient seen by MD/NP daily until engraftment or while IP. Labs not resulted that need follow-up: Fungitell   Shift Summary: Magic mouthwash started for sore mouth, imodium given this morning for x 3 episodes of diarrhea, pt denies nausea.

## 2022-04-17 LAB
ALBUMIN SERPL-MCNC: 3.1 G/DL (ref 3.5–5)
ALBUMIN/GLOB SERPL: 1.1 {RATIO} (ref 1.2–3.5)
ALP SERPL-CCNC: 91 U/L (ref 50–136)
ALT SERPL-CCNC: 21 U/L (ref 12–65)
ANION GAP SERPL CALC-SCNC: 4 MMOL/L (ref 7–16)
AST SERPL-CCNC: 11 U/L (ref 15–37)
BILIRUB SERPL-MCNC: 0.4 MG/DL (ref 0.2–1.1)
BUN SERPL-MCNC: 9 MG/DL (ref 6–23)
CALCIUM SERPL-MCNC: 9 MG/DL (ref 8.3–10.4)
CHLORIDE SERPL-SCNC: 106 MMOL/L (ref 98–107)
CO2 SERPL-SCNC: 30 MMOL/L (ref 21–32)
CREAT SERPL-MCNC: 0.6 MG/DL (ref 0.8–1.5)
DIFFERENTIAL METHOD BLD: ABNORMAL
ERYTHROCYTE [DISTWIDTH] IN BLOOD BY AUTOMATED COUNT: 11.4 % (ref 11.9–14.6)
GLOBULIN SER CALC-MCNC: 2.9 G/DL (ref 2.3–3.5)
GLUCOSE SERPL-MCNC: 106 MG/DL (ref 65–100)
HCT VFR BLD AUTO: 32.2 % (ref 41.1–50.3)
HGB BLD-MCNC: 11.2 G/DL (ref 13.6–17.2)
MAGNESIUM SERPL-MCNC: 2.3 MG/DL (ref 1.8–2.4)
MCH RBC QN AUTO: 30.1 PG (ref 26.1–32.9)
MCHC RBC AUTO-ENTMCNC: 34.8 G/DL (ref 31.4–35)
MCV RBC AUTO: 86.6 FL (ref 79.6–97.8)
NRBC # BLD: 0 K/UL (ref 0–0.2)
PLATELET # BLD AUTO: 14 K/UL (ref 150–450)
PLATELET COMMENTS,PCOM: ABNORMAL
PMV BLD AUTO: 10.4 FL (ref 9.4–12.3)
POTASSIUM SERPL-SCNC: 3.8 MMOL/L (ref 3.5–5.1)
PROT SERPL-MCNC: 6 G/DL (ref 6.3–8.2)
RBC # BLD AUTO: 3.72 M/UL (ref 4.23–5.6)
RBC MORPH BLD: ABNORMAL
SODIUM SERPL-SCNC: 140 MMOL/L (ref 136–145)
WBC # BLD AUTO: 0.1 K/UL (ref 4.3–11.1)
WBC MORPH BLD: ABNORMAL

## 2022-04-17 PROCEDURE — 74011250636 HC RX REV CODE- 250/636: Performed by: INTERNAL MEDICINE

## 2022-04-17 PROCEDURE — 74011250636 HC RX REV CODE- 250/636: Performed by: NURSE PRACTITIONER

## 2022-04-17 PROCEDURE — 74011250637 HC RX REV CODE- 250/637: Performed by: INTERNAL MEDICINE

## 2022-04-17 PROCEDURE — 83735 ASSAY OF MAGNESIUM: CPT

## 2022-04-17 PROCEDURE — 74011250637 HC RX REV CODE- 250/637: Performed by: NURSE PRACTITIONER

## 2022-04-17 PROCEDURE — 99232 SBSQ HOSP IP/OBS MODERATE 35: CPT | Performed by: INTERNAL MEDICINE

## 2022-04-17 PROCEDURE — APPSS45 APP SPLIT SHARED TIME 31-45 MINUTES: Performed by: NURSE PRACTITIONER

## 2022-04-17 PROCEDURE — 80053 COMPREHEN METABOLIC PANEL: CPT

## 2022-04-17 PROCEDURE — 74011000250 HC RX REV CODE- 250: Performed by: NURSE PRACTITIONER

## 2022-04-17 PROCEDURE — 36591 DRAW BLOOD OFF VENOUS DEVICE: CPT

## 2022-04-17 PROCEDURE — 85025 COMPLETE CBC W/AUTO DIFF WBC: CPT

## 2022-04-17 PROCEDURE — 74011000250 HC RX REV CODE- 250: Performed by: INTERNAL MEDICINE

## 2022-04-17 PROCEDURE — 65270000015 HC RM PRIVATE ONCOLOGY

## 2022-04-17 RX ORDER — ACETAMINOPHEN 325 MG/1
650 TABLET ORAL
Status: DISCONTINUED | OUTPATIENT
Start: 2022-04-17 | End: 2022-04-24 | Stop reason: HOSPADM

## 2022-04-17 RX ORDER — SODIUM CHLORIDE 9 MG/ML
250 INJECTION, SOLUTION INTRAVENOUS AS NEEDED
Status: DISCONTINUED | OUTPATIENT
Start: 2022-04-17 | End: 2022-04-18

## 2022-04-17 RX ORDER — DIPHENHYDRAMINE HCL 25 MG
25 CAPSULE ORAL
Status: DISCONTINUED | OUTPATIENT
Start: 2022-04-17 | End: 2022-04-24 | Stop reason: HOSPADM

## 2022-04-17 RX ORDER — SODIUM CHLORIDE 9 MG/ML
100 INJECTION, SOLUTION INTRAVENOUS CONTINUOUS
Status: DISPENSED | OUTPATIENT
Start: 2022-04-17 | End: 2022-04-18

## 2022-04-17 RX ADMIN — ACYCLOVIR 400 MG: 800 TABLET ORAL at 17:23

## 2022-04-17 RX ADMIN — AMOXICILLIN AND CLAVULANATE POTASSIUM 1 TABLET: 875; 125 TABLET, FILM COATED ORAL at 22:11

## 2022-04-17 RX ADMIN — PROCHLORPERAZINE EDISYLATE 10 MG: 5 INJECTION INTRAMUSCULAR; INTRAVENOUS at 00:28

## 2022-04-17 RX ADMIN — Medication 500 MG: at 12:00

## 2022-04-17 RX ADMIN — SODIUM CHLORIDE 100 ML/HR: 900 INJECTION, SOLUTION INTRAVENOUS at 22:17

## 2022-04-17 RX ADMIN — AMOXICILLIN AND CLAVULANATE POTASSIUM 1 TABLET: 875; 125 TABLET, FILM COATED ORAL at 08:03

## 2022-04-17 RX ADMIN — ATENOLOL 25 MG: 50 TABLET ORAL at 08:04

## 2022-04-17 RX ADMIN — CETIRIZINE HYDROCHLORIDE 5 MG: 5 TABLET ORAL at 08:03

## 2022-04-17 RX ADMIN — ONDANSETRON 8 MG: 2 INJECTION INTRAMUSCULAR; INTRAVENOUS at 13:09

## 2022-04-17 RX ADMIN — LEVOFLOXACIN 500 MG: 500 TABLET, FILM COATED ORAL at 08:03

## 2022-04-17 RX ADMIN — DRONABINOL 5 MG: 2.5 CAPSULE ORAL at 17:23

## 2022-04-17 RX ADMIN — LOPERAMIDE HYDROCHLORIDE 2 MG: 2 CAPSULE ORAL at 12:41

## 2022-04-17 RX ADMIN — DEXTROSE MONOHYDRATE AND SODIUM CHLORIDE 100 ML/HR: 5; .45 INJECTION, SOLUTION INTRAVENOUS at 13:09

## 2022-04-17 RX ADMIN — ACYCLOVIR 400 MG: 800 TABLET ORAL at 08:04

## 2022-04-17 RX ADMIN — Medication 500 MG: at 17:23

## 2022-04-17 RX ADMIN — PANTOPRAZOLE SODIUM 40 MG: 40 TABLET, DELAYED RELEASE ORAL at 08:04

## 2022-04-17 RX ADMIN — POTASSIUM CHLORIDE 20 MEQ: 20 TABLET, EXTENDED RELEASE ORAL at 17:23

## 2022-04-17 RX ADMIN — DRONABINOL 5 MG: 2.5 CAPSULE ORAL at 08:03

## 2022-04-17 RX ADMIN — LOPERAMIDE HYDROCHLORIDE 2 MG: 2 CAPSULE ORAL at 17:23

## 2022-04-17 RX ADMIN — LISINOPRIL 20 MG: 20 TABLET ORAL at 08:04

## 2022-04-17 RX ADMIN — Medication 500 MG: at 08:03

## 2022-04-17 RX ADMIN — DIPHENOXYLATE HYDROCHLORIDE AND ATROPINE SULFATE 1 TABLET: 2.5; .025 TABLET ORAL at 17:23

## 2022-04-17 RX ADMIN — DIPHENOXYLATE HYDROCHLORIDE AND ATROPINE SULFATE 1 TABLET: 2.5; .025 TABLET ORAL at 08:04

## 2022-04-17 RX ADMIN — FLUCONAZOLE 200 MG: 100 TABLET ORAL at 08:03

## 2022-04-17 RX ADMIN — PROCHLORPERAZINE EDISYLATE 10 MG: 5 INJECTION INTRAMUSCULAR; INTRAVENOUS at 13:08

## 2022-04-17 RX ADMIN — ONDANSETRON 8 MG: 2 INJECTION INTRAMUSCULAR; INTRAVENOUS at 22:11

## 2022-04-17 RX ADMIN — DIPHENHYDRAMINE HCL 5 ML: 12.5 SOLUTION ORAL at 09:37

## 2022-04-17 RX ADMIN — DEXTROSE MONOHYDRATE AND SODIUM CHLORIDE 125 ML/HR: 5; .45 INJECTION, SOLUTION INTRAVENOUS at 04:35

## 2022-04-17 RX ADMIN — HYDROCORTISONE: 1 CREAM TOPICAL at 09:37

## 2022-04-17 RX ADMIN — FILGRASTIM-AAFI 300 MCG: 300 INJECTION, SOLUTION SUBCUTANEOUS at 08:03

## 2022-04-17 RX ADMIN — POTASSIUM CHLORIDE 20 MEQ: 20 TABLET, EXTENDED RELEASE ORAL at 08:04

## 2022-04-17 RX ADMIN — ONDANSETRON 8 MG: 2 INJECTION INTRAMUSCULAR; INTRAVENOUS at 05:21

## 2022-04-17 RX ADMIN — LOPERAMIDE HYDROCHLORIDE 2 MG: 2 CAPSULE ORAL at 08:04

## 2022-04-17 NOTE — PROGRESS NOTES
Diagnosis: DLBCL  Transplant Date: 4/11/22  Chemo regimen used: BEAM  Day: (+/-) +6. BMT assessments and toxicities completed. Mouth Care completed 0 times this shift due to nausea, no vomiting  WBC/ANC= 0.1/0. Prophylactic medications started D+1 (4/12/22- Augmentin, LVQ, ACV, diflucan). G-CSF to start on D+6 4/17/22. Toxicities greater than 2 :none. Patient seen by MD/NP daily until engraftment or while IP. Labs not resulted that need follow-up: none.   Additional Shift Information:  C/o nausea -compazine given, diarrhea x2 - lomotil given

## 2022-04-17 NOTE — PROGRESS NOTES
Diagnosis: DLBCL  Transplant Date: 4/11/22  Chemo regimen used: BEAM  Day: (+/-) +6. BMT assessments and toxicities completed. Mouth Care completed 3 times this shift. WBC/ANC= 0.1/0. Prophylactic medications started D+1 (4/12/22- Augmentin, LVQ, ACV, diflucan). G-CSF started on D+6 4/17/22. Toxicities greater than 2 :none. Patient seen by MD/NP daily until engraftment or while IP. Labs not resulted that need follow-up: none. Additional Shift Information:  Pt had 2 loose stools, PRN lomotil and imodium given, emesis x 1, PRN compazine given, appetite still decreased, BP hypertensive this AM- BP meds given and now more  Normotensive    Intake/Output  04/17 0701 - 04/17 1900  In: 2003 [P.O.:700; I.V.:1303]  Out: 700 [Urine:300]        04/17/22 0800   Toxicity Grading Criteria   Hemorrhage (GI) 0   Infection 0   Fever in the Absence of Neutropenia (ANC greater than or equal to 1.0 0   Febrile Neutropenia (ANC less than 1.0) 0   Rigors/Chills 0   Mucositis Oral 1   Esophagitis 0   Dry Mouth 0   Dysphagia 0   Nausea 3   Vomiting 1   Diarrhea 2   Constipation 0   Bilirubin increased 0   VOD- Weight Gain 0   ALT (SGPT) 0   Alkaline Phosphatase increased 0   AST (SGOT) 0   GGT increased Not able to assess   Weight Loss 1   Weight Gain 0   Dehydration 1   Urine Protein- Proteinuria Not able to assess   Urine Blood - Hematuria 0   Creatinine increased 0   Bladder Spasms 0   Cystitis non-infective 0   Urinary Frequency 0   Urinary Retention 0   Cardiac Arrhythmia Not able to assess   Conduction Abnormality/AV Heart Block   (SAVITA)   Palpitations 0   Prolonged QTc    (SAVITA)   Supraventricular and Lorena Arrhythmia   (SAVITA)   Vasovagal Episode 0   Ventricular Arrhythmia   (SAVITA)   Hypotension 0   Hypertension Baseline; History of   Neuropathy / Sensory 0   Headache 0   Vision 0   Dizziness 0   Syncope 0   Anxiety 0   Agitation 0   Depression 0   Fatigue 1   Insomnia 0   Pain 0   Dyspnea 0   Cough 0   Hiccups 0   Alopecia 0 Nail Changes 0   Allergic Reaction 0   Rash 0   Urticaria (Hives, Welts, Wheals) 0

## 2022-04-17 NOTE — PROGRESS NOTES
Problem: Falls - Risk of  Goal: *Absence of Falls  Description: Document Aneta Locket Fall Risk and appropriate interventions in the flowsheet.   Outcome: Progressing Towards Goal  Note: Fall Risk Interventions:            Medication Interventions: Evaluate medications/consider consulting pharmacy,Teach patient to arise slowly         History of Falls Interventions: Consult care management for discharge planning,Evaluate medications/consider consulting pharmacy

## 2022-04-17 NOTE — PROGRESS NOTES
We pray for our patient and their families: \"Trust in the LORD with all your heart and lean not on your own  understanding; In all your ways submit to HIM, and HE [id number] make your paths straight. \"          May God's favor and peace be with you this day.             Arnie Alcocer

## 2022-04-17 NOTE — PROGRESS NOTES
[facility]        Inpatient Hematology / Oncology Progress Note    Reason for Admission:  Diffuse large B cell lymphoma ([facility] [C83.30]  Admission for antineoplastic chemotherapy [Z51.11]  Bone marrow transplant status ([facility] [Z94.81]    24 Hour Events:  Afebrile, hypertensive at times  Day +6 BEAM/ASCT  Daily G-CSF  Nausea and PO intake stable  Diarrhea manageable    Transfusions: None  Replacements: None    ROS:  Constitutional: Negative for fever, chills. CV: Negative for chest pain, palpitations, edema. Respiratory: Negative for dyspnea, cough, wheezing. GI: +nausea, vomiting, diarrhea. Negative for abdominal pain. 10 point review of systems is otherwise negative with the exception of the elements mentioned above in the HPI.        No Known Allergies  Past Medical History:   Diagnosis Date    Cancer ([facility]     Hypertension     Valvular heart disease      Past Surgical History:   Procedure Laterality Date    HX BACK SURGERY      26 years ago    HX VASCULAR ACCESS      IR BX BONE MARROW DIAGNOSTIC  2/24/2022    IR CHOLECYSTOSTOMY PERCUTANEOUS      IR INSERT NON TUNL CVC OVER 5 YRS  3/14/2022    IR INTRATHECAL CHEMO INJECTION CNS  12/8/2021    IR INTRATHECAL CHEMO INJECTION CNS  12/28/2021    IR INTRATHECAL CHEMO INJECTION CNS  1/28/2022    IR SPINAL PUNCTURE CSF TREAT / DRAIN  2/4/2022     Family History   Problem Relation Age of Onset    Diabetes Mother     Hypertension Mother     Diabetes Father     Hypertension Father     Hypertension Brother      Social History     Socioeconomic History    Marital status:      Spouse name: Not on file    Number of children: Not on file    Years of education: Not on file    Highest education level: Not on file   Occupational History    Not on file   Tobacco Use    Smoking status: Never Smoker    Smokeless tobacco: Never Used   Vaping Use    Vaping Use: Never used   Substance and Sexual Activity    Alcohol use: Not Currently    Drug use: Not Currently    Sexual activity: Not on file   Other Topics Concern     Service Not Asked    Blood Transfusions Not Asked    Caffeine Concern Not Asked    Occupational Exposure Not Asked    Hobby Hazards Not Asked    Sleep Concern Not Asked    Stress Concern Not Asked    Weight Concern Not Asked    Special Diet Not Asked    Back Care Not Asked    Exercise Not Asked    Bike Helmet Not Asked   [address] Not Asked    Self-Exams Not Asked   Social History Narrative    Not on file     Social Determinants of Health     Financial Resource Strain:     Difficulty of Paying Living Expenses: Not on file   Food Insecurity:     Worried About Running Out of Food in the Last Year: Not on file    Sayra of Food in the Last Year: Not on file   Transportation Needs:     Lack of Transportation (Medical): Not on file    Lack of Transportation (Non-Medical):  Not on file   Physical Activity:     Days of Exercise per Week: Not on file    Minutes of Exercise per Session: Not on file   Stress:     Feeling of Stress : Not on file   Social Connections:     Frequency of Communication with Friends and Family: Not on file    Frequency of Social Gatherings with Friends and Family: Not on file    Attends [employer] Services: Not on file    Active Member of [address] [place] [address] Bt[employer] or Organizations: Not on file    Attends Club or Organization Meetings: Not on file    Marital Status: Not on file   Intimate Partner Violence:     Fear of Current or Ex-Partner: Not on file    Emotionally Abused: Not on file    Physically Abused: Not on file    Sexually Abused: Not on file   Housing Stability:     Unable to Pay for Housing in the Last Year: Not on file    Number of [place] in the Last Year: Not on file    Unstable Housing in the Last Year: Not on file     Current Facility-Administered Medications   Medication Dose Route Frequency Provider Last Rate Last Admin    acetaminophen (TYLENOL) tablet 650 mg  650 mg Oral Q4H PRN Neena Villanueva MD        diphenhydrAMINE (BENADRYL) capsule 25 mg  25 mg Oral Q6H PRN Neena Villanueva MD        hydrocortisone (CORTAID) 1 % cream   Topical QID PRN Noy Paz, FNP   Given at 04/17/22 7914    diphenoxylate-atropine (LOMOTIL) tablet 1 Tablet  1 Tablet Oral QID PRN Neena Villanueva MD   1 Tablet at 04/17/22 0804    magic mouthwash (MARCELLUS) oral suspension 5 mL  5 mL Oral Q4H PRN Neena Villanueva MD   5 mL at 04/17/22 0937    ondansetron (ZOFRAN) injection 8 mg  8 mg IntraVENous Q8H Noy Paz, FNP   8 mg at 04/17/22 7783    dronabinoL (MARINOL) capsule 5 mg  5 mg Oral BID Noy Paz FNP   5 mg at 04/17/22 0803    pantoprazole (PROTONIX) tablet 40 mg  40 mg Oral ACB Noy Paz FNP   40 mg at 04/17/22 0804    loperamide (IMODIUM) capsule 2 mg  2 mg Oral Q4H PRN Ramona Ji, NP   2 mg at 04/17/22 0804    LORazepam (ATIVAN) injection 1 mg  1 mg IntraVENous Q6H PRN Ramona Ji, NP   1 mg at 04/14/22 1249    prochlorperazine (COMPAZINE) with saline injection 10 mg  10 mg IntraVENous Q6H PRN Ramona Ji, NP   10 mg at 04/17/22 0028    dextrose 5 % - 0.45% NaCl infusion  100 mL/hr IntraVENous CONTINUOUS Noy Paz  mL/hr at 04/17/22 0803 100 mL/hr at 04/17/22 0803    amoxicillin-clavulanate (AUGMENTIN) 875-125 mg per tablet 1 Tablet  1 Tablet Oral Q12H Ramona Dienes, NP   1 Tablet at 04/17/22 0803    levoFLOXacin (LEVAQUIN) tablet 500 mg  500 mg Oral Q24H Ramona Margy, NP   500 mg at 04/17/22 0803    acyclovir (ZOVIRAX) tablet 400 mg  400 mg Oral BID Ramona Dienes, NP   400 mg at 04/17/22 0804    fluconazole (DIFLUCAN) tablet 200 mg  200 mg Oral DAILY Ramona Ji NP   200 mg at 04/17/22 0803    filgrastim-aafi (NIVESTYM) injection syringe 300 mcg  300 mcg SubCUTAneous Q24H Neena Villanueva MD   300 mcg at 04/17/22 0803    cloNIDine HCL (CATAPRES) tablet 0.1 mg  0.1 mg Oral Q6H PRN Ramona Ji NP   0.1 mg at 04/10/22 1200  potassium chloride (K-DUR, KLOR-CON M20) SR tablet 20 mEq  20 mEq Oral BID Ann-Marie Guerra MD   20 mEq at 22 0804    lisinopriL (PRINIVIL, ZESTRIL) tablet 20 mg  20 mg Oral DAILY Chalo Martini NP   20 mg at 22 08    calcium carbonate (OS-DARI) tablet 500 mg [elemental]  500 mg Oral TID WITH MEALS Chalo Martini NP   500 mg at 22 0803    HYDROcodone-acetaminophen ([facility]) 5-325 mg per tablet 1 Tablet  1 Tablet Oral Q6H PRN Chalo Martini NP        morphine injection 2 mg  2 mg IntraVENous Q4H PRN Chalo Martini NP        senna-docusate (PERICOLACE) 8.6-50 mg per tablet 1 Tablet  1 Tablet Oral DAILY PRN Ann-Marie Guerra MD   1 Tablet at 22    polyethylene glycol (MIRALAX) packet 17 g  17 g Oral DAILY PRN Ann-Marie Guerra MD        atenoloL (TENORMIN) tablet 25 mg  25 mg Oral DAILY Fred Krueger, FNP   25 mg at 22 0804    cetirizine (ZYRTEC) tablet 5 mg  5 mg Oral DAILY Fred Krueger, FNP   5 mg at 22 0803    ondansetron (ZOFRAN ODT) tablet 8 mg  8 mg Oral Q8H PRN Fred Krueger, FNP   8 mg at 22 7086    [Held by provider] enoxaparin (LOVENOX) injection 40 mg  40 mg SubCUTAneous Q24H Fred Krueger, FNP        heparin (porcine) pf 300 Units  300 Units InterCATHeter PRN Ann-Marie Guerra MD        central line flush (saline) syringe 10 mL  10 mL InterCATHeter PRN Ann-Marie Guerra MD   10 mL at 22 0423       OBJECTIVE:  Patient Vitals for the past 8 hrs:   BP Temp Pulse Resp SpO2   22 0800 (!) 160/77 98.7 °F (37.1 °C) 96 18 100 %   22 0412 (!) 148/77 98.9 °F (37.2 °C) 89 16 97 %     Temp (24hrs), Av.6 °F (37 °C), Min:98 °F (36.7 °C), Max:98.9 °F (37.2 °C)    701 - 1900  In: 633 [P.O.:220; I.V.:499]  Out: 200     Physical Exam:  Constitutional: Well developed, well nourished male in no acute distress, sitting comfortably in the hospital bed. HEENT: +L eye patch. Normocephalic and atraumatic.  Oropharynx is clear, mucous membranes are moist. Extraocular muscles are intact. Sclerae anicteric. Neck supple without JVD. No thyromegaly present. Skin Erythematous, macular rash to BL inner thigh. Warm and dry. No bruising and no rash noted. No erythema. No pallor. Respiratory Lungs are clear to auscultation bilaterally without wheezes, rales or rhonchi, normal air exchange without accessory muscle use. CVS Normal rate, regular rhythm and normal S1 and S2. +murmur   Abdomen Soft, nontender and nondistended, normoactive bowel sounds. No palpable mass. No hepatosplenomegaly. Neuro Grossly nonfocal with no obvious sensory or motor deficits. MSK Normal range of motion in general.  No edema and no tenderness. Psych Appropriate mood and affect. Labs:    Recent Results (from the past 24 hour(s))   METABOLIC PANEL, COMPREHENSIVE    Collection Time: 04/17/22  4:05 AM   Result Value Ref Range    Sodium 140 136 - 145 mmol/L    Potassium 3.8 3.5 - 5.1 mmol/L    Chloride 106 98 - 107 mmol/L    CO2 30 21 - 32 mmol/L    Anion gap 4 (L) 7 - 16 mmol/L    Glucose 106 (H) 65 - 100 mg/dL    BUN 9 6 - 23 MG/DL    Creatinine 0.60 (L) 0.8 - 1.5 MG/DL    GFR est AA >60 >60 ml/min/1.73m2    GFR est non-AA >60 >60 ml/min/1.73m2    Calcium 9.0 8.3 - 10.4 MG/DL    Bilirubin, total 0.4 0.2 - 1.1 MG/DL    ALT (SGPT) 21 12 - 65 U/L    AST (SGOT) 11 (L) 15 - 37 U/L    Alk.  phosphatase 91 50 - 136 U/L    Protein, total 6.0 (L) 6.3 - 8.2 g/dL    Albumin 3.1 (L) 3.5 - 5.0 g/dL    Globulin 2.9 2.3 - 3.5 g/dL    A-G Ratio 1.1 (L) 1.2 - 3.5     MAGNESIUM    Collection Time: 04/17/22  4:05 AM   Result Value Ref Range    Magnesium 2.3 1.8 - 2.4 mg/dL   CBC WITH AUTOMATED DIFF    Collection Time: 04/17/22  4:05 AM   Result Value Ref Range    WBC 0.1 (LL) 4.3 - 11.1 K/uL    RBC 3.72 (L) 4.23 - 5.6 M/uL    HGB 11.2 (L) 13.6 - 17.2 g/dL    HCT 32.2 (L) 41.1 - 50.3 %    MCV 86.6 79.6 - 97.8 FL    MCH 30.1 26.1 - 32.9 PG    MCHC 34.8 31.4 - 35.0 g/dL    RDW 11.4 (L) 11.9 - 14.6 %    PLATELET 14 (LL) 659 - 450 K/uL    MPV 10.4 9.4 - 12.3 FL    ABSOLUTE NRBC 0.00 0.0 - 0.2 K/uL    RBC COMMENTS NORMOCYTIC/NORMOCHROMIC      WBC COMMENTS WBC TOO FEW TO DIFFERENTIATE      PLATELET COMMENTS DECREASED      DF MANUAL         Imaging:  n/a    ASSESSMENT:  Problem List  Date Reviewed: 3/16/2022          Codes Class Noted    Malnutrition of moderate degree ([facility] ICD-10-CM: E44.0  ICD-9-CM: 263.0  4/15/2022        Bone marrow transplant status ([facility] ICD-10-CM: Z94.81  ICD-9-CM: V42.81  4/5/2022        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 Lexus Summers is a 64 y.o. male admitted on 4/5/2022. The primary encounter diagnosis was Diffuse large B-cell lymphoma, unspecified body region [facility]). Diagnoses of Admission for antineoplastic chemotherapy, Bone marrow transplant status ([facility] and Nonrheumatic mitral valve regurgitation were also pertinent to this visit. 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 He is a patient of Dr Chandni Rivera treated for relapsed DLBCL. He was initially treated by Dr Eric Braxton in [place] with R-CHOP 4/21-7/21. However, he was noted to have relapsed disease in L supraclavicular LN. He was then evaluated by Dr Chandni Rivera and has now completed R-ICE x3 with IT MTX x4 with CR. He has been evaluated for ASCT and collected 4.8x10^6/kg CD34+ cells. He is now admitted for BEAM/ASCT. He was seen by Dr Chandni Rivera day prior to admission and rapid COVID and flu testing negative.  He was previously cleared by cardiology to proceed with transplant given mitral valve regurgitation. He is feeling well and ready to begin treatment today. PLAN:    Relapsed DLBCL / ASCT  - Admit for BEAM/ASCT - D-6 today  - Prophylactic antibiotics to begin D+1  - G-CSF to begin D+6  4/10 Day -1 BEAM/ASCT. Stem cells tomorrow. Tolerating treatment well. 4/11 Day 0. Stem cells today. 4/12 Day +1. PPx antibx begin today. 4/17 Day +6. Fungitell pending, aspergillus negative. Counts dropping as expected. Daily G-CSF. Mitral regurgitation  - Evaluated by cardiology, cleared to proceed with ASCT  - Monitor for fluid overload  4/14 No evidence of fluid overload. On gentle hydration d/t decreased intake 2/2 nausea. Con't to monitor for fluid overload. 4/16 Increasing IVF given hypotension  4/17 Hypotension resolved - decrease IVF to 100 ml/hr and monitor fluid status. Hypertension / hypotension  - Continue home meds  4/9 Pt with persistent HTN. Increase lisinopril to 20mg daily. 4/11 BPs improved overall  4/16 Intermittent hypotension with hypertension. Holding antihypertensives. 4/17 Better overnight - decreasing IVF. Constipation / Diarrhea / loose stools  4/8 Continue PRN pericolace/miralax for now  4/9 BM x 1 yesterday  4/12 BM x 3 yesterday, reports loose stools  4/13 650mL diarrhea yesterday. Check for Cdiff. If neg, can utilize antidiarrheals. 4/14 C diff negative - starting imodium PRN  4/17 Loose stools - manageable with antidiarrheals    Nausea / poor appetite  4/11 antiemetics prn  4/14 Nausea managed - reports poor appetite. Will order Ensure. 4/15 On scheduled zofran. Added marinol BID as well as PPI. Some improvement today. 4/17 Stable to improved. Rash  4/16 Hydrocortisone cream    Continue home meds  PPx meds: Acyclovir, Diflucan, Levaquin, Augmentin  Rhea SOPs  VTE prophylaxis - Lovenox (held for plts <50k)  G-CSF started D+6    Goals and plan of care reviewed with the patient. All questions answered to the best of our ability.     Disposition: Anticipate discharge home after engraftment. Expect hospitalization for ~3 weeks.               [facility] H[facility]ology [facility] Oncology  [address] [address]  Office : [phone]  Fax : [phone]

## 2022-04-18 LAB
1,3 BETA GLUCAN SER-MCNC: 38 PG/ML
ABO + RH BLD: NORMAL
ALBUMIN SERPL-MCNC: 3 G/DL (ref 3.5–5)
ALBUMIN/GLOB SERPL: 1.2 {RATIO} (ref 1.2–3.5)
ALP SERPL-CCNC: 95 U/L (ref 50–136)
ALT SERPL-CCNC: 25 U/L (ref 12–65)
ANION GAP SERPL CALC-SCNC: 6 MMOL/L (ref 7–16)
AST SERPL-CCNC: 17 U/L (ref 15–37)
BILIRUB SERPL-MCNC: 0.4 MG/DL (ref 0.2–1.1)
BLOOD GROUP ANTIBODIES SERPL: NORMAL
BUN SERPL-MCNC: 9 MG/DL (ref 6–23)
CALCIUM SERPL-MCNC: 8.5 MG/DL (ref 8.3–10.4)
CHLORIDE SERPL-SCNC: 108 MMOL/L (ref 98–107)
CO2 SERPL-SCNC: 28 MMOL/L (ref 21–32)
CREAT SERPL-MCNC: 0.6 MG/DL (ref 0.8–1.5)
DIFFERENTIAL METHOD BLD: ABNORMAL
ERYTHROCYTE [DISTWIDTH] IN BLOOD BY AUTOMATED COUNT: 11.2 % (ref 11.9–14.6)
GGT SERPL-CCNC: 91 U/L (ref 15–85)
GLOBULIN SER CALC-MCNC: 2.5 G/DL (ref 2.3–3.5)
GLUCOSE SERPL-MCNC: 94 MG/DL (ref 65–100)
HCT VFR BLD AUTO: 31.2 % (ref 41.1–50.3)
HGB BLD-MCNC: 10.9 G/DL (ref 13.6–17.2)
LDH SERPL L TO P-CCNC: 193 U/L (ref 100–190)
MAGNESIUM SERPL-MCNC: 2.1 MG/DL (ref 1.8–2.4)
MCH RBC QN AUTO: 30.1 PG (ref 26.1–32.9)
MCHC RBC AUTO-ENTMCNC: 34.9 G/DL (ref 31.4–35)
MCV RBC AUTO: 86.2 FL (ref 79.6–97.8)
NRBC # BLD: 0 K/UL (ref 0–0.2)
PHOSPHATE SERPL-MCNC: 2.5 MG/DL (ref 2.5–4.5)
PLATELET # BLD AUTO: 6 K/UL (ref 150–450)
PLATELET COMMENTS,PCOM: ABNORMAL
PMV BLD AUTO: 9.5 FL (ref 9.4–12.3)
POTASSIUM SERPL-SCNC: 3.7 MMOL/L (ref 3.5–5.1)
PROT SERPL-MCNC: 5.5 G/DL (ref 6.3–8.2)
RBC # BLD AUTO: 3.62 M/UL (ref 4.23–5.6)
RBC MORPH BLD: ABNORMAL
SODIUM SERPL-SCNC: 142 MMOL/L (ref 136–145)
SPECIMEN EXP DATE BLD: NORMAL
WBC # BLD AUTO: 0.1 K/UL (ref 4.3–11.1)
WBC MORPH BLD: ABNORMAL

## 2022-04-18 PROCEDURE — 74011250637 HC RX REV CODE- 250/637: Performed by: NURSE PRACTITIONER

## 2022-04-18 PROCEDURE — 74011250636 HC RX REV CODE- 250/636: Performed by: INTERNAL MEDICINE

## 2022-04-18 PROCEDURE — APPSS60 APP SPLIT SHARED TIME 46-60 MINUTES: Performed by: NURSE PRACTITIONER

## 2022-04-18 PROCEDURE — 99232 SBSQ HOSP IP/OBS MODERATE 35: CPT | Performed by: INTERNAL MEDICINE

## 2022-04-18 PROCEDURE — 74011000250 HC RX REV CODE- 250: Performed by: INTERNAL MEDICINE

## 2022-04-18 PROCEDURE — 74011250636 HC RX REV CODE- 250/636: Performed by: NURSE PRACTITIONER

## 2022-04-18 PROCEDURE — 83735 ASSAY OF MAGNESIUM: CPT

## 2022-04-18 PROCEDURE — 65270000015 HC RM PRIVATE ONCOLOGY

## 2022-04-18 PROCEDURE — 83615 LACTATE (LD) (LDH) ENZYME: CPT

## 2022-04-18 PROCEDURE — 87449 NOS EACH ORGANISM AG IA: CPT

## 2022-04-18 PROCEDURE — 3E0336Z INTRODUCTION OF NUTRITIONAL SUBSTANCE INTO PERIPHERAL VEIN, PERCUTANEOUS APPROACH: ICD-10-PCS | Performed by: INTERNAL MEDICINE

## 2022-04-18 PROCEDURE — 80053 COMPREHEN METABOLIC PANEL: CPT

## 2022-04-18 PROCEDURE — 86644 CMV ANTIBODY: CPT

## 2022-04-18 PROCEDURE — 74011250637 HC RX REV CODE- 250/637: Performed by: INTERNAL MEDICINE

## 2022-04-18 PROCEDURE — 30233R1 TRANSFUSION OF NONAUTOLOGOUS PLATELETS INTO PERIPHERAL VEIN, PERCUTANEOUS APPROACH: ICD-10-PCS | Performed by: INTERNAL MEDICINE

## 2022-04-18 PROCEDURE — 36591 DRAW BLOOD OFF VENOUS DEVICE: CPT

## 2022-04-18 PROCEDURE — 86900 BLOOD TYPING SEROLOGIC ABO: CPT

## 2022-04-18 PROCEDURE — 82977 ASSAY OF GGT: CPT

## 2022-04-18 PROCEDURE — P9037 PLATE PHERES LEUKOREDU IRRAD: HCPCS

## 2022-04-18 PROCEDURE — 87305 ASPERGILLUS AG IA: CPT

## 2022-04-18 PROCEDURE — 84100 ASSAY OF PHOSPHORUS: CPT

## 2022-04-18 PROCEDURE — 36430 TRANSFUSION BLD/BLD COMPNT: CPT

## 2022-04-18 PROCEDURE — 85025 COMPLETE CBC W/AUTO DIFF WBC: CPT

## 2022-04-18 RX ORDER — SODIUM CHLORIDE 9 MG/ML
250 INJECTION, SOLUTION INTRAVENOUS AS NEEDED
Status: DISCONTINUED | OUTPATIENT
Start: 2022-04-18 | End: 2022-04-24 | Stop reason: HOSPADM

## 2022-04-18 RX ORDER — CHOLESTYRAMINE 4 G/4.8G
4 POWDER, FOR SUSPENSION ORAL DAILY
Status: DISCONTINUED | OUTPATIENT
Start: 2022-04-18 | End: 2022-04-18

## 2022-04-18 RX ORDER — OLANZAPINE 5 MG/1
5 TABLET, ORALLY DISINTEGRATING ORAL
Status: DISCONTINUED | OUTPATIENT
Start: 2022-04-18 | End: 2022-04-24 | Stop reason: HOSPADM

## 2022-04-18 RX ORDER — CHOLESTYRAMINE 4 G/4.8G
4 POWDER, FOR SUSPENSION ORAL
Status: DISCONTINUED | OUTPATIENT
Start: 2022-04-18 | End: 2022-04-18

## 2022-04-18 RX ADMIN — CETIRIZINE HYDROCHLORIDE 5 MG: 5 TABLET ORAL at 08:26

## 2022-04-18 RX ADMIN — LORAZEPAM 1 MG: 2 INJECTION INTRAMUSCULAR; INTRAVENOUS at 04:14

## 2022-04-18 RX ADMIN — SODIUM CHLORIDE 100 ML/HR: 900 INJECTION, SOLUTION INTRAVENOUS at 08:26

## 2022-04-18 RX ADMIN — DIPHENHYDRAMINE HYDROCHLORIDE 25 MG: 25 CAPSULE ORAL at 08:25

## 2022-04-18 RX ADMIN — PANTOPRAZOLE SODIUM 40 MG: 40 TABLET, DELAYED RELEASE ORAL at 08:25

## 2022-04-18 RX ADMIN — OLANZAPINE 5 MG: 5 TABLET, ORALLY DISINTEGRATING ORAL at 22:42

## 2022-04-18 RX ADMIN — POTASSIUM CHLORIDE 20 MEQ: 20 TABLET, EXTENDED RELEASE ORAL at 08:26

## 2022-04-18 RX ADMIN — ACYCLOVIR 400 MG: 800 TABLET ORAL at 08:26

## 2022-04-18 RX ADMIN — FLUCONAZOLE 200 MG: 100 TABLET ORAL at 08:25

## 2022-04-18 RX ADMIN — Medication 500 MG: at 08:26

## 2022-04-18 RX ADMIN — FILGRASTIM-AAFI 300 MCG: 300 INJECTION, SOLUTION SUBCUTANEOUS at 08:25

## 2022-04-18 RX ADMIN — AMOXICILLIN AND CLAVULANATE POTASSIUM 1 TABLET: 875; 125 TABLET, FILM COATED ORAL at 08:26

## 2022-04-18 RX ADMIN — LORAZEPAM 1 MG: 2 INJECTION INTRAMUSCULAR; INTRAVENOUS at 22:42

## 2022-04-18 RX ADMIN — ATENOLOL 25 MG: 50 TABLET ORAL at 08:25

## 2022-04-18 RX ADMIN — MAGNESIUM SULFATE HEPTAHYDRATE: 500 INJECTION, SOLUTION INTRAMUSCULAR; INTRAVENOUS at 17:35

## 2022-04-18 RX ADMIN — ACETAMINOPHEN 650 MG: 325 TABLET ORAL at 08:25

## 2022-04-18 RX ADMIN — DRONABINOL 5 MG: 2.5 CAPSULE ORAL at 08:26

## 2022-04-18 RX ADMIN — ONDANSETRON 8 MG: 2 INJECTION INTRAMUSCULAR; INTRAVENOUS at 05:55

## 2022-04-18 RX ADMIN — AMOXICILLIN AND CLAVULANATE POTASSIUM 1 TABLET: 875; 125 TABLET, FILM COATED ORAL at 22:42

## 2022-04-18 RX ADMIN — LISINOPRIL 20 MG: 20 TABLET ORAL at 08:26

## 2022-04-18 RX ADMIN — LOPERAMIDE HYDROCHLORIDE 2 MG: 2 CAPSULE ORAL at 22:42

## 2022-04-18 RX ADMIN — LEVOFLOXACIN 500 MG: 500 TABLET, FILM COATED ORAL at 08:26

## 2022-04-18 NOTE — CONSULTS
Comprehensive Nutrition Assessment    Type and Reason for Visit: Reassess,Consult  TPN Management (Oncology)    Nutrition Recommendations/Plan:   Parenteral Nutrition:  Total parenteral nutrition  to begin at 1800  Initiate: Dex 5%, 4.25% AA 2 L (85ml/hr)   Hold 250 ml 20% lipids pending am labs  To provide: 680 kcal/d (35% of needs), 85 grams of protein/d (91% of needs), 100 grams of CHO/d and 2000 ml of total volume/d. Lytes/L:   Sodium 150 meq (150 meq NaCl), Potassium 30 meq (30 meq KPO4), 8 meq Mg, 4.5 meq Calcium  Other additives: MTE, MVI MWF due to national shortages, 100 mg Thiamine (day 1/7 due to refeeding risk)  IVF:  Discontinue at 1800  Nutritional Supplement Therapy:   Implement electrolyte replacement per nutrition support protocols  Replacement indicated:  None  Labs:   CMP daily  Mg daily  Phos MWF    Triglyceride tomorrow  POC Glucoses/SSI Not indicated  Meals and Snacks:  Change current diet. Initiate GI soft modification  Nutrition Supplement Therapy:   Medical food supplement therapy:  Change Ensure High Protein three times per day (this provides 160 kcal and 16 grams protein per bottle)     Malnutrition Assessment:  Malnutrition Status: Moderate malnutrition  Context: Acute illness  Findings of clinical characteristics of malnutrition:   Energy Intake:  Mild decrease in energy intake (specify) (less than 50% needs for ~4 days)  Weight Loss:  7 - Greater than 2% over 1 week (14# (7.5%))     Body Fat Loss:  1 - Mild body fat loss, Fat overlying ribs,Triceps   Muscle Mass Loss:  1 - Mild muscle mass loss, Calf,Clavicles (pectoralis & deltoids),Scapula (trapezius),Temples (temporalis)  Fluid Accumulation:  No significant fluid accumulation,     Strength:  Not performed       Nutrition Assessment:   Nutrition History: Patient reports eating well prior to admission. He reports UBW ~240# and that he eats 3 meals per day at baseline.  He reports weight loss ~1.5 years ago prior to cancer diagnosis but states that he has been able to maintain at current weight for at least a year. Nutrition Background: Patient with PMH significant for DLBCL s/p initial treatment with R-CHOP 2021 now replapsed and most recently treated with R-ICE x3 + IT MTX x4, HTN, vascular heart disease. He was admitted for chemo + ASCT. Nutrition Interval:  Patient seen and discussed with Horacio Kim NP, and Dr. Lucy Gomez. Patient has continued with very poor PO secondary to nausea, vomiting, poor appetite. Additionally frequent watery/oily BMs. Patient states that fruit cups, ice cream, and occasional Ensure \"stays down. \" Observed breakfast tray with bites consumed. He also reports that flavors he previously tolerated are now too strong. States the weekend was rough with nausea and vomiting. Endorses water/oily stools. Discussed IV nutrition and changing diet and supplement to limit fiber and fat. He voiced understanding. He asks if this will limit his ice cream. Discussed that we can continue ice cream for now, but may have to limit if BMs do not improve. Moderate refeeding risk with very poor PO x8 days. Abdominal Status (last documented): Diarrhea,Nausea abdomen with Active  bowel sounds. Last BM 04/18/22.  Stool output: 800-1250 ml plus 3-4 additional occurrences over last 3 days   Pertinent Medications: Zovirax, Augmentin, Os-mason, Lomotil, Imodium, Diflucan, MMW, Marinol, Zofran (scheduled Q8 hrs), Compazine (PRN ans last utilized 4/17), Protonix, K-DUR  IVF: NS @ 100 ml/hr  Pertinent Labs:   Lab Results   Component Value Date/Time    Sodium 142 04/18/2022 04:15 AM    Potassium 3.7 04/18/2022 04:15 AM    Chloride 108 (H) 04/18/2022 04:15 AM    CO2 28 04/18/2022 04:15 AM    Anion gap 6 (L) 04/18/2022 04:15 AM    Glucose 94 04/18/2022 04:15 AM    BUN 9 04/18/2022 04:15 AM    Creatinine 0.60 (L) 04/18/2022 04:15 AM    Calcium 8.5 04/18/2022 04:15 AM    Albumin 3.0 (L) 04/18/2022 04:15 AM    Magnesium 2.1 04/18/2022 04:15 AM    Phosphorus 2.5 04/18/2022 04:15 AM         Nutrition Related Findings:   NFPE as above. 4/18 currently with single IV asscess - port. Current Nutrition Therapies:  ADULT ORAL NUTRITION SUPPLEMENT Breakfast, Lunch, Dinner; Standard High Calorie/High Protein  ADULT DIET Regular    Current Intake:   Average Meal Intake: 1-25% Average Supplement Intake: 26-50%      Anthropometric Measures:  Height: 6' (182.9 cm)  Current Body Wt: 77.2 kg (170 lb 3.1 oz) (4/17), Weight source: Standing scale  BMI: 23.1, Normal weight (BMI 18.5-24. 9)  Admission Body Weight: 185 lb (4/7 standing scale)  Ideal Body Weight (lbs) (Calculated): 178 lbs (81 kg), 96.2 %  Usual Body Wt: 83.9 kg (185 lb), Percent weight change: -7.4        Edema: No data recorded  Estimated Daily Nutrient Needs:  Energy (kcal/day): 7697-7430 (Kcal/kg (25-30), Weight Used: Current (77.7 kg (4/13)))  Protein (g/day):  (1.2-1.3 g/kg) Weight Used: (Current)  Fluid (ml/day):   (1 ml/kcal)    Nutrition Diagnosis:   · Inadequate protein-energy intake related to altered GI function (lack of appetite, N/V) as evidenced by intake 0-25%,diarrhea    · Moderate malnutrition related to  (lack of appetite, nausea) as evidenced by  (malnutrition criteria as above)    Nutrition Interventions:   Food and/or Nutrient Delivery: Modify current diet,Modify oral nutrition supplement,Start parenteral nutrition     Coordination of Nutrition Care: Continue to monitor while inpatient    Goals:   Previous Goal Met: No progress toward goal(s)  Active Goal: Tolerate TPN at goal within 3 days    Nutrition Monitoring and Evaluation:      Food/Nutrient Intake Outcomes: Food and nutrient intake,Supplement intake,Parenteral nutrition intake/tolerance  Physical Signs/Symptoms Outcomes: Biochemical data,GI status,Fluid status or edema,Meal time behavior,Weight    Discharge Planning:     Too soon to determine    Hel[place]Gatito [place], LD on 4/18/2022 at 10:59 AM  Contact: [phone]

## 2022-04-18 NOTE — PROGRESS NOTES
Diagnosis: DLBCL  Transplant Date: 4/11/22  Chemo regimen used: BEAM  Day: (+/-) +7. BMT assessments and toxicities completed. Mouth Care completed 3 times this shift. WBC/ANC= 0.1/0. Prophylactic medications started D+1 (4/12/22- Augmentin, LVQ, ACV, diflucan). G-CSF started on D+6 4/17/22. Toxicities greater than 2 :none. Patient seen by MD/NP daily until engraftment or while IP. Labs not resulted that need follow-up: none. Additional Shift Information:  TPN started    Intake/Output  04/18 0701 - 04/18 1900  In: 6688 [P.O.:553;  I.V.:864]  Out: 850 [Urine:300]        04/18/22 0800   Toxicity Grading Criteria   Hemorrhage (GI) 0   Infection 0   Fever in the Absence of Neutropenia (ANC greater than or equal to 1.0 0   Febrile Neutropenia (ANC less than 1.0) 0   Rigors/Chills 0   Mucositis Oral 0   Esophagitis 0   Dry Mouth 0   Dysphagia 0   Nausea 2   Vomiting 1   Diarrhea 2   Constipation 0   Bilirubin increased 0   VOD- Weight Gain 0   ALT (SGPT) 0   Alkaline Phosphatase increased 0   AST (SGOT) 0   GGT increased 1   Weight Loss 1   Weight Gain 0   Dehydration 0   Urine Protein- Proteinuria Not able to assess   Urine Blood - Hematuria 0   Creatinine increased 0   Bladder Spasms 0   Cystitis non-infective 0   Urinary Frequency 0   Urinary Retention 0   Cardiac Arrhythmia Not able to assess   Conduction Abnormality/AV Heart Block   (SAVITA)   Palpitations 0   Prolonged QTc    (SAVITA)   Supraventricular and Lorena Arrhythmia   (SAVITA)   Vasovagal Episode 0   Ventricular Arrhythmia   (SAVITA)   Hypotension 0   Hypertension History of   Neuropathy / Sensory 0   Headache 0   Vision Baseline   Dizziness 0   Syncope 0   Anxiety 0   Agitation 0   Depression 0   Fatigue 1   Insomnia 0   Pain 0   Dyspnea 0   Cough 0   Hiccups 0   Alopecia 0   Nail Changes 0   Allergic Reaction 0   Rash 0   Urticaria (Hives, Welts, Wheals) 0

## 2022-04-18 NOTE — PROGRESS NOTES
I have discussed with the patient the rationale for blood component transfusion; its benefits in treating or preventing fatigue, organ damage, or death; and its risk which includes mild transfusion reactions, rare risk of blood borne infection, or more serious but rare reactions. I have discussed the alternatives to transfusion, including the risk and consequences of not receiving transfusion. The patient had an opportunity to ask questions and had agreed to proceed with transfusion of blood components.       Redd Cartagena NP  [facility] On[facility]

## 2022-04-18 NOTE — PROGRESS NOTES
[facility]        Inpatient Hematology / Oncology Progress Note    Reason for Admission:  Diffuse large B cell lymphoma ([facility] [C83.30]  Admission for antineoplastic chemotherapy [Z51.11]  Bone marrow transplant status ([facility] [Z94.81]    24 Hour Events:  Afebrile, VSS  Day +7 BEAM/ASCT  On Daily G-CSF  Continues with poor po intake, starting TPN  States he feels better today    Transfusions: 1 unit platelets  Replacements: None    ROS:  Constitutional: Negative for fever, chills. CV: Negative for chest pain, palpitations, edema. Respiratory: Negative for dyspnea, cough, wheezing. GI: +nausea, vomiting, diarrhea. Negative for abdominal pain. 10 point review of systems is otherwise negative with the exception of the elements mentioned above in the HPI.        No Known Allergies  Past Medical History:   Diagnosis Date    Cancer ([facility]     Hypertension     Valvular heart disease      Past Surgical History:   Procedure Laterality Date    HX BACK SURGERY      26 years ago    HX VASCULAR ACCESS      IR BX BONE MARROW DIAGNOSTIC  2/24/2022    IR CHOLECYSTOSTOMY PERCUTANEOUS      IR INSERT NON TUNL CVC OVER 5 YRS  3/14/2022    IR INTRATHECAL CHEMO INJECTION CNS  12/8/2021    IR INTRATHECAL CHEMO INJECTION CNS  12/28/2021    IR INTRATHECAL CHEMO INJECTION CNS  1/28/2022    IR SPINAL PUNCTURE CSF TREAT / DRAIN  2/4/2022     Family History   Problem Relation Age of Onset    Diabetes Mother     Hypertension Mother     Diabetes Father     Hypertension Father     Hypertension Brother      Social History     Socioeconomic History    Marital status:      Spouse name: Not on file    Number of children: Not on file    Years of education: Not on file    Highest education level: Not on file   Occupational History    Not on file   Tobacco Use    Smoking status: Never Smoker    Smokeless tobacco: Never Used   Vaping Use    Vaping Use: Never used   Substance and Sexual Activity  Alcohol use: Not Currently    Drug use: Not Currently    Sexual activity: Not on file   Other Topics Concern     Service Not Asked    Blood Transfusions Not Asked    Caffeine Concern Not Asked    Occupational Exposure Not Asked    Hobby Hazards Not Asked    Sleep Concern Not Asked    Stress Concern Not Asked    Weight Concern Not Asked    Special Diet Not Asked    Back Care Not Asked    Exercise Not Asked    Bike Helmet Not Asked   [address] Not Asked    Self-Exams Not Asked   Social History Narrative    Not on file     Social Determinants of Health     Financial Resource Strain:     Difficulty of Paying Living Expenses: Not on file   Food Insecurity:     Worried About Running Out of Food in the Last Year: Not on file    Sayra of Food in the Last Year: Not on file   Transportation Needs:     Lack of Transportation (Medical): Not on file    Lack of Transportation (Non-Medical):  Not on file   Physical Activity:     Days of Exercise per Week: Not on file    Minutes of Exercise per Session: Not on file   Stress:     Feeling of Stress : Not on file   Social Connections:     Frequency of Communication with Friends and Family: Not on file    Frequency of Social Gatherings with Friends and Family: Not on file    Attends Confucianist Services: Not on file    Active Member of [address] [place] [address] Fluid or Organizations: Not on file    Attends Club or Organization Meetings: Not on file    Marital Status: Not on file   Intimate Partner Violence:     Fear of Current or Ex-Partner: Not on file    Emotionally Abused: Not on file    Physically Abused: Not on file    Sexually Abused: Not on file   Housing Stability:     Unable to Pay for Housing in the Last Year: Not on file    Number of [place] in the Last Year: Not on file    Unstable Housing in the Last Year: Not on file     Current Facility-Administered Medications   Medication Dose Route Frequency Provider Last Rate Last Admin    0.9% sodium chloride infusion 250 mL  250 mL IntraVENous PRN Brenda Valderrama MD        acetaminophen (TYLENOL) tablet 650 mg  650 mg Oral Q4H PRN Brenda Valderrama MD        diphenhydrAMINE (BENADRYL) capsule 25 mg  25 mg Oral Q6H PRN Brenda Valderrama MD        0.9% sodium chloride infusion  100 mL/hr IntraVENous CONTINUOUS ARLEN CobbP 100 mL/hr at 04/17/22 2217 100 mL/hr at 04/17/22 2217    hydrocortisone (CORTAID) 1 % cream   Topical QID PRN Gissel Valle, FNP   Given at 04/17/22 1694    diphenoxylate-atropine (LOMOTIL) tablet 1 Tablet  1 Tablet Oral QID PRN Brenda Valderrama MD   1 Tablet at 04/17/22 1723    magic mouthwash (MARCELLUS) oral suspension 5 mL  5 mL Oral Q4H PRN Brenda Valderrama MD   5 mL at 04/17/22 0937    ondansetron (ZOFRAN) injection 8 mg  8 mg IntraVENous Q8H Gissel Valle, FNP   8 mg at 04/18/22 0555    dronabinoL (MARINOL) capsule 5 mg  5 mg Oral BID Gissel Valle, FNP   5 mg at 04/17/22 1723    pantoprazole (PROTONIX) tablet 40 mg  40 mg Oral ACB Gissel Valle, FNP   40 mg at 04/17/22 0804    loperamide (IMODIUM) capsule 2 mg  2 mg Oral Q4H PRN eGra Thakur, NP   2 mg at 04/17/22 1723    LORazepam (ATIVAN) injection 1 mg  1 mg IntraVENous Q6H PRN Gera Thakur, NP   1 mg at 04/18/22 0414    prochlorperazine (COMPAZINE) with saline injection 10 mg  10 mg IntraVENous Q6H PRN Gera Thakur NP   10 mg at 04/17/22 1308    amoxicillin-clavulanate (AUGMENTIN) 875-125 mg per tablet 1 Tablet  1 Tablet Oral Q12H Gera Thakur, NP   1 Tablet at 04/17/22 2211    levoFLOXacin (LEVAQUIN) tablet 500 mg  500 mg Oral Q24H Gera Thakur NP   500 mg at 04/17/22 0803    acyclovir (ZOVIRAX) tablet 400 mg  400 mg Oral BID Gera Thakur NP   400 mg at 04/17/22 1723    fluconazole (DIFLUCAN) tablet 200 mg  200 mg Oral DAILY Gera Thakur NP   200 mg at 04/17/22 0803    filgrastim-aafi (NIVESTYM) injection syringe 300 mcg  300 mcg SubCUTAneous Q24H Brenda Valderrama MD   300 mcg at 04/17/22 2612    cloNIDine HCL (CATAPRES) tablet 0.1 mg  0.1 mg Oral Q6H PRN Tonya Cortes NP   0.1 mg at 04/10/22 1200    potassium chloride (K-DUR, KLOR-CON M20) SR tablet 20 mEq  20 mEq Oral BID Gail Maldonado MD   20 mEq at 22 1723    lisinopriL (PRINIVIL, ZESTRIL) tablet 20 mg  20 mg Oral DAILY Tonya Cortes NP   20 mg at 22 0804    calcium carbonate (OS-DARI) tablet 500 mg [elemental]  500 mg Oral TID WITH MEALS Tonya Cortes NP   500 mg at 22 1723    HYDROcodone-acetaminophen (NORCO) 5-325 mg per tablet 1 Tablet  1 Tablet Oral Q6H PRN Tonya Cortes NP        morphine injection 2 mg  2 mg IntraVENous Q4H PRN Tonya Cortes NP        senna-docusate (PERICOLACE) 8.6-50 mg per tablet 1 Tablet  1 Tablet Oral DAILY PRN Gail Maldonado MD   1 Tablet at 22 205    polyethylene glycol (MIRALAX) packet 17 g  17 g Oral DAILY PRN Gail Maldonado MD        atenoloL (TENORMIN) tablet 25 mg  25 mg Oral DAILY Linus Negrete, FNP   25 mg at 22 0804    cetirizine (ZYRTEC) tablet 5 mg  5 mg Oral DAILY Linus Negrete, FNP   5 mg at 22 0803    ondansetron (ZOFRAN ODT) tablet 8 mg  8 mg Oral Q8H PRN ARLEN BergP   8 mg at 22 1688    [Held by provider] enoxaparin (LOVENOX) injection 40 mg  40 mg SubCUTAneous Q24H Linus Negrete FNP        heparin (porcine) pf 300 Units  300 Units InterCATHeter PRN Gail Maldonado MD        central line flush (saline) syringe 10 mL  10 mL InterCATHeter PRN Gail Maldonado MD   10 mL at 22 0423       OBJECTIVE:  Patient Vitals for the past 8 hrs:   BP Temp Pulse Resp SpO2   22 0358 133/69 98 °F (36.7 °C) 80 16 100 %     Temp (24hrs), Av.3 °F (36.8 °C), Min:98 °F (36.7 °C), Max:98.8 °F (37.1 °C)    No intake/output data recorded. Physical Exam:  Constitutional: Well developed, well nourished male in no acute distress, sitting comfortably in the hospital bed. HEENT: +L eye patch. Normocephalic and atraumatic.  Oropharynx is clear, mucous membranes are moist. Extraocular muscles are intact. Sclerae anicteric. Neck supple without JVD. No thyromegaly present. Skin Erythematous, macular rash to BL inner thigh. Warm and dry. No bruising and no rash noted. No erythema. No pallor. Respiratory Lungs are clear to auscultation bilaterally without wheezes, rales or rhonchi, normal air exchange without accessory muscle use. CVS Normal rate, regular rhythm and normal S1 and S2. +murmur   Abdomen Soft, nontender and nondistended, normoactive bowel sounds. No palpable mass. No hepatosplenomegaly. Neuro Grossly nonfocal with no obvious sensory or motor deficits. MSK Normal range of motion in general.  No edema and no tenderness. Psych Appropriate mood and affect. Labs:    Recent Results (from the past 24 hour(s))   PLATELETS, ALLOCATE    Collection Time: 04/18/22  4:00 AM   Result Value Ref Range    Unit number [id number]     Blood component type PLPH,LRIR3     Unit division 00     Status of unit ALLOCATED    METABOLIC PANEL, COMPREHENSIVE    Collection Time: 04/18/22  4:15 AM   Result Value Ref Range    Sodium 142 136 - 145 mmol/L    Potassium 3.7 3.5 - 5.1 mmol/L    Chloride 108 (H) 98 - 107 mmol/L    CO2 28 21 - 32 mmol/L    Anion gap 6 (L) 7 - 16 mmol/L    Glucose 94 65 - 100 mg/dL    BUN 9 6 - 23 MG/DL    Creatinine 0.60 (L) 0.8 - 1.5 MG/DL    GFR est AA >60 >60 ml/min/1.73m2    GFR est non-AA >60 >60 ml/min/1.73m2    Calcium 8.5 8.3 - 10.4 MG/DL    Bilirubin, total 0.4 0.2 - 1.1 MG/DL    ALT (SGPT) 25 12 - 65 U/L    AST (SGOT) 17 15 - 37 U/L    Alk.  phosphatase 95 50 - 136 U/L    Protein, total 5.5 (L) 6.3 - 8.2 g/dL    Albumin 3.0 (L) 3.5 - 5.0 g/dL    Globulin 2.5 2.3 - 3.5 g/dL    A-G Ratio 1.2 1.2 - 3.5     MAGNESIUM    Collection Time: 04/18/22  4:15 AM   Result Value Ref Range    Magnesium 2.1 1.8 - 2.4 mg/dL   CBC WITH AUTOMATED DIFF    Collection Time: 04/18/22  4:15 AM   Result Value Ref Range    WBC 0.1 (LL) 4.3 - 11.1 K/uL    RBC 3.62 (L) 4.23 - 5.6 M/uL    HGB 10.9 (L) 13.6 - 17.2 g/dL    HCT 31.2 (L) 41.1 - 50.3 %    MCV 86.2 79.6 - 97.8 FL    MCH 30.1 26.1 - 32.9 PG    MCHC 34.9 31.4 - 35.0 g/dL    RDW 11.2 (L) 11.9 - 14.6 %    PLATELET 6 (LL) 280 - 450 K/uL    MPV 9.5 9.4 - 12.3 FL    ABSOLUTE NRBC 0.00 0.0 - 0.2 K/uL    RBC COMMENTS NORMOCYTIC/NORMOCHROMIC      WBC COMMENTS WBC TOO FEW TO DIFFERENTIATE      PLATELET COMMENTS DECREASED      DF MANUAL     PHOSPHORUS    Collection Time: 04/18/22  4:15 AM   Result Value Ref Range    Phosphorus 2.5 2.5 - 4.5 MG/DL   GGT    Collection Time: 04/18/22  4:15 AM   Result Value Ref Range    GGT 91 (H) 15 - 85 U/L   LD    Collection Time: 04/18/22  4:15 AM   Result Value Ref Range     (H) 100 - 190 U/L   TYPE & SCREEN    Collection Time: 04/18/22  4:15 AM   Result Value Ref Range    Crossmatch Expiration 04/21/2022,2359     ABO/Rh(D) Willetta Lis POSITIVE     Antibody screen NEG        Imaging:  n/a    ASSESSMENT:  Problem List  Date Reviewed: 3/16/2022          Codes Class Noted    Malnutrition of moderate degree ([facility] ICD-10-CM: E44.0  ICD-9-CM: 263.0  4/15/2022        Bone marrow transplant status ([facility] ICD-10-CM: Z94.81  ICD-9-CM: V42.81  4/5/2022        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 Valorie Saleem is a 64 y.o. male admitted on 4/5/2022. The primary encounter diagnosis was Diffuse large B-cell lymphoma, unspecified body region [facility]).  Diagnoses of Admission for antineoplastic chemotherapy, Bone marrow transplant status ([facility] and Nonrheumatic mitral valve regurgitation were also pertinent to this visit. 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 He is a patient of Dr Rehana Ramon treated for relapsed DLBCL. He was initially treated by Dr Kavon Dc in [place] with R-CHOP 4/21-7/21. However, he was noted to have relapsed disease in L supraclavicular LN. He was then evaluated by Dr Rehana Ramon and has now completed R-ICE x3 with IT MTX x4 with CR. He has been evaluated for ASCT and collected 4.8x10^6/kg CD34+ cells. He is now admitted for BEAM/ASCT. He was seen by Dr Rehaan Ramon day prior to admission and rapid COVID and flu testing negative. He was previously cleared by cardiology to proceed with transplant given mitral valve regurgitation. He is feeling well and ready to begin treatment today. PLAN:    Relapsed DLBCL / ASCT  - Admit for BEAM/ASCT - D-6 today  - Prophylactic antibiotics to begin D+1  - G-CSF to begin D+6  4/10 Day -1 BEAM/ASCT. Stem cells tomorrow. Tolerating treatment well. 4/11 Day 0. Stem cells today. 4/12 Day +1. PPx antibx begin today. 4/17 Day +6. Fungitell pending, aspergillus negative. Counts dropping as expected. Daily G-CSF.  4/18 Day +7. Awaiting count recovery, con't Nivestym. Mitral regurgitation  - Evaluated by cardiology, cleared to proceed with ASCT  - Monitor for fluid overload  4/14 No evidence of fluid overload. On gentle hydration d/t decreased intake 2/2 nausea. Con't to monitor for fluid overload. 4/16 Increasing IVF given hypotension  4/17 Hypotension resolved - decrease IVF to 100 ml/hr and monitor fluid status. Hypertension / hypotension  - Continue home meds  4/9 Pt with persistent HTN. Increase lisinopril to 20mg daily. 4/11 BPs improved overall  4/16 Intermittent hypotension with hypertension. Holding antihypertensives. 4/17 Better overnight - decreasing IVF.    4/18 BPs improved    Constipation / Diarrhea / loose stools  4/8 Continue PRN pericolace/miralax for now  4/9 BM x 1 yesterday  4/12 BM x 3 yesterday, reports loose stools  4/13 650mL diarrhea yesterday. Check for Cdiff. If neg, can utilize antidiarrheals. 4/14 C diff negative - starting imodium PRN  4/17 Loose stools - manageable with antidiarrheals    Nausea / poor appetite  4/11 antiemetics prn  4/14 Nausea managed - reports poor appetite. Will order Ensure. 4/15 On scheduled zofran. Added marinol BID as well as PPI. Some improvement today. 4/17 Stable to improved. 4/18 Pt with ongoing poor po intake. RD following. Start TPN. Rash  4/16 Hydrocortisone cream    Pancytopenia secondary to chemotherapy  - Transfuse prn per Rhea SOPs  - G-CSF started D+6  4/18 Transfuse 1 unit platelets    Continue home meds  PPx meds: Acyclovir, Diflucan, Levaquin, Augmentin  Rhea SOPs  AC contraindicated d/t thrombocytopenia  G-CSF started D+6    Goals and plan of care reviewed with the patient. All questions answered to the best of our ability. Disposition:  Anticipate discharge home after engraftment. Expect hospitalization for ~3 weeks.               Dalila Herndon NP   [facility]  [address] [place] [address] Avenue  Office : [phone]  Fax : [phone]

## 2022-04-18 NOTE — PROGRESS NOTES
Diagnosis: DLBCL  Transplant Date: 04/11/22  Day: (+/-) +7  Chemo regimen used: BEAM  Labs not resulted that need follow-up: Aspergillus, Fungitell  BMT assessments and toxicities completed 4/17/22  WBC/ANC= 0.1/0  Prophylactic medications started D+1 (4/12/22- Augmentin, LVQ, ACV, diflucan   G-CSF started on D+6   Toxicities greater than 2 : None    Patient seen by MD/NP daily until engraftment. New orders received: None.   Issues: 3 loose stools overnight

## 2022-04-19 LAB
ALBUMIN SERPL-MCNC: 3 G/DL (ref 3.5–5)
ALBUMIN/GLOB SERPL: 1.2 {RATIO} (ref 1.2–3.5)
ALP SERPL-CCNC: 89 U/L (ref 50–136)
ALT SERPL-CCNC: 21 U/L (ref 12–65)
ANION GAP SERPL CALC-SCNC: 5 MMOL/L (ref 7–16)
AST SERPL-CCNC: 9 U/L (ref 15–37)
BILIRUB SERPL-MCNC: 0.4 MG/DL (ref 0.2–1.1)
BLD PROD TYP BPU: NORMAL
BPU ID: NORMAL
BUN SERPL-MCNC: 12 MG/DL (ref 6–23)
CALCIUM SERPL-MCNC: 8.3 MG/DL (ref 8.3–10.4)
CHLORIDE SERPL-SCNC: 108 MMOL/L (ref 98–107)
CO2 SERPL-SCNC: 29 MMOL/L (ref 21–32)
CREAT SERPL-MCNC: 0.5 MG/DL (ref 0.8–1.5)
DIFFERENTIAL METHOD BLD: ABNORMAL
ERYTHROCYTE [DISTWIDTH] IN BLOOD BY AUTOMATED COUNT: 11.2 % (ref 11.9–14.6)
GLOBULIN SER CALC-MCNC: 2.6 G/DL (ref 2.3–3.5)
GLUCOSE SERPL-MCNC: 99 MG/DL (ref 65–100)
HCT VFR BLD AUTO: 28.7 % (ref 41.1–50.3)
HGB BLD-MCNC: 10.2 G/DL (ref 13.6–17.2)
MAGNESIUM SERPL-MCNC: 2.4 MG/DL (ref 1.8–2.4)
MCH RBC QN AUTO: 30.5 PG (ref 26.1–32.9)
MCHC RBC AUTO-ENTMCNC: 35.5 G/DL (ref 31.4–35)
MCV RBC AUTO: 85.9 FL (ref 79.6–97.8)
NRBC # BLD: 0 K/UL (ref 0–0.2)
PHOSPHATE SERPL-MCNC: 2.7 MG/DL (ref 2.5–4.5)
PLATELET # BLD AUTO: 33 K/UL (ref 150–450)
PLATELET COMMENTS,PCOM: ABNORMAL
PMV BLD AUTO: 11.2 FL (ref 9.4–12.3)
POTASSIUM SERPL-SCNC: 3.6 MMOL/L (ref 3.5–5.1)
PROT SERPL-MCNC: 5.6 G/DL (ref 6.3–8.2)
RBC # BLD AUTO: 3.34 M/UL (ref 4.23–5.6)
RBC MORPH BLD: ABNORMAL
SODIUM SERPL-SCNC: 142 MMOL/L (ref 136–145)
STATUS OF UNIT,%ST: NORMAL
TRIGL SERPL-MCNC: 88 MG/DL (ref 35–150)
UNIT DIVISION, %UDIV: 0
WBC # BLD AUTO: 0.3 K/UL (ref 4.3–11.1)
WBC MORPH BLD: ABNORMAL

## 2022-04-19 PROCEDURE — 84100 ASSAY OF PHOSPHORUS: CPT

## 2022-04-19 PROCEDURE — 80053 COMPREHEN METABOLIC PANEL: CPT

## 2022-04-19 PROCEDURE — 74011250636 HC RX REV CODE- 250/636: Performed by: INTERNAL MEDICINE

## 2022-04-19 PROCEDURE — 36591 DRAW BLOOD OFF VENOUS DEVICE: CPT

## 2022-04-19 PROCEDURE — 99232 SBSQ HOSP IP/OBS MODERATE 35: CPT | Performed by: INTERNAL MEDICINE

## 2022-04-19 PROCEDURE — 65270000015 HC RM PRIVATE ONCOLOGY

## 2022-04-19 PROCEDURE — 74011000250 HC RX REV CODE- 250: Performed by: INTERNAL MEDICINE

## 2022-04-19 PROCEDURE — 74011250637 HC RX REV CODE- 250/637: Performed by: NURSE PRACTITIONER

## 2022-04-19 PROCEDURE — 74011250636 HC RX REV CODE- 250/636: Performed by: NURSE PRACTITIONER

## 2022-04-19 PROCEDURE — 85025 COMPLETE CBC W/AUTO DIFF WBC: CPT

## 2022-04-19 PROCEDURE — 83735 ASSAY OF MAGNESIUM: CPT

## 2022-04-19 PROCEDURE — APPSS45 APP SPLIT SHARED TIME 31-45 MINUTES: Performed by: NURSE PRACTITIONER

## 2022-04-19 PROCEDURE — 84478 ASSAY OF TRIGLYCERIDES: CPT

## 2022-04-19 RX ADMIN — AMOXICILLIN AND CLAVULANATE POTASSIUM 1 TABLET: 875; 125 TABLET, FILM COATED ORAL at 08:31

## 2022-04-19 RX ADMIN — LISINOPRIL 20 MG: 20 TABLET ORAL at 08:31

## 2022-04-19 RX ADMIN — FILGRASTIM-AAFI 300 MCG: 300 INJECTION, SOLUTION SUBCUTANEOUS at 08:36

## 2022-04-19 RX ADMIN — SOYBEAN OIL 250 ML: 20 INJECTION, SOLUTION INTRAVENOUS at 17:52

## 2022-04-19 RX ADMIN — ACYCLOVIR 400 MG: 800 TABLET ORAL at 08:31

## 2022-04-19 RX ADMIN — AMOXICILLIN AND CLAVULANATE POTASSIUM 1 TABLET: 875; 125 TABLET, FILM COATED ORAL at 21:03

## 2022-04-19 RX ADMIN — ATENOLOL 25 MG: 50 TABLET ORAL at 08:31

## 2022-04-19 RX ADMIN — PANTOPRAZOLE SODIUM 40 MG: 40 TABLET, DELAYED RELEASE ORAL at 08:31

## 2022-04-19 RX ADMIN — FLUCONAZOLE 200 MG: 100 TABLET ORAL at 08:30

## 2022-04-19 RX ADMIN — ACYCLOVIR 400 MG: 800 TABLET ORAL at 17:55

## 2022-04-19 RX ADMIN — CETIRIZINE HYDROCHLORIDE 5 MG: 5 TABLET ORAL at 08:30

## 2022-04-19 RX ADMIN — OLANZAPINE 5 MG: 5 TABLET, ORALLY DISINTEGRATING ORAL at 21:03

## 2022-04-19 RX ADMIN — LORAZEPAM 1 MG: 2 INJECTION INTRAMUSCULAR; INTRAVENOUS at 23:37

## 2022-04-19 RX ADMIN — MAGNESIUM SULFATE HEPTAHYDRATE: 500 INJECTION, SOLUTION INTRAMUSCULAR; INTRAVENOUS at 17:52

## 2022-04-19 RX ADMIN — LEVOFLOXACIN 500 MG: 500 TABLET, FILM COATED ORAL at 08:31

## 2022-04-19 NOTE — PROGRESS NOTES
LOS 14d  Chart reviewed for discharge planning,  BMT patient  Awaiting count recovery  Day +8 BEAM/ASCT  TPN started 4/18  Will continue to follow for discharge planning needs  Please consult  if any new issues arise  Discharge plan is undetermined at this time.   Pending count recovery

## 2022-04-19 NOTE — PROGRESS NOTES
Resting in recliner, bedside report given to oncoming RN. Pt voices no complaints, and denies pain. Pt doing frequent mouth care.

## 2022-04-19 NOTE — PROGRESS NOTES
Comprehensive Nutrition Assessment    Type and Reason for Visit: Reassess  TPN Management (Oncology)    Nutrition Recommendations/Plan:   Parenteral Nutrition:  Total parenteral nutrition  to begin at 1800  Initiate: Dex 10% , 4.25% AA 2 L (85ml/hr)   Initiate 250 ml 20% daily  To provide: 1520 kcal/d (78% of needs), 85 grams of protein/d (91% of needs), 200 grams of CHO/d and 2250 ml of total volume/d. Lytes/L:   Sodium 150 meq (150 meq NaCl), Potassium 35 meq (35 meq KPO4), 5 meq Mg, 4.5 meq Calcium  Other additives: MTE, MVI MWF due to national shortages, 100 mg Thiamine (day 2/7 due to refeeding risk)  Nutritional Supplement Therapy:   Active electrolyte replacement per nutrition support protocols  Replacement indicated:  None  Labs:   CMP daily  Mg daily  Phos MWF    Triglyceride tomorrow  POC Glucoses/SSI Not indicated  Meals and Snacks:  Continue current diet. Initiate GI soft modification  Nutrition Supplement Therapy:   Medical food supplement therapy:  Continue Ensure High Protein three times per day (this provides 160 kcal and 16 grams protein per bottle)     Malnutrition Assessment:  Malnutrition Status: Moderate malnutrition  Context: Acute illness  Findings of clinical characteristics of malnutrition:   Energy Intake:  Mild decrease in energy intake (specify) (less than 50% needs for ~4 days)  Weight Loss:  7 - Greater than 2% over 1 week (14# (7.5%))     Body Fat Loss:  1 - Mild body fat loss, Fat overlying ribs,Triceps   Muscle Mass Loss:  1 - Mild muscle mass loss, Calf,Clavicles (pectoralis & deltoids),Scapula (trapezius),Temples (temporalis)  Fluid Accumulation:  No significant fluid accumulation,     Strength:  Not performed       Nutrition Assessment:   Nutrition History: Patient reports eating well prior to admission. He reports UBW ~240# and that he eats 3 meals per day at baseline.  He reports weight loss ~1.5 years ago prior to cancer diagnosis but states that he has been able to maintain at current weight for at least a year. Nutrition Background: Patient with PMH significant for DLBCL s/p initial treatment with R-CHOP 2021 now replapsed and most recently treated with R-ICE x3 + IT MTX x4, HTN, vascular heart disease. He was admitted for chemo + ASCT. Nutrition Interval:  Patient seen and discussed with RN, Timbo Miles. He continues with frequent loose/watery stools. He endorses persistent poor appetite and nausea/vomiting impacting PO. He states he ate fruit cup and Ensure for lunch yesterday and ate ice cream and Ensure last evening. Observed ~50% cereal consumed this am.   Moderate refeeding risk with very poor PO x8 days. Abdominal Status (last documented): Diarrhea,Nausea abdomen with Active  bowel sounds. Last BM 04/18/22. Stool output: 550 ml plus 3 additional occurrences over last 24 hrs - ? improving  Pertinent Medications: Zovirax, Augmentin, Lomotil, Imodium, Diflucan, MMW, Zofran (scheduled Q8 hrs), Compazine (PRN ans last utilized 4/17), Protonix, Zyprexa, Nivetym  Pertinent Labs:   Lab Results   Component Value Date/Time    Sodium 142 04/19/2022 03:24 AM    Potassium 3.6 04/19/2022 03:24 AM    Chloride 108 (H) 04/19/2022 03:24 AM    CO2 29 04/19/2022 03:24 AM    Anion gap 5 (L) 04/19/2022 03:24 AM    Glucose 99 04/19/2022 03:24 AM    BUN 12 04/19/2022 03:24 AM    Creatinine 0.50 (L) 04/19/2022 03:24 AM    Calcium 8.3 04/19/2022 03:24 AM    Albumin 3.0 (L) 04/19/2022 03:24 AM    Magnesium 2.4 04/19/2022 03:24 AM    Phosphorus 2.7 04/19/2022 03:24 AM     Lab Results   Component Value Date/Time    Triglyceride 88 04/19/2022 03:24 AM   Labs remarkable for: Na stable, K declined slightly, Mg upper end normal, TG WNL      Nutrition Related Findings:   NFPE as above. 4/18 currently with single IV asscess - port. Dilute TPN initiated 4/18 without lipids.       Current Nutrition Therapies:  ADULT DIET Regular; GI [place] (GERD/Peptic Ulcer)  ADULT ORAL NUTRITION SUPPLEMENT Breakfast, Audi Negrete; Low Calorie/High Protein    Current Intake:   Average Meal Intake: 1-25% Average Supplement Intake: 51-75%      Anthropometric Measures:  Height: 6' (182.9 cm)  Current Body Wt: 76.8 kg (169 lb 5 oz) (4/18), Weight source: Standing scale  BMI: 23, Normal weight (BMI 18.5-24. 9)  Admission Body Weight: 185 lb (4/7 standing scale)  Ideal Body Weight (lbs) (Calculated): 178 lbs (81 kg), 96.2 %  Usual Body Wt: 83.9 kg (185 lb), Percent weight change: -7.4        Edema: No data recorded  Estimated Daily Nutrient Needs:  Energy (kcal/day): 6700-5763 (Kcal/kg (25-30), Weight Used: Current (77.7 kg (4/13)))  Protein (g/day):  (1.2-1.3 g/kg) Weight Used: (Current)  Fluid (ml/day):   (1 ml/kcal)    Nutrition Diagnosis:   · Inadequate protein-energy intake related to altered GI function (lack of appetite, N/V) as evidenced by intake 0-25%,diarrhea    · Moderate malnutrition related to  (lack of appetite, nausea) as evidenced by  (malnutrition criteria as above)    Nutrition Interventions:   Food and/or Nutrient Delivery: Continue current diet,Continue oral nutrition supplement,Modify parenteral nutrition     Coordination of Nutrition Care: Continue to monitor while inpatient    Goals:   Previous Goal Met: Progressing toward goal(s)  Active Goal: Tolerate TPN at goal within 3 days    Nutrition Monitoring and Evaluation:      Food/Nutrient Intake Outcomes: Food and nutrient intake,Supplement intake,Parenteral nutrition intake/tolerance  Physical Signs/Symptoms Outcomes: Biochemical data,GI status,Meal time behavior,Weight    Discharge Planning:     Too soon to determine    [address] [place], LD on 4/19/2022 at 10:59 AM  Contact: [phone]

## 2022-04-19 NOTE — PROGRESS NOTES
[facility] Hematology & Oncology        Inpatient Hematology / Oncology Progress Note    Reason for Admission:  Diffuse large B cell lymphoma ([facility] [C83.30]  Admission for antineoplastic chemotherapy [Z51.11]  Bone marrow transplant status ([facility] [Z94.81]    24 Hour Events:  Afebrile, VSS  Day +8 BEAM/ASCT  Awaiting count recovery, on G-CSF    On TPN for poor po intake  States he feels better today    Transfusions: None  Replacements: None    ROS:  Constitutional: Negative for fever, chills. CV: Negative for chest pain, palpitations, edema. Respiratory: Negative for dyspnea, cough, wheezing. GI: +nausea, vomiting, diarrhea. Negative for abdominal pain. 10 point review of systems is otherwise negative with the exception of the elements mentioned above in the HPI.        No Known Allergies  Past Medical History:   Diagnosis Date    Cancer ([facility]     Hypertension     Valvular heart disease      Past Surgical History:   Procedure Laterality Date    HX BACK SURGERY      26 years ago    HX VASCULAR ACCESS      IR BX BONE MARROW DIAGNOSTIC  2/24/2022    IR CHOLECYSTOSTOMY PERCUTANEOUS      IR INSERT NON TUNL CVC OVER 5 YRS  3/14/2022    IR INTRATHECAL CHEMO INJECTION CNS  12/8/2021    IR INTRATHECAL CHEMO INJECTION CNS  12/28/2021    IR INTRATHECAL CHEMO INJECTION CNS  1/28/2022    IR SPINAL PUNCTURE CSF TREAT / DRAIN  2/4/2022     Family History   Problem Relation Age of Onset    Diabetes Mother     Hypertension Mother     Diabetes Father     Hypertension Father     Hypertension Brother      Social History     Socioeconomic History    Marital status:      Spouse name: Not on file    Number of children: Not on file    Years of education: Not on file    Highest education level: Not on file   Occupational History    Not on file   Tobacco Use    Smoking status: Never Smoker    Smokeless tobacco: Never Used   Vaping Use    Vaping Use: Never used   Substance and Sexual Activity    Alcohol use: Not Currently    Drug use: Not Currently    Sexual activity: Not on file   Other Topics Concern     Service Not Asked    Blood Transfusions Not Asked    Caffeine Concern Not Asked    Occupational Exposure Not Asked    Hobby Hazards Not Asked    Sleep Concern Not Asked    Stress Concern Not Asked    Weight Concern Not Asked    Special Diet Not Asked    Back Care Not Asked    Exercise Not Asked    Bike Helmet Not Asked   [address] Not Asked    Self-Exams Not Asked   Social History Narrative    Not on file     Social Determinants of Health     Financial Resource Strain:     Difficulty of Paying Living Expenses: Not on file   Food Insecurity:     Worried About Running Out of Food in the Last Year: Not on file    Sayra of Food in the Last Year: Not on file   Transportation Needs:     Lack of Transportation (Medical): Not on file    Lack of Transportation (Non-Medical):  Not on file   Physical Activity:     Days of Exercise per Week: Not on file    Minutes of Exercise per Session: Not on file   Stress:     Feeling of Stress : Not on file   Social Connections:     Frequency of Communication with Friends and Family: Not on file    Frequency of Social Gatherings with Friends and Family: Not on file    Attends Roman [employer] Services: Not on file    Active Member of [address] [place] [address] [employer] or Organizations: Not on file    Attends Club or Organization Meetings: Not on file    Marital Status: Not on file   Intimate Partner Violence:     Fear of Current or Ex-Partner: Not on file    Emotionally Abused: Not on file    Physically Abused: Not on file    Sexually Abused: Not on file   Housing Stability:     Unable to Pay for Housing in the Last Year: Not on file    Number of [place] in the Last Year: Not on file    Unstable Housing in the Last Year: Not on file     Current Facility-Administered Medications   Medication Dose Route Frequency Provider Last Rate Last Admin    0.9% sodium chloride infusion 250 mL  250 mL IntraVENous PRN Kentrell Acevedo MD        TPN ADULT - dextrose 5% amino acid 4.25%   IntraVENous QPM Cassandra Michael MD 85 mL/hr at 04/18/22 1735 Given at 04/18/22 1735    OLANZapine (ZyPREXA zydis) disintegrating tablet 5 mg  5 mg Oral QHS [facility], FNP   5 mg at 04/18/22 2242    acetaminophen (TYLENOL) tablet 650 mg  650 mg Oral Q4H PRN Kentrell Acevedo MD   650 mg at 04/18/22 0825    diphenhydrAMINE (BENADRYL) capsule 25 mg  25 mg Oral Q6H PRN Kentrell Acevedo MD   25 mg at 04/18/22 0825    hydrocortisone (CORTAID) 1 % cream   Topical QID PRN [facility], FNP   Given at 04/17/22 1767    diphenoxylate-atropine (LOMOTIL) tablet 1 Tablet  1 Tablet Oral QID PRN Kentrell Acevedo MD   1 Tablet at 04/17/22 1723    magic mouthwash (MARCELLUS) oral suspension 5 mL  5 mL Oral Q4H PRN Kentrell Acevedo MD   5 mL at 04/17/22 0937    ondansetron (ZOFRAN) injection 8 mg  8 mg IntraVENous Q8H [facility], FNP   8 mg at 04/18/22 0555    pantoprazole (PROTONIX) tablet 40 mg  40 mg Oral ACB [facility], FNP   40 mg at 04/19/22 0831    loperamide (IMODIUM) capsule 2 mg  2 mg Oral Q4H PRN Azra Giovanni, NP   2 mg at 04/18/22 2242    LORazepam (ATIVAN) injection 1 mg  1 mg IntraVENous Q6H PRN Azra Giovanni, NP   1 mg at 04/18/22 2242    prochlorperazine (COMPAZINE) with saline injection 10 mg  10 mg IntraVENous Q6H PRN Azra Giovanni, NP   10 mg at 04/17/22 1308    amoxicillin-clavulanate (AUGMENTIN) 875-125 mg per tablet 1 Tablet  1 Tablet Oral Q12H Azra Giovanni, NP   1 Tablet at 04/19/22 0831    levoFLOXacin (LEVAQUIN) tablet 500 mg  500 mg Oral Q24H Azra Giovanni, NP   500 mg at 04/19/22 0831    acyclovir (ZOVIRAX) tablet 400 mg  400 mg Oral BID Azra Giovanni, NP   400 mg at 04/19/22 0831    fluconazole (DIFLUCAN) tablet 200 mg  200 mg Oral DAILY Azra Giovanni, NP   200 mg at 04/19/22 0830    filgrastim-aafi (NIVESTYM) injection syringe 300 mcg  300 mcg SubCUTAneous Q24H Tylor Wilder MD   300 mcg at 22 0836    cloNIDine HCL (CATAPRES) tablet 0.1 mg  0.1 mg Oral Q6H PRN Sofia Dhillon, NP   0.1 mg at 04/10/22 1200    lisinopriL (PRINIVIL, ZESTRIL) tablet 20 mg  20 mg Oral DAILY Blackmelinda Dhillon, NP   20 mg at 22 0831    HYDROcodone-acetaminophen (Anh ) 5-325 mg per tablet 1 Tablet  1 Tablet Oral Q6H PRN Sofia Dhillon NP        morphine injection 2 mg  2 mg IntraVENous Q4H PRN Sofia Dhillon NP        senna-docusate (PERICOLACE) 8.6-50 mg per tablet 1 Tablet  1 Tablet Oral DAILY PRN Varun Decker MD   1 Tablet at 22 205    polyethylene glycol (MIRALAX) packet 17 g  17 g Oral DAILY PRN Varun Decker MD        atenoloL (TENORMIN) tablet 25 mg  25 mg Oral DAILY Sarah Dial, FNP   25 mg at 22 0831    cetirizine (ZYRTEC) tablet 5 mg  5 mg Oral DAILY Sarah Dial, FNP   5 mg at 22 0830    ondansetron (ZOFRAN ODT) tablet 8 mg  8 mg Oral Q8H PRN Sarah Dial, FNP   8 mg at 22 0439    [Held by provider] enoxaparin (LOVENOX) injection 40 mg  40 mg SubCUTAneous Q24H Sarah Dial, FNP        heparin (porcine) pf 300 Units  300 Units InterCATHeter PRN Varun Decker MD        central line flush (saline) syringe 10 mL  10 mL InterCATHeter PRN Varun Decker MD   10 mL at 22 0423       OBJECTIVE:  Patient Vitals for the past 8 hrs:   BP Temp Pulse Resp SpO2   22 0831 120/70 -- (!) 106 -- --   22 0335 118/69 97.4 °F (36.3 °C) 81 15 99 %     Temp (24hrs), Av.1 °F (36.7 °C), Min:97.4 °F (36.3 °C), Max:98.7 °F (37.1 °C)    No intake/output data recorded. Physical Exam:  Constitutional: Well developed, well nourished male in no acute distress, sitting comfortably in the hospital bed. HEENT: +L eye patch. Normocephalic and atraumatic. Oropharynx is clear, mucous membranes are moist. Extraocular muscles are intact. Sclerae anicteric. Neck supple without JVD. No thyromegaly present.     Skin Erythematous, macular rash to BL inner thigh. Warm and dry. No bruising and no rash noted. No erythema. No pallor. Respiratory Lungs are clear to auscultation bilaterally without wheezes, rales or rhonchi, normal air exchange without accessory muscle use. CVS Normal rate, regular rhythm and normal S1 and S2. +murmur   Abdomen Soft, nontender and nondistended, normoactive bowel sounds. No palpable mass. No hepatosplenomegaly. Neuro Grossly nonfocal with no obvious sensory or motor deficits. MSK Normal range of motion in general.  No edema and no tenderness. Psych Appropriate mood and affect. Labs:    Recent Results (from the past 24 hour(s))   METABOLIC PANEL, COMPREHENSIVE    Collection Time: 04/19/22  3:24 AM   Result Value Ref Range    Sodium 142 136 - 145 mmol/L    Potassium 3.6 3.5 - 5.1 mmol/L    Chloride 108 (H) 98 - 107 mmol/L    CO2 29 21 - 32 mmol/L    Anion gap 5 (L) 7 - 16 mmol/L    Glucose 99 65 - 100 mg/dL    BUN 12 6 - 23 MG/DL    Creatinine 0.50 (L) 0.8 - 1.5 MG/DL    GFR est AA >60 >60 ml/min/1.73m2    GFR est non-AA >60 >60 ml/min/1.73m2    Calcium 8.3 8.3 - 10.4 MG/DL    Bilirubin, total 0.4 0.2 - 1.1 MG/DL    ALT (SGPT) 21 12 - 65 U/L    AST (SGOT) 9 (L) 15 - 37 U/L    Alk.  phosphatase 89 50 - 136 U/L    Protein, total 5.6 (L) 6.3 - 8.2 g/dL    Albumin 3.0 (L) 3.5 - 5.0 g/dL    Globulin 2.6 2.3 - 3.5 g/dL    A-G Ratio 1.2 1.2 - 3.5     MAGNESIUM    Collection Time: 04/19/22  3:24 AM   Result Value Ref Range    Magnesium 2.4 1.8 - 2.4 mg/dL   CBC WITH AUTOMATED DIFF    Collection Time: 04/19/22  3:24 AM   Result Value Ref Range    WBC 0.3 (LL) 4.3 - 11.1 K/uL    RBC 3.34 (L) 4.23 - 5.6 M/uL    HGB 10.2 (L) 13.6 - 17.2 g/dL    HCT 28.7 (L) 41.1 - 50.3 %    MCV 85.9 79.6 - 97.8 FL    MCH 30.5 26.1 - 32.9 PG    MCHC 35.5 (H) 31.4 - 35.0 g/dL    RDW 11.2 (L) 11.9 - 14.6 %    PLATELET 33 (L) 924 - 450 K/uL    MPV 11.2 9.4 - 12.3 FL    ABSOLUTE NRBC 0.00 0.0 - 0.2 K/uL    RBC COMMENTS NORMOCYTIC/NORMOCHROMIC      WBC COMMENTS WBC TOO FEW TO DIFFERENTIATE      PLATELET COMMENTS DECREASED      DF AUTOMATED     TRIGLYCERIDE    Collection Time: 04/19/22  3:24 AM   Result Value Ref Range    Triglyceride 88 35 - 150 MG/DL       Imaging:  n/a    ASSESSMENT:  Problem List  Date Reviewed: 3/16/2022          Codes Class Noted    Malnutrition of moderate degree ([facility] ICD-10-CM: E44.0  ICD-9-CM: 263.0  4/15/2022        Bone marrow transplant status ([facility] ICD-10-CM: Z94.81  ICD-9-CM: V42.81  4/5/2022        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 Papito Yeh is a 64 y.o. male admitted on 4/5/2022. The primary encounter diagnosis was Diffuse large B-cell lymphoma, unspecified body region [facility]). Diagnoses of Admission for antineoplastic chemotherapy, Bone marrow transplant status ([facility] and Nonrheumatic mitral valve regurgitation were also pertinent to this visit. 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 He is a patient of Dr Justin Kim treated for relapsed DLBCL. He was initially treated by Dr Daniela Tejeda in [place] with R-CHOP 4/21-7/21. However, he was noted to have relapsed disease in L supraclavicular LN. He was then evaluated by Dr Justin Kim and has now completed R-ICE x3 with IT MTX x4 with CR. He has been evaluated for ASCT and collected 4.8x10^6/kg CD34+ cells. He is now admitted for BEAM/ASCT. He was seen by Dr Justin Kim day prior to admission and rapid COVID and flu testing negative.  He was previously cleared by cardiology to proceed with transplant given mitral valve regurgitation. He is feeling well and ready to begin treatment today. PLAN:    Relapsed DLBCL / ASCT  - Admit for BEAM/ASCT - D-6 today  - Prophylactic antibiotics to begin D+1  - G-CSF to begin D+6  4/10 Day -1 BEAM/ASCT. Stem cells tomorrow. Tolerating treatment well. 4/11 Day 0. Stem cells today. 4/12 Day +1. PPx antibx begin today. 4/17 Day +6. Fungitell pending, aspergillus negative. Counts dropping as expected. Daily G-CSF.  4/19 Day +8. Awaiting count recovery, con't Nivestym. Mitral regurgitation  - Evaluated by cardiology, cleared to proceed with ASCT  - Monitor for fluid overload  4/14 No evidence of fluid overload. On gentle hydration d/t decreased intake 2/2 nausea. Con't to monitor for fluid overload. 4/16 Increasing IVF given hypotension  4/17 Hypotension resolved - decrease IVF to 100 ml/hr and monitor fluid status. Hypertension / hypotension  - Continue home meds  4/9 Pt with persistent HTN. Increase lisinopril to 20mg daily. 4/11 BPs improved overall  4/16 Intermittent hypotension with hypertension. Holding antihypertensives. 4/17 Better overnight - decreasing IVF. 4/18 BPs improved    Constipation / Diarrhea / loose stools  4/8 Continue PRN pericolace/miralax for now  4/9 BM x 1 yesterday  4/12 BM x 3 yesterday, reports loose stools  4/13 650mL diarrhea yesterday. Check for Cdiff. If neg, can utilize antidiarrheals. 4/14 C diff negative - starting imodium PRN  4/17 Loose stools - manageable with antidiarrheals    Nausea / poor appetite  4/11 antiemetics prn  4/14 Nausea managed - reports poor appetite. Will order Ensure. 4/15 On scheduled zofran. Added marinol BID as well as PPI. Some improvement today. 4/17 Stable to improved. 4/18 Pt with ongoing poor po intake. RD following. Start TPN.  4/19 States he feels better today.   Con't TPN    Rash  4/16 Hydrocortisone cream    Pancytopenia secondary to chemotherapy  - Transfuse prn per Rhea SOPs  - G-CSF started D+6    Continue home meds  PPx meds: Acyclovir, Diflucan, Levaquin, Augmentin  Rhea SOPs  AC contraindicated d/t thrombocytopenia  G-CSF started D+6    Goals and plan of care reviewed with the patient. All questions answered to the best of our ability. Disposition:  Awaiting count recovery.               Diane Otriz NP   [facility] Hematology & Oncology  [address] [place] [address]  Office : [phone]  Fax : [phone]

## 2022-04-19 NOTE — PROGRESS NOTES
Problem: Falls - Risk of  Goal: *Absence of Falls  Description: Document Aneta Locket Fall Risk and appropriate interventions in the flowsheet.   Outcome: Progressing Towards Goal  Note: Fall Risk Interventions:            Medication Interventions: (P) Teach patient to arise slowly,Patient to call before getting OOB         History of Falls Interventions: Consult care management for discharge planning,Evaluate medications/consider consulting pharmacy         Problem: Chemotherapy, Day 3 to Discharge  Goal: Activity/Safety  Outcome: Progressing Towards Goal  Goal: Consults, if ordered  Outcome: Progressing Towards Goal  Goal: Diagnostic Test/Procedures  Outcome: Progressing Towards Goal  Goal: Nutrition/Diet  Outcome: Progressing Towards Goal  Goal: Discharge Planning  Outcome: Progressing Towards Goal  Goal: Medications  Outcome: Progressing Towards Goal  Goal: Treatments/Interventions/Procedures  Outcome: Progressing Towards Goal  Goal: Psychosocial  Outcome: Progressing Towards Goal  Goal: *Optimal pain control at patient's stated goal  Outcome: Progressing Towards Goal  Goal: *Hemodynamically stable  Outcome: Progressing Towards Goal  Goal: *Adequate oxygenation  Outcome: Progressing Towards Goal

## 2022-04-20 LAB
ALBUMIN SERPL-MCNC: 2.8 G/DL (ref 3.5–5)
ALBUMIN/GLOB SERPL: 1.2 {RATIO} (ref 1.2–3.5)
ALP SERPL-CCNC: 81 U/L (ref 50–136)
ALT SERPL-CCNC: 17 U/L (ref 12–65)
ANION GAP SERPL CALC-SCNC: 3 MMOL/L (ref 7–16)
AST SERPL-CCNC: 8 U/L (ref 15–37)
BILIRUB SERPL-MCNC: 0.2 MG/DL (ref 0.2–1.1)
BUN SERPL-MCNC: 13 MG/DL (ref 6–23)
CALCIUM SERPL-MCNC: 8.2 MG/DL (ref 8.3–10.4)
CHLORIDE SERPL-SCNC: 110 MMOL/L (ref 98–107)
CO2 SERPL-SCNC: 29 MMOL/L (ref 21–32)
CREAT SERPL-MCNC: 0.5 MG/DL (ref 0.8–1.5)
DIFFERENTIAL METHOD BLD: ABNORMAL
ERYTHROCYTE [DISTWIDTH] IN BLOOD BY AUTOMATED COUNT: 11.1 % (ref 11.9–14.6)
GGT SERPL-CCNC: 75 U/L (ref 15–85)
GLOBULIN SER CALC-MCNC: 2.4 G/DL (ref 2.3–3.5)
GLUCOSE SERPL-MCNC: 107 MG/DL (ref 65–100)
HCT VFR BLD AUTO: 27.2 % (ref 41.1–50.3)
HGB BLD-MCNC: 9.4 G/DL (ref 13.6–17.2)
LDH SERPL L TO P-CCNC: 141 U/L (ref 100–190)
MAGNESIUM SERPL-MCNC: 2.4 MG/DL (ref 1.8–2.4)
MCH RBC QN AUTO: 29.7 PG (ref 26.1–32.9)
MCHC RBC AUTO-ENTMCNC: 34.6 G/DL (ref 31.4–35)
MCV RBC AUTO: 85.8 FL (ref 79.6–97.8)
NRBC # BLD: 0 K/UL (ref 0–0.2)
PHOSPHATE SERPL-MCNC: 3 MG/DL (ref 2.5–4.5)
PLATELET # BLD AUTO: 22 K/UL (ref 150–450)
PLATELET COMMENTS,PCOM: ABNORMAL
PMV BLD AUTO: 11.7 FL (ref 9.4–12.3)
POTASSIUM SERPL-SCNC: 3.4 MMOL/L (ref 3.5–5.1)
PROT SERPL-MCNC: 5.2 G/DL (ref 6.3–8.2)
RBC # BLD AUTO: 3.17 M/UL (ref 4.23–5.6)
RBC MORPH BLD: ABNORMAL
SODIUM SERPL-SCNC: 142 MMOL/L (ref 138–145)
TRIGL SERPL-MCNC: 104 MG/DL (ref 35–150)
WBC # BLD AUTO: 0.4 K/UL (ref 4.3–11.1)
WBC MORPH BLD: ABNORMAL

## 2022-04-20 PROCEDURE — 84478 ASSAY OF TRIGLYCERIDES: CPT

## 2022-04-20 PROCEDURE — 74011250637 HC RX REV CODE- 250/637: Performed by: INTERNAL MEDICINE

## 2022-04-20 PROCEDURE — APPSS45 APP SPLIT SHARED TIME 31-45 MINUTES: Performed by: NURSE PRACTITIONER

## 2022-04-20 PROCEDURE — 83615 LACTATE (LD) (LDH) ENZYME: CPT

## 2022-04-20 PROCEDURE — 65270000015 HC RM PRIVATE ONCOLOGY

## 2022-04-20 PROCEDURE — 84100 ASSAY OF PHOSPHORUS: CPT

## 2022-04-20 PROCEDURE — 74011000250 HC RX REV CODE- 250: Performed by: INTERNAL MEDICINE

## 2022-04-20 PROCEDURE — 36591 DRAW BLOOD OFF VENOUS DEVICE: CPT

## 2022-04-20 PROCEDURE — 82977 ASSAY OF GGT: CPT

## 2022-04-20 PROCEDURE — 74011250636 HC RX REV CODE- 250/636: Performed by: INTERNAL MEDICINE

## 2022-04-20 PROCEDURE — 99232 SBSQ HOSP IP/OBS MODERATE 35: CPT | Performed by: INTERNAL MEDICINE

## 2022-04-20 PROCEDURE — 74011250637 HC RX REV CODE- 250/637: Performed by: NURSE PRACTITIONER

## 2022-04-20 PROCEDURE — 85025 COMPLETE CBC W/AUTO DIFF WBC: CPT

## 2022-04-20 PROCEDURE — 83735 ASSAY OF MAGNESIUM: CPT

## 2022-04-20 PROCEDURE — 80053 COMPREHEN METABOLIC PANEL: CPT

## 2022-04-20 RX ORDER — POTASSIUM CHLORIDE 29.8 MG/ML
20 INJECTION INTRAVENOUS ONCE
Status: COMPLETED | OUTPATIENT
Start: 2022-04-20 | End: 2022-04-20

## 2022-04-20 RX ORDER — MORPHINE 10 MG/ML
0.6 TINCTURE ORAL ONCE
Status: DISCONTINUED | OUTPATIENT
Start: 2022-04-20 | End: 2022-04-20

## 2022-04-20 RX ORDER — LOPERAMIDE HYDROCHLORIDE 2 MG/1
2 CAPSULE ORAL EVERY 8 HOURS
Status: DISCONTINUED | OUTPATIENT
Start: 2022-04-20 | End: 2022-04-24 | Stop reason: HOSPADM

## 2022-04-20 RX ORDER — ONDANSETRON 2 MG/ML
8 INJECTION INTRAMUSCULAR; INTRAVENOUS
Status: DISCONTINUED | OUTPATIENT
Start: 2022-04-20 | End: 2022-04-24 | Stop reason: HOSPADM

## 2022-04-20 RX ORDER — MORPHINE 10 MG/ML
0.6 TINCTURE ORAL
Status: DISCONTINUED | OUTPATIENT
Start: 2022-04-20 | End: 2022-04-24 | Stop reason: HOSPADM

## 2022-04-20 RX ADMIN — OLANZAPINE 5 MG: 5 TABLET, ORALLY DISINTEGRATING ORAL at 20:53

## 2022-04-20 RX ADMIN — PANTOPRAZOLE SODIUM 40 MG: 40 TABLET, DELAYED RELEASE ORAL at 07:51

## 2022-04-20 RX ADMIN — AMOXICILLIN AND CLAVULANATE POTASSIUM 1 TABLET: 875; 125 TABLET, FILM COATED ORAL at 07:53

## 2022-04-20 RX ADMIN — ATENOLOL 25 MG: 50 TABLET ORAL at 07:51

## 2022-04-20 RX ADMIN — SOYBEAN OIL 250 ML: 20 INJECTION, SOLUTION INTRAVENOUS at 20:18

## 2022-04-20 RX ADMIN — FLUCONAZOLE 200 MG: 100 TABLET ORAL at 07:52

## 2022-04-20 RX ADMIN — LOPERAMIDE HYDROCHLORIDE 2 MG: 2 CAPSULE ORAL at 20:53

## 2022-04-20 RX ADMIN — LOPERAMIDE HYDROCHLORIDE 2 MG: 2 CAPSULE ORAL at 09:50

## 2022-04-20 RX ADMIN — CALCIUM GLUCONATE: 98 INJECTION, SOLUTION INTRAVENOUS at 20:18

## 2022-04-20 RX ADMIN — CETIRIZINE HYDROCHLORIDE 5 MG: 5 TABLET ORAL at 07:53

## 2022-04-20 RX ADMIN — ACYCLOVIR 400 MG: 800 TABLET ORAL at 17:36

## 2022-04-20 RX ADMIN — POTASSIUM CHLORIDE 20 MEQ: 400 INJECTION, SOLUTION INTRAVENOUS at 17:36

## 2022-04-20 RX ADMIN — DIPHENOXYLATE HYDROCHLORIDE AND ATROPINE SULFATE 1 TABLET: 2.5; .025 TABLET ORAL at 08:02

## 2022-04-20 RX ADMIN — ACYCLOVIR 400 MG: 800 TABLET ORAL at 07:54

## 2022-04-20 RX ADMIN — LEVOFLOXACIN 500 MG: 500 TABLET, FILM COATED ORAL at 07:53

## 2022-04-20 RX ADMIN — AMOXICILLIN AND CLAVULANATE POTASSIUM 1 TABLET: 875; 125 TABLET, FILM COATED ORAL at 20:53

## 2022-04-20 RX ADMIN — FILGRASTIM-AAFI 300 MCG: 300 INJECTION, SOLUTION SUBCUTANEOUS at 07:54

## 2022-04-20 RX ADMIN — LISINOPRIL 20 MG: 20 TABLET ORAL at 07:53

## 2022-04-20 RX ADMIN — LOPERAMIDE HYDROCHLORIDE 2 MG: 2 CAPSULE ORAL at 14:55

## 2022-04-20 NOTE — PROGRESS NOTES
Comprehensive Nutrition Assessment    Type and Reason for Visit: Reassess  TPN Management (Oncology)    Nutrition Recommendations/Plan:   Parenteral Nutrition:  Total parenteral nutrition  to begin at 1800  Continue: Dex 10% , 4.25% AA 2 L (90ml/hr for 22 hr infusion due to KCl outside TPN)   Continue 250 ml 20% daily   Lytes/L:   Sodium 150 meq (150 meq NaCl), Potassium 35 meq (35 meq KPO4), 5 meq Mg, 4.5 meq Calcium  Other additives: MTE, MVI MWF due to national shortages, 100 mg Thiamine (day 3/7 due to refeeding risk)  Electrolytes outside TPN: 20 meq KCl to infuse for 2 hrs prior to TPN infusion tonight  Nutritional Supplement Therapy:   Active electrolyte replacement per nutrition support protocols  Replacement indicated:  None - will replace outside TPN tonight as currently no other IV access  Labs:   CMP daily  Mg daily  Phos MWF    Triglyceride tomorrow  POC Glucoses/SSI Not indicated  Meals and Snacks:  Continue current diet. Continue GI soft modification  Nutrition Supplement Therapy:   Medical food supplement therapy:  Continue Ensure High Protein three times per day (this provides 160 kcal and 16 grams protein per bottle)     Malnutrition Assessment:  Malnutrition Status: Moderate malnutrition  Context: Acute illness  Findings of clinical characteristics of malnutrition:   Energy Intake:  Mild decrease in energy intake (specify) (less than 50% needs for ~4 days)  Weight Loss:  7 - Greater than 2% over 1 week (14# (7.5%))     Body Fat Loss:  1 - Mild body fat loss, Fat overlying ribs,Triceps   Muscle Mass Loss:  1 - Mild muscle mass loss, Calf,Clavicles (pectoralis & deltoids),Scapula (trapezius),Temples (temporalis)  Fluid Accumulation:  No significant fluid accumulation,     Strength:  Not performed       Nutrition Assessment:   Nutrition History: Patient reports eating well prior to admission. He reports UBW ~240# and that he eats 3 meals per day at baseline.  He reports weight loss ~1.5 years ago prior to cancer diagnosis but states that he has been able to maintain at current weight for at least a year. Nutrition Background: Patient with PMH significant for DLBCL s/p initial treatment with R-CHOP 2021 now replapsed and most recently treated with R-ICE x3 + IT MTX x4, HTN, vascular heart disease. He was admitted for chemo + ASCT. Nutrition Interval:  Patient seen and discussed with RN, Francisco Puentes. He continues with frequent loose/watery stools. He states [place] are going to try something else to slow them down. \" He states that his nausea has improved. Observed am tray and patient consumed 1 boiled egg, entire cereal with milk, ~30% Ensure High Protein. He states \"that is more that I have been doing. \"  Moderate refeeding risk with very poor PO x8 days. Abdominal Status (last documented): Diarrhea abdomen with Active  bowel sounds. Last BM 04/19/22. Stool output: 1200 ml plus 2 additional occurrences over last 24 hrs   Pertinent Medications: Zovirax, Augmentin, Lomotil, Imodium now scheduled, Diflucan, MMW, Zofran (scheduled Q8 hrs), Compazine (PRN ans last utilized 4/17), Protonix, Zyprexa, Nivetym, Tincture of opium  Pertinent Labs:   Lab Results   Component Value Date/Time    Sodium 142 04/20/2022 03:42 AM    Potassium 3.4 (L) 04/20/2022 03:42 AM    Chloride 110 (H) 04/20/2022 03:42 AM    CO2 29 04/20/2022 03:42 AM    Anion gap 3 (L) 04/20/2022 03:42 AM    Glucose 107 (H) 04/20/2022 03:42 AM    BUN 13 04/20/2022 03:42 AM    Creatinine 0.50 (L) 04/20/2022 03:42 AM    Calcium 8.2 (L) 04/20/2022 03:42 AM    Albumin 2.8 (L) 04/20/2022 03:42 AM    Magnesium 2.4 04/20/2022 03:42 AM    Phosphorus 3.0 04/20/2022 03:42 AM     Lab Results   Component Value Date/Time    Triglyceride 104 04/20/2022 03:42 AM   Labs remarkable for: Na stable, K depleted, Mg stable, TG WNL      Nutrition Related Findings:   NFPE as above. 4/18 currently with single IV asscess - port. Dilute TPN initiated 4/18 without lipids.   TPN advanced to 10/4.25 + lipid initiation. Current Nutrition Therapies:  ADULT DIET Regular; GI [place] (GERD/Peptic Ulcer)  ADULT ORAL NUTRITION SUPPLEMENT Breakfast, Lunch, Dinner; Low Calorie/High Protein    Current Intake:   Average Meal Intake: 26-50% Average Supplement Intake: 26-50%      Anthropometric Measures:  Height: 6' (182.9 cm)  Current Body Wt: 76.8 kg (169 lb 5 oz) (4/19), Weight source: Standing scale  BMI: 23, Normal weight (BMI 18.5-24. 9)  Admission Body Weight: 185 lb (4/7 standing scale)  Ideal Body Weight (lbs) (Calculated): 178 lbs (81 kg), 96.2 %  Usual Body Wt: 83.9 kg (185 lb), Percent weight change: -7.4        Edema: No data recorded  Estimated Daily Nutrient Needs:  Energy (kcal/day): 1732-2867 (Kcal/kg (25-30), Weight Used: Current (77.7 kg (4/13)))  Protein (g/day):  (1.2-1.3 g/kg) Weight Used: (Current)  Fluid (ml/day):   (1 ml/kcal)    Nutrition Diagnosis:   · Inadequate protein-energy intake related to altered GI function (lack of appetite, N/V) as evidenced by intake 0-25%,diarrhea    · Moderate malnutrition related to  (lack of appetite, nausea) as evidenced by  (malnutrition criteria as above)    Nutrition Interventions:   Food and/or Nutrient Delivery: Continue current diet,Continue oral nutrition supplement,Modify parenteral nutrition     Coordination of Nutrition Care: Continue to monitor while inpatient    Goals:   Previous Goal Met: Goal(s) achieved  Active Goal: Continue to tolerate TPN + PO diet/ONS to meet >75% nutrition needs    Nutrition Monitoring and Evaluation:      Food/Nutrient Intake Outcomes: Food and nutrient intake,Supplement intake,Parenteral nutrition intake/tolerance  Physical Signs/Symptoms Outcomes: Biochemical data,GI status,Meal time behavior,Weight    Discharge Planning:     Too soon to determine    [address] [place], LD on 4/20/2022 at 10:59 AM  Contact: [phone]

## 2022-04-20 NOTE — PROGRESS NOTES
Diagnosis: DLBCL  Transplant Date: 4/11/22  Day: (+/-) D+9. Chemo regimen used: BEAM.  BMT assessments and toxicities completed. WBC/ANC= 0.4/0.0. Prophylactic medications started D+1 (4/12/22- Augmentin, LVQ, ACV, diflucan). G-CSF started on D+6 4/17/22  Toxicities greater than 2 :None. Patient seen by MD/NP Daily until engraftment or While IP. New orders received: None. Issues:None    END OF SHIFT NOTE:    Intake/Output  04/19 1901 - 04/20 0700  In: -   Out: 700 [Urine:100]   Voiding: YES  Catheter: NO  Drain:              Stool:  3 occurrences. Stool Assessment  Stool Color: Green (04/19/22 2300)  Stool Appearance: Watery (04/19/22 2300)  Stool Amount: Large (04/19/22 2300)  Stool Source/Status: Rectum (04/19/22 2300)    Emesis:  0 occurrences. Emesis Assessment  Appearance: Yellow (04/12/22 1138)  Emesis Amount: Small (04/12/22 1138)    VITAL SIGNS  Patient Vitals for the past 12 hrs:   Temp Pulse Resp BP SpO2   04/19/22 2300 98.3 °F (36.8 °C) 83 18 126/71 98 %   04/19/22 1900 98.9 °F (37.2 °C) 95 18 111/67 98 %   04/19/22 1600 97.9 °F (36.6 °C) 96 18 126/75 97 %       Pain Assessment  Pain 1  Pain Scale 1: Numeric (0 - 10) (04/19/22 2300)  Pain Intensity 1: 0 (04/19/22 2300)  Patient Stated Pain Goal: 0 (04/19/22 2300)  Pain Reassessment 1: Patient resting w/respiratory rate greater than 10 (04/19/22 0231)    Ambulating  Yes    Additional Information:     Shift report given to oncoming nurse at the bedside.     Jennifer Douglas RN

## 2022-04-20 NOTE — PROGRESS NOTES
END OF SHIFT NOTE:      Diagnosis: DLBCL  Transplant Date: 4/11/22  Chemo regimen used: BEAM  Day: (+/-) +9. BMT assessments and toxicities completed. Mouth Care completed 4 times this shift. WBC/ANC= 0.7/0.0  Prophylactic medications started D+1 (4/12/22- Augmentin, LVQ, ACV, diflucan). G-CSF started on D+6 4/17/22. Toxicities greater than 2 :none. Patient seen by MD/NP daily until engraftment or while IP. Labs not resulted that need follow-up: none. Additional Shift Information: Pt continues to have loose stool; anti diarrhea meds started and stools are slowly becoming more solid. Pt up in chair since early this AM. No other complaints. Will update PM nurse at bedside. Intake/Output  04/20 0701 - 04/20 1900  In: 2416 [P.O.:1320; I.V.:1409]  Out: 1400 [Urine:250]   Voiding: YES  Catheter: NO  Drain:              Stool:  4 occurrences. Stool Assessment  Stool Color: Brown (04/20/22 1743)  Stool Appearance: Loose (04/20/22 1743)  Stool Amount: Medium (04/20/22 1743)  Stool Source/Status: Rectum (04/20/22 1743)    Emesis:  0 occurrences. Emesis Assessment  Appearance: Yellow (04/12/22 1138)  Emesis Amount: Small (04/12/22 1138)    VITAL SIGNS  Patient Vitals for the past 12 hrs:   Temp Pulse Resp BP SpO2   04/20/22 1455 97.7 °F (36.5 °C) 75 18 128/69 100 %   04/20/22 1138 98.2 °F (36.8 °C) 79 18 116/64 99 %   04/20/22 0747 98 °F (36.7 °C) 83 18 137/70 96 %       Pain Assessment  Pain 1  Pain Scale 1: Numeric (0 - 10) (04/20/22 1738)  Pain Intensity 1: 0 (04/20/22 1738)  Patient Stated Pain Goal: 0 (04/20/22 0300)  Pain Reassessment 1: Patient resting w/respiratory rate greater than 10 (04/19/22 0231)    Ambulating  Yes    Shift report given to oncoming nurse at the bedside.     Layne Prescott RN

## 2022-04-20 NOTE — PROGRESS NOTES
[facility] Hematology & Oncology        Inpatient Hematology / Oncology Progress Note    Reason for Admission:  Diffuse large B cell lymphoma ([facility] [C83.30]  Admission for antineoplastic chemotherapy [Z51.11]  Bone marrow transplant status ([facility] [Z94.81]    24 Hour Events:  Afebrile, VSS  Day +9 BEAM/ASCT  Awaiting count recovery, on G-CSF  WBC up to 400  On TPN for poor po intake  Ongoing diarrhea, 1.2L yesterday  States he feels better today    Transfusions: None  Replacements: K+ (in TPN)    ROS:  Constitutional: Negative for fever, chills. CV: Negative for chest pain, palpitations, edema. Respiratory: Negative for dyspnea, cough, wheezing. GI: +diarrhea. Negative for abdominal pain. 10 point review of systems is otherwise negative with the exception of the elements mentioned above in the HPI.        No Known Allergies  Past Medical History:   Diagnosis Date    Cancer ([facility]     Hypertension     Valvular heart disease      Past Surgical History:   Procedure Laterality Date    HX BACK SURGERY      26 years ago    HX TRANSPLANT  04/2022    auto stem cell transplant    HX VASCULAR ACCESS      IR BX BONE MARROW DIAGNOSTIC  2/24/2022    IR CHOLECYSTOSTOMY PERCUTANEOUS      IR INSERT NON TUNL CVC OVER 5 YRS  3/14/2022    IR INTRATHECAL CHEMO INJECTION CNS  12/8/2021    IR INTRATHECAL CHEMO INJECTION CNS  12/28/2021    IR INTRATHECAL CHEMO INJECTION CNS  1/28/2022    IR SPINAL PUNCTURE CSF TREAT / DRAIN  2/4/2022     Family History   Problem Relation Age of Onset    Diabetes Mother     Hypertension Mother     Diabetes Father     Hypertension Father     Hypertension Brother      Social History     Socioeconomic History    Marital status:      Spouse name: Not on file    Number of children: Not on file    Years of education: Not on file    Highest education level: Not on file   Occupational History    Not on file   Tobacco Use    Smoking status: Never Smoker    Smokeless tobacco: Never Used   Vaping Use    Vaping Use: Never used   Substance and Sexual Activity    Alcohol use: Not Currently    Drug use: Not Currently    Sexual activity: Not on file   Other Topics Concern     Service Not Asked    Blood Transfusions Not Asked    Caffeine Concern Not Asked    Occupational Exposure Not Asked    Hobby Hazards Not Asked    Sleep Concern Not Asked    Stress Concern Not Asked    Weight Concern Not Asked    Special Diet Not Asked    Back Care Not Asked    Exercise Not Asked    Bike Helmet Not Asked   [address] Not Asked    Self-Exams Not Asked   Social History Narrative    Not on file     Social Determinants of Health     Financial Resource Strain:     Difficulty of Paying Living Expenses: Not on file   Food Insecurity:     Worried About Running Out of Food in the Last Year: Not on file    Sayra of Food in the Last Year: Not on file   Transportation Needs:     Lack of Transportation (Medical): Not on file    Lack of Transportation (Non-Medical):  Not on file   Physical Activity:     Days of Exercise per Week: Not on file    Minutes of Exercise per Session: Not on file   Stress:     Feeling of Stress : Not on file   Social Connections:     Frequency of Communication with Friends and Family: Not on file    Frequency of Social Gatherings with Friends and Family: Not on file    Attends [employer]ism Services: Not on file    Active Member of [address] [place] [address] [employer] or Organizations: Not on file    Attends Club or Organization Meetings: Not on file    Marital Status: Not on file   Intimate Partner Violence:     Fear of Current or Ex-Partner: Not on file    Emotionally Abused: Not on file    Physically Abused: Not on file    Sexually Abused: Not on file   Housing Stability:     Unable to Pay for Housing in the Last Year: Not on file    Number of [place] in the Last Year: Not on file    Unstable Housing in the Last Year: Not on file     Current Facility-Administered Medications   Medication Dose Route Frequency Provider Last Rate Last Admin    opium tincture 10 mg/mL (morphine) oral solution 0.6 mL  0.6 mL Oral Q4H PRN Sonia Walker MD        TPN ADULT - dextrose 10% amino acid 4.25%   IntraVENous QPM Sonia Walker MD 85 mL/hr at 04/19/22 1752 Given at 04/19/22 1752    fat emulsion 20% (LIPOSYN, INTRAlipid) infusion 250 mL  250 mL IntraVENous QPM Sonia Walker MD 20.83 mL/hr at 04/19/22 1752 250 mL at 04/19/22 1752    0.9% sodium chloride infusion 250 mL  250 mL IntraVENous PRN Carol Lan MD        OLANZapine (ZyPREXA zydis) disintegrating tablet 5 mg  5 mg Oral QHS ARLEN PurdyP   5 mg at 04/19/22 2103    acetaminophen (TYLENOL) tablet 650 mg  650 mg Oral Q4H PRN Carol Lan MD   650 mg at 04/18/22 0825    diphenhydrAMINE (BENADRYL) capsule 25 mg  25 mg Oral Q6H PRN Carol Lan MD   25 mg at 04/18/22 0825    hydrocortisone (CORTAID) 1 % cream   Topical QID PRN JW Purdy   Given at 04/17/22 3611    diphenoxylate-atropine (LOMOTIL) tablet 1 Tablet  1 Tablet Oral QID PRN Carol Lan MD   1 Tablet at 04/20/22 0802    magic mouthwash (MARCELLUS) oral suspension 5 mL  5 mL Oral Q4H PRN Carol Lan MD   5 mL at 04/17/22 0937    ondansetron (ZOFRAN) injection 8 mg  8 mg IntraVENous Q8H ARLEN PurdyP   8 mg at 04/18/22 0555    pantoprazole (PROTONIX) tablet 40 mg  40 mg Oral ACB ARLEN PurdyP   40 mg at 04/20/22 0751    loperamide (IMODIUM) capsule 2 mg  2 mg Oral Q4H PRN Calvin Serum, NP   2 mg at 04/18/22 2242    LORazepam (ATIVAN) injection 1 mg  1 mg IntraVENous Q6H PRN Calvin Serum, NP   1 mg at 04/19/22 2337    prochlorperazine (COMPAZINE) with saline injection 10 mg  10 mg IntraVENous Q6H PRN [place] Serum, NP   10 mg at 04/17/22 1308    amoxicillin-clavulanate (AUGMENTIN) 875-125 mg per tablet 1 Tablet  1 Tablet Oral Q12H [place] Serum, NP   1 Tablet at 04/20/22 0753    levoFLOXacin (Sabrina Valdez) tablet 500 mg  500 mg Oral Q24H Sofia Dhillon NP   500 mg at 22 0753    acyclovir (ZOVIRAX) tablet 400 mg  400 mg Oral BID Sofia Dhillon NP   400 mg at 22 0754    fluconazole (DIFLUCAN) tablet 200 mg  200 mg Oral DAILY Sofia Dhillon NP   200 mg at 22 0752    filgrastim-aafi (NIVESTYM) injection syringe 300 mcg  300 mcg SubCUTAneous Q24H Tylor Wilder MD   300 mcg at 22 0754    cloNIDine HCL (CATAPRES) tablet 0.1 mg  0.1 mg Oral Q6H PRN Sofia Dhillon NP   0.1 mg at 04/10/22 1200    lisinopriL (PRINIVIL, ZESTRIL) tablet 20 mg  20 mg Oral DAILY Sofia Dhillon NP   20 mg at 22 0753    HYDROcodone-acetaminophen (NORCO) 5-325 mg per tablet 1 Tablet  1 Tablet Oral Q6H PRN Sofia Dhillon AMAN        morphine injection 2 mg  2 mg IntraVENous Q4H PRN Sofia Dhillon NP        senna-docusate (PERICOLACE) 8.6-50 mg per tablet 1 Tablet  1 Tablet Oral DAILY PRN Varun Decker MD   1 Tablet at 22    polyethylene glycol (MIRALAX) packet 17 g  17 g Oral DAILY PRN Varun Decker MD        atenoloL (TENORMIN) tablet 25 mg  25 mg Oral DAILY Chemult Dial, FNP   25 mg at 22 075    cetirizine (ZYRTEC) tablet 5 mg  5 mg Oral DAILY Chemult Dial, FNP   5 mg at 22 0753    ondansetron (ZOFRAN ODT) tablet 8 mg  8 mg Oral Q8H PRN Sarah Dial, FNP   8 mg at 22 9869    [Held by provider] enoxaparin (LOVENOX) injection 40 mg  40 mg SubCUTAneous Q24H Chemult Dial, FNP        heparin (porcine) pf 300 Units  300 Units InterCATHeter PRN Varun Decker MD        central line flush (saline) syringe 10 mL  10 mL InterCATHeter PRN Varun Decker MD   10 mL at 22 0843       OBJECTIVE:  Patient Vitals for the past 8 hrs:   BP Temp Pulse Resp SpO2   22 0747 137/70 98 °F (36.7 °C) 83 18 96 %   22 0300 120/67 98.3 °F (36.8 °C) 78 20 98 %     Temp (24hrs), Av.3 °F (36.8 °C), Min:97.9 °F (36.6 °C), Max:98.9 °F (37.2 °C)     07 -  1900  In: 0864 [P.O.:120; I.V.:1409]  Out: 500     Physical Exam:  Constitutional: Well developed, well nourished male in no acute distress, sitting comfortably in the bedside chair. HEENT: +L eye patch. Normocephalic and atraumatic. Oropharynx is clear, mucous membranes are moist. Extraocular muscles are intact. Sclerae anicteric. Neck supple without JVD. No thyromegaly present. Skin Erythematous, macular rash to BL inner thigh. Warm and dry. No bruising and no rash noted. No erythema. No pallor. Respiratory Lungs are clear to auscultation bilaterally without wheezes, rales or rhonchi, normal air exchange without accessory muscle use. CVS Normal rate, regular rhythm and normal S1 and S2. +murmur   Abdomen Soft, nontender and nondistended, normoactive bowel sounds. No palpable mass. No hepatosplenomegaly. Neuro Grossly nonfocal with no obvious sensory or motor deficits. MSK Normal range of motion in general.  No edema and no tenderness. Psych Appropriate mood and affect. Labs:    Recent Results (from the past 24 hour(s))   METABOLIC PANEL, COMPREHENSIVE    Collection Time: 04/20/22  3:42 AM   Result Value Ref Range    Sodium 142 138 - 145 mmol/L    Potassium 3.4 (L) 3.5 - 5.1 mmol/L    Chloride 110 (H) 98 - 107 mmol/L    CO2 29 21 - 32 mmol/L    Anion gap 3 (L) 7 - 16 mmol/L    Glucose 107 (H) 65 - 100 mg/dL    BUN 13 6 - 23 MG/DL    Creatinine 0.50 (L) 0.8 - 1.5 MG/DL    GFR est AA >60 >60 ml/min/1.73m2    GFR est non-AA >60 >60 ml/min/1.73m2    Calcium 8.2 (L) 8.3 - 10.4 MG/DL    Bilirubin, total 0.2 0.2 - 1.1 MG/DL    ALT (SGPT) 17 12 - 65 U/L    AST (SGOT) 8 (L) 15 - 37 U/L    Alk.  phosphatase 81 50 - 136 U/L    Protein, total 5.2 (L) 6.3 - 8.2 g/dL    Albumin 2.8 (L) 3.5 - 5.0 g/dL    Globulin 2.4 2.3 - 3.5 g/dL    A-G Ratio 1.2 1.2 - 3.5     MAGNESIUM    Collection Time: 04/20/22  3:42 AM   Result Value Ref Range    Magnesium 2.4 1.8 - 2.4 mg/dL   CBC WITH AUTOMATED DIFF    Collection Time: 04/20/22  3:42 AM   Result Value Ref Range    WBC 0.4 (LL) 4.3 - 11.1 K/uL    RBC 3.17 (L) 4.23 - 5.6 M/uL    HGB 9.4 (L) 13.6 - 17.2 g/dL    HCT 27.2 (L) 41.1 - 50.3 %    MCV 85.8 79.6 - 97.8 FL    MCH 29.7 26.1 - 32.9 PG    MCHC 34.6 31.4 - 35.0 g/dL    RDW 11.1 (L) 11.9 - 14.6 %    PLATELET 22 (LL) 538 - 450 K/uL    MPV 11.7 9.4 - 12.3 FL    ABSOLUTE NRBC 0.00 0.0 - 0.2 K/uL    RBC COMMENTS NORMOCYTIC/NORMOCHROMIC      WBC COMMENTS WBC TOO FEW TO DIFFERENTIATE      PLATELET COMMENTS MARKED      DF MANUAL     PHOSPHORUS    Collection Time: 04/20/22  3:42 AM   Result Value Ref Range    Phosphorus 3.0 2.5 - 4.5 MG/DL   GGT    Collection Time: 04/20/22  3:42 AM   Result Value Ref Range    GGT 75 15 - 85 U/L   LD    Collection Time: 04/20/22  3:42 AM   Result Value Ref Range     100 - 190 U/L   TRIGLYCERIDE    Collection Time: 04/20/22  3:42 AM   Result Value Ref Range    Triglyceride 104 35 - 150 MG/DL       Imaging:  n/a    ASSESSMENT:  Problem List  Date Reviewed: 3/16/2022          Codes Class Noted    Malnutrition of moderate degree (HCC) ICD-10-CM: E44.0  ICD-9-CM: 263.0  4/15/2022        B[facility] status ([facility] ICD-10-CM: Z94.81  ICD-9-CM: V42.81  4/5/2022        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 Livingston is a 64 y.o. male admitted on 4/5/2022. The primary encounter diagnosis was Diffuse large B-cell lymphoma, unspecified body region [facility]).  Diagnoses of Admission for antineoplastic chemotherapy, Bone marrow transplant status ([facility] and Nonrheumatic mitral valve regurgitation were also pertinent to this visit.      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 He is a patient of Dr Joie Varghese treated for relapsed DLBCL. He was initially treated by Dr Matthias Shin in [place] with R-CHOP 4/21-7/21. However, he was noted to have relapsed disease in L supraclavicular LN. He was then evaluated by Dr Joie Varghese and has now completed R-ICE x3 with IT MTX x4 with CR. He has been evaluated for ASCT and collected 4.8x10^6/kg CD34+ cells. He is now admitted for BEAM/ASCT. He was seen by Dr Joie Varghese day prior to admission and rapid COVID and flu testing negative. He was previously cleared by cardiology to proceed with transplant given mitral valve regurgitation. He is feeling well and ready to begin treatment today. PLAN:    Relapsed DLBCL / ASCT  - Admit for BEAM/ASCT - D-6 today  - Prophylactic antibiotics to begin D+1  - G-CSF to begin D+6  4/10 Day -1 BEAM/ASCT. Stem cells tomorrow. Tolerating treatment well. 4/11 Day 0. Stem cells today. 4/12 Day +1. PPx antibx begin today. 4/17 Day +6. Fungitell pending, aspergillus negative. Counts dropping as expected. Daily G-CSF.  4/20 Day +9. Awaiting count recovery, con't Nivestym. Mitral regurgitation  - Evaluated by cardiology, cleared to proceed with ASCT  - Monitor for fluid overload  4/14 No evidence of fluid overload. On gentle hydration d/t decreased intake 2/2 nausea. Con't to monitor for fluid overload. 4/16 Increasing IVF given hypotension  4/17 Hypotension resolved - decrease IVF to 100 ml/hr and monitor fluid status. Hypertension / hypotension  - Continue home meds  4/9 Pt with persistent HTN. Increase lisinopril to 20mg daily. 4/11 BPs improved overall  4/16 Intermittent hypotension with hypertension. Holding antihypertensives. 4/17 Better overnight - decreasing IVF.    4/18 BPs improved    Constipation / Diarrhea / loose stools  4/8 Continue PRN pericolace/miralax for now  4/9 BM x 1 yesterday  4/12 BM x 3 yesterday, reports loose stools  4/13 650mL diarrhea yesterday. Check for Cdiff. If neg, can utilize antidiarrheals. 4/14 C diff negative - starting imodium PRN  4/17 Loose stools - manageable with antidiarrheals  4/20 Ongoing diarrhea, 1.2L yesterday. Schedule imodium. Add opium tincture. Nausea / poor appetite  4/11 antiemetics prn  4/14 Nausea managed - reports poor appetite. Will order Ensure. 4/15 On scheduled zofran. Added marinol BID as well as PPI. Some improvement today. 4/17 Stable to improved. 4/18 Pt with ongoing poor po intake. RD following. Start TPN.  4/19 States he feels better today. Con't TPN    Rash  4/16 Hydrocortisone cream    Pancytopenia secondary to chemotherapy  - Transfuse prn per Rhea SOPs  - G-CSF started D+6    Continue home meds  PPx meds: Acyclovir, Diflucan, Levaquin, Augmentin  Rhea SOPs  AC contraindicated d/t thrombocytopenia    Goals and plan of care reviewed with the patient. All questions answered to the best of our ability. Disposition:  Awaiting count recovery.               Dalila Herndon NP   [facility]ology [facility]  [address] [place] [address]  Office : [phone]  Fax : [phone]

## 2022-04-21 LAB
1,3 BETA GLUCAN SER-MCNC: 42 PG/ML
ALBUMIN SERPL-MCNC: 3.1 G/DL (ref 3.5–5)
ALBUMIN/GLOB SERPL: 1.2 {RATIO} (ref 1.2–3.5)
ALP SERPL-CCNC: 87 U/L (ref 50–136)
ALT SERPL-CCNC: 25 U/L (ref 12–65)
ANION GAP SERPL CALC-SCNC: 5 MMOL/L (ref 7–16)
AST SERPL-CCNC: 11 U/L (ref 15–37)
BASOPHILS # BLD: 0 K/UL (ref 0–0.2)
BASOPHILS NFR BLD: 0 % (ref 0–2)
BILIRUB SERPL-MCNC: 0.3 MG/DL (ref 0.2–1.1)
BUN SERPL-MCNC: 11 MG/DL (ref 6–23)
CALCIUM SERPL-MCNC: 8.7 MG/DL (ref 8.3–10.4)
CHLORIDE SERPL-SCNC: 110 MMOL/L (ref 98–107)
CO2 SERPL-SCNC: 28 MMOL/L (ref 21–32)
CREAT SERPL-MCNC: 0.5 MG/DL (ref 0.8–1.5)
DIFFERENTIAL METHOD BLD: ABNORMAL
EOSINOPHIL # BLD: 0 K/UL (ref 0–0.8)
EOSINOPHIL NFR BLD: 0 % (ref 0.5–7.8)
ERYTHROCYTE [DISTWIDTH] IN BLOOD BY AUTOMATED COUNT: 11.1 % (ref 11.9–14.6)
GALACTOMANNAN AG SPEC IA-ACNC: 0.02 INDEX (ref 0–0.49)
GLOBULIN SER CALC-MCNC: 2.6 G/DL (ref 2.3–3.5)
GLUCOSE SERPL-MCNC: 97 MG/DL (ref 65–100)
HCT VFR BLD AUTO: 27.1 % (ref 41.1–50.3)
HGB BLD-MCNC: 9.4 G/DL (ref 13.6–17.2)
IMM GRANULOCYTES # BLD AUTO: 0 K/UL (ref 0–0.5)
IMM GRANULOCYTES NFR BLD AUTO: 0 % (ref 0–5)
LYMPHOCYTES # BLD: 0.4 K/UL (ref 0.5–4.6)
LYMPHOCYTES NFR BLD: 51 % (ref 13–44)
MAGNESIUM SERPL-MCNC: 2.2 MG/DL (ref 1.8–2.4)
MCH RBC QN AUTO: 29.7 PG (ref 26.1–32.9)
MCHC RBC AUTO-ENTMCNC: 34.7 G/DL (ref 31.4–35)
MCV RBC AUTO: 85.8 FL (ref 79.6–97.8)
MONOCYTES # BLD: 0.3 K/UL (ref 0.1–1.3)
MONOCYTES NFR BLD: 45 % (ref 4–12)
NEUTS SEG # BLD: 0 K/UL (ref 1.7–8.2)
NEUTS SEG NFR BLD: 4 % (ref 43–78)
NRBC # BLD: 0 K/UL (ref 0–0.2)
PLATELET # BLD AUTO: 18 K/UL (ref 150–450)
PLATELET COMMENTS,PCOM: ABNORMAL
PMV BLD AUTO: 10.8 FL (ref 9.4–12.3)
POTASSIUM SERPL-SCNC: 3.4 MMOL/L (ref 3.5–5.1)
PROT SERPL-MCNC: 5.7 G/DL (ref 6.3–8.2)
RBC # BLD AUTO: 3.16 M/UL (ref 4.23–5.6)
RBC MORPH BLD: ABNORMAL
SODIUM SERPL-SCNC: 143 MMOL/L (ref 136–145)
WBC # BLD AUTO: 0.7 K/UL (ref 4.3–11.1)
WBC MORPH BLD: ABNORMAL

## 2022-04-21 PROCEDURE — 85025 COMPLETE CBC W/AUTO DIFF WBC: CPT

## 2022-04-21 PROCEDURE — 83735 ASSAY OF MAGNESIUM: CPT

## 2022-04-21 PROCEDURE — 80053 COMPREHEN METABOLIC PANEL: CPT

## 2022-04-21 PROCEDURE — 74011000250 HC RX REV CODE- 250: Performed by: INTERNAL MEDICINE

## 2022-04-21 PROCEDURE — 74011250636 HC RX REV CODE- 250/636: Performed by: INTERNAL MEDICINE

## 2022-04-21 PROCEDURE — 74011250637 HC RX REV CODE- 250/637: Performed by: NURSE PRACTITIONER

## 2022-04-21 PROCEDURE — 99232 SBSQ HOSP IP/OBS MODERATE 35: CPT | Performed by: INTERNAL MEDICINE

## 2022-04-21 PROCEDURE — 74011250636 HC RX REV CODE- 250/636: Performed by: NURSE PRACTITIONER

## 2022-04-21 PROCEDURE — 36591 DRAW BLOOD OFF VENOUS DEVICE: CPT

## 2022-04-21 PROCEDURE — 65270000015 HC RM PRIVATE ONCOLOGY

## 2022-04-21 PROCEDURE — APPSS60 APP SPLIT SHARED TIME 46-60 MINUTES: Performed by: NURSE PRACTITIONER

## 2022-04-21 RX ORDER — POTASSIUM CHLORIDE 29.8 MG/ML
20 INJECTION INTRAVENOUS ONCE
Status: COMPLETED | OUTPATIENT
Start: 2022-04-21 | End: 2022-04-21

## 2022-04-21 RX ADMIN — POTASSIUM CHLORIDE 20 MEQ: 400 INJECTION, SOLUTION INTRAVENOUS at 18:00

## 2022-04-21 RX ADMIN — LEVOFLOXACIN 500 MG: 500 TABLET, FILM COATED ORAL at 07:49

## 2022-04-21 RX ADMIN — PANTOPRAZOLE SODIUM 40 MG: 40 TABLET, DELAYED RELEASE ORAL at 07:49

## 2022-04-21 RX ADMIN — FLUCONAZOLE 200 MG: 100 TABLET ORAL at 07:49

## 2022-04-21 RX ADMIN — OLANZAPINE 5 MG: 5 TABLET, ORALLY DISINTEGRATING ORAL at 20:49

## 2022-04-21 RX ADMIN — SOYBEAN OIL 250 ML: 20 INJECTION, SOLUTION INTRAVENOUS at 20:46

## 2022-04-21 RX ADMIN — ATENOLOL 25 MG: 50 TABLET ORAL at 07:49

## 2022-04-21 RX ADMIN — ACYCLOVIR 400 MG: 800 TABLET ORAL at 17:05

## 2022-04-21 RX ADMIN — AMOXICILLIN AND CLAVULANATE POTASSIUM 1 TABLET: 875; 125 TABLET, FILM COATED ORAL at 07:49

## 2022-04-21 RX ADMIN — LISINOPRIL 20 MG: 20 TABLET ORAL at 07:49

## 2022-04-21 RX ADMIN — POTASSIUM PHOSPHATE, MONOBASIC POTASSIUM PHOSPHATE, DIBASIC: 224; 236 INJECTION, SOLUTION, CONCENTRATE INTRAVENOUS at 20:47

## 2022-04-21 RX ADMIN — LORAZEPAM 1 MG: 2 INJECTION INTRAMUSCULAR; INTRAVENOUS at 23:38

## 2022-04-21 RX ADMIN — ACYCLOVIR 400 MG: 800 TABLET ORAL at 07:49

## 2022-04-21 RX ADMIN — CETIRIZINE HYDROCHLORIDE 5 MG: 5 TABLET ORAL at 07:49

## 2022-04-21 RX ADMIN — AMOXICILLIN AND CLAVULANATE POTASSIUM 1 TABLET: 875; 125 TABLET, FILM COATED ORAL at 20:50

## 2022-04-21 RX ADMIN — LOPERAMIDE HYDROCHLORIDE 2 MG: 2 CAPSULE ORAL at 14:35

## 2022-04-21 RX ADMIN — Medication 10 ML: at 05:52

## 2022-04-21 RX ADMIN — LOPERAMIDE HYDROCHLORIDE 2 MG: 2 CAPSULE ORAL at 20:49

## 2022-04-21 RX ADMIN — FILGRASTIM-AAFI 300 MCG: 300 INJECTION, SOLUTION SUBCUTANEOUS at 07:49

## 2022-04-21 RX ADMIN — LOPERAMIDE HYDROCHLORIDE 2 MG: 2 CAPSULE ORAL at 05:52

## 2022-04-21 NOTE — PROGRESS NOTES
[facility] Hematology & Oncology        Inpatient Hematology / Oncology Progress Note    Reason for Admission:  Diffuse large B cell lymphoma ([facility] [C83.30]  Admission for antineoplastic chemotherapy [Z51.11]  Bone marrow transplant status ([facility] [Z94.81]    24 Hour Events:  Afebrile, hypertensive at times  Day +10 BEAM/ASCT  Awaiting count recovery, on G-CSF  WBC up to 700  On TPN for poor po intake  Ongoing diarrhea, 1.6L yesterday, but stools reportedly have become more thickened  Wt up 4# with net +0.5L    Transfusions: None  Replacements: K+ (in TPN)    ROS:  Constitutional: Negative for fever, chills. CV: Negative for chest pain, palpitations, edema. Respiratory: Negative for dyspnea, cough, wheezing. GI: +diarrhea. Negative for abdominal pain. 10 point review of systems is otherwise negative with the exception of the elements mentioned above in the HPI.        No Known Allergies  Past Medical History:   Diagnosis Date    Cancer ([facility]     Hypertension     Valvular heart disease      Past Surgical History:   Procedure Laterality Date    HX BACK SURGERY      26 years ago    HX TRANSPLANT  04/2022    auto stem cell transplant    HX VASCULAR ACCESS      IR BX BONE MARROW DIAGNOSTIC  2/24/2022    IR CHOLECYSTOSTOMY PERCUTANEOUS      IR INSERT NON TUNL CVC OVER 5 YRS  3/14/2022    IR INTRATHECAL CHEMO INJECTION CNS  12/8/2021    IR INTRATHECAL CHEMO INJECTION CNS  12/28/2021    IR INTRATHECAL CHEMO INJECTION CNS  1/28/2022    IR SPINAL PUNCTURE CSF TREAT / DRAIN  2/4/2022     Family History   Problem Relation Age of Onset    Diabetes Mother     Hypertension Mother     Diabetes Father     Hypertension Father     Hypertension Brother      Social History     Socioeconomic History    Marital status:      Spouse name: Not on file    Number of children: Not on file    Years of education: Not on file    Highest education level: Not on file   Occupational History    Not on file Tobacco Use    Smoking status: Never Smoker    Smokeless tobacco: Never Used   Vaping Use    Vaping Use: Never used   Substance and Sexual Activity    Alcohol use: Not Currently    Drug use: Not Currently    Sexual activity: Not on file   Other Topics Concern     Service Not Asked    Blood Transfusions Not Asked    Caffeine Concern Not Asked    Occupational Exposure Not Asked    Hobby Hazards Not Asked    Sleep Concern Not Asked    Stress Concern Not Asked    Weight Concern Not Asked    Special Diet Not Asked    Back Care Not Asked    Exercise Not Asked    Bike Helmet Not Asked   [address] Not Asked    Self-Exams Not Asked   Social History Narrative    Not on file     Social Determinants of Health     Financial Resource Strain:     Difficulty of Paying Living Expenses: Not on file   Food Insecurity:     Worried About Running Out of Food in the Last Year: Not on file    Sayra of Food in the Last Year: Not on file   Transportation Needs:     Lack of Transportation (Medical): Not on file    Lack of Transportation (Non-Medical):  Not on file   Physical Activity:     Days of Exercise per Week: Not on file    Minutes of Exercise per Session: Not on file   Stress:     Feeling of Stress : Not on file   Social Connections:     Frequency of Communication with Friends and Family: Not on file    Frequency of Social Gatherings with Friends and Family: Not on file    Attends Hinduism Services: Not on file    Active Member of Clubs or Organizations: Not on file    Attends Club or Organization Meetings: Not on file    Marital Status: Not on file   Intimate Partner Violence:     Fear of Current or Ex-Partner: Not on file    Emotionally Abused: Not on file    Physically Abused: Not on file    Sexually Abused: Not on file   Housing Stability:     Unable to Pay for Housing in the Last Year: Not on file    Number of [place] in the Last Year: Not on file    Unstable Housing in the Last Year: Not on file     Current Facility-Administered Medications   Medication Dose Route Frequency Provider Last Rate Last Admin    opium tincture 10 mg/mL (morphine) oral solution 0.6 mL  0.6 mL Oral Q4H PRN Oli Munoz MD        loperamide (IMODIUM) capsule 2 mg  2 mg Oral Q8H Yosvany Meth, NP   2 mg at 04/21/22 0552    TPN ADULT - dextrose 10% amino acid 4.25%   IntraVENous QPM Oli Munoz MD 90 mL/hr at 04/20/22 2018 Given at 04/20/22 2018    ondansetron (ZOFRAN) injection 8 mg  8 mg IntraVENous Q8H PRN Yosvany Meth, NP        fat emulsion 20% (LIPOSYN, INTRAlipid) infusion 250 mL  250 mL IntraVENous QPM Oli Munoz MD 20.83 mL/hr at 04/20/22 2018 250 mL at 04/20/22 2018    0.9% sodium chloride infusion 250 mL  250 mL IntraVENous PRN Fabi Hernandez MD        OLANZapine (ZyPREXA zydis) disintegrating tablet 5 mg  5 mg Oral QHS JW Meadows   5 mg at 04/20/22 2053    acetaminophen (TYLENOL) tablet 650 mg  650 mg Oral Q4H PRN Fabi Hernandez MD   650 mg at 04/18/22 0825    diphenhydrAMINE (BENADRYL) capsule 25 mg  25 mg Oral Q6H PRN Fabi Hernandez MD   25 mg at 04/18/22 0825    hydrocortisone (CORTAID) 1 % cream   Topical QID PRN JW Meadows   Given at 04/17/22 7097    diphenoxylate-atropine (LOMOTIL) tablet 1 Tablet  1 Tablet Oral QID PRN Fabi Hernandez MD   1 Tablet at 04/20/22 0802    magic mouthwash (MARCELLUS) oral suspension 5 mL  5 mL Oral Q4H PRN Fabi Hernandez MD   5 mL at 04/17/22 0937    pantoprazole (PROTONIX) tablet 40 mg  40 mg Oral ACB JW Meadows   40 mg at 04/21/22 0749    loperamide (IMODIUM) capsule 2 mg  2 mg Oral Q4H PRN Yosvany Meth, NP   2 mg at 04/18/22 2242    LORazepam (ATIVAN) injection 1 mg  1 mg IntraVENous Q6H PRN Yosvany Meth, NP   1 mg at 04/19/22 2337    prochlorperazine (COMPAZINE) with saline injection 10 mg  10 mg IntraVENous Q6H PRN Yosvany Meth, NP   10 mg at 04/17/22 1308    amoxicillin-clavulanate (AUGMENTIN) 875-125 mg per tablet 1 Tablet  1 Tablet Oral Q12H Regulo Beverage, NP   1 Tablet at 04/21/22 0749    levoFLOXacin (LEVAQUIN) tablet 500 mg  500 mg Oral Q24H Regulo Beverage, NP   500 mg at 04/21/22 0749    acyclovir (ZOVIRAX) tablet 400 mg  400 mg Oral BID Regulo Beverage, NP   400 mg at 04/21/22 0749    fluconazole (DIFLUCAN) tablet 200 mg  200 mg Oral DAILY Regulo Beverage, NP   200 mg at 04/21/22 0749    filgrastim-aafi (NIVESTYM) injection syringe 300 mcg  300 mcg SubCUTAneous Q24H Lashae Edwards MD   300 mcg at 04/21/22 0749    cloNIDine HCL (CATAPRES) tablet 0.1 mg  0.1 mg Oral Q6H PRN Regulo Beverage, NP   0.1 mg at 04/10/22 1200    lisinopriL (PRINIVIL, ZESTRIL) tablet 20 mg  20 mg Oral DAILY Regulo Beverage, NP   20 mg at 04/21/22 0749    HYDROcodone-acetaminophen (NORCO) 5-325 mg per tablet 1 Tablet  1 Tablet Oral Q6H PRN Regulo Beverage, NP        morphine injection 2 mg  2 mg IntraVENous Q4H PRN Regulo Beverage, NP        senna-docusate (PERICOLACE) 8.6-50 mg per tablet 1 Tablet  1 Tablet Oral DAILY PRN Radha Juárez MD   1 Tablet at 04/07/22 2059    polyethylene glycol (MIRALAX) packet 17 g  17 g Oral DAILY PRN Radha Juárez MD        atenoloL (TENORMIN) tablet 25 mg  25 mg Oral DAILY ARLEN ShineP   25 mg at 04/21/22 0749    cetirizine (ZYRTEC) tablet 5 mg  5 mg Oral DAILY ARLEN ShineP   5 mg at 04/21/22 0749    ondansetron (ZOFRAN ODT) tablet 8 mg  8 mg Oral Q8H PRN ARLEN ShineP   8 mg at 04/13/22 9052    [Held by provider] enoxaparin (LOVENOX) injection 40 mg  40 mg SubCUTAneous Q24H JW Shine        heparin (porcine) pf 300 Units  300 Units InterCATHeter PRN Radha Juárez MD        central line flush (saline) syringe 10 mL  10 mL InterCATHeter PRN Radha Juárez MD   10 mL at 04/21/22 0552       OBJECTIVE:  Patient Vitals for the past 8 hrs:   BP Temp Pulse Resp SpO2   04/21/22 0748 (!) 146/77 98.2 °F (36.8 °C) 92 18 98 %   04/21/22 0300 136/65 98.3 °F (36.8 °C) 90 18 100 %     Temp (24hrs), Av.1 °F (36.7 °C), Min:97.7 °F (36.5 °C), Max:98.5 °F (36.9 °C)    No intake/output data recorded. Physical Exam:  Constitutional: Well developed, well nourished male in no acute distress, sitting comfortably in the bedside chair. HEENT: +L eye patch. Normocephalic and atraumatic. Oropharynx is clear, mucous membranes are moist. Extraocular muscles are intact. Sclerae anicteric. Neck supple without JVD. No thyromegaly present. Skin Erythematous, macular rash to BL inner thigh. Warm and dry. No bruising and no rash noted. No erythema. No pallor. Respiratory Lungs are clear to auscultation bilaterally without wheezes, rales or rhonchi, normal air exchange without accessory muscle use. CVS Normal rate, regular rhythm and normal S1 and S2. +murmur   Abdomen Soft, nontender and nondistended, normoactive bowel sounds. No palpable mass. No hepatosplenomegaly. Neuro Grossly nonfocal with no obvious sensory or motor deficits. MSK Normal range of motion in general.  No edema and no tenderness. Psych Appropriate mood and affect. Labs:    Recent Results (from the past 24 hour(s))   METABOLIC PANEL, COMPREHENSIVE    Collection Time: 22  3:25 AM   Result Value Ref Range    Sodium 143 136 - 145 mmol/L    Potassium 3.4 (L) 3.5 - 5.1 mmol/L    Chloride 110 (H) 98 - 107 mmol/L    CO2 28 21 - 32 mmol/L    Anion gap 5 (L) 7 - 16 mmol/L    Glucose 97 65 - 100 mg/dL    BUN 11 6 - 23 MG/DL    Creatinine 0.50 (L) 0.8 - 1.5 MG/DL    GFR est AA >60 >60 ml/min/1.73m2    GFR est non-AA >60 >60 ml/min/1.73m2    Calcium 8.7 8.3 - 10.4 MG/DL    Bilirubin, total 0.3 0.2 - 1.1 MG/DL    ALT (SGPT) 25 12 - 65 U/L    AST (SGOT) 11 (L) 15 - 37 U/L    Alk.  phosphatase 87 50 - 136 U/L    Protein, total 5.7 (L) 6.3 - 8.2 g/dL    Albumin 3.1 (L) 3.5 - 5.0 g/dL    Globulin 2.6 2.3 - 3.5 g/dL    A-G Ratio 1.2 1.2 - 3.5     MAGNESIUM    Collection Time: 22  3:25 AM   Result Value Ref Range    Magnesium 2.2 1.8 - 2.4 mg/dL   CBC WITH AUTOMATED DIFF    Collection Time: 04/21/22  3:25 AM   Result Value Ref Range    WBC 0.7 (LL) 4.3 - 11.1 K/uL    RBC 3.16 (L) 4.23 - 5.6 M/uL    HGB 9.4 (L) 13.6 - 17.2 g/dL    HCT 27.1 (L) 41.1 - 50.3 %    MCV 85.8 79.6 - 97.8 FL    MCH 29.7 26.1 - 32.9 PG    MCHC 34.7 31.4 - 35.0 g/dL    RDW 11.1 (L) 11.9 - 14.6 %    PLATELET 18 (LL) 584 - 450 K/uL    MPV 10.8 9.4 - 12.3 FL    ABSOLUTE NRBC 0.00 0.0 - 0.2 K/uL    NEUTROPHILS 4 (L) 43 - 78 %    LYMPHOCYTES 51 (H) 13 - 44 %    MONOCYTES 45 (H) 4.0 - 12.0 %    EOSINOPHILS 0 (L) 0.5 - 7.8 %    BASOPHILS 0 0.0 - 2.0 %    IMMATURE GRANULOCYTES 0 0.0 - 5.0 %    ABS. NEUTROPHILS 0.0 (L) 1.7 - 8.2 K/UL    ABS. LYMPHOCYTES 0.4 (L) 0.5 - 4.6 K/UL    ABS. MONOCYTES 0.3 0.1 - 1.3 K/UL    ABS. EOSINOPHILS 0.0 0.0 - 0.8 K/UL    ABS. BASOPHILS 0.0 0.0 - 0.2 K/UL    ABS. IMM. GRANS. 0.0 0.0 - 0.5 K/UL    RBC COMMENTS SLIGHT  ANISOCYTOSIS + POIKILOCYTOSIS        WBC COMMENTS OCCASIONAL      PLATELET COMMENTS MARKED      DF MANUAL         Imaging:  n/a    ASSESSMENT:  Problem List  Date Reviewed: 3/16/2022          Codes Class Noted    Malnutrition of moderate degree ([facility] ICD-10-CM: E44.0  ICD-9-CM: 263.0  4/15/2022        Bone marrow transplant status ([facility] ICD-10-CM: Z94.81  ICD-9-CM: V42.81  4/5/2022        Abnormality of chordae tendineae of mitral valve ICD-10-CM: Q23.8  ICD-9-CM: 746.89  12/17/2021        Diffuse large B cell lymphoma ([facility] ICD-10-CM: C83.30  ICD-9-CM: 202.80  12/9/2021        Hypertension ICD-10-CM: I10  ICD-9-CM: 401.9  12/9/2021        Mitral regurgitation ICD-10-CM: I34.0  ICD-9-CM: 424.0  12/9/2021        Admission for antineoplastic chemotherapy ICD-10-CM: Z51.11  ICD-9-CM: V58.11  12/6/2021            Mr. Josie Cancino is a 64 y.o. male admitted on 4/5/2022. The primary encounter diagnosis was Diffuse large B-cell lymphoma, unspecified body region [facility]).  Diagnoses of Admission for antineoplastic chemotherapy, Bone marrow transplant status ([facility] and Nonrheumatic mitral valve regurgitation were also pertinent to this visit. 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 He is a patient of Dr Deandre Juarez treated for relapsed DLBCL. He was initially treated by Dr Juvenal Ingram in [place] with R-CHOP 4/21-7/21. However, he was noted to have relapsed disease in L supraclavicular LN. He was then evaluated by Dr Deandre Juarez and has now completed R-ICE x3 with IT MTX x4 with CR. He has been evaluated for ASCT and collected 4.8x10^6/kg CD34+ cells. He is now admitted for BEAM/ASCT. He was seen by Dr Deandre Juarez day prior to admission and rapid COVID and flu testing negative. He was previously cleared by cardiology to proceed with transplant given mitral valve regurgitation. He is feeling well and ready to begin treatment today. PLAN:    Relapsed DLBCL / ASCT  - Admit for BEAM/ASCT - D-6 today  - Prophylactic antibiotics to begin D+1  - G-CSF to begin D+6  4/10 Day -1 BEAM/ASCT. Stem cells tomorrow. Tolerating treatment well. 4/11 Day 0. Stem cells today. 4/12 Day +1. PPx antibx begin today. 4/17 Day +6. Fungitell pending, aspergillus negative. Counts dropping as expected. Daily G-CSF.  4/21 Day +10. Awaiting count recovery, con't Nivestym. Mitral regurgitation  - Evaluated by cardiology, cleared to proceed with ASCT  - Monitor for fluid overload  4/14 No evidence of fluid overload. On gentle hydration d/t decreased intake 2/2 nausea. Con't to monitor for fluid overload. 4/16 Increasing IVF given hypotension  4/17 Hypotension resolved - decrease IVF to 100 ml/hr and monitor fluid status. 4/21 Wt up 4# with net +500mL. Will hold off on diuresing for now given ongoing diarrhea. Hypertension / hypotension  - Continue home meds  4/9 Pt with persistent HTN. Increase lisinopril to 20mg daily. 4/11 BPs improved overall  4/16 Intermittent hypotension with hypertension. Holding antihypertensives. 4/17 Better overnight - decreasing IVF. 4/18 BPs improved    Constipation / Diarrhea / loose stools  4/8 Continue PRN pericolace/miralax for now  4/9 BM x 1 yesterday  4/12 BM x 3 yesterday, reports loose stools  4/13 650mL diarrhea yesterday. Check for Cdiff. If neg, can utilize antidiarrheals. 4/14 C diff negative - starting imodium PRN  4/17 Loose stools - manageable with antidiarrheals  4/20 Ongoing diarrhea, 1.2L yesterday. Schedule imodium. Add opium tincture. 4/21 Diarrhea ongoing, 1.6L yesterday, but stools reportedly have become more thickened. Con't scheduled imodium. Could better utilize antidiarrheals if needed. Nausea / poor appetite  4/11 antiemetics prn  4/14 Nausea managed - reports poor appetite. Will order Ensure. 4/15 On scheduled zofran. Added marinol BID as well as PPI. Some improvement today. 4/17 Stable to improved. 4/18 Pt with ongoing poor po intake. RD following. Start TPN.  4/19 States he feels better today. Con't TPN  4/21 Con't TPN    Rash  4/16 Hydrocortisone cream    Pancytopenia secondary to chemotherapy  - Transfuse prn per Rhea SOPs  - G-CSF started D+6    Continue home meds  PPx meds: Acyclovir, Diflucan, Levaquin, Augmentin  Rhea SOPs  AC contraindicated d/t thrombocytopenia    Goals and plan of care reviewed with the patient. All questions answered to the best of our ability. Disposition:  Awaiting count recovery.               Justin Fields NP   [facility] Hematology [facility] Oncology  [address] [facility] [address]  [address]  Office : [phone]  Fax : [phone]

## 2022-04-21 NOTE — PROGRESS NOTES
Comprehensive Nutrition Assessment    Type and Reason for Visit: Reassess  TPN Management (Oncology)    Nutrition Recommendations/Plan:   Parenteral Nutrition:  Total parenteral nutrition  to begin at 1800  Continue: Dex 10% , 4.25% AA 2 L (90ml/hr for 22 hr infusion due to KCl outside TPN)   Continue 250 ml 20% daily   Lytes/L:   Sodium 100 meq (100 meq NaCl), Potassium 35 meq (35 meq KPO4), 5 meq Mg, 4.5 meq Calcium  Other additives: MTE, MVI MWF due to national shortages, 100 mg Thiamine (day 4/7 due to refeeding risk)  Electrolytes outside TPN: 20 meq KCl to infuse for 2 hrs prior to TPN infusion again tonight  Nutritional Supplement Therapy:   Active electrolyte replacement per nutrition support protocols  Replacement indicated:  None - will replace outside TPN tonight as currently no other IV access  Labs:   CMP daily  Mg daily  Phos MWF    Triglyceride tomorrow  POC Glucoses/SSI Not indicated  Meals and Snacks:  Continue current diet. Continue GI soft modification  Nutrition Supplement Therapy:   Medical food supplement therapy:  Continue Ensure High Protein three times per day (this provides 160 kcal and 16 grams protein per bottle)     Malnutrition Assessment:  Malnutrition Status: Moderate malnutrition  Context: Acute illness  Findings of clinical characteristics of malnutrition:   Energy Intake:  Mild decrease in energy intake (specify) (less than 50% needs for ~4 days)  Weight Loss:  7 - Greater than 2% over 1 week (14# (7.5%))     Body Fat Loss:  1 - Mild body fat loss, Fat overlying ribs,Triceps   Muscle Mass Loss:  1 - Mild muscle mass loss, Calf,Clavicles (pectoralis & deltoids),Scapula (trapezius),Temples (temporalis)  Fluid Accumulation:  No significant fluid accumulation,     Strength:  Not performed       Nutrition Assessment:   Nutrition History: Patient reports eating well prior to admission. He reports UBW ~240# and that he eats 3 meals per day at baseline.  He reports weight loss ~1.5 years ago prior to cancer diagnosis but states that he has been able to maintain at current weight for at least a year. Nutrition Background: Patient with PMH significant for DLBCL s/p initial treatment with R-CHOP 2021 now replapsed and most recently treated with R-ICE x3 + IT MTX x4, HTN, vascular heart disease. He was admitted for chemo + ASCT. Nutrition Interval:  Patient seen and discussed with RN, [facility]. He states that he is eating a little better. He states he has been less nauseous with PO. He states that he ate all his chicken noodle soup and Ensure for lunch yesterday, but only had a fruit cup and Ensure for dinner. He states that he was able to eat almost 2 boiled eggs and all of his cereal this morning. He asks if snacks are available if he gets hungry at night. Explained snacks available on floor. He states that he is still having watery stools but can feel like there are becoming thicker. Moderate refeeding risk with very poor PO x8 days. Abdominal Status (last documented): Diarrhea,Intact abdomen with Active  bowel sounds. Last BM 04/21/22.  Stool output: 1650 ml/6 occurrences over last 24 hrs   Pertinent Medications: Zovirax, Augmentin, Lomotil, Imodium now scheduled, Diflucan, MMW, Zofran (scheduled Q8 hrs), Compazine (PRN ans last utilized 4/17), Protonix, Zyprexa, Nivetym, Tincture of opium  Pertinent Labs:   Lab Results   Component Value Date/Time    Sodium 143 04/21/2022 03:25 AM    Potassium 3.4 (L) 04/21/2022 03:25 AM    Chloride 110 (H) 04/21/2022 03:25 AM    CO2 28 04/21/2022 03:25 AM    Anion gap 5 (L) 04/21/2022 03:25 AM    Glucose 97 04/21/2022 03:25 AM    BUN 11 04/21/2022 03:25 AM    Creatinine 0.50 (L) 04/21/2022 03:25 AM    Calcium 8.7 04/21/2022 03:25 AM    Albumin 3.1 (L) 04/21/2022 03:25 AM    Magnesium 2.2 04/21/2022 03:25 AM    Phosphorus 3.0 04/20/2022 03:42 AM     Lab Results   Component Value Date/Time    Triglyceride 104 04/20/2022 03:42 AM   Labs remarkable for: Na slight trend up, K depleted again but stable, Mg stable      Nutrition Related Findings:   NFPE as above. 4/18 currently with single IV asscess - port. Dilute TPN initiated 4/18 without lipids. TPN advanced to 10/4.25 + lipid initiation. Current Nutrition Therapies:  ADULT DIET Regular; GI [place] (GERD/Peptic Ulcer)  ADULT ORAL NUTRITION SUPPLEMENT Breakfast, Lunch, Dinner; Low Calorie/High Protein    Current Intake:   Average Meal Intake: 26-50% Average Supplement Intake: 51-75%      Anthropometric Measures:  Height: 6' (182.9 cm)  Current Body Wt: 76.8 kg (169 lb 5 oz) (4/19), Weight source: Standing scale  BMI: 23, Normal weight (BMI 18.5-24. 9)  Admission Body Weight: 185 lb (4/7 standing scale)  Ideal Body Weight (lbs) (Calculated): 178 lbs (81 kg), 96.2 %  Usual Body Wt: 83.9 kg (185 lb), Percent weight change: -7.4        Edema: No data recorded  Estimated Daily Nutrient Needs:  Energy (kcal/day): 7134-1242 (Kcal/kg (25-30), Weight Used: Current (77.7 kg (4/13)))  Protein (g/day):  (1.2-1.3 g/kg) Weight Used: (Current)  Fluid (ml/day):   (1 ml/kcal)    Nutrition Diagnosis:   · Inadequate protein-energy intake related to altered GI function (lack of appetite, N/V) as evidenced by intake 0-25%,diarrhea    · Moderate malnutrition related to  (lack of appetite, nausea) as evidenced by  (malnutrition criteria as above)    Nutrition Interventions:   Food and/or Nutrient Delivery: Continue current diet,Continue oral nutrition supplement,Modify parenteral nutrition     Coordination of Nutrition Care: Continue to monitor while inpatient    Goals:   Previous Goal Met: Goal(s) achieved  Active Goal: Continue to tolerate diet + ONS + TPN to meet needs until PO and GI status improve     Nutrition Monitoring and Evaluation:      Food/Nutrient Intake Outcomes: Food and nutrient intake,Supplement intake,Parenteral nutrition intake/tolerance  Physical Signs/Symptoms Outcomes: Biochemical data,Diarrhea,GI status,Nausea/vomiting,Meal time behavior,Weight    Discharge Planning:     Too soon to determine    [address], LD on 4/21/2022 at 10:59 AM  Contact: [phone]

## 2022-04-21 NOTE — PROGRESS NOTES
Diagnosis: DLBCL  Transplant Date: 4/11/22  Day: (+/-) D+10. Chemo regimen used: BEAM  Mouth Care completed 3  BMT assessments and toxicities completed. WBC/ANC= 0.7/0.0. Prophylactic medications started D+1(4/12/22-Augmentin, LVQ, ACV, diflucan)   G-CSF started on D+6 4/17/22   Toxicities greater than 2 :NONE. Patient seen by MD/NP daily until engraftment or while IP. Labs not resulted that need follow-up:Aspergillus, Fungitell  New orders received: none. Issues:NONE    END OF SHIFT NOTE:    Intake/Output  04/20 1901 - 04/21 0700  In: -   Out: 500 [Urine:300]   Voiding: YES  Catheter: NO  Drain:              Stool:  1 occurrences. Stool Assessment  Stool Color: Brown (04/20/22 1743)  Stool Appearance: Loose (04/20/22 1743)  Stool Amount: Medium (04/20/22 1743)  Stool Source/Status: Rectum (04/20/22 1743)    Emesis:  0 occurrences. Emesis Assessment  Appearance: Yellow (04/12/22 1138)  Emesis Amount: Small (04/12/22 1138)    VITAL SIGNS  Patient Vitals for the past 12 hrs:   Temp Pulse Resp BP SpO2   04/20/22 2327 98.5 °F (36.9 °C) 75 20 (!) 142/73 100 %   04/20/22 1900 97.9 °F (36.6 °C) 83 20 (!) 147/77 100 %   04/20/22 1455 97.7 °F (36.5 °C) 75 18 128/69 100 %       Pain Assessment  Pain 1  Pain Scale 1: Numeric (0 - 10) (04/20/22 1900)  Pain Intensity 1: 0 (04/20/22 1900)  Patient Stated Pain Goal: 0 (04/20/22 1900)  Pain Reassessment 1: Patient resting w/respiratory rate greater than 10 (04/19/22 0231)    Ambulating  Yes    Additional Information:     Shift report given to oncoming nurse at the bedside.     Yunior Conrad RN

## 2022-04-21 NOTE — PROGRESS NOTES
Diagnosis: DLBCL  Transplant Date: 04/11/2022  Day: (+/-) +10. Chemo regimen used: BEAM  BMT assessments and toxicities completed. WBC/ANC= 0.7/0.0. Prophylactic medications started D+1 04/12/2022. G-CSF started on D+6 04/17/2022. Toxicities greater than 2 :none. .    Patient seen by MD/NP daily until engraftment. New orders received: none. Fairy Mew much better today. END OF SHIFT NOTE:    Intake/Output  04/21 0701 - 04/21 1900  In: 2193 [P.O.:855; I.V.:1338]  Out: 500 [Urine:200]   Voiding: YES  Catheter: NO  Drain:              Stool:  2 occurrences. Stool Assessment  Stool Color: Daun Linen (04/21/22 1429)  Stool Appearance: Soft;Loose (04/21/22 1429)  Stool Amount: Small (04/21/22 1429)  Stool Source/Status: Rectum (04/21/22 1429)    Emesis:  0 occurrences. Emesis Assessment  Appearance: Yellow (04/12/22 1138)  Emesis Amount: Small (04/12/22 1138)    VITAL SIGNS  Patient Vitals for the past 12 hrs:   Temp Pulse Resp BP SpO2   04/21/22 1710 98.6 °F (37 °C) 84 18 (!) 144/77 96 %   04/21/22 1132 98 °F (36.7 °C) 96 18 134/79 100 %   04/21/22 0748 98.2 °F (36.8 °C) 92 18 (!) 146/77 98 %       Pain Assessment  Pain 1  Pain Scale 1: Numeric (0 - 10) (04/21/22 1430)  Pain Intensity 1: 0 (04/21/22 1430)  Patient Stated Pain Goal: 0 (04/21/22 1430)  Pain Reassessment 1: Patient resting w/respiratory rate greater than 10 (04/19/22 0231)    Ambulating  Yes    Additional Information: Patient's diarrhea much better today with only 2 semi-loose stools on my shift. Stool more jelly like consistency than watery. Mouth care completed 5 times during my shift. Did urinate twice in toilet when having a BM so did not catch those in urinal. Appetite improved. VSS. States he is feeling much better. Shift report given to oncoming nurse at the bedside.     Estefany Bradley

## 2022-04-22 LAB
ALBUMIN SERPL-MCNC: 2.9 G/DL (ref 3.5–5)
ALBUMIN/GLOB SERPL: 1.2 {RATIO} (ref 1.2–3.5)
ALP SERPL-CCNC: 92 U/L (ref 50–136)
ALT SERPL-CCNC: 33 U/L (ref 12–65)
ANION GAP SERPL CALC-SCNC: 5 MMOL/L (ref 7–16)
AST SERPL-CCNC: 17 U/L (ref 15–37)
BASOPHILS # BLD: 0 K/UL (ref 0–0.2)
BASOPHILS NFR BLD: 1 % (ref 0–2)
BILIRUB SERPL-MCNC: 0.2 MG/DL (ref 0.2–1.1)
BUN SERPL-MCNC: 10 MG/DL (ref 6–23)
CALCIUM SERPL-MCNC: 8.4 MG/DL (ref 8.3–10.4)
CHLORIDE SERPL-SCNC: 109 MMOL/L (ref 98–107)
CO2 SERPL-SCNC: 30 MMOL/L (ref 21–32)
CREAT SERPL-MCNC: 0.5 MG/DL (ref 0.8–1.5)
DIFFERENTIAL METHOD BLD: ABNORMAL
EOSINOPHIL # BLD: 0 K/UL (ref 0–0.8)
EOSINOPHIL NFR BLD: 0 % (ref 0.5–7.8)
ERYTHROCYTE [DISTWIDTH] IN BLOOD BY AUTOMATED COUNT: 11.2 % (ref 11.9–14.6)
GGT SERPL-CCNC: 87 U/L (ref 15–85)
GLOBULIN SER CALC-MCNC: 2.5 G/DL (ref 2.3–3.5)
GLUCOSE SERPL-MCNC: 102 MG/DL (ref 65–100)
HCT VFR BLD AUTO: 26 % (ref 41.1–50.3)
HGB BLD-MCNC: 9.2 G/DL (ref 13.6–17.2)
IMM GRANULOCYTES # BLD AUTO: 0 K/UL (ref 0–0.5)
IMM GRANULOCYTES NFR BLD AUTO: 3 % (ref 0–5)
LDH SERPL L TO P-CCNC: 170 U/L (ref 100–190)
LYMPHOCYTES # BLD: 0.5 K/UL (ref 0.5–4.6)
LYMPHOCYTES NFR BLD: 33 % (ref 13–44)
MAGNESIUM SERPL-MCNC: 2 MG/DL (ref 1.8–2.4)
MCH RBC QN AUTO: 30 PG (ref 26.1–32.9)
MCHC RBC AUTO-ENTMCNC: 35.4 G/DL (ref 31.4–35)
MCV RBC AUTO: 84.7 FL (ref 79.6–97.8)
MONOCYTES # BLD: 0.8 K/UL (ref 0.1–1.3)
MONOCYTES NFR BLD: 52 % (ref 4–12)
NEUTS SEG # BLD: 0.2 K/UL (ref 1.7–8.2)
NEUTS SEG NFR BLD: 11 % (ref 43–78)
NRBC # BLD: 0.02 K/UL (ref 0–0.2)
PHOSPHATE SERPL-MCNC: 2.7 MG/DL (ref 2.5–4.5)
PLATELET # BLD AUTO: 15 K/UL (ref 150–450)
PLATELET COMMENTS,PCOM: ABNORMAL
PMV BLD AUTO: 10.5 FL (ref 9.4–12.3)
POTASSIUM SERPL-SCNC: 3.4 MMOL/L (ref 3.5–5.1)
PROT SERPL-MCNC: 5.4 G/DL (ref 6.3–8.2)
RBC # BLD AUTO: 3.07 M/UL (ref 4.23–5.6)
RBC MORPH BLD: ABNORMAL
SODIUM SERPL-SCNC: 144 MMOL/L (ref 136–145)
WBC # BLD AUTO: 1.5 K/UL (ref 4.3–11.1)
WBC MORPH BLD: ABNORMAL

## 2022-04-22 PROCEDURE — 2709999900 HC NON-CHARGEABLE SUPPLY

## 2022-04-22 PROCEDURE — APPSS60 APP SPLIT SHARED TIME 46-60 MINUTES: Performed by: NURSE PRACTITIONER

## 2022-04-22 PROCEDURE — 74011250636 HC RX REV CODE- 250/636: Performed by: INTERNAL MEDICINE

## 2022-04-22 PROCEDURE — APPNB15 APP NON BILLABLE TIME 0-15 MINS: Performed by: NURSE PRACTITIONER

## 2022-04-22 PROCEDURE — 36591 DRAW BLOOD OFF VENOUS DEVICE: CPT

## 2022-04-22 PROCEDURE — 99232 SBSQ HOSP IP/OBS MODERATE 35: CPT | Performed by: INTERNAL MEDICINE

## 2022-04-22 PROCEDURE — 74011000250 HC RX REV CODE- 250: Performed by: INTERNAL MEDICINE

## 2022-04-22 PROCEDURE — 83735 ASSAY OF MAGNESIUM: CPT

## 2022-04-22 PROCEDURE — 84100 ASSAY OF PHOSPHORUS: CPT

## 2022-04-22 PROCEDURE — 65270000015 HC RM PRIVATE ONCOLOGY

## 2022-04-22 PROCEDURE — 85025 COMPLETE CBC W/AUTO DIFF WBC: CPT

## 2022-04-22 PROCEDURE — 74011250637 HC RX REV CODE- 250/637: Performed by: NURSE PRACTITIONER

## 2022-04-22 PROCEDURE — 82977 ASSAY OF GGT: CPT

## 2022-04-22 PROCEDURE — 83615 LACTATE (LD) (LDH) ENZYME: CPT

## 2022-04-22 PROCEDURE — 80053 COMPREHEN METABOLIC PANEL: CPT

## 2022-04-22 RX ORDER — PROCHLORPERAZINE MALEATE 10 MG
5-10 TABLET ORAL
Qty: 30 TABLET | Refills: 0 | Status: SHIPPED | OUTPATIENT
Start: 2022-04-22

## 2022-04-22 RX ORDER — LOPERAMIDE HYDROCHLORIDE 2 MG/1
2 CAPSULE ORAL
Qty: 30 CAPSULE | Refills: 0 | Status: SHIPPED | OUTPATIENT
Start: 2022-04-22

## 2022-04-22 RX ORDER — POTASSIUM CHLORIDE 29.8 MG/ML
40 INJECTION INTRAVENOUS ONCE
Status: COMPLETED | OUTPATIENT
Start: 2022-04-22 | End: 2022-04-22

## 2022-04-22 RX ORDER — ONDANSETRON 8 MG/1
8 TABLET, ORALLY DISINTEGRATING ORAL
Qty: 60 TABLET | Refills: 0 | Status: SHIPPED | OUTPATIENT
Start: 2022-04-22

## 2022-04-22 RX ORDER — ACYCLOVIR 400 MG/1
400 TABLET ORAL 2 TIMES DAILY
Qty: 60 TABLET | Refills: 0 | Status: SHIPPED | OUTPATIENT
Start: 2022-04-22 | End: 2022-05-12 | Stop reason: SINTOL

## 2022-04-22 RX ORDER — LISINOPRIL 20 MG/1
20 TABLET ORAL DAILY
Qty: 30 TABLET | Refills: 0 | Status: SHIPPED | OUTPATIENT
Start: 2022-04-22

## 2022-04-22 RX ORDER — DIPHENOXYLATE HYDROCHLORIDE AND ATROPINE SULFATE 2.5; .025 MG/1; MG/1
1 TABLET ORAL
Qty: 30 TABLET | Refills: 0 | Status: SHIPPED | OUTPATIENT
Start: 2022-04-22

## 2022-04-22 RX ADMIN — CALCIUM GLUCONATE: 98 INJECTION, SOLUTION INTRAVENOUS at 22:18

## 2022-04-22 RX ADMIN — AMOXICILLIN AND CLAVULANATE POTASSIUM 1 TABLET: 875; 125 TABLET, FILM COATED ORAL at 22:17

## 2022-04-22 RX ADMIN — ACYCLOVIR 400 MG: 800 TABLET ORAL at 07:48

## 2022-04-22 RX ADMIN — AMOXICILLIN AND CLAVULANATE POTASSIUM 1 TABLET: 875; 125 TABLET, FILM COATED ORAL at 07:48

## 2022-04-22 RX ADMIN — LOPERAMIDE HYDROCHLORIDE 2 MG: 2 CAPSULE ORAL at 14:05

## 2022-04-22 RX ADMIN — CETIRIZINE HYDROCHLORIDE 5 MG: 5 TABLET ORAL at 07:47

## 2022-04-22 RX ADMIN — FLUCONAZOLE 200 MG: 100 TABLET ORAL at 07:48

## 2022-04-22 RX ADMIN — LOPERAMIDE HYDROCHLORIDE 2 MG: 2 CAPSULE ORAL at 07:48

## 2022-04-22 RX ADMIN — OLANZAPINE 5 MG: 5 TABLET, ORALLY DISINTEGRATING ORAL at 22:17

## 2022-04-22 RX ADMIN — PANTOPRAZOLE SODIUM 40 MG: 40 TABLET, DELAYED RELEASE ORAL at 07:48

## 2022-04-22 RX ADMIN — FILGRASTIM-AAFI 300 MCG: 300 INJECTION, SOLUTION SUBCUTANEOUS at 07:47

## 2022-04-22 RX ADMIN — ACYCLOVIR 400 MG: 800 TABLET ORAL at 17:05

## 2022-04-22 RX ADMIN — POTASSIUM CHLORIDE 40 MEQ: 400 INJECTION, SOLUTION INTRAVENOUS at 17:05

## 2022-04-22 RX ADMIN — LISINOPRIL 20 MG: 20 TABLET ORAL at 07:48

## 2022-04-22 RX ADMIN — LEVOFLOXACIN 500 MG: 500 TABLET, FILM COATED ORAL at 07:48

## 2022-04-22 RX ADMIN — LOPERAMIDE HYDROCHLORIDE 2 MG: 2 CAPSULE ORAL at 22:17

## 2022-04-22 RX ADMIN — ATENOLOL 25 MG: 50 TABLET ORAL at 07:48

## 2022-04-22 NOTE — PROGRESS NOTES
Diagnosis: DLBCL  Transplant Date: 04/11/2022  Day: (+/-) +11. Chemo regimen used: BEAM  BMT assessments and toxicities completed. WBC/ANC= 1.5/0.2. Prophylactic medications started D+1 04/12/2022  G-CSF started on D+6 04/17/2022  Toxicities greater than 2 :none. Patient seen by MD/NP daily until engraftment. New orders received: none. Issues:none    END OF SHIFT NOTE:    Intake/Output  04/22 0701 - 04/22 1900  In: 2438 [P.O.:1087; I.V.:1351]  Out: 750 [Urine:500]   Voiding: YES  Catheter: NO  Drain:              Stool:  2 occurrences. Stool Assessment  Stool Color: Woody Jasson (04/22/22 1716)  Stool Appearance: Soft;Loose (04/22/22 1716)  Stool Amount: Medium (04/22/22 1716)  Stool Source/Status: Rectum (04/22/22 1716)    Emesis:  0 occurrences. Emesis Assessment  Appearance: Yellow (04/12/22 1138)  Emesis Amount: Small (04/12/22 1138)    VITAL SIGNS  Patient Vitals for the past 12 hrs:   Temp Pulse Resp BP SpO2   04/22/22 1716 98 °F (36.7 °C) 84 18 121/66 99 %   04/22/22 1145 98.2 °F (36.8 °C) 86 18 105/70 96 %   04/22/22 0748 98 °F (36.7 °C) 94 18 138/71 98 %       Pain Assessment  Pain 1  Pain Scale 1: Numeric (0 - 10) (04/22/22 1409)  Pain Intensity 1: 0 (04/22/22 1409)  Patient Stated Pain Goal: 0 (04/22/22 1409)  Pain Reassessment 1: Patient resting w/respiratory rate greater than 10 (04/19/22 0231)    Ambulating  Yes    Additional Information: Patient feeling well. 2 BM's that are still loose with some solid form to it. Mouth care completed 4 times this shift. Eating well. Walked in room. VSS. Shift report given to oncoming nurse at the bedside.     Larry Payan

## 2022-04-22 NOTE — PROGRESS NOTES
LOS 17d  Chart reviewed by [facility] for discharge planning   BMT day11+  Continue to improve possible discharge over the weekend per ONC notes.   Will continue to follow for discharge planning needs  Please consult  if any new issues arise  Discharge home with family assitance

## 2022-04-22 NOTE — PROGRESS NOTES
[facility] Hematology & Oncology        Inpatient Hematology / Oncology Progress Note    Reason for Admission:  Diffuse large B cell lymphoma ([facility] [C83.30]  Admission for antineoplastic chemotherapy [Z51.11]  Bone marrow transplant status ([facility] [Z94.81]    24 Hour Events:  Afebrile, VSS  Day +11 BEAM/ASCT  Awaiting count recovery, on G-CSF  ANC up to 200  On TPN, po intake much improved  Diarrhea improving  Wt up 4# with net +1L    Transfusions: None  Replacements: K+ (in TPN)    ROS:  Constitutional: Negative for fever, chills. CV: Negative for chest pain, palpitations, edema. Respiratory: Negative for dyspnea, cough, wheezing. GI: +diarrhea. Negative for abdominal pain. 10 point review of systems is otherwise negative with the exception of the elements mentioned above in the HPI.        No Known Allergies  Past Medical History:   Diagnosis Date    Cancer ([facility]     Hypertension     Valvular heart disease      Past Surgical History:   Procedure Laterality Date    HX BACK SURGERY      26 years ago    HX TRANSPLANT  04/2022    auto stem cell transplant    HX VASCULAR ACCESS      IR BX BONE MARROW DIAGNOSTIC  2/24/2022    IR CHOLECYSTOSTOMY PERCUTANEOUS      IR INSERT NON TUNL CVC OVER 5 YRS  3/14/2022    IR INTRATHECAL CHEMO INJECTION CNS  12/8/2021    IR INTRATHECAL CHEMO INJECTION CNS  12/28/2021    IR INTRATHECAL CHEMO INJECTION CNS  1/28/2022    IR SPINAL PUNCTURE CSF TREAT / DRAIN  2/4/2022     Family History   Problem Relation Age of Onset    Diabetes Mother     Hypertension Mother     Diabetes Father     Hypertension Father     Hypertension Brother      Social History     Socioeconomic History    Marital status:      Spouse name: Not on file    Number of children: Not on file    Years of education: Not on file    Highest education level: Not on file   Occupational History    Not on file   Tobacco Use    Smoking status: Never Smoker    Smokeless tobacco: Never Used Vaping Use    Vaping Use: Never used   Substance and Sexual Activity    Alcohol use: Not Currently    Drug use: Not Currently    Sexual activity: Not on file   Other Topics Concern     Service Not Asked    Blood Transfusions Not Asked    Caffeine Concern Not Asked    Occupational Exposure Not Asked    Hobby Hazards Not Asked    Sleep Concern Not Asked    Stress Concern Not Asked    Weight Concern Not Asked    Special Diet Not Asked    Back Care Not Asked    Exercise Not Asked    Bike Helmet Not Asked   [address] Not Asked    Self-Exams Not Asked   Social History Narrative    Not on file     Social Determinants of Health     Financial Resource Strain:     Difficulty of Paying Living Expenses: Not on file   Food Insecurity:     Worried About Running Out of Food in the Last Year: Not on file    Sayra of Food in the Last Year: Not on file   Transportation Needs:     Lack of Transportation (Medical): Not on file    Lack of Transportation (Non-Medical):  Not on file   Physical Activity:     Days of Exercise per Week: Not on file    Minutes of Exercise per Session: Not on file   Stress:     Feeling of Stress : Not on file   Social Connections:     Frequency of Communication with Friends and Family: Not on file    Frequency of Social Gatherings with Friends and Family: Not on file    Attends Gnosticist Services: Not on file    Active Member of [address] [place] [address] [employer] or Organizations: Not on file    Attends Club or Organization Meetings: Not on file    Marital Status: Not on file   Intimate Partner Violence:     Fear of Current or Ex-Partner: Not on file    Emotionally Abused: Not on file    Physically Abused: Not on file    Sexually Abused: Not on file   Housing Stability:     Unable to Pay for Housing in the Last Year: Not on file    Number of [place] in the Last Year: Not on file    Unstable Housing in the Last Year: Not on file     Current Facility-Administered Medications Medication Dose Route Frequency Provider Last Rate Last Admin    TPN ADULT - dextrose 10% amino acid 4.25%   IntraVENous QPM Rivera Manzano MD 90 mL/hr at 04/21/22 2047 Given at 04/21/22 2047    opium tincture 10 mg/mL (morphine) oral solution 0.6 mL  0.6 mL Oral Q4H PRN Rivera Manzano MD        loperamide (IMODIUM) capsule 2 mg  2 mg Oral Q8H Gayatri Mccarty NP   2 mg at 04/22/22 0748    ondansetron (ZOFRAN) injection 8 mg  8 mg IntraVENous Q8H PRN Gayatri Mccarty NP        fat emulsion 20% (LIPOSYN, INTRAlipid) infusion 250 mL  250 mL IntraVENous QPM Rivera Manzano MD 20.83 mL/hr at 04/21/22 2046 250 mL at 04/21/22 2046    0.9% sodium chloride infusion 250 mL  250 mL IntraVENous PRN Iwona Webster MD        OLANZapine (ZyPREXA zydis) disintegrating tablet 5 mg  5 mg Oral QHS JW Dalton   5 mg at 04/21/22 2049    acetaminophen (TYLENOL) tablet 650 mg  650 mg Oral Q4H PRN Iwona Webster MD   650 mg at 04/18/22 0825    diphenhydrAMINE (BENADRYL) capsule 25 mg  25 mg Oral Q6H PRN Iwona Webster MD   25 mg at 04/18/22 0825    hydrocortisone (CORTAID) 1 % cream   Topical QID PRN JW Dalton   Given at 04/17/22 2052    diphenoxylate-atropine (LOMOTIL) tablet 1 Tablet  1 Tablet Oral QID PRN Iwona Webster MD   1 Tablet at 04/20/22 0802    magic mouthwash (MARCELLUS) oral suspension 5 mL  5 mL Oral Q4H PRN Iwona Webster MD   5 mL at 04/17/22 0937    pantoprazole (PROTONIX) tablet 40 mg  40 mg Oral ACB JW Dalton   40 mg at 04/22/22 9134    loperamide (IMODIUM) capsule 2 mg  2 mg Oral Q4H PRN Gayatri Mccarty NP   2 mg at 04/18/22 2242    LORazepam (ATIVAN) injection 1 mg  1 mg IntraVENous Q6H PRN Gayatri Mccarty NP   1 mg at 04/19/22 2337    prochlorperazine (COMPAZINE) with saline injection 10 mg  10 mg IntraVENous Q6H PRN Gayatri Mccarty NP   10 mg at 04/17/22 1308    amoxicillin-clavulanate (AUGMENTIN) 875-125 mg per tablet 1 Tablet  1 Tablet Oral Q12H Gayatri Mccarty, NP   1 Tablet at 22 0748    levoFLOXacin (LEVAQUIN) tablet 500 mg  500 mg Oral Q24H Sofia Dhillon NP   500 mg at 22 0748    acyclovir (ZOVIRAX) tablet 400 mg  400 mg Oral BID Sofia Dhillon NP   400 mg at 22 0748    fluconazole (DIFLUCAN) tablet 200 mg  200 mg Oral DAILY Sofia Dhillon NP   200 mg at 22 0748    filgrastim-aafi (NIVESTYM) injection syringe 300 mcg  300 mcg SubCUTAneous Q24H Tylor Wilder MD   300 mcg at 22 0747    cloNIDine HCL (CATAPRES) tablet 0.1 mg  0.1 mg Oral Q6H PRN Sofia BrayAMAN felix   0.1 mg at 04/10/22 1200    lisinopriL (PRINIVIL, ZESTRIL) tablet 20 mg  20 mg Oral DAILY Sofia DhillonAMAN   20 mg at 22 0748    HYDROcodone-acetaminophen (NORCO) 5-325 mg per tablet 1 Tablet  1 Tablet Oral Q6H PRN Black AMAN Dhillon        morphine injection 2 mg  2 mg IntraVENous Q4H PRN Black AMAN Dhillon        senna-docusate (PERICOLACE) 8.6-50 mg per tablet 1 Tablet  1 Tablet Oral DAILY PRN Varun Decker MD   1 Tablet at 22    polyethylene glycol (MIRALAX) packet 17 g  17 g Oral DAILY PRN Varun Decker MD        atenoloL (TENORMIN) tablet 25 mg  25 mg Oral DAILY Sarah Dial, FNP   25 mg at 22 3022    cetirizine (ZYRTEC) tablet 5 mg  5 mg Oral DAILY Dumont Dial, FNP   5 mg at 22 0747    ondansetron (ZOFRAN ODT) tablet 8 mg  8 mg Oral Q8H PRN Sarah Dial, FNP   8 mg at 22 1511    [Held by provider] enoxaparin (LOVENOX) injection 40 mg  40 mg SubCUTAneous Q24H Sarah Dial, FNP        heparin (porcine) pf 300 Units  300 Units InterCATHeter PRN Varun Decker MD        central line flush (saline) syringe 10 mL  10 mL InterCATHeter PRN Varun Decker MD   10 mL at 22 0589       OBJECTIVE:  Patient Vitals for the past 8 hrs:   BP Temp Pulse Resp SpO2   22 0748 138/71 98 °F (36.7 °C) 94 18 98 %   22 0240 126/64 98.5 °F (36.9 °C) 83 18 97 %     Temp (24hrs), Av.1 °F (36.7 °C), Min:97.7 °F (36.5 °C), Max:98.6 °F (37 °C)    04/22 0701 - 04/22 1900  In: -   Out: 200 [Urine:200]    Physical Exam:  Constitutional: Well developed, well nourished male in no acute distress, sitting comfortably in the bedside chair. HEENT: +L eye patch. Normocephalic and atraumatic. Oropharynx is clear, mucous membranes are moist. Extraocular muscles are intact. Sclerae anicteric. Neck supple without JVD. No thyromegaly present. Skin Erythematous, macular rash to BL inner thigh. Warm and dry. No bruising and no rash noted. No erythema. No pallor. Respiratory Lungs are clear to auscultation bilaterally without wheezes, rales or rhonchi, normal air exchange without accessory muscle use. CVS Normal rate, regular rhythm and normal S1 and S2. +murmur   Abdomen Soft, nontender and nondistended, normoactive bowel sounds. No palpable mass. No hepatosplenomegaly. Neuro Grossly nonfocal with no obvious sensory or motor deficits. MSK Normal range of motion in general.  No edema and no tenderness. Psych Appropriate mood and affect. Labs:    Recent Results (from the past 24 hour(s))   METABOLIC PANEL, COMPREHENSIVE    Collection Time: 04/22/22  2:46 AM   Result Value Ref Range    Sodium 144 136 - 145 mmol/L    Potassium 3.4 (L) 3.5 - 5.1 mmol/L    Chloride 109 (H) 98 - 107 mmol/L    CO2 30 21 - 32 mmol/L    Anion gap 5 (L) 7 - 16 mmol/L    Glucose 102 (H) 65 - 100 mg/dL    BUN 10 6 - 23 MG/DL    Creatinine 0.50 (L) 0.8 - 1.5 MG/DL    GFR est AA >60 >60 ml/min/1.73m2    GFR est non-AA >60 >60 ml/min/1.73m2    Calcium 8.4 8.3 - 10.4 MG/DL    Bilirubin, total 0.2 0.2 - 1.1 MG/DL    ALT (SGPT) 33 12 - 65 U/L    AST (SGOT) 17 15 - 37 U/L    Alk.  phosphatase 92 50 - 136 U/L    Protein, total 5.4 (L) 6.3 - 8.2 g/dL    Albumin 2.9 (L) 3.5 - 5.0 g/dL    Globulin 2.5 2.3 - 3.5 g/dL    A-G Ratio 1.2 1.2 - 3.5     MAGNESIUM    Collection Time: 04/22/22  2:46 AM   Result Value Ref Range    Magnesium 2.0 1.8 - 2.4 mg/dL   CBC WITH AUTOMATED DIFF    Collection Time: 04/22/22  2:46 AM   Result Value Ref Range    WBC 1.5 (LL) 4.3 - 11.1 K/uL    RBC 3.07 (L) 4.23 - 5.6 M/uL    HGB 9.2 (L) 13.6 - 17.2 g/dL    HCT 26.0 (L) 41.1 - 50.3 %    MCV 84.7 79.6 - 97.8 FL    MCH 30.0 26.1 - 32.9 PG    MCHC 35.4 (H) 31.4 - 35.0 g/dL    RDW 11.2 (L) 11.9 - 14.6 %    PLATELET 15 (LL) 112 - 450 K/uL    MPV 10.5 9.4 - 12.3 FL    ABSOLUTE NRBC 0.02 0.0 - 0.2 K/uL    NEUTROPHILS 11 (L) 43 - 78 %    LYMPHOCYTES 33 13 - 44 %    MONOCYTES 52 (H) 4.0 - 12.0 %    EOSINOPHILS 0 (L) 0.5 - 7.8 %    BASOPHILS 1 0.0 - 2.0 %    IMMATURE GRANULOCYTES 3 0.0 - 5.0 %    ABS. NEUTROPHILS 0.2 (L) 1.7 - 8.2 K/UL    ABS. LYMPHOCYTES 0.5 0.5 - 4.6 K/UL    ABS. MONOCYTES 0.8 0.1 - 1.3 K/UL    ABS. EOSINOPHILS 0.0 0.0 - 0.8 K/UL    ABS. BASOPHILS 0.0 0.0 - 0.2 K/UL    ABS. IMM.  GRANS. 0.0 0.0 - 0.5 K/UL    RBC COMMENTS NORMOCYTIC/NORMOCHROMIC      WBC COMMENTS Result Confirmed By Smear      PLATELET COMMENTS MARKED      DF AUTOMATED     PHOSPHORUS    Collection Time: 04/22/22  2:46 AM   Result Value Ref Range    Phosphorus 2.7 2.5 - 4.5 MG/DL   GGT    Collection Time: 04/22/22  2:46 AM   Result Value Ref Range    GGT 87 (H) 15 - 85 U/L   LD    Collection Time: 04/22/22  2:46 AM   Result Value Ref Range     100 - 190 U/L       Imaging:  n/a    ASSESSMENT:  Problem List  Date Reviewed: 3/16/2022          Codes Class Noted    Malnutrition of moderate degree (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 Darius Fallon is a 64 y.o. male admitted on 4/5/2022. The primary encounter diagnosis was Diffuse large B-cell lymphoma, unspecified body region [facility]). Diagnoses of Admission for antineoplastic chemotherapy, Bone marrow transplant status ([address] and Nonrheumatic mitral valve regurgitation were also pertinent to this visit. 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 He is a patient of Dr eGrda Berman treated for relapsed DLBCL. He was initially treated by Dr Merna Tafoya in [facility] with R-CHOP 4/21-7/21. However, he was noted to have relapsed disease in L supraclavicular LN. He was then evaluated by Dr Gerda Berman and has now completed R-ICE x3 with IT MTX x4 with CR. He has been evaluated for ASCT and collected 4.8x10^6/kg CD34+ cells. He is now admitted for BEAM/ASCT. He was seen by Dr Gerda Berman day prior to admission and rapid COVID and flu testing negative. He was previously cleared by cardiology to proceed with transplant given mitral valve regurgitation. He is feeling well and ready to begin treatment today. PLAN:    Relapsed DLBCL / ASCT  - Admit for BEAM/ASCT - D-6 today  - Prophylactic antibiotics to begin D+1  - G-CSF to begin D+6  4/10 Day -1 BEAM/ASCT. Stem cells tomorrow. Tolerating treatment well. 4/11 Day 0. Stem cells today. 4/12 Day +1. PPx antibx begin today. 4/17 Day +6. Fungitell pending, aspergillus negative. Counts dropping as expected. Daily G-CSF.  4/22 Day +11. Awaiting count recovery, con't Nivestym. Asp/Fungitell neg. Mitral regurgitation  - Evaluated by cardiology, cleared to proceed with ASCT  - Monitor for fluid overload  4/14 No evidence of fluid overload. On gentle hydration d/t decreased intake 2/2 nausea. Con't to monitor for fluid overload. 4/16 Increasing IVF given hypotension  4/17 Hypotension resolved - decrease IVF to 100 ml/hr and monitor fluid status. 4/22 Wt up 4# with net +1L. Will hold off on diuresing for now given ongoing diarrhea. Hypertension / hypotension  - Continue home meds  4/9 Pt with persistent HTN. Increase lisinopril to 20mg daily. 4/11 BPs improved overall  4/16 Intermittent hypotension with hypertension. Holding antihypertensives. 4/17 Better overnight - decreasing IVF. 4/18 BPs improved    Constipation / Diarrhea / loose stools  4/8 Continue PRN pericolace/miralax for now  4/9 BM x 1 yesterday  4/12 BM x 3 yesterday, reports loose stools  4/13 650mL diarrhea yesterday. Check for Cdiff. If neg, can utilize antidiarrheals. 4/14 C diff negative - starting imodium PRN  4/17 Loose stools - manageable with antidiarrheals  4/20 Ongoing diarrhea, 1.2L yesterday. Schedule imodium. Add opium tincture. 4/21 Diarrhea ongoing, 1.6L yesterday, but stools reportedly have become more thickened. Con't scheduled imodium. Could better utilize antidiarrheals if needed. 4/22 Diarrhea improving. Con't scheduled imodium and prn antidiarrheals. Nausea / poor appetite  4/11 antiemetics prn  4/14 Nausea managed - reports poor appetite. Will order Ensure. 4/15 On scheduled zofran. Added marinol BID as well as PPI. Some improvement today. 4/17 Stable to improved. 4/18 Pt with ongoing poor po intake. RD following. Start TPN.  4/19 States he feels better today. Con't TPN  4/22 Wean TPN, po intake much improved    Rash  4/16 Hydrocortisone cream    Pancytopenia secondary to chemotherapy  - Transfuse prn per Rhea SOPs  - G-CSF started D+6    Continue home meds  PPx meds: Acyclovir, Diflucan, Levaquin, Augmentin  Rhea SOPs  AC contraindicated d/t thrombocytopenia    Goals and plan of care reviewed with the patient. All questions answered to the best of our ability. Disposition:  Awaiting count recovery. ANC up to 200.               Jane Barajas NP   [facility]  [id number] [place], [place] [address] [address]  Office : [phone]  Fax : [phone]

## 2022-04-22 NOTE — DISCHARGE SUMMARY
[employer] [facility]: Inpatient Hematology / Oncology Discharge Summary Note    Patient ID:  Nasima Isaiah  [id number]  57 y.o.  1965    Admit Date: 4/5/2022    Discharge Date: 4/24/2022    Admission Diagnoses: Diffuse large B cell lymphoma ([facility] [C83.30]  Admission for antineoplastic chemotherapy [Z51.11]  Bone marrow transplant status [facility]) [Z94.81]    Discharge Diagnoses:  Principal Diagnosis: <principal problem not specified>  Active Problems:    Admission for antineoplastic chemotherapy (12/6/2021)      Diffuse large B cell lymphoma ([facility] (12/9/2021)      B[facility] marrow transplant status ([facility] (4/5/2022)      Malnutrition of moderate degree ([facility] (4/15/2022)        Hospital Course:  Mr. Madhu Wang is a 64 y.o. male admitted on 4/5/2022. The primary encounter diagnosis was Diffuse large B-cell lymphoma, unspecified body region [facility]). A diagnosis of Admission for antineoplastic chemotherapy was also pertinent to this visit.       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 He is a patient of Dr Madina Eden treated for relapsed DLBCL. He was initially treated by Dr Ayaan Ochoa in [place] with R-CHOP 4/21-7/21. However, he was noted to have relapsed disease in L supraclavicular LN. He was then evaluated by Dr Madina Eden and has now completed R-ICE x3 with IT MTX x4 with CR. He has been evaluated for ASCT and collected 4.8x10^6/kg CD34+ cells. He is now admitted for BEAM/ASCT. He was seen by Dr Madina Eden day prior to admission and rapid COVID and flu testing negative. He was previously cleared by cardiology to proceed with transplant given mitral valve regurgitation. He is Day +13. ANC 6.1. He developed nausea, vomiting, and diarrhea during his admission. Cdiff neg. Nausea and vomiting controlled with antiemetics. He was started on TPN for poor po intake. Diarrhea improved on antidiarrheals. Appetite and po intake have much improved.   He is feeling well and is ready for discharge home. He is scheduled for f/u on 4/25. Advised to call with fever, chills, uncontrollable symptoms, or with any other concerns. Relapsed DLBCL / ASCT  - Admit for BEAM/ASCT - D-6 today  - Prophylactic antibiotics to begin D+1  - G-CSF to begin D+6  4/10 Day -1 BEAM/ASCT. Stem cells tomorrow. Tolerating treatment well. 4/11 Day 0. Stem cells today. 4/12 Day +1. PPx antibx begin today. 4/17 Day +6. Fungitell pending, aspergillus negative. Counts dropping as expected. Daily G-CSF.  4/22 Day +11. Awaiting count recovery, con't Nivestym. Asp/Fungitell neg.  4/23 D+12, ANC 0.8, continue granix and plan for discharge home tomorrow assuming ANC >1.5     Mitral regurgitation  - Evaluated by cardiology, cleared to proceed with ASCT  - Monitor for fluid overload  4/14 No evidence of fluid overload. On gentle hydration d/t decreased intake 2/2 nausea. Con't to monitor for fluid overload. 4/16 Increasing IVF given hypotension  4/17 Hypotension resolved - decrease IVF to 100 ml/hr and monitor fluid status. 4/22 Wt up 4# with net +1L. Will hold off on diuresing for now given ongoing diarrhea.     Hypertension / hypotension  - Continue home meds  4/9 Pt with persistent HTN. Increase lisinopril to 20mg daily. 4/11 BPs improved overall  4/16 Intermittent hypotension with hypertension. Holding antihypertensives. 4/17 Better overnight - decreasing IVF. 4/18 BPs improved     Constipation / Diarrhea / loose stools  4/8 Continue PRN pericolace/miralax for now  4/9 BM x 1 yesterday  4/12 BM x 3 yesterday, reports loose stools  4/13 650mL diarrhea yesterday. Check for Cdiff. If neg, can utilize antidiarrheals. 4/14 C diff negative - starting imodium PRN  4/17 Loose stools - manageable with antidiarrheals  4/20 Ongoing diarrhea, 1.2L yesterday. Schedule imodium. Add opium tincture. 4/21 Diarrhea ongoing, 1.6L yesterday, but stools reportedly have become more thickened. Con't scheduled imodium. Could better utilize antidiarrheals if needed. 4/22 Diarrhea improving. Con't scheduled imodium and prn antidiarrheals. 4/24 Stools are more formed now     Nausea / poor appetite  4/11 antiemetics prn  4/14 Nausea managed - reports poor appetite. Will order Ensure. 4/15 On scheduled zofran. Added marinol BID as well as PPI. Some improvement today. 4/17 Stable to improved. 4/18 Pt with ongoing poor po intake. RD following. Start TPN.  4/19 States he feels better today. Con't TPN  4/22 Wean TPN, po intake much improved  4/23 Will complete TPN today, eating well now     Rash  4/16 Hydrocortisone cream     Pancytopenia secondary to chemotherapy  - Transfuse prn per [facility] SOPs  - G-CSF started D+6, last dose received 4/23 (D+12)    Consults:  None    Pertinent Diagnostic Studies:   Labs:    Recent Labs     04/24/22  0337 04/23/22  0311 04/22/22  0246   WBC 12.2* 4.8 1.5*   HGB 10.2* 10.1* 9.2*   PLT 25* 18* 15*   ANEU 6.1 0.8* 0.2*      Recent Labs     04/24/22  0337 04/23/22  0311 04/22/22  0246    142 144   K 3.9 3.9 3.4*    107 109*   CO2 33* 32 30   GLU 89 90 102*   BUN 9 9 10   CREA 0.70* 0.50* 0.50*   CA 9.4 9.3 8.4    104 92   TP 6.4 6.1* 5.4*   ALB 3.4* 3.3* 2.9*   MG 2.2 2.2 2.0   PHOS  --   --  2.7       Imaging:  n/a    Current Discharge Medication List      START taking these medications    Details   acyclovir (ZOVIRAX) 400 mg tablet Take 1 Tablet by mouth two (2) times a day. Qty: 60 Tablet, Refills: 0  Start date: 4/22/2022      diphenoxylate-atropine (LOMOTIL) 2.5-0.025 mg per tablet Take 1 Tablet by mouth four (4) times daily as needed for Diarrhea. Max Daily Amount: 4 Tablets. Indications: diarrhea caused by chemotherapy  Qty: 30 Tablet, Refills: 0  Start date: 4/22/2022    Associated Diagnoses: Chemotherapy induced diarrhea      loperamide (IMODIUM) 2 mg capsule Take 1 Capsule by mouth every four (4) hours as needed for Diarrhea.   Qty: 30 Capsule, Refills: 0  Start date: 2022      prochlorperazine (Compazine) 10 mg tablet Take 0.5-1 Tablets by mouth every six (6) hours as needed for Nausea or Vomiting. Qty: 30 Tablet, Refills: 0  Start date: 2022         CONTINUE these medications which have CHANGED    Details   ondansetron (ZOFRAN ODT) 8 mg disintegrating tablet Take 1 Tablet by mouth every eight (8) hours as needed for Nausea or Vomiting. Qty: 60 Tablet, Refills: 0  Start date: 2022      lisinopriL (PRINIVIL, ZESTRIL) 20 mg tablet Take 1 Tablet by mouth daily. Qty: 30 Tablet, Refills: 0  Start date: 2022         CONTINUE these medications which have NOT CHANGED    Details   loratadine (Claritin) 10 mg tablet Take 10 mg by mouth. acetaminophen (TylenoL) 325 mg tablet Take  by mouth every four (4) hours as needed for Pain. atenoloL (Tenormin) 25 mg tablet Take 1 Tablet by mouth daily. Qty: 90 Tablet, Refills: 3         STOP taking these medications       allopurinoL (ZYLOPRIM) 300 mg tablet Comments:   Reason for Stopping:             I have reviewed the patient's controlled substance prescription history, as maintained in the [place] prescription monitoring program, so that the prescriptions(s) for a controlled substance can be given. OBJECTIVE:  Patient Vitals for the past 8 hrs:   BP Temp Pulse Resp SpO2   22 0820 127/73 98.3 °F (36.8 °C) 96 18 95 %   22 0555 -- 98.4 °F (36.9 °C) -- -- --   22 0325 119/61 100 °F (37.8 °C) 97 17 97 %     Temp (24hrs), Av.7 °F (37.1 °C), Min:97.9 °F (36.6 °C), Max:100 °F (37.8 °C)     0701 -  1900  In: 300 [P.O.:300]  Out: 500 [Urine:400]    Physical Exam:  Constitutional: Well developed, well nourished male in no acute distress, sitting comfortably on the bedside chair. HEENT: Normocephalic and atraumatic. Oropharynx is clear, mucous membranes are moist.  Extraocular muscles are intact. Sclerae anicteric. Neck supple without JVD. No thyromegaly present.     Skin Warm and dry. No bruising and no rash noted. No erythema. No pallor. Respiratory Lungs are clear to auscultation bilaterally without wheezes, rales or rhonchi, normal air exchange without accessory muscle use. CVS Normal rate, regular rhythm and normal S1 and S2. No murmurs, gallops, or rubs. Abdomen Soft, nontender and nondistended, normoactive bowel sounds. Neuro Grossly nonfocal with no obvious sensory or motor deficits. MSK Normal range of motion in general.  No edema and no tenderness. Psych Appropriate mood and affect. ASSESSMENT:    Active Problems:    Admission for antineoplastic chemotherapy (12/6/2021)      Diffuse large B cell lymphoma ([facility] (12/9/2021)      Bone marrow transplant status ([facility] (4/5/2022)      Malnutrition of moderate degree ([facility] (4/15/2022)        DISPOSITION:  Follow-up Appointments   Procedures    FOLLOW UP VISIT Appointment in: Other (Specify) Please schedule follow up with NP or Dr. Bright Samples on 4/26 and 4/29 with labs prior     Please schedule follow up with NP or Dr. Bright Samples on 4/26 and 4/29 with labs prior     Standing Status:   Standing     Number of Occurrences:   1     Order Specific Question:   Appointment in     Answer: Other (Specify)         Over 60 minutes was spent in discharge planning and coordination of care.             Zayda Fair NP  [facility] Hematology [facility] Oncology  [address] [address]  Office : [phone]  Fax : [phone]

## 2022-04-22 NOTE — PROGRESS NOTES
Diagnosis: DLBCL  Transplant Date: 4/11/22  Day: (+/-) +11. Chemo regimen used: BEAM   BMT assessments and toxicities completed. WBC/ANC= 1.5/.02.  Prophylactic medications started D+1 4/12/22   G-CSF started on D+6 4/17/22  Toxicities greater than 2 :none. Patient seen by MD/NP daily until engraftment. New orders received: None. Issues:None    END OF SHIFT NOTE:    Intake/Output  04/21 1901 - 04/22 0700  In: 982 [P.O.:200; I.V.:654]  Out: 900 [Urine:700]   Voiding: YES  Catheter: NO  Drain:              Stool:  2 occurrences. Stool Assessment  Stool Color: Ronnie Penning (04/21/22 2047)  Stool Appearance: Loose; Soft (04/21/22 2047)  Stool Amount: Medium (04/21/22 2047)  Stool Source/Status: Rectum (04/21/22 2047)    Emesis:  0 occurrences. Emesis Assessment  Appearance: Yellow (04/12/22 1138)  Emesis Amount: Small (04/12/22 1138)    VITAL SIGNS  Patient Vitals for the past 12 hrs:   Temp Pulse Resp BP SpO2   04/22/22 0240 98.5 °F (36.9 °C) 83 18 126/64 97 %   04/21/22 2343 97.7 °F (36.5 °C) 89 20 (!) 142/69 97 %   04/21/22 2145 98 °F (36.7 °C) 88 20 124/66 100 %       Pain Assessment  Pain 1  Pain Scale 1: Numeric (0 - 10) (04/22/22 0240)  Pain Intensity 1: 0 (04/22/22 0240)  Patient Stated Pain Goal: 0 (04/22/22 0240)  Pain Reassessment 1: Patient resting w/respiratory rate greater than 10 (04/19/22 0231)    Ambulating  Yes    Additional Information: Pt voiced no concerns or issues through out the night. Shift report given to oncoming nurse at the bedside.     Donna Lynch RN

## 2022-04-22 NOTE — PROGRESS NOTES
Comprehensive Nutrition Assessment    Type and Reason for Visit: Reassess  TPN Management (Oncology)    Nutrition Recommendations/Plan:   Parenteral Nutrition:  Total parenteral nutrition  to begin at 1800  Decrease: Dex 10% , 4.25% AA 1 L (50ml/hr for 20 hr infusion due to KCl outside TPN)   Discontinue 250 ml 20% daily   Lytes/L:   Sodium 80 meq (80 meq NaCl), Potassium 35 meq (35 meq KPO4), 5 meq Mg, 4.5 meq Calcium  Other additives: MTE, MVI MWF due to national shortages, 100 mg Thiamine (day 5/7 due to refeeding risk)  Electrolytes outside TPN: 40 meq KCl to infuse for 4 hrs prior to TPN infusion  tonight  Nutritional Supplement Therapy:   Active electrolyte replacement per nutrition support protocols  Replacement indicated:  None - will replace outside TPN tonight as currently no other IV access  Labs:   CMP daily  Mg daily  Phos MWF    Triglyceride tomorrow  POC Glucoses/SSI Not indicated  Meals and Snacks:  Continue current diet. Continue GI soft modification  Nutrition Supplement Therapy:   Medical food supplement therapy:  Continue Ensure High Protein three times per day (this provides 160 kcal and 16 grams protein per bottle)     Malnutrition Assessment:  Malnutrition Status: Moderate malnutrition  Context: Acute illness  Findings of clinical characteristics of malnutrition:   Energy Intake:  Mild decrease in energy intake (specify) (less than 50% needs for ~4 days)  Weight Loss:  7 - Greater than 2% over 1 week (14# (7.5%))     Body Fat Loss:  1 - Mild body fat loss, Fat overlying ribs,Triceps   Muscle Mass Loss:  1 - Mild muscle mass loss, Calf,Clavicles (pectoralis & deltoids),Scapula (trapezius),Temples (temporalis)  Fluid Accumulation:  No significant fluid accumulation,     Strength:  Not performed       Nutrition Assessment:   Nutrition History: Patient reports eating well prior to admission. He reports UBW ~240# and that he eats 3 meals per day at baseline.  He reports weight loss ~1.5 years ago prior to cancer diagnosis but states that he has been able to maintain at current weight for at least a year. Nutrition Background: Patient with PMH significant for DLBCL s/p initial treatment with R-CHOP 2021 now replapsed and most recently treated with R-ICE x3 + IT MTX x4, HTN, vascular heart disease. He was admitted for chemo + ASCT. Nutrition Interval:  Patient seen and discussed with RN, Herman Felipe and NP, Malachi Garrido and [facility]. He reports he is feeling much better today. He states he continues to improve PO intake. He was able to eat 50% of a tuna salad sandwich for lunch + Ensure and had a fish sandwich last night. He states he was able to eat all his cereal and 2 boiled eggs this am, but was too full to drink Ensure. RN reports stools continue to thicken and frequency of stools has decline  Moderate refeeding risk with very poor PO x8 days. Abdominal Status (last documented): Diarrhea,Intact abdomen with Active  bowel sounds. Last BM 04/22/22.  Stool output: 700 ml/4 occurrences over last 24 hrs   Pertinent Medications: Zovirax, Augmentin, Levaquin Lomotil - not utilized, Imodium scheduled, Diflucan, MMW, Zofran PRN (not utilized), Compazine PRN (not utilized), Protonix, Zyprexa, Nivetym, Tincture of opium  Pertinent Labs:   Lab Results   Component Value Date/Time    Sodium 144 04/22/2022 02:46 AM    Potassium 3.4 (L) 04/22/2022 02:46 AM    Chloride 109 (H) 04/22/2022 02:46 AM    CO2 30 04/22/2022 02:46 AM    Anion gap 5 (L) 04/22/2022 02:46 AM    Glucose 102 (H) 04/22/2022 02:46 AM    BUN 10 04/22/2022 02:46 AM    Creatinine 0.50 (L) 04/22/2022 02:46 AM    Calcium 8.4 04/22/2022 02:46 AM    Albumin 2.9 (L) 04/22/2022 02:46 AM    Magnesium 2.0 04/22/2022 02:46 AM    Phosphorus 2.7 04/22/2022 02:46 AM     Lab Results   Component Value Date/Time    Triglyceride 104 04/20/2022 03:42 AM   Labs remarkable for: Na still trending up, K depleted again but stable - will replace 40 meq KCL outside as PN provisions declining with volume reduction, Mg stable, Phos stable      Nutrition Related Findings:   NFPE as above. 4/18 currently with single IV asscess - port. Dilute TPN initiated 4/18 without lipids. TPN advanced to 10/4.25 + lipid initiation. Current Nutrition Therapies:  ADULT DIET Regular; GI [place] (GERD/Peptic Ulcer)  ADULT ORAL NUTRITION SUPPLEMENT Breakfast, Lunch, Dinner; Low Calorie/High Protein    Current Intake:   Average Meal Intake: 51-75% Average Supplement Intake: 51-75%      Anthropometric Measures:  Height: 6' (182.9 cm)  Current Body Wt: 78.6 kg (173 lb 4.5 oz) (4/21), Weight source: Standing scale  BMI: 23.5, Normal weight (BMI 18.5-24. 9)  Admission Body Weight: 185 lb (4/7 standing scale)  Ideal Body Weight (lbs) (Calculated): 178 lbs (81 kg), 96.2 %  Usual Body Wt: 83.9 kg (185 lb), Percent weight change: -7.4        Edema: No data recorded  Estimated Daily Nutrient Needs:  Energy (kcal/day): 9492-9691 (Kcal/kg (25-30), Weight Used: Current (77.7 kg (4/13)))  Protein (g/day):  (1.2-1.3 g/kg) Weight Used: (Current)  Fluid (ml/day):   (1 ml/kcal)    Nutrition Diagnosis:   · Inadequate protein-energy intake related to altered GI function (lack of appetite, N/V) as evidenced by intake 0-25%,diarrhea    · Moderate malnutrition related to  (lack of appetite, nausea) as evidenced by  (malnutrition criteria as above)    Nutrition Interventions:   Food and/or Nutrient Delivery: Continue current diet,Continue oral nutrition supplement,Modify parenteral nutrition     Coordination of Nutrition Care: Continue to monitor while inpatient    Goals:   Previous Goal Met: Goal(s) achieved  Active Goal: Continue PO intake to meet at least 75% needs within 2 days    Nutrition Monitoring and Evaluation:      Food/Nutrient Intake Outcomes: Food and nutrient intake,Supplement intake,Parenteral nutrition intake/tolerance  Physical Signs/Symptoms Outcomes: Biochemical data,GI status,Meal time behavior,Weight    Discharge Planning:     Too soon to determine    [address] [place], LD on 4/22/2022 at 10:59 AM  Contact: [phone]

## 2022-04-23 LAB
ALBUMIN SERPL-MCNC: 3.3 G/DL (ref 3.5–5)
ALBUMIN/GLOB SERPL: 1.2 {RATIO} (ref 1.2–3.5)
ALP SERPL-CCNC: 104 U/L (ref 50–136)
ALT SERPL-CCNC: 36 U/L (ref 12–65)
ANION GAP SERPL CALC-SCNC: 3 MMOL/L (ref 7–16)
AST SERPL-CCNC: 19 U/L (ref 15–37)
BASOPHILS # BLD: 0 K/UL (ref 0–0.2)
BASOPHILS NFR BLD: 1 % (ref 0–2)
BILIRUB SERPL-MCNC: 0.2 MG/DL (ref 0.2–1.1)
BUN SERPL-MCNC: 9 MG/DL (ref 6–23)
CALCIUM SERPL-MCNC: 9.3 MG/DL (ref 8.3–10.4)
CHLORIDE SERPL-SCNC: 107 MMOL/L (ref 98–107)
CO2 SERPL-SCNC: 32 MMOL/L (ref 21–32)
CREAT SERPL-MCNC: 0.5 MG/DL (ref 0.8–1.5)
DIFFERENTIAL METHOD BLD: ABNORMAL
EOSINOPHIL # BLD: 0.7 K/UL (ref 0–0.8)
EOSINOPHIL NFR BLD: 14 % (ref 0.5–7.8)
ERYTHROCYTE [DISTWIDTH] IN BLOOD BY AUTOMATED COUNT: 11.2 % (ref 11.9–14.6)
GLOBULIN SER CALC-MCNC: 2.8 G/DL (ref 2.3–3.5)
GLUCOSE SERPL-MCNC: 90 MG/DL (ref 65–100)
HCT VFR BLD AUTO: 29 % (ref 41.1–50.3)
HGB BLD-MCNC: 10.1 G/DL (ref 13.6–17.2)
IMM GRANULOCYTES # BLD AUTO: 0.5 K/UL (ref 0–0.5)
IMM GRANULOCYTES NFR BLD AUTO: 10 % (ref 0–5)
LYMPHOCYTES # BLD: 0.8 K/UL (ref 0.5–4.6)
LYMPHOCYTES NFR BLD: 17 % (ref 13–44)
MAGNESIUM SERPL-MCNC: 2.2 MG/DL (ref 1.8–2.4)
MCH RBC QN AUTO: 30.1 PG (ref 26.1–32.9)
MCHC RBC AUTO-ENTMCNC: 34.8 G/DL (ref 31.4–35)
MCV RBC AUTO: 86.3 FL (ref 79.6–97.8)
MONOCYTES # BLD: 2 K/UL (ref 0.1–1.3)
MONOCYTES NFR BLD: 41 % (ref 4–12)
NEUTS SEG # BLD: 0.8 K/UL (ref 1.7–8.2)
NEUTS SEG NFR BLD: 17 % (ref 43–78)
NRBC # BLD: 0.02 K/UL (ref 0–0.2)
PLATELET # BLD AUTO: 18 K/UL (ref 150–450)
PLATELET COMMENTS,PCOM: ABNORMAL
PMV BLD AUTO: ABNORMAL FL (ref 9.4–12.3)
POTASSIUM SERPL-SCNC: 3.9 MMOL/L (ref 3.5–5.1)
PROT SERPL-MCNC: 6.1 G/DL (ref 6.3–8.2)
RBC # BLD AUTO: 3.36 M/UL (ref 4.23–5.6)
RBC MORPH BLD: ABNORMAL
SODIUM SERPL-SCNC: 142 MMOL/L (ref 136–145)
WBC # BLD AUTO: 4.8 K/UL (ref 4.3–11.1)
WBC MORPH BLD: ABNORMAL

## 2022-04-23 PROCEDURE — 36591 DRAW BLOOD OFF VENOUS DEVICE: CPT

## 2022-04-23 PROCEDURE — 74011250636 HC RX REV CODE- 250/636: Performed by: INTERNAL MEDICINE

## 2022-04-23 PROCEDURE — 99232 SBSQ HOSP IP/OBS MODERATE 35: CPT | Performed by: INTERNAL MEDICINE

## 2022-04-23 PROCEDURE — 85025 COMPLETE CBC W/AUTO DIFF WBC: CPT

## 2022-04-23 PROCEDURE — 83735 ASSAY OF MAGNESIUM: CPT

## 2022-04-23 PROCEDURE — 74011250637 HC RX REV CODE- 250/637: Performed by: NURSE PRACTITIONER

## 2022-04-23 PROCEDURE — 65270000015 HC RM PRIVATE ONCOLOGY

## 2022-04-23 PROCEDURE — 80053 COMPREHEN METABOLIC PANEL: CPT

## 2022-04-23 RX ADMIN — FLUCONAZOLE 200 MG: 100 TABLET ORAL at 08:52

## 2022-04-23 RX ADMIN — LOPERAMIDE HYDROCHLORIDE 2 MG: 2 CAPSULE ORAL at 08:51

## 2022-04-23 RX ADMIN — ONDANSETRON 8 MG: 4 TABLET, ORALLY DISINTEGRATING ORAL at 18:17

## 2022-04-23 RX ADMIN — FILGRASTIM-AAFI 300 MCG: 300 INJECTION, SOLUTION SUBCUTANEOUS at 08:52

## 2022-04-23 RX ADMIN — LEVOFLOXACIN 500 MG: 500 TABLET, FILM COATED ORAL at 08:52

## 2022-04-23 RX ADMIN — LOPERAMIDE HYDROCHLORIDE 2 MG: 2 CAPSULE ORAL at 20:51

## 2022-04-23 RX ADMIN — ATENOLOL 25 MG: 50 TABLET ORAL at 08:51

## 2022-04-23 RX ADMIN — LOPERAMIDE HYDROCHLORIDE 2 MG: 2 CAPSULE ORAL at 15:02

## 2022-04-23 RX ADMIN — ACYCLOVIR 400 MG: 800 TABLET ORAL at 08:51

## 2022-04-23 RX ADMIN — OLANZAPINE 5 MG: 5 TABLET, ORALLY DISINTEGRATING ORAL at 20:51

## 2022-04-23 RX ADMIN — ACYCLOVIR 400 MG: 800 TABLET ORAL at 18:10

## 2022-04-23 RX ADMIN — CETIRIZINE HYDROCHLORIDE 5 MG: 5 TABLET ORAL at 08:51

## 2022-04-23 RX ADMIN — AMOXICILLIN AND CLAVULANATE POTASSIUM 1 TABLET: 875; 125 TABLET, FILM COATED ORAL at 20:51

## 2022-04-23 RX ADMIN — AMOXICILLIN AND CLAVULANATE POTASSIUM 1 TABLET: 875; 125 TABLET, FILM COATED ORAL at 08:52

## 2022-04-23 RX ADMIN — PANTOPRAZOLE SODIUM 40 MG: 40 TABLET, DELAYED RELEASE ORAL at 08:51

## 2022-04-23 NOTE — PROGRESS NOTES
Problem: Falls - Risk of  Goal: *Absence of Falls  Description: Document Brianna Villanueva Fall Risk and appropriate interventions in the flowsheet.   Outcome: Progressing Towards Goal  Note: Fall Risk Interventions:            Medication Interventions: Assess postural VS orthostatic hypotension,Patient to call before getting OOB,Teach patient to arise slowly    Elimination Interventions: Call light in reach,Patient to call for help with toileting needs    History of Falls Interventions: Room close to nurse's station         Problem: Chemotherapy, Day 3 to Discharge  Goal: Activity/Safety  Outcome: Progressing Towards Goal  Goal: Consults, if ordered  Outcome: Progressing Towards Goal  Goal: Diagnostic Test/Procedures  Outcome: Progressing Towards Goal  Goal: Nutrition/Diet  Outcome: Progressing Towards Goal  Goal: Discharge Planning  Outcome: Progressing Towards Goal  Goal: Medications  Outcome: Progressing Towards Goal  Goal: Treatments/Interventions/Procedures  Outcome: Progressing Towards Goal  Goal: Psychosocial  Outcome: Progressing Towards Goal  Goal: *Optimal pain control at patient's stated goal  Outcome: Progressing Towards Goal  Goal: *Hemodynamically stable  Outcome: Progressing Towards Goal  Goal: *Adequate oxygenation  Outcome: Progressing Towards Goal     Problem: Nutrition Deficit  Goal: *Optimize nutritional status  Outcome: Progressing Towards Goal

## 2022-04-23 NOTE — PROGRESS NOTES
[employer] [facility]        Inpatient Hematology / Oncology Progress Note    Reason for Admission:  Diffuse large B cell lymphoma ([facility] [C83.30]  Admission for antineoplastic chemotherapy [Z51.11]  Bone marrow transplant status ([facility] [Z94.81]    24 Hour Events:  Afebrile, VSS  Day +12 BEAM/ASCT  Awaiting count recovery, on G-CSF  ANC up to 800  Will complete TPN today  Diarrhea improved  Wt down 4# with net +218ml    Transfusions: None  Replacements: K+ (in TPN)    ROS:  Constitutional: Negative for fever, chills. CV: Negative for chest pain, palpitations, edema. Respiratory: Negative for dyspnea, cough, wheezing. GI: +diarrhea. Negative for abdominal pain. 10 point review of systems is otherwise negative with the exception of the elements mentioned above in the HPI.        No Known Allergies  Past Medical History:   Diagnosis Date    Cancer ([facility]     Hypertension     Valvular heart disease      Past Surgical History:   Procedure Laterality Date    HX BACK SURGERY      26 years ago    HX TRANSPLANT  04/2022    auto stem cell transplant    HX VASCULAR ACCESS      IR BX BONE MARROW DIAGNOSTIC  2/24/2022    IR CHOLECYSTOSTOMY PERCUTANEOUS      IR INSERT NON TUNL CVC OVER 5 YRS  3/14/2022    IR INTRATHECAL CHEMO INJECTION CNS  12/8/2021    IR INTRATHECAL CHEMO INJECTION CNS  12/28/2021    IR INTRATHECAL CHEMO INJECTION CNS  1/28/2022    IR SPINAL PUNCTURE CSF TREAT / DRAIN  2/4/2022     Family History   Problem Relation Age of Onset    Diabetes Mother     Hypertension Mother     Diabetes Father     Hypertension Father     Hypertension Brother      Social History     Socioeconomic History    Marital status:      Spouse name: Not on file    Number of children: Not on file    Years of education: Not on file    Highest education level: Not on file   Occupational History    Not on file   Tobacco Use    Smoking status: Never Smoker    Smokeless tobacco: Never Used   Vaping Use    Vaping Use: Never used   Substance and Sexual Activity    Alcohol use: Not Currently    Drug use: Not Currently    Sexual activity: Not on file   Other Topics Concern     Service Not Asked    Blood Transfusions Not Asked    Caffeine Concern Not Asked    Occupational Exposure Not Asked    Hobby Hazards Not Asked    Sleep Concern Not Asked    Stress Concern Not Asked    Weight Concern Not Asked    Special Diet Not Asked    Back Care Not Asked    Exercise Not Asked    Bike Helmet Not Asked   [address] Not Asked    Self-Exams Not Asked   Social History Narrative    Not on file     Social Determinants of Health     Financial Resource Strain:     Difficulty of Paying Living Expenses: Not on file   Food Insecurity:     Worried About Running Out of Food in the Last Year: Not on file    Sayra of Food in the Last Year: Not on file   Transportation Needs:     Lack of Transportation (Medical): Not on file    Lack of Transportation (Non-Medical):  Not on file   Physical Activity:     Days of Exercise per Week: Not on file    Minutes of Exercise per Session: Not on file   Stress:     Feeling of Stress : Not on file   Social Connections:     Frequency of Communication with Friends and Family: Not on file    Frequency of Social Gatherings with Friends and Family: Not on file    Attends Rastafari Services: Not on file    Active Member of [address] [place] [address] [employer] or Organizations: Not on file    Attends Club or Organization Meetings: Not on file    Marital Status: Not on file   Intimate Partner Violence:     Fear of Current or Ex-Partner: Not on file    Emotionally Abused: Not on file    Physically Abused: Not on file    Sexually Abused: Not on file   Housing Stability:     Unable to Pay for Housing in the Last Year: Not on file    Number of [place] in the Last Year: Not on file    Unstable Housing in the Last Year: Not on file     Current Facility-Administered Medications   Medication Dose Route Frequency Provider Last Rate Last Admin    TPN ADULT - dextrose 10% amino acid 4.25%   IntraVENous QPM Farooq Guy MD 50 mL/hr at 04/22/22 2218 Given at 04/22/22 2218    opium tincture 10 mg/mL (morphine) oral solution 0.6 mL  0.6 mL Oral Q4H PRN Farooq Guy MD        loperamide (IMODIUM) capsule 2 mg  2 mg Oral Q8H Remona Jasso, NP   2 mg at 04/23/22 0851    ondansetron (ZOFRAN) injection 8 mg  8 mg IntraVENous Q8H PRN Remona Jasso, NP        0.9% sodium chloride infusion 250 mL  250 mL IntraVENous PRN Leonarda Soriano MD        OLANZapine (ZyPREXA zydis) disintegrating tablet 5 mg  5 mg Oral QHS Dena Carrier, FNP   5 mg at 04/22/22 2217    acetaminophen (TYLENOL) tablet 650 mg  650 mg Oral Q4H PRN Leonarda Soriano MD   650 mg at 04/18/22 0825    diphenhydrAMINE (BENADRYL) capsule 25 mg  25 mg Oral Q6H PRN Leonarda Soriano MD   25 mg at 04/18/22 0825    hydrocortisone (CORTAID) 1 % cream   Topical QID PRN Dena Carrier, FNP   Given at 04/17/22 6552    diphenoxylate-atropine (LOMOTIL) tablet 1 Tablet  1 Tablet Oral QID PRN Leonarda Soriano MD   1 Tablet at 04/20/22 0802    magic mouthwash (MARCELLUS) oral suspension 5 mL  5 mL Oral Q4H PRN Leonarda Soriano MD   5 mL at 04/17/22 0937    pantoprazole (PROTONIX) tablet 40 mg  40 mg Oral ACB Dena Carrier, FNP   40 mg at 04/23/22 3315    loperamide (IMODIUM) capsule 2 mg  2 mg Oral Q4H PRN Remona Jasso, NP   2 mg at 04/18/22 2242    LORazepam (ATIVAN) injection 1 mg  1 mg IntraVENous Q6H PRN Remona Jasso, NP   1 mg at 04/21/22 2338    prochlorperazine (COMPAZINE) with saline injection 10 mg  10 mg IntraVENous Q6H PRN Remona Jasso, NP   10 mg at 04/17/22 1308    amoxicillin-clavulanate (AUGMENTIN) 875-125 mg per tablet 1 Tablet  1 Tablet Oral Q12H Remona Jasso, NP   1 Tablet at 04/23/22 0852    levoFLOXacin (LEVAQUIN) tablet 500 mg  500 mg Oral Q24H Remona Jasso, NP   500 mg at 04/23/22 0852    acyclovir (ZOVIRAX) tablet 400 mg 400 mg Oral BID Regulo Beverage, NP   400 mg at 22 5366    fluconazole (DIFLUCAN) tablet 200 mg  200 mg Oral DAILY Regulo Beverage, NP   200 mg at 22 1310    filgrastim-aafi (NIVESTYM) injection syringe 300 mcg  300 mcg SubCUTAneous Q24H Lashae Edwards MD   300 mcg at 22 6481    cloNIDine HCL (CATAPRES) tablet 0.1 mg  0.1 mg Oral Q6H PRN Regulo Beverage, NP   0.1 mg at 04/10/22 1200    lisinopriL (PRINIVIL, ZESTRIL) tablet 20 mg  20 mg Oral DAILY Regulo Beverage, NP   20 mg at 22 0748    HYDROcodone-acetaminophen (NORCO) 5-325 mg per tablet 1 Tablet  1 Tablet Oral Q6H PRN Regulo Beverage, NP        morphine injection 2 mg  2 mg IntraVENous Q4H PRN Regulo Beverage, NP        senna-docusate (PERICOLACE) 8.6-50 mg per tablet 1 Tablet  1 Tablet Oral DAILY PRN Radha Juárez MD   1 Tablet at 22    polyethylene glycol (MIRALAX) packet 17 g  17 g Oral DAILY PRN Radha Juárez MD        atenoloL (TENORMIN) tablet 25 mg  25 mg Oral DAILY JW Shine   25 mg at 22    cetirizine (ZYRTEC) tablet 5 mg  5 mg Oral DAILY ARLEN ShineP   5 mg at 22 0851    ondansetron (ZOFRAN ODT) tablet 8 mg  8 mg Oral Q8H PRN ARLEN ShineP   8 mg at 22 9565    [Held by provider] enoxaparin (LOVENOX) injection 40 mg  40 mg SubCUTAneous Q24H JW Shine        heparin (porcine) pf 300 Units  300 Units InterCATHeter PRN Radha Juárez MD        central line flush (saline) syringe 10 mL  10 mL InterCATHeter PRROSA Juárez MD   10 mL at 22 0552       OBJECTIVE:  Patient Vitals for the past 8 hrs:   BP Temp Pulse Resp SpO2   22 1114 99/63 98.6 °F (37 °C) 92 18 97 %   22 0736 109/63 98.1 °F (36.7 °C) 93 18 96 %     Temp (24hrs), Av.3 °F (36.8 °C), Min:98 °F (36.7 °C), Max:98.6 °F (37 °C)     0701 -  1900  In: 219 [I.V.:219]  Out: 1150 [Urine:650]    Physical Exam:  Constitutional: Well developed, well nourished male in no acute distress, sitting comfortably in the bedside chair. HEENT: +L eye patch. Normocephalic and atraumatic. Oropharynx is clear, mucous membranes are moist.  Sclerae anicteric. Neck supple without JVD. No thyromegaly present. Skin Erythematous, macular rash to BL inner thigh-not examined today. Warm and dry. No bruising and no rash noted. No erythema. No pallor. Respiratory Lungs are clear to auscultation bilaterally without wheezes, rales or rhonchi, normal air exchange without accessory muscle use. CVS Normal rate, regular rhythm and normal S1 and S2. +murmur   Abdomen Soft, nontender and nondistended, normoactive bowel sounds. Neuro Grossly nonfocal with no obvious sensory or motor deficits. MSK Normal range of motion in general.  No edema and no tenderness. Psych Appropriate mood and affect. Labs:    Recent Results (from the past 24 hour(s))   METABOLIC PANEL, COMPREHENSIVE    Collection Time: 04/23/22  3:11 AM   Result Value Ref Range    Sodium 142 136 - 145 mmol/L    Potassium 3.9 3.5 - 5.1 mmol/L    Chloride 107 98 - 107 mmol/L    CO2 32 21 - 32 mmol/L    Anion gap 3 (L) 7 - 16 mmol/L    Glucose 90 65 - 100 mg/dL    BUN 9 6 - 23 MG/DL    Creatinine 0.50 (L) 0.8 - 1.5 MG/DL    GFR est AA >60 >60 ml/min/1.73m2    GFR est non-AA >60 >60 ml/min/1.73m2    Calcium 9.3 8.3 - 10.4 MG/DL    Bilirubin, total 0.2 0.2 - 1.1 MG/DL    ALT (SGPT) 36 12 - 65 U/L    AST (SGOT) 19 15 - 37 U/L    Alk.  phosphatase 104 50 - 136 U/L    Protein, total 6.1 (L) 6.3 - 8.2 g/dL    Albumin 3.3 (L) 3.5 - 5.0 g/dL    Globulin 2.8 2.3 - 3.5 g/dL    A-G Ratio 1.2 1.2 - 3.5     MAGNESIUM    Collection Time: 04/23/22  3:11 AM   Result Value Ref Range    Magnesium 2.2 1.8 - 2.4 mg/dL   CBC WITH AUTOMATED DIFF    Collection Time: 04/23/22  3:11 AM   Result Value Ref Range    WBC 4.8 4.3 - 11.1 K/uL    RBC 3.36 (L) 4.23 - 5.6 M/uL    HGB 10.1 (L) 13.6 - 17.2 g/dL    HCT 29.0 (L) 41.1 - 50.3 %    MCV 86.3 79.6 - 97.8 FL MCH 30.1 26.1 - 32.9 PG    MCHC 34.8 31.4 - 35.0 g/dL    RDW 11.2 (L) 11.9 - 14.6 %    PLATELET 18 (LL) 865 - 450 K/uL    MPV Unable to calculate. Recommend adding IPF. 9.4 - 12.3 FL    ABSOLUTE NRBC 0.02 0.0 - 0.2 K/uL    NEUTROPHILS 17 (L) 43 - 78 %    LYMPHOCYTES 17 13 - 44 %    MONOCYTES 41 (H) 4.0 - 12.0 %    EOSINOPHILS 14 (H) 0.5 - 7.8 %    BASOPHILS 1 0.0 - 2.0 %    IMMATURE GRANULOCYTES 10 (H) 0.0 - 5.0 %    ABS. NEUTROPHILS 0.8 (L) 1.7 - 8.2 K/UL    ABS. LYMPHOCYTES 0.8 0.5 - 4.6 K/UL    ABS. MONOCYTES 2.0 (H) 0.1 - 1.3 K/UL    ABS. EOSINOPHILS 0.7 0.0 - 0.8 K/UL    ABS. BASOPHILS 0.0 0.0 - 0.2 K/UL    ABS. IMM. GRANS. 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 Geraldo Kee is a 64 y.o. male admitted on 4/5/2022. The primary encounter diagnosis was Diffuse large B-cell lymphoma, unspecified body region [facility]). Diagnoses of Admission for antineoplastic chemotherapy, Bone marrow transplant status ([facility] and Nonrheumatic mitral valve regurgitation were also pertinent to this visit. 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  He is a patient of Dr John Maldonado treated for relapsed DLBCL. He was initially treated by Dr Radha Jimenez in [facility] with R-CHOP 4/21-7/21. However, he was noted to have relapsed disease in L supraclavicular LN. He was then evaluated by Dr Dominic Hunter and has now completed R-ICE x3 with IT MTX x4 with CR. He has been evaluated for ASCT and collected 4.8x10^6/kg CD34+ cells. He is now admitted for BEAM/ASCT. He was seen by Dr Dominic Hunter day prior to admission and rapid COVID and flu testing negative. He was previously cleared by cardiology to proceed with transplant given mitral valve regurgitation. He is feeling well and ready to begin treatment today. PLAN:    Relapsed DLBCL / ASCT  - Admit for BEAM/ASCT - D-6 today  - Prophylactic antibiotics to begin D+1  - G-CSF to begin D+6  4/10 Day -1 BEAM/ASCT. Stem cells tomorrow. Tolerating treatment well. 4/11 Day 0. Stem cells today. 4/12 Day +1. PPx antibx begin today. 4/17 Day +6. Fungitell pending, aspergillus negative. Counts dropping as expected. Daily G-CSF.  4/22 Day +11. Awaiting count recovery, con't Nivestym. Asp/Fungitell neg.  4/23 D+12, ANC 0.8, continue granix and plan for discharge home tomorrow assuming ANC >1.5    Mitral regurgitation  - Evaluated by cardiology, cleared to proceed with ASCT  - Monitor for fluid overload  4/14 No evidence of fluid overload. On gentle hydration d/t decreased intake 2/2 nausea. Con't to monitor for fluid overload. 4/16 Increasing IVF given hypotension  4/17 Hypotension resolved - decrease IVF to 100 ml/hr and monitor fluid status. 4/22 Wt up 4# with net +1L. Will hold off on diuresing for now given ongoing diarrhea. Hypertension / hypotension  - Continue home meds  4/9 Pt with persistent HTN. Increase lisinopril to 20mg daily. 4/11 BPs improved overall  4/16 Intermittent hypotension with hypertension. Holding antihypertensives. 4/17 Better overnight - decreasing IVF.    4/18 BPs improved    Constipation / Diarrhea / loose stools  4/8 Continue PRN pericolace/miralax for now  4/9 BM x 1 yesterday  4/12 BM x 3 yesterday, reports loose stools  4/13 650mL diarrhea yesterday. Check for Cdiff. If neg, can utilize antidiarrheals. 4/14 C diff negative - starting imodium PRN  4/17 Loose stools - manageable with antidiarrheals  4/20 Ongoing diarrhea, 1.2L yesterday. Schedule imodium. Add opium tincture. 4/21 Diarrhea ongoing, 1.6L yesterday, but stools reportedly have become more thickened. Con't scheduled imodium. Could better utilize antidiarrheals if needed. 4/22 Diarrhea improving. Con't scheduled imodium and prn antidiarrheals. Nausea / poor appetite  4/11 antiemetics prn  4/14 Nausea managed - reports poor appetite. Will order Ensure. 4/15 On scheduled zofran. Added marinol BID as well as PPI. Some improvement today. 4/17 Stable to improved. 4/18 Pt with ongoing poor po intake. RD following. Start TPN.  4/19 States he feels better today. Con't TPN  4/22 Wean TPN, po intake much improved  4/23 Will complete TPN today, eating well now    Rash  4/16 Hydrocortisone cream    Pancytopenia secondary to chemotherapy  - Transfuse prn per Rhea SOPs  - G-CSF started D+6    Continue home meds  PPx meds: Acyclovir, Diflucan, Levaquin, Augmentin  Rhea SOPs  AC contraindicated d/t thrombocytopenia    Goals and plan of care reviewed with the patient. All questions answered to the best of our ability. Disposition:  Awaiting count recovery. ANC up to 200.               Harvey Isaac NP   [facility]  [address] [address]  Office : [phone]  Fax : [phone]

## 2022-04-23 NOTE — PROGRESS NOTES
Comprehensive Nutrition Assessment    Type and Reason for Visit: Reassess  TPN Management (Oncology)    Nutrition Recommendations/Plan:   Parenteral Nutrition:  Total parenteral nutrition  to begin at 1800  Discontinue TPN after current bag. Wean to 25 ml/hr at 1600 and infuse for 2 hrs prior to stopping at 1800. Meals and Snacks:  Continue current diet. Continue GI soft modification  Nutrition Supplement Therapy:   Medical food supplement therapy:  Continue Ensure High Protein three times per day (this provides 160 kcal and 16 grams protein per bottle)     Malnutrition Assessment:  Malnutrition Status: Moderate malnutrition  Context: Acute illness  Findings of clinical characteristics of malnutrition:   Energy Intake:  Mild decrease in energy intake (specify) (less than 50% needs for ~4 days)  Weight Loss:  7 - Greater than 2% over 1 week (14# (7.5%))     Body Fat Loss:  1 - Mild body fat loss, Fat overlying ribs,Triceps   Muscle Mass Loss:  1 - Mild muscle mass loss, Calf,Clavicles (pectoralis & deltoids),Scapula (trapezius),Temples (temporalis)  Fluid Accumulation:  No significant fluid accumulation,     Strength:  Not performed       Nutrition Assessment:   Nutrition History: Patient reports eating well prior to admission. He reports UBW ~240# and that he eats 3 meals per day at baseline. He reports weight loss ~1.5 years ago prior to cancer diagnosis but states that he has been able to maintain at current weight for at least a year. Nutrition Background: Patient with PMH significant for DLBCL s/p initial treatment with R-CHOP 2021 now replapsed and most recently treated with R-ICE x3 + IT MTX x4, HTN, vascular heart disease. He was admitted for chemo + ASCT. Nutrition Interval:  Patient seen and discussed with RN, Kelvin Holm. Patient reports PO continues to improve. He states that he ate nearly all of dinner last night + Ensure.  He states that he ate the turkey off his sandwich, fruit, and Ensure for lunch. He states all of breakfast this am + Ensure. He reports stools continue to improve, but still having some loose stools. Abdominal Status (last documented): Diarrhea abdomen with Active  bowel sounds. Last BM 04/23/22. Stool output: 750 ml/4 occurrences over last 24 hrs   Pertinent Medications: Zovirax, Augmentin, Levaquin Lomotil - not utilized, Imodium scheduled, Diflucan, MMW, Zofran PRN (not utilized), Compazine PRN (not utilized), Protonix, Zyprexa, Nivetym, Tincture of opium  Pertinent Labs:   Lab Results   Component Value Date/Time    Sodium 142 04/23/2022 03:11 AM    Potassium 3.9 04/23/2022 03:11 AM    Chloride 107 04/23/2022 03:11 AM    CO2 32 04/23/2022 03:11 AM    Anion gap 3 (L) 04/23/2022 03:11 AM    Glucose 90 04/23/2022 03:11 AM    BUN 9 04/23/2022 03:11 AM    Creatinine 0.50 (L) 04/23/2022 03:11 AM    Calcium 9.3 04/23/2022 03:11 AM    Albumin 3.3 (L) 04/23/2022 03:11 AM    Magnesium 2.2 04/23/2022 03:11 AM    Phosphorus 2.7 04/22/2022 02:46 AM     Lab Results   Component Value Date/Time    Triglyceride 104 04/20/2022 03:42 AM   Labs remarkable for: Na stable, K stable , Mg stable      Nutrition Related Findings:   NFPE as above. 4/18 currently with single IV asscess - port. Dilute TPN initiated 4/18 without lipids. TPN advanced to 10/4.25 + lipid initiation. TPN reduced to 1L and lipids d/c 4/22. Current Nutrition Therapies:  ADULT DIET Regular; GI Allen (GERD/Peptic Ulcer)  ADULT ORAL NUTRITION SUPPLEMENT Breakfast, Lunch, Dinner; Low Calorie/High Protein  Current Parenteral Nutrition Orders:  · Type and Formula: Dex 10% , 4.25% AA    · Lipids: 250ml,Daily  · Duration: Continuous  · Rate/Volume: 1L (50 ml/hr x 20 hrs)  · Current PN Order Provides: infusing at ordered rate  · Goal PN Orders Provides: 510 kcal/d (26% of needs), 43 grams of protein/d (46% of needs), 100 grams of CHO/d and 1000 ml of total volume/d.        Current Intake:   Average Meal Intake: 51-75% Average Supplement Intake: %      Anthropometric Measures:  Height: 6' (182.9 cm)  Current Body Wt: 76.9 kg (169 lb 8.5 oz) (4/23), Weight source: Standing scale  BMI: 23, Normal weight (BMI 18.5-24. 9)  Admission Body Weight: 185 lb (4/7 standing scale)  Ideal Body Weight (lbs) (Calculated): 178 lbs (81 kg), 96.2 %  Usual Body Wt: 83.9 kg (185 lb), Percent weight change: -7.4        Edema: No data recorded  Estimated Daily Nutrient Needs:  Energy (kcal/day): 3298-5883 (Kcal/kg (25-30), Weight Used: Current (77.7 kg (4/13)))  Protein (g/day):  (1.2-1.3 g/kg) Weight Used: (Current)  Fluid (ml/day):   (1 ml/kcal)    Nutrition Diagnosis:   · Inadequate protein-energy intake related to  (poor appetite) as evidenced by  (reported barriers to PO, still requiring ONS supplement PO)    · Moderate malnutrition related to  (lack of appetite, nausea) as evidenced by  (malnutrition criteria as above)    Nutrition Interventions:   Food and/or Nutrient Delivery: Continue current diet,Continue oral nutrition supplement,Discontinue parenteral nutrition     Coordination of Nutrition Care: Continue to monitor while inpatient    Goals:   Previous Goal Met: Progressing toward goal(s)  Active Goal: Continue PO intake to meet at least 75% needs within 2 days    Nutrition Monitoring and Evaluation:      Food/Nutrient Intake Outcomes: Food and nutrient intake,Supplement intake  Physical Signs/Symptoms Outcomes: GI status,Meal time behavior,Weight    Discharge Planning:    Continue current diet    [address] [place], LD on 4/23/2022 at 10:59 AM  Contact: [phone]

## 2022-04-24 VITALS
TEMPERATURE: 98.3 F | BODY MASS INDEX: 22.93 KG/M2 | DIASTOLIC BLOOD PRESSURE: 73 MMHG | SYSTOLIC BLOOD PRESSURE: 127 MMHG | RESPIRATION RATE: 18 BRPM | OXYGEN SATURATION: 95 % | HEART RATE: 96 BPM | WEIGHT: 169.31 LBS | HEIGHT: 72 IN

## 2022-04-24 LAB
ALBUMIN SERPL-MCNC: 3.4 G/DL (ref 3.5–5)
ALBUMIN/GLOB SERPL: 1.1 {RATIO} (ref 1.2–3.5)
ALP SERPL-CCNC: 114 U/L (ref 50–136)
ALT SERPL-CCNC: 39 U/L (ref 12–65)
ANION GAP SERPL CALC-SCNC: 2 MMOL/L (ref 7–16)
AST SERPL-CCNC: 27 U/L (ref 15–37)
BASOPHILS # BLD: 0.1 K/UL (ref 0–0.2)
BASOPHILS NFR BLD: 1 % (ref 0–2)
BILIRUB SERPL-MCNC: 0.2 MG/DL (ref 0.2–1.1)
BUN SERPL-MCNC: 9 MG/DL (ref 6–23)
CALCIUM SERPL-MCNC: 9.4 MG/DL (ref 8.3–10.4)
CHLORIDE SERPL-SCNC: 103 MMOL/L (ref 98–107)
CO2 SERPL-SCNC: 33 MMOL/L (ref 21–32)
CREAT SERPL-MCNC: 0.7 MG/DL (ref 0.8–1.5)
DIFFERENTIAL METHOD BLD: ABNORMAL
EOSINOPHIL # BLD: 0 K/UL (ref 0–0.8)
EOSINOPHIL NFR BLD: 0 % (ref 0.5–7.8)
ERYTHROCYTE [DISTWIDTH] IN BLOOD BY AUTOMATED COUNT: 11.5 % (ref 11.9–14.6)
GLOBULIN SER CALC-MCNC: 3 G/DL (ref 2.3–3.5)
GLUCOSE SERPL-MCNC: 89 MG/DL (ref 65–100)
HCT VFR BLD AUTO: 29.6 % (ref 41.1–50.3)
HGB BLD-MCNC: 10.2 G/DL (ref 13.6–17.2)
IMM GRANULOCYTES # BLD AUTO: 1.2 K/UL (ref 0–0.5)
IMM GRANULOCYTES NFR BLD AUTO: 10 % (ref 0–5)
LYMPHOCYTES # BLD: 1.1 K/UL (ref 0.5–4.6)
LYMPHOCYTES NFR BLD: 9 % (ref 13–44)
MAGNESIUM SERPL-MCNC: 2.2 MG/DL (ref 1.8–2.4)
MCH RBC QN AUTO: 29.8 PG (ref 26.1–32.9)
MCHC RBC AUTO-ENTMCNC: 34.5 G/DL (ref 31.4–35)
MCV RBC AUTO: 86.5 FL (ref 79.6–97.8)
MONOCYTES # BLD: 3.7 K/UL (ref 0.1–1.3)
MONOCYTES NFR BLD: 30 % (ref 4–12)
NEUTS SEG # BLD: 6.1 K/UL (ref 1.7–8.2)
NEUTS SEG NFR BLD: 50 % (ref 43–78)
NRBC # BLD: 0.02 K/UL (ref 0–0.2)
PLATELET # BLD AUTO: 25 K/UL (ref 150–450)
PLATELET COMMENTS,PCOM: ABNORMAL
PMV BLD AUTO: 12.5 FL (ref 9.4–12.3)
POTASSIUM SERPL-SCNC: 3.9 MMOL/L (ref 3.5–5.1)
PROT SERPL-MCNC: 6.4 G/DL (ref 6.3–8.2)
RBC # BLD AUTO: 3.42 M/UL (ref 4.23–5.6)
RBC MORPH BLD: ABNORMAL
SODIUM SERPL-SCNC: 138 MMOL/L (ref 138–145)
WBC # BLD AUTO: 12.2 K/UL (ref 4.3–11.1)
WBC MORPH BLD: ABNORMAL

## 2022-04-24 PROCEDURE — 85025 COMPLETE CBC W/AUTO DIFF WBC: CPT

## 2022-04-24 PROCEDURE — 74011250637 HC RX REV CODE- 250/637: Performed by: NURSE PRACTITIONER

## 2022-04-24 PROCEDURE — 36591 DRAW BLOOD OFF VENOUS DEVICE: CPT

## 2022-04-24 PROCEDURE — 83735 ASSAY OF MAGNESIUM: CPT

## 2022-04-24 PROCEDURE — 99239 HOSP IP/OBS DSCHRG MGMT >30: CPT | Performed by: INTERNAL MEDICINE

## 2022-04-24 PROCEDURE — 80053 COMPREHEN METABOLIC PANEL: CPT

## 2022-04-24 RX ORDER — OLANZAPINE 5 MG/1
5 TABLET, ORALLY DISINTEGRATING ORAL
Qty: 5 TABLET | Refills: 0 | Status: SHIPPED | OUTPATIENT
Start: 2022-04-24

## 2022-04-24 RX ADMIN — AMOXICILLIN AND CLAVULANATE POTASSIUM 1 TABLET: 875; 125 TABLET, FILM COATED ORAL at 08:26

## 2022-04-24 RX ADMIN — PANTOPRAZOLE SODIUM 40 MG: 40 TABLET, DELAYED RELEASE ORAL at 08:25

## 2022-04-24 RX ADMIN — ATENOLOL 25 MG: 50 TABLET ORAL at 08:26

## 2022-04-24 RX ADMIN — FLUCONAZOLE 200 MG: 100 TABLET ORAL at 08:26

## 2022-04-24 RX ADMIN — CETIRIZINE HYDROCHLORIDE 5 MG: 5 TABLET ORAL at 08:25

## 2022-04-24 RX ADMIN — LEVOFLOXACIN 500 MG: 500 TABLET, FILM COATED ORAL at 08:26

## 2022-04-24 RX ADMIN — LOPERAMIDE HYDROCHLORIDE 2 MG: 2 CAPSULE ORAL at 05:54

## 2022-04-24 RX ADMIN — ONDANSETRON 8 MG: 4 TABLET, ORALLY DISINTEGRATING ORAL at 10:54

## 2022-04-24 RX ADMIN — ACYCLOVIR 400 MG: 800 TABLET ORAL at 08:25

## 2022-04-24 NOTE — PROGRESS NOTES
Problem: Falls - Risk of  Goal: *Absence of Falls  Description: Document Singh Díaz Fall Risk and appropriate interventions in the flowsheet.   Outcome: Progressing Towards Goal  Note: Fall Risk Interventions:            Medication Interventions: Evaluate medications/consider consulting pharmacy,Teach patient to arise slowly    Elimination Interventions: Call light in reach,Toilet paper/wipes in reach,Urinal in reach,Patient to call for help with toileting needs    History of Falls Interventions: Consult care management for discharge planning,Evaluate medications/consider consulting pharmacy

## 2022-04-24 NOTE — PROGRESS NOTES
Diagnosis: DLBCL  Transplant Date: 04/11/2022  Day: (+/-) +13. Chemo regimen used: BEAM  BMT assessments and toxicities completed. WBC/ANC= 12.2/  Prophylactic medications started D+1 04/12/2022  G-CSF started on D+6 04/17/2022  Toxicities greater than 2 :none.    Patient seen by MD/NP daily until engraftment.   New orders received: none  Issues: none

## 2022-04-24 NOTE — PROGRESS NOTES
Patient will be d/c home today. Patient has no needs identified at being d/c home today. Family will transport home. Patient has met all treatment goals / milestones. CM will continue to monitor and remain available for any needs that may occur. Care Management Interventions  PCP Verified by CM: Yes  Mode of Transport at Discharge:  Other (see comment)  Transition of Care Consult (CM Consult): Discharge Planning  Discharge Durable Medical Equipment: No  Physical Therapy Consult: No  Occupational Therapy Consult: No  Speech Therapy Consult: No  Support Systems: Spouse/Significant Other  Confirm Follow Up Transport: Family  The Plan for Transition of Care is Related to the Following Treatment Goals : return to baseline  The Patient and/or Patient Representative was Provided with a Choice of Provider and Agrees with the Discharge Plan?: Yes  Freedom of Choice List was Provided with Basic Dialogue that Supports the Patient's Individualized Plan of Care/Goals, Treatment Preferences and Shares the Quality Data Associated with the Providers?: Yes  [place] Resource Information Provided?: No  Discharge Location  Patient Expects to be Discharged to[de-identified] Home with family assistance

## 2022-04-25 ENCOUNTER — HOSPITAL ENCOUNTER (OUTPATIENT)
Dept: LAB | Age: 57
Discharge: HOME | End: 2022-04-25
Payer: COMMERCIAL

## 2022-04-25 ENCOUNTER — PATIENT OUTREACH (OUTPATIENT)
Dept: CASE MANAGEMENT | Age: 57
End: 2022-04-25

## 2022-04-25 ENCOUNTER — HOSPITAL ENCOUNTER (OUTPATIENT)
Dept: INFUSION THERAPY | Age: 57
Discharge: HOME | End: 2022-04-25

## 2022-04-25 DIAGNOSIS — Z51.11 ADMISSION FOR ANTINEOPLASTIC CHEMOTHERAPY: ICD-10-CM

## 2022-04-25 DIAGNOSIS — C83.30 DIFFUSE LARGE B-CELL LYMPHOMA, UNSPECIFIED BODY REGION (HCC): ICD-10-CM

## 2022-04-25 LAB
ABO + RH BLD: NORMAL
ALBUMIN SERPL-MCNC: 3.6 G/DL (ref 3.5–5)
ALBUMIN/GLOB SERPL: 1.2 {RATIO} (ref 1.2–3.5)
ALP SERPL-CCNC: 130 U/L (ref 50–136)
ALT SERPL-CCNC: 46 U/L (ref 12–65)
ANION GAP SERPL CALC-SCNC: 4 MMOL/L (ref 7–16)
AST SERPL-CCNC: 26 U/L (ref 15–37)
BASOPHILS # BLD: 0.1 K/UL (ref 0–0.2)
BASOPHILS NFR BLD: 1 % (ref 0–2)
BILIRUB SERPL-MCNC: 0.2 MG/DL (ref 0.2–1.1)
BLOOD GROUP ANTIBODIES SERPL: NORMAL
BUN SERPL-MCNC: 13 MG/DL (ref 6–23)
CALCIUM SERPL-MCNC: 9 MG/DL (ref 8.3–10.4)
CHLORIDE SERPL-SCNC: 101 MMOL/L (ref 98–107)
CO2 SERPL-SCNC: 32 MMOL/L (ref 21–32)
CREAT SERPL-MCNC: 0.8 MG/DL (ref 0.8–1.5)
DIFFERENTIAL METHOD BLD: ABNORMAL
EOSINOPHIL # BLD: 0 K/UL (ref 0–0.8)
EOSINOPHIL NFR BLD: 0 % (ref 0.5–7.8)
ERYTHROCYTE [DISTWIDTH] IN BLOOD BY AUTOMATED COUNT: 11.8 % (ref 11.9–14.6)
GLOBULIN SER CALC-MCNC: 3.1 G/DL (ref 2.3–3.5)
GLUCOSE SERPL-MCNC: 123 MG/DL (ref 65–100)
HCT VFR BLD AUTO: 32.2 %
HGB BLD-MCNC: 11 G/DL (ref 13.6–17.2)
IMM GRANULOCYTES # BLD AUTO: 0.3 K/UL (ref 0–0.5)
IMM GRANULOCYTES NFR BLD AUTO: 5 % (ref 0–5)
LYMPHOCYTES # BLD: 1 K/UL (ref 0.5–4.6)
LYMPHOCYTES NFR BLD: 14 % (ref 13–44)
MAGNESIUM SERPL-MCNC: 2.4 MG/DL (ref 1.8–2.4)
MCH RBC QN AUTO: 30.4 PG (ref 26.1–32.9)
MCHC RBC AUTO-ENTMCNC: 34.2 G/DL (ref 31.4–35)
MCV RBC AUTO: 89 FL (ref 79.6–97.8)
MONOCYTES # BLD: 2.2 K/UL (ref 0.1–1.3)
MONOCYTES NFR BLD: 32 % (ref 4–12)
NEUTS SEG # BLD: 3.3 K/UL (ref 1.7–8.2)
NEUTS SEG NFR BLD: 48 % (ref 43–78)
NRBC # BLD: 0 K/UL (ref 0–0.2)
PLATELET # BLD AUTO: 49 K/UL (ref 150–450)
PLATELET COMMENTS,PCOM: ABNORMAL
PMV BLD AUTO: 11 FL (ref 9.4–12.3)
POTASSIUM SERPL-SCNC: 3.8 MMOL/L (ref 3.5–5.1)
PROT SERPL-MCNC: 6.7 G/DL (ref 6.3–8.2)
RBC # BLD AUTO: 3.62 M/UL (ref 4.23–5.6)
RBC MORPH BLD: ABNORMAL
SODIUM SERPL-SCNC: 137 MMOL/L (ref 136–145)
SPECIMEN EXP DATE BLD: NORMAL
WBC # BLD AUTO: 6.9 K/UL (ref 4.3–11.1)
WBC MORPH BLD: ABNORMAL

## 2022-04-25 PROCEDURE — 36415 COLL VENOUS BLD VENIPUNCTURE: CPT

## 2022-04-25 PROCEDURE — 83735 ASSAY OF MAGNESIUM: CPT

## 2022-04-25 PROCEDURE — 85025 COMPLETE CBC W/AUTO DIFF WBC: CPT

## 2022-04-25 PROCEDURE — 80053 COMPREHEN METABOLIC PANEL: CPT

## 2022-04-25 PROCEDURE — 86900 BLOOD TYPING SEROLOGIC ABO: CPT

## 2022-04-25 NOTE — PROGRESS NOTES
4/25 D+14  Pt is feeling better. Diarrhea has improved per pt. Lab look looks good and recovering nicely. He does not need replacements today.  We will continue to see him twice weekly

## 2022-04-28 ENCOUNTER — HOSPITAL ENCOUNTER (OUTPATIENT)
Dept: LAB | Age: 57
Discharge: HOME | End: 2022-04-28
Payer: COMMERCIAL

## 2022-04-28 ENCOUNTER — PATIENT OUTREACH (OUTPATIENT)
Dept: CASE MANAGEMENT | Age: 57
End: 2022-04-28

## 2022-04-28 ENCOUNTER — HOSPITAL ENCOUNTER (OUTPATIENT)
Dept: INFUSION THERAPY | Age: 57
Discharge: HOME | End: 2022-04-28

## 2022-04-28 DIAGNOSIS — C83.38 DIFFUSE LARGE B-CELL LYMPHOMA OF LYMPH NODES OF MULTIPLE REGIONS (HCC): ICD-10-CM

## 2022-04-28 LAB
ALBUMIN SERPL-MCNC: 3.5 G/DL (ref 3.5–5)
ALBUMIN/GLOB SERPL: 1.2 {RATIO} (ref 1.2–3.5)
ALP SERPL-CCNC: 116 U/L (ref 50–136)
ALT SERPL-CCNC: 41 U/L (ref 12–65)
ANION GAP SERPL CALC-SCNC: 4 MMOL/L (ref 7–16)
AST SERPL-CCNC: 26 U/L (ref 15–37)
BASOPHILS # BLD: 0.1 K/UL (ref 0–0.2)
BASOPHILS NFR BLD: 2 % (ref 0–2)
BILIRUB SERPL-MCNC: 0.2 MG/DL (ref 0.2–1.1)
BUN SERPL-MCNC: 10 MG/DL (ref 6–23)
CALCIUM SERPL-MCNC: 8.7 MG/DL (ref 8.3–10.4)
CHLORIDE SERPL-SCNC: 105 MMOL/L (ref 98–107)
CO2 SERPL-SCNC: 32 MMOL/L (ref 21–32)
CREAT SERPL-MCNC: 0.7 MG/DL (ref 0.8–1.5)
DIFFERENTIAL METHOD BLD: ABNORMAL
EOSINOPHIL # BLD: 0 K/UL (ref 0–0.8)
EOSINOPHIL NFR BLD: 0 % (ref 0.5–7.8)
ERYTHROCYTE [DISTWIDTH] IN BLOOD BY AUTOMATED COUNT: 11.9 % (ref 11.9–14.6)
GLOBULIN SER CALC-MCNC: 2.9 G/DL (ref 2.3–3.5)
GLUCOSE SERPL-MCNC: 95 MG/DL (ref 65–100)
HCT VFR BLD AUTO: 30.4 %
HGB BLD-MCNC: 10.4 G/DL (ref 13.6–17.2)
IMM GRANULOCYTES # BLD AUTO: 0.1 K/UL (ref 0–0.5)
IMM GRANULOCYTES NFR BLD AUTO: 3 % (ref 0–5)
LYMPHOCYTES # BLD: 0.8 K/UL (ref 0.5–4.6)
LYMPHOCYTES NFR BLD: 22 % (ref 13–44)
MAGNESIUM SERPL-MCNC: 2.1 MG/DL (ref 1.8–2.4)
MCH RBC QN AUTO: 30.2 PG (ref 26.1–32.9)
MCHC RBC AUTO-ENTMCNC: 34.2 G/DL (ref 31.4–35)
MCV RBC AUTO: 88.4 FL (ref 79.6–97.8)
MONOCYTES # BLD: 1 K/UL (ref 0.1–1.3)
MONOCYTES NFR BLD: 27 % (ref 4–12)
NEUTS SEG # BLD: 1.6 K/UL (ref 1.7–8.2)
NEUTS SEG NFR BLD: 46 % (ref 43–78)
NRBC # BLD: 0 K/UL (ref 0–0.2)
PLATELET # BLD AUTO: 103 K/UL (ref 150–450)
PLATELET COMMENTS,PCOM: ABNORMAL
PMV BLD AUTO: 10.2 FL (ref 9.4–12.3)
POTASSIUM SERPL-SCNC: 4 MMOL/L (ref 3.5–5.1)
PROT SERPL-MCNC: 6.4 G/DL (ref 6.3–8.2)
RBC # BLD AUTO: 3.44 M/UL (ref 4.23–5.6)
RBC MORPH BLD: ABNORMAL
SODIUM SERPL-SCNC: 141 MMOL/L (ref 136–145)
WBC # BLD AUTO: 3.6 K/UL (ref 4.3–11.1)
WBC MORPH BLD: ABNORMAL

## 2022-04-28 PROCEDURE — 80053 COMPREHEN METABOLIC PANEL: CPT

## 2022-04-28 PROCEDURE — 83735 ASSAY OF MAGNESIUM: CPT

## 2022-04-28 PROCEDURE — 85025 COMPLETE CBC W/AUTO DIFF WBC: CPT

## 2022-04-28 PROCEDURE — 36415 COLL VENOUS BLD VENIPUNCTURE: CPT

## 2022-04-28 NOTE — PROGRESS NOTES
4/28 D+17  Pt states he is feeling better each day. Labs continue to look great. We will continue to see him weekly.

## 2022-05-02 ENCOUNTER — HOSPITAL ENCOUNTER (OUTPATIENT)
Dept: LAB | Age: 57
Discharge: HOME | End: 2022-05-02
Payer: COMMERCIAL

## 2022-05-02 ENCOUNTER — HOSPITAL ENCOUNTER (OUTPATIENT)
Dept: INFUSION THERAPY | Age: 57
Discharge: HOME | End: 2022-05-02

## 2022-05-02 ENCOUNTER — PATIENT OUTREACH (OUTPATIENT)
Dept: CASE MANAGEMENT | Age: 57
End: 2022-05-02

## 2022-05-02 DIAGNOSIS — C83.31 DIFFUSE LARGE B-CELL LYMPHOMA OF LYMPH NODES OF NECK (HCC): ICD-10-CM

## 2022-05-02 LAB
ALBUMIN SERPL-MCNC: 3.6 G/DL (ref 3.5–5)
ALBUMIN/GLOB SERPL: 1.4 {RATIO} (ref 1.2–3.5)
ALP SERPL-CCNC: 102 U/L (ref 50–136)
ALT SERPL-CCNC: 35 U/L (ref 12–65)
ANION GAP SERPL CALC-SCNC: 3 MMOL/L (ref 7–16)
AST SERPL-CCNC: 24 U/L (ref 15–37)
BASOPHILS # BLD: 0 K/UL (ref 0–0.2)
BASOPHILS NFR BLD: 1 % (ref 0–2)
BILIRUB SERPL-MCNC: 0.2 MG/DL (ref 0.2–1.1)
BUN SERPL-MCNC: 12 MG/DL (ref 6–23)
CALCIUM SERPL-MCNC: 8.9 MG/DL (ref 8.3–10.4)
CHLORIDE SERPL-SCNC: 104 MMOL/L (ref 98–107)
CO2 SERPL-SCNC: 33 MMOL/L (ref 21–32)
CREAT SERPL-MCNC: 0.6 MG/DL (ref 0.8–1.5)
DIFFERENTIAL METHOD BLD: ABNORMAL
EOSINOPHIL # BLD: 0 K/UL (ref 0–0.8)
EOSINOPHIL NFR BLD: 0 % (ref 0.5–7.8)
ERYTHROCYTE [DISTWIDTH] IN BLOOD BY AUTOMATED COUNT: 13 % (ref 11.9–14.6)
GLOBULIN SER CALC-MCNC: 2.6 G/DL (ref 2.3–3.5)
GLUCOSE SERPL-MCNC: 97 MG/DL (ref 65–100)
HCT VFR BLD AUTO: 30 %
HGB BLD-MCNC: 10.1 G/DL (ref 13.6–17.2)
IMM GRANULOCYTES # BLD AUTO: 0 K/UL (ref 0–0.5)
IMM GRANULOCYTES NFR BLD AUTO: 1 % (ref 0–5)
LYMPHOCYTES # BLD: 0.9 K/UL (ref 0.5–4.6)
LYMPHOCYTES NFR BLD: 22 % (ref 13–44)
MAGNESIUM SERPL-MCNC: 2.2 MG/DL (ref 1.8–2.4)
MCH RBC QN AUTO: 30.2 PG (ref 26.1–32.9)
MCHC RBC AUTO-ENTMCNC: 33.7 G/DL (ref 31.4–35)
MCV RBC AUTO: 89.8 FL (ref 79.6–97.8)
MONOCYTES # BLD: 0.7 K/UL (ref 0.1–1.3)
MONOCYTES NFR BLD: 17 % (ref 4–12)
NEUTS SEG # BLD: 2.6 K/UL (ref 1.7–8.2)
NEUTS SEG NFR BLD: 59 % (ref 43–78)
NRBC # BLD: 0 K/UL (ref 0–0.2)
PLATELET # BLD AUTO: 160 K/UL (ref 150–450)
PLATELET COMMENTS,PCOM: ADEQUATE
PMV BLD AUTO: 9.5 FL (ref 9.4–12.3)
POTASSIUM SERPL-SCNC: 3.7 MMOL/L (ref 3.5–5.1)
PROT SERPL-MCNC: 6.2 G/DL (ref 6.3–8.2)
RBC # BLD AUTO: 3.34 M/UL (ref 4.23–5.6)
RBC MORPH BLD: ABNORMAL
RBC MORPH BLD: ABNORMAL
SODIUM SERPL-SCNC: 140 MMOL/L (ref 136–145)
WBC # BLD AUTO: 4.2 K/UL (ref 4.3–11.1)
WBC MORPH BLD: ABNORMAL

## 2022-05-02 PROCEDURE — 83735 ASSAY OF MAGNESIUM: CPT

## 2022-05-02 PROCEDURE — 80053 COMPREHEN METABOLIC PANEL: CPT

## 2022-05-02 PROCEDURE — 36415 COLL VENOUS BLD VENIPUNCTURE: CPT

## 2022-05-02 PROCEDURE — 85025 COMPLETE CBC W/AUTO DIFF WBC: CPT

## 2022-05-02 NOTE — PROGRESS NOTES
5/2 D21  Pt came in for a f/u. Labs continue to look great. Pt is feeling well. No needs for replacements today.

## 2022-05-05 ENCOUNTER — HOSPITAL ENCOUNTER (OUTPATIENT)
Dept: INFUSION THERAPY | Age: 57
Discharge: HOME | End: 2022-05-05

## 2022-05-05 ENCOUNTER — HOSPITAL ENCOUNTER (OUTPATIENT)
Dept: LAB | Age: 57
Discharge: HOME | End: 2022-05-05
Payer: COMMERCIAL

## 2022-05-05 DIAGNOSIS — C83.31 DIFFUSE LARGE B-CELL LYMPHOMA OF LYMPH NODES OF NECK (HCC): ICD-10-CM

## 2022-05-05 LAB
ALBUMIN SERPL-MCNC: 3.5 G/DL (ref 3.5–5)
ALBUMIN/GLOB SERPL: 1.4 {RATIO} (ref 1.2–3.5)
ALP SERPL-CCNC: 96 U/L (ref 50–136)
ALT SERPL-CCNC: 32 U/L (ref 12–65)
ANION GAP SERPL CALC-SCNC: 3 MMOL/L (ref 7–16)
AST SERPL-CCNC: 20 U/L (ref 15–37)
BASOPHILS # BLD: 0.1 K/UL (ref 0–0.2)
BASOPHILS NFR BLD: 1 % (ref 0–2)
BILIRUB SERPL-MCNC: 0.2 MG/DL (ref 0.2–1.1)
BUN SERPL-MCNC: 11 MG/DL (ref 6–23)
CALCIUM SERPL-MCNC: 9 MG/DL (ref 8.3–10.4)
CHLORIDE SERPL-SCNC: 106 MMOL/L (ref 98–107)
CO2 SERPL-SCNC: 33 MMOL/L (ref 21–32)
CREAT SERPL-MCNC: 0.6 MG/DL (ref 0.8–1.5)
DIFFERENTIAL METHOD BLD: ABNORMAL
EOSINOPHIL # BLD: 0 K/UL (ref 0–0.8)
EOSINOPHIL NFR BLD: 1 % (ref 0.5–7.8)
ERYTHROCYTE [DISTWIDTH] IN BLOOD BY AUTOMATED COUNT: 13.4 % (ref 11.9–14.6)
GLOBULIN SER CALC-MCNC: 2.5 G/DL (ref 2.3–3.5)
GLUCOSE SERPL-MCNC: 95 MG/DL (ref 65–100)
HCT VFR BLD AUTO: 31.6 %
HGB BLD-MCNC: 10.6 G/DL (ref 13.6–17.2)
IMM GRANULOCYTES # BLD AUTO: 0 K/UL (ref 0–0.5)
IMM GRANULOCYTES NFR BLD AUTO: 0 % (ref 0–5)
LYMPHOCYTES # BLD: 1.3 K/UL (ref 0.5–4.6)
LYMPHOCYTES NFR BLD: 25 % (ref 13–44)
MAGNESIUM SERPL-MCNC: 2.2 MG/DL (ref 1.8–2.4)
MCH RBC QN AUTO: 30.5 PG (ref 26.1–32.9)
MCHC RBC AUTO-ENTMCNC: 33.5 G/DL (ref 31.4–35)
MCV RBC AUTO: 90.8 FL (ref 79.6–97.8)
MONOCYTES # BLD: 0.8 K/UL (ref 0.1–1.3)
MONOCYTES NFR BLD: 15 % (ref 4–12)
NEUTS SEG # BLD: 3.2 K/UL (ref 1.7–8.2)
NEUTS SEG NFR BLD: 58 % (ref 43–78)
NRBC # BLD: 0 K/UL (ref 0–0.2)
PLATELET # BLD AUTO: 159 K/UL (ref 150–450)
PMV BLD AUTO: 9.3 FL (ref 9.4–12.3)
POTASSIUM SERPL-SCNC: 3.8 MMOL/L (ref 3.5–5.1)
PROT SERPL-MCNC: 6 G/DL (ref 6.3–8.2)
RBC # BLD AUTO: 3.48 M/UL (ref 4.23–5.6)
SODIUM SERPL-SCNC: 142 MMOL/L (ref 136–145)
WBC # BLD AUTO: 5.5 K/UL (ref 4.3–11.1)

## 2022-05-05 PROCEDURE — 36415 COLL VENOUS BLD VENIPUNCTURE: CPT

## 2022-05-05 PROCEDURE — 85025 COMPLETE CBC W/AUTO DIFF WBC: CPT

## 2022-05-05 PROCEDURE — 83735 ASSAY OF MAGNESIUM: CPT

## 2022-05-05 PROCEDURE — 80053 COMPREHEN METABOLIC PANEL: CPT

## 2022-05-12 ENCOUNTER — PATIENT OUTREACH (OUTPATIENT)
Dept: CASE MANAGEMENT | Age: 57
End: 2022-05-12

## 2022-05-12 ENCOUNTER — HOSPITAL ENCOUNTER (OUTPATIENT)
Dept: LAB | Age: 57
Discharge: HOME | End: 2022-05-12
Payer: COMMERCIAL

## 2022-05-12 ENCOUNTER — HOSPITAL ENCOUNTER (OUTPATIENT)
Dept: INFUSION THERAPY | Age: 57
Discharge: HOME | End: 2022-05-12
Payer: COMMERCIAL

## 2022-05-12 DIAGNOSIS — C83.30 DIFFUSE LARGE B-CELL LYMPHOMA, UNSPECIFIED BODY REGION (HCC): ICD-10-CM

## 2022-05-12 DIAGNOSIS — Z51.11 ADMISSION FOR ANTINEOPLASTIC CHEMOTHERAPY: ICD-10-CM

## 2022-05-12 DIAGNOSIS — Z94.81 STATUS POST BONE MARROW TRANSPLANT (HCC): Primary | ICD-10-CM

## 2022-05-12 LAB
ALBUMIN SERPL-MCNC: 3.6 G/DL (ref 3.5–5)
ALBUMIN/GLOB SERPL: 1.3 {RATIO} (ref 1.2–3.5)
ALP SERPL-CCNC: 96 U/L (ref 50–136)
ALT SERPL-CCNC: 38 U/L (ref 12–65)
ANION GAP SERPL CALC-SCNC: 3 MMOL/L (ref 7–16)
AST SERPL-CCNC: 26 U/L (ref 15–37)
BASOPHILS # BLD: 0 K/UL (ref 0–0.2)
BASOPHILS NFR BLD: 1 % (ref 0–2)
BILIRUB SERPL-MCNC: 0.3 MG/DL (ref 0.2–1.1)
BUN SERPL-MCNC: 9 MG/DL (ref 6–23)
CALCIUM SERPL-MCNC: 9.3 MG/DL (ref 8.3–10.4)
CHLORIDE SERPL-SCNC: 104 MMOL/L (ref 98–107)
CO2 SERPL-SCNC: 34 MMOL/L (ref 21–32)
CREAT SERPL-MCNC: 0.7 MG/DL (ref 0.8–1.5)
DIFFERENTIAL METHOD BLD: ABNORMAL
EOSINOPHIL # BLD: 0.1 K/UL (ref 0–0.8)
EOSINOPHIL NFR BLD: 3 % (ref 0.5–7.8)
ERYTHROCYTE [DISTWIDTH] IN BLOOD BY AUTOMATED COUNT: 14.5 % (ref 11.9–14.6)
GLOBULIN SER CALC-MCNC: 2.8 G/DL (ref 2.3–3.5)
GLUCOSE SERPL-MCNC: 93 MG/DL (ref 65–100)
HCT VFR BLD AUTO: 33.3 %
HGB BLD-MCNC: 11 G/DL (ref 13.6–17.2)
IMM GRANULOCYTES # BLD AUTO: 0 K/UL (ref 0–0.5)
IMM GRANULOCYTES NFR BLD AUTO: 0 % (ref 0–5)
LYMPHOCYTES # BLD: 1.2 K/UL (ref 0.5–4.6)
LYMPHOCYTES NFR BLD: 25 % (ref 13–44)
MAGNESIUM SERPL-MCNC: 2.4 MG/DL (ref 1.8–2.4)
MCH RBC QN AUTO: 30.1 PG (ref 26.1–32.9)
MCHC RBC AUTO-ENTMCNC: 33 G/DL (ref 31.4–35)
MCV RBC AUTO: 91.2 FL (ref 79.6–97.8)
MONOCYTES # BLD: 0.8 K/UL (ref 0.1–1.3)
MONOCYTES NFR BLD: 18 % (ref 4–12)
NEUTS SEG # BLD: 2.5 K/UL (ref 1.7–8.2)
NEUTS SEG NFR BLD: 53 % (ref 43–78)
NRBC # BLD: 0 K/UL (ref 0–0.2)
PLATELET # BLD AUTO: 140 K/UL (ref 150–450)
PMV BLD AUTO: 9.8 FL (ref 9.4–12.3)
POTASSIUM SERPL-SCNC: 3.7 MMOL/L (ref 3.5–5.1)
PROT SERPL-MCNC: 6.4 G/DL (ref 6.3–8.2)
RBC # BLD AUTO: 3.65 M/UL (ref 4.23–5.6)
SODIUM SERPL-SCNC: 141 MMOL/L (ref 136–145)
WBC # BLD AUTO: 4.6 K/UL (ref 4.3–11.1)

## 2022-05-12 PROCEDURE — 74011000250 HC RX REV CODE- 250

## 2022-05-12 PROCEDURE — M0220 HC TIXAGEV AND CILGAV, INJ: HCPCS

## 2022-05-12 PROCEDURE — 85025 COMPLETE CBC W/AUTO DIFF WBC: CPT

## 2022-05-12 PROCEDURE — 80053 COMPREHEN METABOLIC PANEL: CPT

## 2022-05-12 PROCEDURE — 36415 COLL VENOUS BLD VENIPUNCTURE: CPT

## 2022-05-12 PROCEDURE — 83735 ASSAY OF MAGNESIUM: CPT

## 2022-05-12 RX ORDER — CILGAVIMAB 150 MG/1.5
300 VIAL (ML) INTRAMUSCULAR ONCE
Status: COMPLETED | OUTPATIENT
Start: 2022-05-12 | End: 2022-05-12

## 2022-05-12 RX ORDER — EPINEPHRINE 1 MG/ML
0.3 INJECTION, SOLUTION, CONCENTRATE INTRAVENOUS AS NEEDED
OUTPATIENT
Start: 2022-05-12

## 2022-05-12 RX ORDER — TIXAGEVIMAB 150 MG/1.5
300 VIAL (ML) INTRAMUSCULAR ONCE
Status: COMPLETED | OUTPATIENT
Start: 2022-05-12 | End: 2022-05-12

## 2022-05-12 RX ORDER — ACETAMINOPHEN 325 MG/1
650 TABLET ORAL AS NEEDED
Start: 2022-05-12

## 2022-05-12 RX ORDER — CILGAVIMAB 150 MG/1.5
300 VIAL (ML) INTRAMUSCULAR ONCE
Status: CANCELLED | OUTPATIENT
Start: 2022-05-12 | End: 2022-05-12

## 2022-05-12 RX ORDER — HYDROCORTISONE SODIUM SUCCINATE 100 MG/2ML
100 INJECTION, POWDER, FOR SOLUTION INTRAMUSCULAR; INTRAVENOUS AS NEEDED
OUTPATIENT
Start: 2022-05-12

## 2022-05-12 RX ORDER — DIPHENHYDRAMINE HYDROCHLORIDE 50 MG/ML
25 INJECTION, SOLUTION INTRAMUSCULAR; INTRAVENOUS AS NEEDED
Start: 2022-05-12

## 2022-05-12 RX ORDER — DIPHENHYDRAMINE HYDROCHLORIDE 50 MG/ML
50 INJECTION, SOLUTION INTRAMUSCULAR; INTRAVENOUS AS NEEDED
Start: 2022-05-12

## 2022-05-12 RX ORDER — ALBUTEROL SULFATE 0.83 MG/ML
2.5 SOLUTION RESPIRATORY (INHALATION) AS NEEDED
Start: 2022-05-12

## 2022-05-12 RX ORDER — TIXAGEVIMAB 150 MG/1.5
300 VIAL (ML) INTRAMUSCULAR ONCE
Status: CANCELLED | OUTPATIENT
Start: 2022-05-12 | End: 2022-05-12

## 2022-05-12 RX ORDER — ONDANSETRON 2 MG/ML
8 INJECTION INTRAMUSCULAR; INTRAVENOUS AS NEEDED
OUTPATIENT
Start: 2022-05-12

## 2022-05-12 RX ADMIN — Medication 300 MG: at 11:38

## 2022-05-12 RX ADMIN — Medication 300 MG: at 11:39

## 2022-05-12 NOTE — PROGRESS NOTES
Pt arrived ambulatory, leno completed, pt tolerated well, stated he wasn't informed at his office visit that there was a 1 hour monitoring time after injections and he didn't have time to stay.   Pt instructed to take benadryl or go to closest ER if he began having adverse reaction, pt verbalized understanding, discharged home ambulatory

## 2022-05-19 ENCOUNTER — HOSPITAL ENCOUNTER (OUTPATIENT)
Dept: INFUSION THERAPY | Age: 57
Discharge: HOME | End: 2022-05-19

## 2022-05-19 ENCOUNTER — HOSPITAL ENCOUNTER (OUTPATIENT)
Dept: LAB | Age: 57
Discharge: HOME | End: 2022-05-19
Payer: COMMERCIAL

## 2022-05-19 DIAGNOSIS — Z94.81 STATUS POST BONE MARROW TRANSPLANT (HCC): ICD-10-CM

## 2022-05-19 DIAGNOSIS — C83.30 DIFFUSE LARGE B-CELL LYMPHOMA, UNSPECIFIED BODY REGION (HCC): ICD-10-CM

## 2022-05-19 LAB
ALBUMIN SERPL-MCNC: 3.6 G/DL (ref 3.5–5)
ALBUMIN/GLOB SERPL: 1.4 {RATIO} (ref 1.2–3.5)
ALP SERPL-CCNC: 99 U/L (ref 50–136)
ALT SERPL-CCNC: 32 U/L (ref 12–65)
ANION GAP SERPL CALC-SCNC: 4 MMOL/L (ref 7–16)
AST SERPL-CCNC: 26 U/L (ref 15–37)
BASOPHILS # BLD: 0.1 K/UL (ref 0–0.2)
BASOPHILS NFR BLD: 1 % (ref 0–2)
BILIRUB SERPL-MCNC: 0.2 MG/DL (ref 0.2–1.1)
BUN SERPL-MCNC: 8 MG/DL (ref 6–23)
CALCIUM SERPL-MCNC: 9.3 MG/DL (ref 8.3–10.4)
CHLORIDE SERPL-SCNC: 105 MMOL/L (ref 98–107)
CO2 SERPL-SCNC: 33 MMOL/L (ref 21–32)
CREAT SERPL-MCNC: 0.8 MG/DL (ref 0.8–1.5)
DIFFERENTIAL METHOD BLD: ABNORMAL
EOSINOPHIL # BLD: 0.2 K/UL (ref 0–0.8)
EOSINOPHIL NFR BLD: 4 % (ref 0.5–7.8)
ERYTHROCYTE [DISTWIDTH] IN BLOOD BY AUTOMATED COUNT: 14.3 % (ref 11.9–14.6)
GLOBULIN SER CALC-MCNC: 2.5 G/DL (ref 2.3–3.5)
GLUCOSE SERPL-MCNC: 96 MG/DL (ref 65–100)
HCT VFR BLD AUTO: 33.6 %
HGB BLD-MCNC: 11.3 G/DL (ref 13.6–17.2)
IMM GRANULOCYTES # BLD AUTO: 0 K/UL (ref 0–0.5)
IMM GRANULOCYTES NFR BLD AUTO: 1 % (ref 0–5)
LYMPHOCYTES # BLD: 1.4 K/UL (ref 0.5–4.6)
LYMPHOCYTES NFR BLD: 26 % (ref 13–44)
MAGNESIUM SERPL-MCNC: 2.3 MG/DL (ref 1.8–2.4)
MCH RBC QN AUTO: 30.6 PG (ref 26.1–32.9)
MCHC RBC AUTO-ENTMCNC: 33.6 G/DL (ref 31.4–35)
MCV RBC AUTO: 91.1 FL (ref 79.6–97.8)
MONOCYTES # BLD: 1 K/UL (ref 0.1–1.3)
MONOCYTES NFR BLD: 18 % (ref 4–12)
NEUTS SEG # BLD: 2.7 K/UL (ref 1.7–8.2)
NEUTS SEG NFR BLD: 50 % (ref 43–78)
NRBC # BLD: 0 K/UL (ref 0–0.2)
PLATELET # BLD AUTO: 133 K/UL (ref 150–450)
PMV BLD AUTO: 9.5 FL (ref 9.4–12.3)
POTASSIUM SERPL-SCNC: 3.8 MMOL/L (ref 3.5–5.1)
PROT SERPL-MCNC: 6.1 G/DL (ref 6.3–8.2)
RBC # BLD AUTO: 3.69 M/UL (ref 4.23–5.6)
SODIUM SERPL-SCNC: 142 MMOL/L (ref 136–145)
WBC # BLD AUTO: 5.3 K/UL (ref 4.3–11.1)

## 2022-05-19 PROCEDURE — 36415 COLL VENOUS BLD VENIPUNCTURE: CPT

## 2022-05-19 PROCEDURE — 83735 ASSAY OF MAGNESIUM: CPT

## 2022-05-19 PROCEDURE — 85025 COMPLETE CBC W/AUTO DIFF WBC: CPT

## 2022-05-19 PROCEDURE — 80053 COMPREHEN METABOLIC PANEL: CPT

## 2022-05-23 ENCOUNTER — HOSPITAL ENCOUNTER (OUTPATIENT)
Dept: INFUSION THERAPY | Age: 57
Discharge: HOME | End: 2022-05-23

## 2022-05-23 ENCOUNTER — HOSPITAL ENCOUNTER (OUTPATIENT)
Dept: LAB | Age: 57
Discharge: HOME | End: 2022-05-26
Payer: COMMERCIAL

## 2022-05-23 ENCOUNTER — OFFICE VISIT (OUTPATIENT)
Dept: ONCOLOGY | Age: 57
End: 2022-05-23
Payer: COMMERCIAL

## 2022-05-23 VITALS
HEIGHT: 72 IN | DIASTOLIC BLOOD PRESSURE: 76 MMHG | BODY MASS INDEX: 25.06 KG/M2 | SYSTOLIC BLOOD PRESSURE: 145 MMHG | OXYGEN SATURATION: 99 % | TEMPERATURE: 97.7 F | RESPIRATION RATE: 18 BRPM | WEIGHT: 185 LBS | HEART RATE: 63 BPM

## 2022-05-23 DIAGNOSIS — Z79.899 HIGH RISK MEDICATION USE: ICD-10-CM

## 2022-05-23 DIAGNOSIS — C83.30 DIFFUSE LARGE B-CELL LYMPHOMA, UNSPECIFIED BODY REGION (HCC): ICD-10-CM

## 2022-05-23 DIAGNOSIS — Z94.81 STATUS POST BONE MARROW TRANSPLANT (HCC): ICD-10-CM

## 2022-05-23 DIAGNOSIS — Z94.81 STATUS POST BONE MARROW TRANSPLANT (HCC): Primary | ICD-10-CM

## 2022-05-23 DIAGNOSIS — D84.9 IMMUNOCOMPROMISED (HCC): ICD-10-CM

## 2022-05-23 DIAGNOSIS — C83.31 DIFFUSE LARGE B-CELL LYMPHOMA OF LYMPH NODES OF NECK (HCC): Primary | ICD-10-CM

## 2022-05-23 LAB
ALBUMIN SERPL-MCNC: 3.5 G/DL (ref 3.5–5)
ALBUMIN/GLOB SERPL: 1.3 {RATIO} (ref 1.2–3.5)
ALP SERPL-CCNC: 112 U/L (ref 50–136)
ALT SERPL-CCNC: 34 U/L (ref 12–65)
ANION GAP SERPL CALC-SCNC: 4 MMOL/L (ref 7–16)
AST SERPL-CCNC: 24 U/L (ref 15–37)
BASOPHILS # BLD: 0.1 K/UL (ref 0–0.2)
BASOPHILS NFR BLD: 1 % (ref 0–2)
BILIRUB SERPL-MCNC: 0.3 MG/DL (ref 0.2–1.1)
BUN SERPL-MCNC: 9 MG/DL (ref 6–23)
CALCIUM SERPL-MCNC: 9.1 MG/DL (ref 8.3–10.4)
CHLORIDE SERPL-SCNC: 107 MMOL/L (ref 98–107)
CO2 SERPL-SCNC: 31 MMOL/L (ref 21–32)
CREAT SERPL-MCNC: 0.8 MG/DL (ref 0.8–1.5)
DIFFERENTIAL METHOD BLD: ABNORMAL
EOSINOPHIL # BLD: 0.2 K/UL (ref 0–0.8)
EOSINOPHIL NFR BLD: 3 % (ref 0.5–7.8)
ERYTHROCYTE [DISTWIDTH] IN BLOOD BY AUTOMATED COUNT: 14.4 % (ref 11.9–14.6)
GLOBULIN SER CALC-MCNC: 2.6 G/DL (ref 2.3–3.5)
GLUCOSE SERPL-MCNC: 103 MG/DL (ref 65–100)
HCT VFR BLD AUTO: 33.5 %
HGB BLD-MCNC: 11.3 G/DL (ref 13.6–17.2)
IMM GRANULOCYTES # BLD AUTO: 0 K/UL (ref 0–0.5)
IMM GRANULOCYTES NFR BLD AUTO: 0 % (ref 0–5)
LYMPHOCYTES # BLD: 1.9 K/UL (ref 0.5–4.6)
LYMPHOCYTES NFR BLD: 33 % (ref 13–44)
MAGNESIUM SERPL-MCNC: 2.3 MG/DL (ref 1.8–2.4)
MCH RBC QN AUTO: 30.5 PG (ref 26.1–32.9)
MCHC RBC AUTO-ENTMCNC: 33.7 G/DL (ref 31.4–35)
MCV RBC AUTO: 90.3 FL (ref 79.6–97.8)
MONOCYTES # BLD: 0.7 K/UL (ref 0.1–1.3)
MONOCYTES NFR BLD: 13 % (ref 4–12)
NEUTS SEG # BLD: 2.9 K/UL (ref 1.7–8.2)
NEUTS SEG NFR BLD: 50 % (ref 43–78)
NRBC # BLD: 0 K/UL (ref 0–0.2)
PLATELET # BLD AUTO: 130 K/UL (ref 150–450)
PMV BLD AUTO: 9.5 FL (ref 9.4–12.3)
POTASSIUM SERPL-SCNC: 3.9 MMOL/L (ref 3.5–5.1)
PROT SERPL-MCNC: 6.1 G/DL (ref 6.3–8.2)
RBC # BLD AUTO: 3.71 M/UL (ref 4.23–5.6)
SODIUM SERPL-SCNC: 142 MMOL/L (ref 136–145)
WBC # BLD AUTO: 5.8 K/UL (ref 4.3–11.1)

## 2022-05-23 PROCEDURE — 83735 ASSAY OF MAGNESIUM: CPT

## 2022-05-23 PROCEDURE — 80053 COMPREHEN METABOLIC PANEL: CPT

## 2022-05-23 PROCEDURE — 36415 COLL VENOUS BLD VENIPUNCTURE: CPT

## 2022-05-23 PROCEDURE — 99215 OFFICE O/P EST HI 40 MIN: CPT | Performed by: INTERNAL MEDICINE

## 2022-05-23 PROCEDURE — 85025 COMPLETE CBC W/AUTO DIFF WBC: CPT

## 2022-05-23 RX ORDER — SULFAMETHOXAZOLE AND TRIMETHOPRIM 800; 160 MG/1; MG/1
TABLET ORAL
COMMUNITY
Start: 2022-05-19 | End: 2022-10-20 | Stop reason: ALTCHOICE

## 2022-05-23 RX ORDER — LISINOPRIL 10 MG/1
TABLET ORAL
COMMUNITY
Start: 2022-03-14 | End: 2022-06-14 | Stop reason: SDUPTHER

## 2022-05-23 ASSESSMENT — PATIENT HEALTH QUESTIONNAIRE - PHQ9
SUM OF ALL RESPONSES TO PHQ QUESTIONS 1-9: 0
1. LITTLE INTEREST OR PLEASURE IN DOING THINGS: 0
SUM OF ALL RESPONSES TO PHQ QUESTIONS 1-9: 0
SUM OF ALL RESPONSES TO PHQ QUESTIONS 1-9: 0
SUM OF ALL RESPONSES TO PHQ9 QUESTIONS 1 & 2: 0
2. FEELING DOWN, DEPRESSED OR HOPELESS: 0
SUM OF ALL RESPONSES TO PHQ QUESTIONS 1-9: 0

## 2022-05-23 NOTE — PROGRESS NOTES
Data Source: Patient, [facility] record. 5/23/2022    11:51 AM    Bryan Wallace [id number]    64 y.o. Patient Encounter: [facility] Visit    Heme Diagnosis:  Recurrent high grade B cell lymphoma  Stage:  Initially IIIB, now recurrent  Performance Status:  1  Code Status:  Not discussed  Heme History (Copied from prior):   49-year-old  male, history of herniated lumbar disc & back surgery, cholecystectomy, now kindly referred to us by Dr. Bart Loyd area [address] [place] [address], for relapsed DLBCL. 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 Per records, brain imaging including MRI wo contrast 11/12/20 with no acute changes. CT head without contrast 11/12/20 unremarkable, CT angiogram with contrast 11/12/20 without acute findings. He was also seen by neurology Dr. Lucas Guallpa. Headaches slowly resolved over time. - 03/21: On a follow-up visit, noted 30 pound weight loss. He was also found to have enlarged mass in left upper chest with diffusely palpable adenopathy in cervical area as well as bulky bilateral axillary adenopathy. Per patient, swelling developed over a 2-week, and was nontender. Per patient reports being told that his lymphoma diagnosis was seperate from his 3rd cranial nerve palsy.  - CT neck/chest 3/22/2021 showed extensive adenopathy including left pectoralis lymph node mass measuring 7 x 3.7 cm. Extensive bilateral axillary adenopathy measuring up to 6.8 x 11.4 cm. Extensive mediastinal adenopathy noted, including right infrahilar region mass measuring 4.6 x 5.3 cm. Enlarged right adrenal gland measuring 4.1 x 4 cm. Extensive adenopathy in the gurwinder hepatis measuring 5 x 4.4 cm. PET scan could not be performed due to insurance issues. Patient seen by hematology Dr. Zina Cagle.   Core needle biopsy of left chest wall mass showed high-grade B-cell malignancy with FISH testing revealing rearrangement of BCL6/3q. and additional signals for BCL2/18 q. Significantly no evidence of MYC rearrangement noted. Bone marrow biopsy without lymphoma involvement. 2D echo with normal EF. LDH elevated at 311. Hepatitis B negative. He was diagnosed with DLBCL, stage IIIb, IPI score 2.  - R-CHOP x6 (from 4/6/2021 till 7/28/2021. PET scan 8/13/2021 with essentially CR and minimal FDG uptake in the right axillary lymph node with FDG 1.2.  -11/16/2021 CT CAP with disease relapse including new left supraclavicular mass measuring 5.5 x 3.4 cm. Stable axillary and chest wall lymph nodes as well as shotty retroperitoneal lymph nodes. Ultrasound-guided left supraclavicular lymph node biopsy 11/22/2021 showing monoclonal B-cell population with increased cell size, results consistent with mature B-cell lymphoproliferative disorder. Patient now referred to us for further work-up and management. Hospice Referral:  na  Interval History:  5/23/2022: Patient seen for DLBCL with recent high-dose chemotherapy, stem cell transplant, and toxicity management. Today is day +42. Reasonable p.o. intake and mobility. Denies new B symptoms, aches or pains. Continues with acyclovir (in place of valacyclovir and tolerating well). Also on Bactrim prophylaxis. Blood work with adequate counts, chemistries unremarkable, potassium and magnesium normal range. S/p Evusheld injection which she tolerated reasonably. We will see him back in 2 weeks. REVIEW OF SYSTEMS:  As mentioned above, all other systems were reviewed in full and are negative.     Past Medical History:   Diagnosis Date    Cancer ([address]     Hypertension     Valvular heart disease        Past Surgical History:   Procedure Laterality Date    BACK SURGERY      26 years ago    IR BIOPSY PERC SUPERF BONE  2/24/2022    IR BIOPSY PERC SUPERF BONE  2/24/2022    IR BIOPSY PERC SUPERF BONE 2/24/2022 [facility]    IR CHEMO ADMIN IN CNS SPINAL [facility]  12/28/2021    [facility]AL [facility]  12/8/2021    IR CHEMO ADMIN IN CNS SPINAL [facility]  1/28/2022    IR CHOLECYSTOSTOMY PERCUTANEOUS COMPLETE      IR NONTUNNELED VASCULAR CATHETER  3/14/2022    IR NONTUNNELED VASCULAR CATHETER  3/14/2022    IR NONTUNNELED VASCULAR CATHETER 3/14/2022 [facility]    IR SPINAL PUNCTURE CSF TREAT/DRAIN  2/4/2022    TRANSPLANT  04/2022    auto stem cell transplant    VASCULAR SURGERY         Current Outpatient Medications   Medication Sig Dispense Refill    lisinopril (PRINIVIL;ZESTRIL) 10 MG tablet TAKE 1 TABLET BY MOUTH EVERY DAY      sulfamethoxazole-trimethoprim (BACTRIM DS;SEPTRA DS) 800-160 MG per tablet       acetaminophen (TYLENOL) 325 MG tablet Take by mouth every 4 hours as needed      acyclovir (ZOVIRAX) 400 MG tablet Take 400 mg by mouth 2 times daily      atenolol (TENORMIN) 25 MG tablet Take 25 mg by mouth daily      diphenoxylate-atropine (LOMOTIL) 2.5-0.025 MG per tablet Take 1 tablet by mouth 4 times daily as needed.  lisinopril (PRINIVIL;ZESTRIL) 20 MG tablet Take 20 mg by mouth daily      loperamide (IMODIUM) 2 MG capsule Take 2 mg by mouth every 4 hours as needed      loratadine (CLARITIN) 10 MG tablet Take 10 mg by mouth      OLANZapine zydis (ZYPREXA) 5 MG disintegrating tablet Take 5 mg by mouth      ondansetron (ZOFRAN-ODT) 8 MG TBDP disintegrating tablet Take 8 mg by mouth every 8 hours as needed      prochlorperazine (COMPAZINE) 10 MG tablet Take 5-10 mg by mouth every 6 hours as needed       No current facility-administered medications for this visit.        Social History     Socioeconomic History    Marital status:      Spouse name: None    Number of children: None    Years of education: None    Highest education level: None   Occupational History    None   Tobacco Use    Smoking status: Never Smoker    Smokeless tobacco: Never Used   Substance and Sexual Activity    Alcohol use: Not Currently    Drug use: Not Currently    Sexual activity: None   Other Topics Concern    None   Social History Narrative    None     Social Determinants of Health     Financial Resource Strain:     Difficulty of Paying Living Expenses: Not on file   Food Insecurity:     Worried About Running Out of Food in the Last Year: Not on file    Colleen of Food in the Last Year: Not on file   Transportation Needs:     Lack of Transportation (Medical): Not on file    Lack of Transportation (Non-Medical): Not on file   Physical Activity:     Days of Exercise per Week: Not on file    Minutes of Exercise per Session: Not on file   Stress:     Feeling of Stress : Not on file   Social Connections:     Frequency of Communication with Friends and Family: Not on file    Frequency of Social Gatherings with Friends and Family: Not on file    Attends Spiritism Services: Not on file    Active Member of Clubs or Organizations: Not on file    Attends Club or Organization Meetings: Not on file    Marital Status: Not on file   Intimate Partner Violence:     Fear of Current or Ex-Partner: Not on file    Emotionally Abused: Not on file    Physically Abused: Not on file    Sexually Abused: Not on file   Housing Stability:     Unable to Pay for Housing in the Last Year: Not on file    Number of [place] in the Last Year: Not on file    Unstable Housing in the Last Year: Not on file       Family History   Problem Relation Age of Onset    Diabetes Mother     Hypertension Mother     Diabetes Father     Hypertension Father     Hypertension Brother        No Known Allergies    PHYSICAL EXAMINATION:  General Appearance: Healthy appearing patient in no acute distress  Vitals reviewed.    BP (!) 145/76 (Site: Left Upper Arm, Position: Sitting, Cuff Size: Medium Adult)   Pulse 63   Temp 97.7 °F (36.5 °C) (Oral)   Resp 18   Ht 6' (1.829 m)   Wt 185 lb (83.9 kg)   SpO2 99%   BMI 25.09 kg/m²   HEENT: No oral or pharyngeal masses, ulceration or thrush noted, no sinus tenderness. Neck is supple with no thyromegaly or JVD noted. Lymph Nodes: No lymphadenopathy noted in the occipital, pre and post auricular, cervical, supra and infraclavicular, axillary, epitrochlear, inguinal, and popliteal region. Breasts: No palpable masses, nipple discharge or skin retraction  Lungs/Thorax: Clear to auscultation, no accessory muscles of respiration being used. Heart: Regular rate and rhythm, normal S1, S2, no appreciable murmurs, rubs, gallops  Abdomen: Soft, nontender, bowel sounds present, no appreciable hepatosplenomegaly, no palpable masses  Extremeties: Good pulses bilaterally, no peripheral edema. Skin: Normal skin tone with no rash, petechiae, ecchymosis noted. Musculoskeletal: No pain on palpation over bony prominence, no edema, no evidence of gout, no joint or bony deformity  Neurologic: Grossly intact        LABS/IMAGING:    Lab Results   Component Value Date    WBC 5.8 05/23/2022    HGB 11.3 05/23/2022    HCT 33.5 05/23/2022     05/23/2022    MCV 90.3 05/23/2022       Lab Results   Component Value Date     05/23/2022    K 3.9 05/23/2022     05/23/2022    CO2 31 05/23/2022    BUN 9 05/23/2022    GFRAA >60 05/23/2022    GLOB 2.6 05/23/2022    ALT 34 05/23/2022             Above results reviewed with patient. ASSESSMENT:  71-year-old  male, history of herniated lumbar disc & back surgery, cholecystectomy, now kindly referred to us by Dr. Meron Churchill area [address] [place] [address], for relapsed DLBCL. 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 Per records, brain imaging including MRI wo contrast 11/12/20 with no acute changes. CT head without contrast 11/12/20 unremarkable, CT angiogram with contrast 11/12/20 without acute findings. He was also seen by neurology Dr. Sammie Garcia. Headaches slowly resolved over time. - 03/21: On a follow-up visit, noted 30 pound weight loss. He was also found to have enlarged mass in left upper chest with diffusely palpable adenopathy in cervical area as well as bulky bilateral axillary adenopathy. Per patient, swelling developed over a 2-week, and was nontender. Per patient reports being told that his lymphoma diagnosis was seperate from his 3rd cranial nerve palsy.  - CT neck/chest 3/22/2021 showed extensive adenopathy including left pectoralis lymph node mass measuring 7 x 3.7 cm. Extensive bilateral axillary adenopathy measuring up to 6.8 x 11.4 cm. Extensive mediastinal adenopathy noted, including right infrahilar region mass measuring 4.6 x 5.3 cm. Enlarged right adrenal gland measuring 4.1 x 4 cm. Extensive adenopathy in the gurwinder hepatis measuring 5 x 4.4 cm. PET scan could not be performed due to insurance issues. Patient seen by hematology Dr. Clarke Resides. Core needle biopsy of left chest wall mass showed high-grade B-cell malignancy with FISH testing revealing rearrangement of BCL6/3q. and additional signals for BCL2/18 q. Significantly no evidence of MYC rearrangement noted. Bone marrow biopsy without lymphoma involvement. 2D echo with normal EF. LDH elevated at 311. Hepatitis B negative. He was diagnosed with DLBCL, stage IIIb, IPI score 2.  - R-CHOP x6 (from 4/6/2021 till 7/28/2021. PET scan 8/13/2021 with essentially CR and minimal FDG uptake in the right axillary lymph node with FDG 1.2.  -11/16/2021 CT CAP with disease relapse including new left supraclavicular mass measuring 5.5 x 3.4 cm. Stable axillary and chest wall lymph nodes as well as shotty retroperitoneal lymph nodes. Ultrasound-guided left supraclavicular lymph node biopsy 11/22/2021 showing monoclonal B-cell population with increased cell size, results consistent with mature B-cell lymphoproliferative disorder.   Patient now referred to us for further work-up and management. On exam today, he is accompanied by his daughter. Family consists of 4 adult children, 3 daughters and 1 son. Denies b-symptoms or new palpable lumps. Reports headaches mostly resolved, continues to wear left sided eye patch. 1/6/2022: His hepatitis/HIV were negative. LDH was high at 255. He was seen by neurology in house Dr. Niko Georges, and 3rd nerve palsy felt unlikely related to lymphoma. Brain MRI 12/7/2021 without evidence of intracranial systemic lymphoma. Contrasted CT CAP 12/6/2021 with slight interval growth in left supraclavicular soft tissue mass measuring 5.8 x 5 cm, likely area of recurrent lymphoma. Other lymph nodes felt stable to slightly smaller. CSF fluid analysis negative. 2D echo had shown normal EF at 60 to 65%, however moderate to severe mitral regurg noted, seen by cardiology, status post EDUAR with partially flail anterior mitral leaflet, cardiology recommendations to monitor for now with possible surgical intervention should he clinically worsen. He has since received R-ICE x2 which he has tolerated well. Today denies any B symptoms, fevers or chills. Now scheduled for repeat PET scan and follow-up with us in a week. Has since last visit [facility], and hopefully should be able to proceed with HDT/ASCT after 3-4 cycles depending on results of PET scan. He will continue with prophylaxis with acyclovir, Diflucan and allopurinol. Navigation following, we will see him back in a week. 1/13/2022: Reports some runny nose with sore throat over the last few days. Denies any fevers, otherwise feels well. Labs with mild leukocytosis, possibly G-CSF related. PET scan with significant resolution of prior known disease including left-sided axillary/subpectoral mass (masses decreased from 5.8 x 5 to 1.7 x 1.5 cm, minimal FDG uptake with SUV 1.8).   Note made of developing lower lobe groundglass infiltrates possibly sequela of subclinical infection. Will check for COVID and flu. Prescribed Levaquin x5 days for coverage. Given clinically asymptomatic, hopefully still should be able to proceed with cycle 3 RICE next week. We will plan for HDT/ASCT thereafter. 3/9/2022: Reports doing well. Completed cycle 3 on 2/3/2022 along with IT MTX.  CSF flow negative. Recovered well. Good p.o. intake and mobility, denies any new lumps or bumps. Extensive ROS unremarkable today. PET scan with CR. Bone marrow biopsy negative. 2D echo with normal EF however significant mitral valve sclerosis with regurg noted. Patient has seen cardiology in the past.  Will get clearance prior to transplant. PFTs normal.  Infectious panel unremarkable. Will clarify hep B surface antibody status. Okay to proceed with mobilization and collection. This will be through a temporary central line given patient and support. Goal CD34 plus will be 5,000,000/kg. Mobilization will be with G-CSF plus minus Mozobil. He will be staying at a hotel nearby during mobilization and collection. We will see him back prior to transplant. Various risks, and side effects of high dose chemo w/ BEAM/ASCT discussed in detail including nausea, vomiting, diarrhea, life threatening infections, bleeding, anemia, end organ damage, sepsis, and potential death. Also discussed graft failure risk and mortality from procedure ( <5%). Delayed long term effects such as MDS, leukemia and secondary malignancies also discussed. Patient willing to proceed. 4/4/2022: Successfully collected CD34 plus at 4.82 mil/kg. Reports doing well today. Extensive ROS unremarkable. Vital signs stable, labs unremarkable. He has seen cardiology and has been cleared by them to proceed with stem cell transplant. We will need to be cautious with volume status given mitral valve regurg with flail anterior leaflet.   Covid and flu screen pending, if negative, will admit tomorrow to proceed with high-dose therapy with BEAM followed by SCT.    5/12/22: Patient seen for his recurrent DLBCL, recent stem cell transplant, and associated toxicity management. Today is Day +31 of his SCT. Reasonable p.o. intake and mobility. Denies any significant loose stools. Blood work with adequate counts, chemistries unremarkable. Notes some poor tolerance to acyclovir, will prescribe valacyclovir 500 twice daily. We will also add Bactrim next week as prophylaxis. Okay to administer Evusheld every 6 months. Navigation following. Continue weekly visits for now.    5/23/2022: Patient seen for DLBCL with recent high-dose chemotherapy, stem cell transplant, and toxicity management. Today is day +42. Reasonable p.o. intake and mobility. Denies new B symptoms, aches or pains. Continues with acyclovir (in place of valacyclovir and tolerating well). Also on Bactrim prophylaxis. Blood work with adequate counts, chemistries unremarkable, potassium and magnesium normal range. S/p Evusheld injection which she tolerated reasonably. We will see him back in 2 weeks. 1. 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 Mitral valve regurge, mod-severe flail anterior leaflet. PLAN:  - As above. S/p R ICE x 3 and IT MTX x 4 w CR. S/p BEAM/ASCT, D+42   - Electrolyte replacement as needed  - Prophylaxis: poor tolerance to acyclovir, will prescribe valacyclovir 500 twice daily. We will also add Bactrim next week as prophylaxis. - Collected CD34+ 4.8 mil/kg. - Neurology has evaluated pt for 3rd nerve palsy, not felt to be lymphoma related  - Mitral valve flail ant leaflet: to be monitored per cardiology. Cleared by cardiology. To be monitored for volume overload. - Evusheld [id number] 5/22. RTC in 2 weeks w NP and in 4 weeks w MD w labs    I truly appreciate the kind referral from Dr Josh Dodge.  Please call w/ any quesions    Shun Saini MD  [address] [place] [address],[address]  Hematology Oncology  [phone]  Office : [phone]  Fax : [phone]

## 2022-05-23 NOTE — PATIENT INSTRUCTIONS
ANJALI Salomon, RN  Hematology Navigator  [address] [place] [address]  EELJ:[phone]  Office: [phone]   Fax: [phone]  Email:  [email or web address]    Navigator is available by phone Monday through Friday 8 am to 4 pm.    If you need assistance after 4pm, or on the weekend please call [phone]. The answering service is available 24 hours a day, 7 days a week.

## 2022-06-06 DIAGNOSIS — C83.31 DIFFUSE LARGE B-CELL LYMPHOMA OF LYMPH NODES OF NECK (HCC): Primary | ICD-10-CM

## 2022-06-06 DIAGNOSIS — Z94.81 STATUS POST BONE MARROW TRANSPLANT (HCC): Primary | ICD-10-CM

## 2022-06-06 DIAGNOSIS — Z94.81 STATUS POST BONE MARROW TRANSPLANT (HCC): ICD-10-CM

## 2022-06-09 ENCOUNTER — OFFICE VISIT (OUTPATIENT)
Dept: ONCOLOGY | Age: 57
End: 2022-06-09
Payer: COMMERCIAL

## 2022-06-09 ENCOUNTER — HOSPITAL ENCOUNTER (OUTPATIENT)
Dept: LAB | Age: 57
Discharge: HOME | End: 2022-06-12
Payer: COMMERCIAL

## 2022-06-09 VITALS
OXYGEN SATURATION: 98 % | BODY MASS INDEX: 25.55 KG/M2 | RESPIRATION RATE: 19 BRPM | HEIGHT: 72 IN | SYSTOLIC BLOOD PRESSURE: 122 MMHG | WEIGHT: 188.6 LBS | DIASTOLIC BLOOD PRESSURE: 69 MMHG | TEMPERATURE: 97.8 F | HEART RATE: 68 BPM

## 2022-06-09 DIAGNOSIS — C83.31 DIFFUSE LARGE B-CELL LYMPHOMA OF LYMPH NODES OF NECK (HCC): Primary | ICD-10-CM

## 2022-06-09 DIAGNOSIS — Z94.81 STATUS POST BONE MARROW TRANSPLANT (HCC): ICD-10-CM

## 2022-06-09 DIAGNOSIS — D84.9 IMMUNOCOMPROMISED (HCC): ICD-10-CM

## 2022-06-09 LAB
ALBUMIN SERPL-MCNC: 3.9 G/DL (ref 3.5–5)
ALBUMIN/GLOB SERPL: 1.4 {RATIO} (ref 1.2–3.5)
ALP SERPL-CCNC: 106 U/L (ref 50–136)
ALT SERPL-CCNC: 26 U/L (ref 12–65)
ANION GAP SERPL CALC-SCNC: 5 MMOL/L (ref 7–16)
AST SERPL-CCNC: 23 U/L (ref 15–37)
BASOPHILS # BLD: 0 K/UL (ref 0–0.2)
BASOPHILS NFR BLD: 1 % (ref 0–2)
BILIRUB SERPL-MCNC: 0.3 MG/DL (ref 0.2–1.1)
BUN SERPL-MCNC: 12 MG/DL (ref 6–23)
CALCIUM SERPL-MCNC: 9.3 MG/DL (ref 8.3–10.4)
CHLORIDE SERPL-SCNC: 106 MMOL/L (ref 98–107)
CO2 SERPL-SCNC: 30 MMOL/L (ref 21–32)
CREAT SERPL-MCNC: 0.9 MG/DL (ref 0.8–1.5)
DIFFERENTIAL METHOD BLD: ABNORMAL
EOSINOPHIL # BLD: 0.3 K/UL (ref 0–0.8)
EOSINOPHIL NFR BLD: 7 % (ref 0.5–7.8)
ERYTHROCYTE [DISTWIDTH] IN BLOOD BY AUTOMATED COUNT: 13.7 % (ref 11.9–14.6)
GLOBULIN SER CALC-MCNC: 2.8 G/DL (ref 2.3–3.5)
GLUCOSE SERPL-MCNC: 103 MG/DL (ref 65–100)
HCT VFR BLD AUTO: 34.6 %
HGB BLD-MCNC: 11.8 G/DL (ref 13.6–17.2)
IMM GRANULOCYTES # BLD AUTO: 0 K/UL (ref 0–0.5)
IMM GRANULOCYTES NFR BLD AUTO: 0 % (ref 0–5)
LYMPHOCYTES # BLD: 2.1 K/UL (ref 0.5–4.6)
LYMPHOCYTES NFR BLD: 44 % (ref 13–44)
MAGNESIUM SERPL-MCNC: 2.4 MG/DL (ref 1.8–2.4)
MCH RBC QN AUTO: 30.4 PG (ref 26.1–32.9)
MCHC RBC AUTO-ENTMCNC: 34.1 G/DL (ref 31.4–35)
MCV RBC AUTO: 89.2 FL (ref 79.6–97.8)
MONOCYTES # BLD: 0.5 K/UL (ref 0.1–1.3)
MONOCYTES NFR BLD: 11 % (ref 4–12)
NEUTS SEG # BLD: 1.7 K/UL (ref 1.7–8.2)
NEUTS SEG NFR BLD: 37 % (ref 43–78)
NRBC # BLD: 0 K/UL (ref 0–0.2)
PLATELET # BLD AUTO: 124 K/UL (ref 150–450)
PMV BLD AUTO: 9 FL (ref 9.4–12.3)
POTASSIUM SERPL-SCNC: 4 MMOL/L (ref 3.5–5.1)
PROT SERPL-MCNC: 6.7 G/DL (ref 6.3–8.2)
RBC # BLD AUTO: 3.88 M/UL (ref 4.23–5.6)
SODIUM SERPL-SCNC: 141 MMOL/L (ref 136–145)
WBC # BLD AUTO: 4.7 K/UL (ref 4.3–11.1)

## 2022-06-09 PROCEDURE — 80053 COMPREHEN METABOLIC PANEL: CPT

## 2022-06-09 PROCEDURE — 99214 OFFICE O/P EST MOD 30 MIN: CPT | Performed by: NURSE PRACTITIONER

## 2022-06-09 PROCEDURE — 85025 COMPLETE CBC W/AUTO DIFF WBC: CPT

## 2022-06-09 PROCEDURE — 83735 ASSAY OF MAGNESIUM: CPT

## 2022-06-09 PROCEDURE — 36415 COLL VENOUS BLD VENIPUNCTURE: CPT

## 2022-06-09 RX ORDER — VALACYCLOVIR HYDROCHLORIDE 1 G/1
500 TABLET, FILM COATED ORAL 2 TIMES DAILY
COMMUNITY
Start: 2022-05-12 | End: 2022-06-11

## 2022-06-09 ASSESSMENT — PATIENT HEALTH QUESTIONNAIRE - PHQ9
2. FEELING DOWN, DEPRESSED OR HOPELESS: 0
1. LITTLE INTEREST OR PLEASURE IN DOING THINGS: 0
SUM OF ALL RESPONSES TO PHQ QUESTIONS 1-9: 0
SUM OF ALL RESPONSES TO PHQ9 QUESTIONS 1 & 2: 0
SUM OF ALL RESPONSES TO PHQ QUESTIONS 1-9: 0

## 2022-06-09 NOTE — PATIENT INSTRUCTIONS
Patient Instructions from Today's Visit    Reason for Visit:  Follow up visit  D59    Plan:  - We are working on getting your D100 studies scheduled, you should start seeing some appts pop up soon for those  - Your labs continue to look great!!    Follow Up:  As scheduled     Recent Lab Results:  Hospital Outpatient Visit on 06/09/2022   Component Date Value Ref Range Status    WBC 06/09/2022 4.7  4.3 - 11.1 K/uL Final    RBC 06/09/2022 3.88* 4.23 - 5.6 M/uL Final    Hemoglobin 06/09/2022 11.8* 13.6 - 17.2 g/dL Final    Hematocrit 06/09/2022 34.6  % Final    MCV 06/09/2022 89.2  79.6 - 97.8 FL Final    MCH 06/09/2022 30.4  26.1 - 32.9 PG Final    MCHC 06/09/2022 34.1  31.4 - 35.0 g/dL Final    RDW 06/09/2022 13.7  11.9 - 14.6 % Final    Platelets 81/38/3669 124* 150 - 450 K/uL Final    MPV 06/09/2022 9.0* 9.4 - 12.3 FL Final    nRBC 06/09/2022 0.00  0.0 - 0.2 K/uL Final    **Note: Absolute NRBC parameter is now reported with Hemogram**    Seg Neutrophils 06/09/2022 37* 43 - 78 % Final    Lymphocytes 06/09/2022 44  13 - 44 % Final    Monocytes 06/09/2022 11  4.0 - 12.0 % Final    Eosinophils % 06/09/2022 7  0.5 - 7.8 % Final    Basophils 06/09/2022 1  0.0 - 2.0 % Final    Immature Granulocytes 06/09/2022 0  0.0 - 5.0 % Final    Segs Absolute 06/09/2022 1.7  1.7 - 8.2 K/UL Final    Absolute Lymph # 06/09/2022 2.1  0.5 - 4.6 K/UL Final    Absolute Mono # 06/09/2022 0.5  0.1 - 1.3 K/UL Final    Absolute Eos # 06/09/2022 0.3  0.0 - 0.8 K/UL Final    Basophils Absolute 06/09/2022 0.0  0.0 - 0.2 K/UL Final    Absolute Immature Granulocyte 06/09/2022 0.0  0.0 - 0.5 K/UL Final    Differential Type 06/09/2022 AUTOMATED    Final       Treatment Summary has been discussed and given to patient: n/a    -------------------------------------------------------------------------------------------------------------------  Please call our office at [phone] if you have any  of the following symptoms: · Fever of 100.5 or greater  · Chills  · Shortness of breath  · Swelling or pain in one leg    After office hours an answering service is available and will contact a provider for emergencies or if you are experiencing any of the above symptoms.  Patient did express an interest in My Chart. My Chart log in information explained on the after visit summary printout at the [facility] [address] desk. ANJALI Whiting, RN  Hematology Navigator  [address] [place] [address]  INTE:[phone]  Office: [phone]   Fax: [phone]  Email:  [email or web address]    Navigator is available by phone Monday through Friday 8 am to 4 pm.    If you need assistance after 4pm, or on the weekend please call [phone]. The answering service is available 24 hours a day, 7 days a week.

## 2022-06-09 NOTE — PROGRESS NOTES
Data Source: Patient, [facility] record. 6/9/2022    11:28 AM    Fernando Villalta [id number]    64 y.o. Patient Encounter: [facility] Visit    Heme Diagnosis:  Recurrent high grade B cell lymphoma  Stage:  Initially IIIB, now recurrent  Performance Status:  1  Code Status:  Not discussed  Heme History (Copied from prior):   31-year-old  male, history of herniated lumbar disc & back surgery, cholecystectomy, now kindly referred to us by Dr. Melissa Mckeon area [address] [place] [address], for relapsed DLBCL. 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 Per records, brain imaging including MRI wo contrast 11/12/20 with no acute changes. CT head without contrast 11/12/20 unremarkable, CT angiogram with contrast 11/12/20 without acute findings. He was also seen by neurology Dr. John Azar. Headaches slowly resolved over time. - 03/21: On a follow-up visit, noted 30 pound weight loss. He was also found to have enlarged mass in left upper chest with diffusely palpable adenopathy in cervical area as well as bulky bilateral axillary adenopathy. Per patient, swelling developed over a 2-week, and was nontender. Per patient reports being told that his lymphoma diagnosis was seperate from his 3rd cranial nerve palsy.  - CT neck/chest 3/22/2021 showed extensive adenopathy including left pectoralis lymph node mass measuring 7 x 3.7 cm. Extensive bilateral axillary adenopathy measuring up to 6.8 x 11.4 cm. Extensive mediastinal adenopathy noted, including right infrahilar region mass measuring 4.6 x 5.3 cm. Enlarged right adrenal gland measuring 4.1 x 4 cm. Extensive adenopathy in the gurwinder hepatis measuring 5 x 4.4 cm. PET scan could not be performed due to insurance issues. Patient seen by hematology Dr. Daniel Luna.   Core needle biopsy of left chest wall mass showed high-grade B-cell malignancy with FISH testing revealing rearrangement of BCL6/3q. and additional signals for BCL2/18 q. Significantly no evidence of MYC rearrangement noted. Bone marrow biopsy without lymphoma involvement. 2D echo with normal EF. LDH elevated at 311. Hepatitis B negative. He was diagnosed with DLBCL, stage IIIb, IPI score 2.  - R-CHOP x6 (from 4/6/2021 till 7/28/2021. PET scan 8/13/2021 with essentially CR and minimal FDG uptake in the right axillary lymph node with FDG 1.2.  -11/16/2021 CT CAP with disease relapse including new left supraclavicular mass measuring 5.5 x 3.4 cm. Stable axillary and chest wall lymph nodes as well as shotty retroperitoneal lymph nodes. Ultrasound-guided left supraclavicular lymph node biopsy 11/22/2021 showing monoclonal B-cell population with increased cell size, results consistent with mature B-cell lymphoproliferative disorder. Patient now referred to us for further work-up and management. Hospice Referral:  Na    Interval History:  6/09/2022: D+59. He returns today in follow up. He continues to do well overall. His energy is good. Appetite is great - weight gain noted. He denies any respiratory issues like dyspnea, cough or wheezing. He denies neuropathy. He has no significant pain. He has had no bleeding. No fevers, chills or other infectious symptoms. He is taking his prophylactic acyclovir BID and Bactrim DS MWF. Labs reviewed, no replacements needed. PHQ-9 Total Score: 0 (6/9/2022 10:42 AM)  Pain/Fatigue: Pain Score:   0 - No pain (fatigue-0)      REVIEW OF SYSTEMS:  As mentioned above, all other systems were reviewed in full and are negative.     Past Medical History:   Diagnosis Date    Cancer ([address]     Hypertension     Valvular heart disease        Past Surgical History:   Procedure Laterality Date    BACK SURGERY      26 years ago    IR BIOPSY PERC SUPERF BONE  2/24/2022    IR BIOPSY PERC SUPERF BONE  2/24/2022    IR BIOPSY PERC SUPERF BONE 2/24/2022 [facility]    IR CHEMO ADMIN IN CNS SPINAL PUNC  12/28/2021    IR CHEMO ADMIN IN CNS SPINAL PUNC  12/8/2021    IR CHEMO ADMIN IN CNS SPINAL PUNC  1/28/2022    IR CHOLECYSTOSTOMY PERCUTANEOUS COMPLETE      IR NONTUNNELED VASCULAR CATHETER  3/14/2022    IR NONTUNNELED VASCULAR CATHETER  3/14/2022    IR NONTUNNELED VASCULAR CATHETER 3/14/2022 [facility]    IR SPINAL PUNCTURE CSF TREAT/DRAIN  2/4/2022    TRANSPLANT  04/2022    auto stem cell transplant    VASCULAR SURGERY         Current Outpatient Medications   Medication Sig Dispense Refill    valACYclovir (VALTREX) 1 g tablet Take 500 mg by mouth 2 times daily      lisinopril (PRINIVIL;ZESTRIL) 10 MG tablet TAKE 1 TABLET BY MOUTH EVERY DAY      sulfamethoxazole-trimethoprim (BACTRIM DS;SEPTRA DS) 800-160 MG per tablet       acetaminophen (TYLENOL) 325 MG tablet Take by mouth every 4 hours as needed      acyclovir (ZOVIRAX) 400 MG tablet Take 400 mg by mouth 2 times daily      atenolol (TENORMIN) 25 MG tablet Take 25 mg by mouth daily      diphenoxylate-atropine (LOMOTIL) 2.5-0.025 MG per tablet Take 1 tablet by mouth 4 times daily as needed.  lisinopril (PRINIVIL;ZESTRIL) 20 MG tablet Take 20 mg by mouth daily      loperamide (IMODIUM) 2 MG capsule Take 2 mg by mouth every 4 hours as needed      OLANZapine zydis (ZYPREXA) 5 MG disintegrating tablet Take 5 mg by mouth      ondansetron (ZOFRAN-ODT) 8 MG TBDP disintegrating tablet Take 8 mg by mouth every 8 hours as needed (Patient not taking: Reported on 6/9/2022)      prochlorperazine (COMPAZINE) 10 MG tablet Take 5-10 mg by mouth every 6 hours as needed (Patient not taking: Reported on 6/9/2022)       No current facility-administered medications for this visit.        Social History     Socioeconomic History    Marital status:      Spouse name: None    Number of children: None    Years of education: None    Highest education level: None   Occupational History    None   Tobacco Use    Smoking status: Never Smoker    Smokeless tobacco: Never Used   Substance and Sexual Activity    Alcohol use: Not Currently    Drug use: Not Currently    Sexual activity: None   Other Topics Concern    None   Social History Narrative    None     Social Determinants of Health     Financial Resource Strain:     Difficulty of Paying Living Expenses: Not on file   Food Insecurity:     Worried About Running Out of Food in the Last Year: Not on file    Colleen of Food in the Last Year: Not on file   Transportation Needs:     Lack of Transportation (Medical): Not on file    Lack of Transportation (Non-Medical): Not on file   Physical Activity:     Days of Exercise per Week: Not on file    Minutes of Exercise per Session: Not on file   Stress:     Feeling of Stress : Not on file   Social Connections:     Frequency of Communication with Friends and Family: Not on file    Frequency of Social Gatherings with Friends and Family: Not on file    Attends [employer] Services: Not on file    Active Member of Clubs or Organizations: Not on file    Attends Club or Organization Meetings: Not on file    Marital Status: Not on file   Intimate Partner Violence:     Fear of Current or Ex-Partner: Not on file    Emotionally Abused: Not on file    Physically Abused: Not on file    Sexually Abused: Not on file   Housing Stability:     Unable to Pay for Housing in the Last Year: Not on file    Number of [place] in the Last Year: Not on file    Unstable Housing in the Last Year: Not on file       Family History   Problem Relation Age of Onset    Diabetes Mother     Hypertension Mother     Diabetes Father     Hypertension Father     Hypertension Brother        No Known Allergies    PHYSICAL EXAMINATION:  General Appearance: Healthy appearing patient in no acute distress  Vitals reviewed.    /69 (Site: Right Upper Arm, Position: Sitting, Cuff Size: Medium Adult)   Pulse 68   Temp 97.8 °F (36.6 °C) (Oral)   Resp 19   Ht 6' (1.829 m)   Wt 188 lb 9.6 oz (85.5 kg)   SpO2 98%   BMI 25.58 kg/m²   HEENT: No oral or pharyngeal masses, ulceration or thrush noted, no sinus tenderness. Neck is supple with no thyromegaly or JVD noted. Lymph Nodes: Deferred. Breasts: Deferred. Lungs/Thorax: Clear to auscultation, no accessory muscles of respiration being used. Heart: Regular rate and rhythm, normal S1, S2, no appreciable murmurs, rubs, gallops  Abdomen: Soft, nontender, bowel sounds present, no appreciable hepatosplenomegaly, no palpable masses  Extremeties: Good pulses bilaterally, no peripheral edema. Skin: Normal skin tone with no rash, petechiae, ecchymosis noted. Musculoskeletal: No pain on palpation over bony prominence, no edema, no evidence of gout, no joint or bony deformity. Neurologic: Grossly intact.         LABS/IMAGING:  Hospital Outpatient Visit on 06/09/2022   Component Date Value Ref Range Status    WBC 06/09/2022 4.7  4.3 - 11.1 K/uL Final    RBC 06/09/2022 3.88* 4.23 - 5.6 M/uL Final    Hemoglobin 06/09/2022 11.8* 13.6 - 17.2 g/dL Final    Hematocrit 06/09/2022 34.6  % Final    MCV 06/09/2022 89.2  79.6 - 97.8 FL Final    MCH 06/09/2022 30.4  26.1 - 32.9 PG Final    MCHC 06/09/2022 34.1  31.4 - 35.0 g/dL Final    RDW 06/09/2022 13.7  11.9 - 14.6 % Final    Platelets 05/85/2627 124* 150 - 450 K/uL Final    MPV 06/09/2022 9.0* 9.4 - 12.3 FL Final    nRBC 06/09/2022 0.00  0.0 - 0.2 K/uL Final    **Note: Absolute NRBC parameter is now reported with Hemogram**    Seg Neutrophils 06/09/2022 37* 43 - 78 % Final    Lymphocytes 06/09/2022 44  13 - 44 % Final    Monocytes 06/09/2022 11  4.0 - 12.0 % Final    Eosinophils % 06/09/2022 7  0.5 - 7.8 % Final    Basophils 06/09/2022 1  0.0 - 2.0 % Final    Immature Granulocytes 06/09/2022 0  0.0 - 5.0 % Final    Segs Absolute 06/09/2022 1.7  1.7 - 8.2 K/UL Final    Absolute Lymph # 06/09/2022 2.1  0.5 - 4.6 K/UL Final    Absolute Mono # 06/09/2022 0.5  0.1 - 1.3 K/UL Final    Absolute Eos # 06/09/2022 0.3  0.0 - 0.8 K/UL Final    Basophils Absolute 06/09/2022 0.0  0.0 - 0.2 K/UL Final    Absolute Immature Granulocyte 06/09/2022 0.0  0.0 - 0.5 K/UL Final    Differential Type 06/09/2022 AUTOMATED    Final    Sodium 06/09/2022 141  136 - 145 mmol/L Final    Potassium 06/09/2022 4.0  3.5 - 5.1 mmol/L Final    Chloride 06/09/2022 106  98 - 107 mmol/L Final    CO2 06/09/2022 30  21 - 32 mmol/L Final    Anion Gap 06/09/2022 5* 7 - 16 mmol/L Final    Glucose 06/09/2022 103* 65 - 100 mg/dL Final    BUN 06/09/2022 12  6 - 23 MG/DL Final    CREATININE 06/09/2022 0.90  0.8 - 1.5 MG/DL Final    GFR  06/09/2022 >60  >60 ml/min/1.73m2 Final    GFR Non- 06/09/2022 >60  >60 ml/min/1.73m2 Final    Comment:      Estimated GFR is calculated using the Modification of Diet in Renal Disease (MDRD) Study equation, reported for both  Americans (GFRAA) and non- Americans (GFRNA), and normalized to 1.73m2 body surface area. The physician must decide which value applies to the patient. The MDRD study equation should only be used in individuals age 25 or older. It has not been validated for the following: pregnant women, patients with serious comorbid conditions,or on certain medications, or persons with extremes of body size, muscle mass, or nutritional status.       Calcium 06/09/2022 9.3  8.3 - 10.4 MG/DL Final    Total Bilirubin 06/09/2022 0.3  0.2 - 1.1 MG/DL Final    ALT 06/09/2022 26  12 - 65 U/L Final    AST 06/09/2022 23  15 - 37 U/L Final    Alk Phosphatase 06/09/2022 106  50 - 136 U/L Final    Total Protein 06/09/2022 6.7  6.3 - 8.2 g/dL Final    Albumin 06/09/2022 3.9  3.5 - 5.0 g/dL Final    Globulin 06/09/2022 2.8  2.3 - 3.5 g/dL Final    Albumin/Globulin Ratio 06/09/2022 1.4  1.2 - 3.5   Final    Magnesium 06/09/2022 2.4  1.8 - 2.4 mg/dL Final           Above results reviewed with patient. ASSESSMENT:  20-year-old  male, history of herniated lumbar disc & back surgery, cholecystectomy, now kindly referred to us by Dr. Stella Sandoval area [facility], for relapsed DLBCL. 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 Per records, brain imaging including MRI wo contrast 11/12/20 with no acute changes. CT head without contrast 11/12/20 unremarkable, CT angiogram with contrast 11/12/20 without acute findings. He was also seen by neurology Dr. Red Yang. Headaches slowly resolved over time. - 03/21: On a follow-up visit, noted 30 pound weight loss. He was also found to have enlarged mass in left upper chest with diffusely palpable adenopathy in cervical area as well as bulky bilateral axillary adenopathy. Per patient, swelling developed over a 2-week, and was nontender. Per patient reports being told that his lymphoma diagnosis was seperate from his 3rd cranial nerve palsy.  - CT neck/chest 3/22/2021 showed extensive adenopathy including left pectoralis lymph node mass measuring 7 x 3.7 cm. Extensive bilateral axillary adenopathy measuring up to 6.8 x 11.4 cm. Extensive mediastinal adenopathy noted, including right infrahilar region mass measuring 4.6 x 5.3 cm. Enlarged right adrenal gland measuring 4.1 x 4 cm. Extensive adenopathy in the gurwinder hepatis measuring 5 x 4.4 cm. PET scan could not be performed due to insurance issues. Patient seen by hematology Dr. Solorio Christina. Core needle biopsy of left chest wall mass showed high-grade B-cell malignancy with FISH testing revealing rearrangement of BCL6/3q. and additional signals for BCL2/18 q. Significantly no evidence of MYC rearrangement noted. Bone marrow biopsy without lymphoma involvement. 2D echo with normal EF. LDH elevated at 311. Hepatitis B negative. He was diagnosed with DLBCL, stage IIIb, IPI score 2.  - R-CHOP x6 (from 4/6/2021 till 7/28/2021. PET scan 8/13/2021 with essentially CR and minimal FDG uptake in the right axillary lymph node with FDG 1.2.  -11/16/2021 CT CAP with disease relapse including new left supraclavicular mass measuring 5.5 x 3.4 cm. Stable axillary and chest wall lymph nodes as well as shotty retroperitoneal lymph nodes. Ultrasound-guided left supraclavicular lymph node biopsy 11/22/2021 showing monoclonal B-cell population with increased cell size, results consistent with mature B-cell lymphoproliferative disorder. Patient now referred to us for further work-up and management. On exam today, he is accompanied by his daughter. Family consists of 4 adult children, 3 daughters and 1 son. Denies b-symptoms or new palpable lumps. Reports headaches mostly resolved, continues to wear left sided eye patch. 1/6/2022: His hepatitis/HIV were negative. LDH was high at 255. He was seen by neurology in house Dr. Luca Gaming, and 3rd nerve palsy felt unlikely related to lymphoma. Brain MRI 12/7/2021 without evidence of intracranial systemic lymphoma. Contrasted CT CAP 12/6/2021 with slight interval growth in left supraclavicular soft tissue mass measuring 5.8 x 5 cm, likely area of recurrent lymphoma. Other lymph nodes felt stable to slightly smaller. CSF fluid analysis negative. 2D echo had shown normal EF at 60 to 65%, however moderate to severe mitral regurg noted, seen by cardiology, status post EDUAR with partially flail anterior mitral leaflet, cardiology recommendations to monitor for now with possible surgical intervention should he clinically worsen. He has since received R-ICE x2 which he has tolerated well. Today denies any B symptoms, fevers or chills. Now scheduled for repeat PET scan and follow-up with us in a week.   Has since last visit now enrolled in [facility] plan, and hopefully should be able to proceed with HDT/ASCT after 3-4 cycles depending on results of PET scan. He will continue with prophylaxis with acyclovir, Diflucan and allopurinol. Navigation following, we will see him back in a week. 1/13/2022: Reports some runny nose with sore throat over the last few days. Denies any fevers, otherwise feels well. Labs with mild leukocytosis, possibly G-CSF related. PET scan with significant resolution of prior known disease including left-sided axillary/subpectoral mass (masses decreased from 5.8 x 5 to 1.7 x 1.5 cm, minimal FDG uptake with SUV 1.8). Note made of developing lower lobe groundglass infiltrates possibly sequela of subclinical infection. Will check for COVID and flu. Prescribed Levaquin x5 days for coverage. Given clinically asymptomatic, hopefully still should be able to proceed with cycle 3 RICE next week. We will plan for HDT/ASCT thereafter. 3/9/2022: Reports doing well. Completed cycle 3 on 2/3/2022 along with IT MTX.  CSF flow negative. Recovered well. Good p.o. intake and mobility, denies any new lumps or bumps. Extensive ROS unremarkable today. PET scan with CR. Bone marrow biopsy negative. 2D echo with normal EF however significant mitral valve sclerosis with regurg noted. Patient has seen cardiology in the past.  Will get clearance prior to transplant. PFTs normal.  Infectious panel unremarkable. Will clarify hep B surface antibody status. Okay to proceed with mobilization and collection. This will be through a temporary central line given patient and support. Goal CD34 plus will be 5,000,000/kg. Mobilization will be with G-CSF plus minus Mozobil. He will be staying at a hotel nearby during mobilization and collection. We will see him back prior to transplant.  Various risks, and side effects of high dose chemo w/ BEAM/ASCT discussed in detail including nausea, vomiting, diarrhea, life threatening infections, bleeding, anemia, end organ damage, sepsis, and potential death. Also discussed graft failure risk and mortality from procedure ( <5%). Delayed long term effects such as MDS, leukemia and secondary malignancies also discussed. Patient willing to proceed. 4/4/2022: Successfully collected CD34 plus at 4.82 mil/kg. Reports doing well today. Extensive ROS unremarkable. Vital signs stable, labs unremarkable. He has seen cardiology and has been cleared by them to proceed with stem cell transplant. We will need to be cautious with volume status given mitral valve regurg with flail anterior leaflet. Covid and flu screen pending, if negative, will admit tomorrow to proceed with high-dose therapy with BEAM followed by SCT.    5/12/22: Patient seen for his recurrent DLBCL, recent stem cell transplant, and associated toxicity management. Today is Day +31 of his SCT. Reasonable p.o. intake and mobility. Denies any significant loose stools. Blood work with adequate counts, chemistries unremarkable. Notes some poor tolerance to acyclovir, will prescribe valacyclovir 500 twice daily. We will also add Bactrim next week as prophylaxis. Okay to administer Evusheld every 6 months. Navigation following. Continue weekly visits for now.    5/23/2022: Patient seen for DLBCL with recent high-dose chemotherapy, stem cell transplant, and toxicity management. Today is day +42. Reasonable p.o. intake and mobility. Denies new B symptoms, aches or pains. Continues with acyclovir (in place of valacyclovir and tolerating well). Also on Bactrim prophylaxis. Blood work with adequate counts, chemistries unremarkable, potassium and magnesium normal range. S/p Evusheld injection which she tolerated reasonably. We will see him back in 2 weeks. ICD-10-CM    1. Diffuse large B-cell lymphoma of lymph nodes of neck (HCC)  C83.31 CBC with Auto Differential     Comprehensive Metabolic Panel     Magnesium   2. Status post bone marrow transplant ([facility] [address]  Z94.81    3. Immunocompromised ([facility] [address]  D84.9        1. 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 Mitral valve regurge, mod-severe flail anterior leaflet. PLAN:  - As above. S/p R ICE x 3 and IT MTX x 4 w CR. S/p BEAM/ASCT, D+59.   - Electrolyte replacement as needed  - Immunocompromised: Prophylaxis: valacyclovir 500 twice daily. Bactrim DS MWF.   - Collected CD34+ 4.8 mil/kg. - Neurology has evaluated pt for 3rd nerve palsy, not felt to be lymphoma related  - Mitral valve flail ant leaflet: to be monitored per cardiology. Cleared by cardiology. To be monitored for volume overload. - Evusheld [id number] 5/22.   - Continue good oral nutrition and hydration.   - Encouraged frequent activity throughout the day and rest as needed to combat fatigue.   - Call with any fevers, uncontrolled side effects from treatment or any other worrisome/concerning symptoms. RTC in 2 weeks w NP and in 4 weeks w MD w labs. Day 100 studies starting to be scheduled.        Marcelo Isaacs, [address] [place] [address] Hematology Oncology  [address] [place] [address]  [address] [facility] [place]  Office : [phone]  Fax : [phone]

## 2022-06-14 ENCOUNTER — OFFICE VISIT (OUTPATIENT)
Dept: CARDIOLOGY CLINIC | Age: 57
End: 2022-06-14
Payer: COMMERCIAL

## 2022-06-14 VITALS
DIASTOLIC BLOOD PRESSURE: 60 MMHG | HEIGHT: 72 IN | HEART RATE: 68 BPM | WEIGHT: 188 LBS | BODY MASS INDEX: 25.47 KG/M2 | SYSTOLIC BLOOD PRESSURE: 134 MMHG

## 2022-06-14 DIAGNOSIS — Z94.81 STATUS POST BONE MARROW TRANSPLANT (HCC): ICD-10-CM

## 2022-06-14 DIAGNOSIS — I34.0 NONRHEUMATIC MITRAL VALVE REGURGITATION: Primary | ICD-10-CM

## 2022-06-14 DIAGNOSIS — I10 PRIMARY HYPERTENSION: ICD-10-CM

## 2022-06-14 DIAGNOSIS — C83.30 DIFFUSE LARGE B-CELL LYMPHOMA, UNSPECIFIED BODY REGION (HCC): ICD-10-CM

## 2022-06-14 DIAGNOSIS — Q23.8 ABNORMALITY OF CHORDAE TENDINEAE OF MITRAL VALVE: ICD-10-CM

## 2022-06-14 PROCEDURE — 99214 OFFICE O/P EST MOD 30 MIN: CPT | Performed by: INTERNAL MEDICINE

## 2022-06-14 RX ORDER — LISINOPRIL 10 MG/1
10 TABLET ORAL DAILY
Qty: 90 TABLET | Refills: 3 | Status: SHIPPED | OUTPATIENT
Start: 2022-06-14 | End: 2022-10-18 | Stop reason: DRUGHIGH

## 2022-06-14 RX ORDER — LISINOPRIL 10 MG/1
30 TABLET ORAL DAILY
Qty: 90 TABLET | Refills: 3 | Status: SHIPPED | OUTPATIENT
Start: 2022-06-14 | End: 2022-06-14 | Stop reason: DRUGHIGH

## 2022-06-14 NOTE — PROGRESS NOTES
[facility] CARDIOLOGY   [facility], [address]   PHONE: [phone]              NAME:  Debbra Frankel   : 1965   MRN: [id number]             SUBJECTIVE:    Debbra Frankel is a 64 y.o.  male seen for a visit regarding the following:        Chief Complaint       Patient presents with          Follow Up Chronic Condition             HTN           HPI:     Cardio problem list:   1. Mitral regurgitation- Severe-eccentric   -Renal artery Doppler of A2 segment of the anterior mitral leaflet was partially flail with a small mobile echodensity noted consistent with a torn chordal structure. Severe eccentric mitral regurgitation-posteriorly directed with pulmonary vein systolic  reversal.  EDUAR on 12/10/2021 showed an EF of 60 to 65% with no regional wall motion abnormalities. Left atrium was mildly dilated. -From 2022 showed an EF at 55 to 60% with no regional wall motion abnormalities, severe mitral regurgitation with abnormal anterior mitral valve leaflet-likely torn chordae   2. Hypertension   3. Diffuse large B-cell lymphoma      I saw Mr. Belem Gillette is a pleasant 77-year-old gentleman in cardiovascular hospital follow-up on 2022. We last saw him in follow-up on 3/14/2022 at which time we made no changes with his medical therapy and his blood pressures were much better controlled  with addition of atenolol and lisinopril. He was not symptomatic from his mitral regurgitation so we continue to monitor him conservatively. He has an underlying diffuse large B-cell lymphoma and requires chemotherapy underwent a stem cell transplant. EDUAR from 2021 showed evidence of a significantly eccentric jet of mitral regurgitation with evidence of a mitral valve anterior leaflet torn chordae-leading to severe eccentric mitral regurgitation. He was fortunately not significantly symptomatic  from this.   He had a follow-up echo in February which appeared to be stable with preserved LV function, no significant evidence of pulmonary hypertension and he remains in sinus rhythm. Still NYHA class I for the most.  Remains fairly active. He says  the only thing that wife said his energy from time to time his chemotherapy but not otherwise. No recent fever or chills or anything suggestive of any infective endocarditis. -Denies headaches or blurry vision, denies any significant palpitations or presyncope or syncope or TIAs or strokelike symptoms. No fever or chills. Denies any chest pain or any worsening dyspnea on exertion orthopnea PND or lower extremity edema. He remains active but wants to go back to work and understands that he has to wait a full 3 months since his stem cell transplant before he can go back to work. Past Medical History, Past Surgical History, Family history, Social History, and Medications were all reviewed with the patient today and updated as necessary.        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  Q23.8         Past Medical History:        Diagnosis  Date           Cancer ([facility] [address]         Hypertension             Valvular heart disease           Past Surgical History:         Procedure  Laterality  Date            HX BACK SURGERY             26 years ago            HX VASCULAR ACCESS          IR BX BONE MARROW DIAGNOSTIC    2/24/2022      IR CHOLECYSTOSTOMY PERCUTANEOUS          IR INTRATHECAL CHEMO INJECTION CNS    12/8/2021      IR INTRATHECAL CHEMO INJECTION CNS    12/28/2021      IR INTRATHECAL CHEMO INJECTION CNS    1/28/2022           IR SPINAL PUNCTURE CSF TREAT / DRAIN    2/4/2022        Family History         Problem  Relation  Age of Onset            Diabetes Mother        Hypertension  Mother        Diabetes  Father        Hypertension  Father             Hypertension  Brother          Social History         Tobacco Use           Smoking status:  Never Smoker       Smokeless tobacco:  Never Used       Substance Use Topics           Alcohol use:  Not Currently         Current Outpatient Medications          Medication  Sig  Dispense  Refill            loratadine (Claritin) 10 mg tablet  Take 10 mg by mouth.   acetaminophen (TylenoL) 325 mg tablet  Take  by mouth every four (4) hours as needed for Pain.   lisinopriL (PRINIVIL, ZESTRIL) 10 mg tablet  Take 1 Tablet by mouth daily. 90 Tablet  3      atenoloL (Tenormin) 25 mg tablet  Take 1 Tablet by mouth daily. 90 Tablet  3      acyclovir (ZOVIRAX) 400 mg tablet  TAKE 1 TABLET BY MOUTH TWO (2) TIMES A DAY FOR 30 DAYS. (Patient not taking: Reported on 3/9/2022)  60 Tablet  0      allopurinoL (ZYLOPRIM) 300 mg tablet  Take 1 Tablet by mouth daily. (Patient not taking: Reported on 3/9/2022)  30 Tablet  0            ondansetron (ZOFRAN ODT) 8 mg disintegrating tablet  Take 1 Tablet by mouth every eight (8) hours as needed for Nausea or Vomiting. (Patient not taking: Reported on 2/1/2022)  60 Tablet  0        Facility-Administered Medications Ordered in Other Visits             Medication  Dose  Route  Frequency  Provider  Last Rate  Last Admin               sodium chloride (NS) flush 10-40 mL   10-40 mL  [employer]  PRN  Jackson Mcneill MD     10 mL at 03/14/22 0736          Review of Systems    Constitutional: Negative for fever, weight gain and weight loss. HENT: Negative for ear pain, nosebleeds and sore throat. Eyes: Negative for photophobia and visual disturbance. Cardiovascular: Negative for claudication and syncope. Respiratory: Negative for hemoptysis and wheezing. Endocrine: Negative for cold intolerance and heat intolerance.     Hematologic/Lymphatic: Negative for adenopathy and bleeding problem. Skin: Negative for rash and suspicious lesions. Musculoskeletal: Negative for falls and joint swelling. Gastrointestinal: Negative for abdominal pain, change in bowel habit and hematemesis. Genitourinary: Negative for dysuria and hematuria. Neurological: Negative for focal weakness and seizures. Psychiatric/Behavioral: Negative for hallucinations and suicidal ideas. Allergic/Immunologic: Negative for hives and persistent infections. PHYSICAL EXAM:      Visit Vitals       /60   Pulse 68   Ht 6' (1.829 m)   Wt 188 lb (85.3 kg)   BMI 25.50 kg/m²    Physical Exam   General: Alert and awake and oriented in no acute distress    HEENT: Head is normocephalic and atraumatic, pupils are equal and bilaterally reactive to light, throat is clear   Neck: No significant jugular venous distention or carotid bruits. Cardiac: S1 and S2 heard, regular rate and rhythm, no significant rubs or gallops. 3/6 holosystolic murmur heard at the apex radiating across the chest into the axilla. Respiratory: Clear to auscultation bilaterally with no adventitious sounds. Respirations are normal.   Abdomen: Soft, nontender, nondistended, bowel sounds present. No abdominal bruits noted. Extremities: No cyanosis clubbing or edema. Peripheral pulses:  Bilateral radial pulses are equal. Bilateral pedal pulses are well felt. Musculoskeletal: No significant joint swelling or redness noted. Lymphatic: No significant enlarged lymphadenopathy noted   Skin: No significant rashes noted in exposed regions. Neurological: No Facial droop, no gross focal motor or neurological deficits   Medical problems and test results were reviewed with the patient today.           No results found for: CHOL, CHOLPOCT, CHOLX, CHLST, CHOLV, HDL, HDLPOC, HDLP, LDL, LDLCPOC, LDLC, DLDLP, VLDLC, VLDL, TGLX, TRIGL, TRIGP, TGLPOCT, CHHD, CHHDX ,hemoglobin, basic metabolic panel, No results found for: TSH, TSH2, TSH3, TSHP, TSHEXT, TSHEXT ,    Lab Results         Component  Value  Date/Time            Sodium  142  03/14/2022 07:37 AM       Potassium  3.7  03/14/2022 07:37 AM       Chloride  108 (H)  03/14/2022 07:37 AM       CO2  33 (H)  03/14/2022 07:37 AM       Anion gap  1 (L)  03/14/2022 07:37 AM       Glucose  101 (H)  03/14/2022 07:37 AM       BUN  9  03/14/2022 07:37 AM       Creatinine  0.80  03/14/2022 07:37 AM       GFR est AA  >60  03/14/2022 07:37 AM       GFR est non-AA  >60  03/14/2022 07:37 AM       Calcium  9.2  03/14/2022 07:37 AM       Bilirubin, total  0.3  03/14/2022 07:37 AM       Alk. phosphatase  175 (H)  03/14/2022 07:37 AM       Protein, total  6.6  03/14/2022 07:37 AM       Albumin  3.9  03/14/2022 07:37 AM       Globulin  2.7  03/14/2022 07:37 AM       A-G Ratio  1.4  03/14/2022 07:37 AM       ALT (SGPT)  21  03/14/2022 07:37 AM            AST (SGOT)  21  03/14/2022 07:37 AM             Results for orders placed or performed during the hospital encounter of 03/14/22     CBC WITH AUTOMATED DIFF         Result  Value  Ref Range            WBC  36.2 (H)  4.3 - 11.1 K/uL       RBC  3.56 (L)  4.23 - 5.6 M/uL       HGB  11.3 (L)  13.6 - 17.2 g/dL       HCT  35.8  %       MCV  100.6 (H)  79.6 - 97.8 FL       MCH  31.7  26.1 - 32.9 PG       MCHC  31.6  31.4 - 35.0 g/dL       RDW  15.0 (H)  11.9 - 14.6 %       PLATELET  585  350 - 450 K/uL       MPV  10.4  9.4 - 12.3 FL       ABSOLUTE NRBC  0.00  0.0 - 0.2 K/uL       NEUTROPHILS  79 (H)  43 - 78 %       LYMPHOCYTES  4 (L)  13 - 44 %       MONOCYTES  8  4.0 - 12.0 %       EOSINOPHILS  1  0.5 - 7.8 %       BASOPHILS  1  0.0 - 2.0 %       IMMATURE GRANULOCYTES  7 (H)  0.0 - 5.0 %       ABS. NEUTROPHILS  28.6 (H)  1.7 - 8.2 K/UL       ABS. LYMPHOCYTES  1.4  0.5 - 4.6 K/UL       ABS. MONOCYTES  2.9 (H)  0.1 - 1.3 K/UL       ABS. EOSINOPHILS  0.4  0.0 - 0.8 K/UL       ABS. BASOPHILS  0.4 (H)  0.0 - 0.2 K/UL       ABS. IMM. GRANS.   2.5 (H)  0.0 - 0.5 K/UL       RBC COMMENTS  SLIGHT   ANISOCYTOSIS + POIKILOCYTOSIS             RBC COMMENTS  OCCASIONAL   OVALOCYTES             RBC COMMENTS  OCCASIONAL   STOMATOCYTES             RBC COMMENTS  OCCASIONAL   TEARDROP CELLS             WBC COMMENTS  Result Confirmed By Smear          PLATELET COMMENTS  ADEQUATE          DF  AUTOMATED          METABOLIC PANEL, COMPREHENSIVE         Result  Value  Ref Range            Sodium  142  136 - 145 mmol/L       Potassium  3.7  3.5 - 5.1 mmol/L       Chloride  108 (H)  98 - 107 mmol/L       CO2  33 (H)  21 - 32 mmol/L       Anion gap  1 (L)  7 - 16 mmol/L       Glucose  101 (H)  65 - 100 mg/dL       BUN  9  6 - 23 MG/DL       Creatinine  0.80  0.8 - 1.5 MG/DL       GFR est AA  >60  >60 ml/min/1.73m2       GFR est non-AA  >60  >60 ml/min/1.73m2       Calcium  9.2  8.3 - 10.4 MG/DL       Bilirubin, total  0.3  0.2 - 1.1 MG/DL       ALT (SGPT)  21  12 - 65 U/L       AST (SGOT)  21  15 - 37 U/L       Alk. phosphatase  175 (H)  50 - 136 U/L       Protein, total  6.6  6.3 - 8.2 g/dL       Albumin  3.9  3.5 - 5.0 g/dL       Globulin  2.7  2.3 - 3.5 g/dL       A-G Ratio  1.4  1.2 - 3.5         MAGNESIUM         Result  Value  Ref Range            Magnesium  1.9  1.8 - 2.4 mg/dL       PHOSPHORUS         Result  Value  Ref Range            Phosphorus  2.8  2.5 - 4.5 MG/DL            EKG done today showed sinus rhythm at 73 bpm, left atrial enlargement     ASSESSMENT and PLAN      1. Nonrheumatic mitral valve regurgitation   -He has severe mitral regurgitation but fortunately not symptomatic from it yet. It is a very eccentric posteriorly directed jet because of an anterior mitral valve leaflet pathology. There is partial prolapse of the A2 leaflet from his torn chordae. We will monitor this carefully.   Follow-up echo that he had in February appears to be stable with regards to both LV function and his mitral regurgitation-torn chordae with partially flail anterior mitral valve leaflet noted but being monitored conservatively  for now. -Rechecking limited echo in 3 months. 2. 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 3. Primary hypertension   -Much better controlled on his current combination of therapy including lisinopril With atenolol at current doses-continue      4. Diffuse large B-cell lymphoma, unspecified body region [facility])   -On chemotherapy per heme-onc. Will need echos followed routinely while on chemotherapy             Overall Impression   No complaints of any chest pain or any significant dyspnea on exertion. Should appear to be well controlled on his current therapy. Continue atenolol and lisinopril. Continues to have a loud systolic murmur on exam at the apex consistent with his severe eccentric mitral regurgitation but he is not symptomatic from the same so we will continue to manage him medically. If he does develop any worsening dyspnea on exertion orthopnea PND/heart failure-like symptoms, he will let us know.  -I will see him in follow-up in about 3 to 4 months time and repeat a limited echocardiogram at that point. Follow-up and Dispositions     ·   Return in about 3 months (around 6/14/2022) for Severe mitral regurgitation, lymphoma.                Son Becerril MD    3/14/2022

## 2022-06-23 ENCOUNTER — HOSPITAL ENCOUNTER (OUTPATIENT)
Dept: LAB | Age: 57
Discharge: HOME | End: 2022-06-26
Payer: COMMERCIAL

## 2022-06-23 ENCOUNTER — CLINICAL DOCUMENTATION (OUTPATIENT)
Dept: CASE MANAGEMENT | Age: 57
End: 2022-06-23

## 2022-06-23 ENCOUNTER — OFFICE VISIT (OUTPATIENT)
Dept: ONCOLOGY | Age: 57
End: 2022-06-23
Payer: COMMERCIAL

## 2022-06-23 VITALS
HEART RATE: 68 BPM | WEIGHT: 188.1 LBS | BODY MASS INDEX: 25.51 KG/M2 | RESPIRATION RATE: 18 BRPM | SYSTOLIC BLOOD PRESSURE: 129 MMHG | DIASTOLIC BLOOD PRESSURE: 71 MMHG | TEMPERATURE: 98.2 F | OXYGEN SATURATION: 98 %

## 2022-06-23 DIAGNOSIS — Z94.81 STATUS POST BONE MARROW TRANSPLANT (HCC): ICD-10-CM

## 2022-06-23 DIAGNOSIS — C83.31 DIFFUSE LARGE B-CELL LYMPHOMA OF LYMPH NODES OF NECK (HCC): ICD-10-CM

## 2022-06-23 DIAGNOSIS — C83.31 DIFFUSE LARGE B-CELL LYMPHOMA OF LYMPH NODES OF NECK (HCC): Primary | ICD-10-CM

## 2022-06-23 LAB
ALBUMIN SERPL-MCNC: 3.9 G/DL (ref 3.5–5)
ALBUMIN/GLOB SERPL: 1.4 {RATIO} (ref 1.2–3.5)
ALP SERPL-CCNC: 110 U/L (ref 50–136)
ALT SERPL-CCNC: 26 U/L (ref 12–65)
ANION GAP SERPL CALC-SCNC: 3 MMOL/L (ref 7–16)
AST SERPL-CCNC: 23 U/L (ref 15–37)
BASOPHILS # BLD: 0 K/UL (ref 0–0.2)
BASOPHILS NFR BLD: 1 % (ref 0–2)
BILIRUB SERPL-MCNC: 0.3 MG/DL (ref 0.2–1.1)
BUN SERPL-MCNC: 12 MG/DL (ref 6–23)
CALCIUM SERPL-MCNC: 9 MG/DL (ref 8.3–10.4)
CHLORIDE SERPL-SCNC: 106 MMOL/L (ref 98–107)
CO2 SERPL-SCNC: 31 MMOL/L (ref 21–32)
CREAT SERPL-MCNC: 0.8 MG/DL (ref 0.8–1.5)
DIFFERENTIAL METHOD BLD: ABNORMAL
EOSINOPHIL # BLD: 0.2 K/UL (ref 0–0.8)
EOSINOPHIL NFR BLD: 4 % (ref 0.5–7.8)
ERYTHROCYTE [DISTWIDTH] IN BLOOD BY AUTOMATED COUNT: 13.4 % (ref 11.9–14.6)
GLOBULIN SER CALC-MCNC: 2.7 G/DL (ref 2.3–3.5)
GLUCOSE SERPL-MCNC: 101 MG/DL (ref 65–100)
HCT VFR BLD AUTO: 35.1 %
HGB BLD-MCNC: 11.7 G/DL (ref 13.6–17.2)
IMM GRANULOCYTES # BLD AUTO: 0 K/UL (ref 0–0.5)
IMM GRANULOCYTES NFR BLD AUTO: 0 % (ref 0–5)
LYMPHOCYTES # BLD: 1.9 K/UL (ref 0.5–4.6)
LYMPHOCYTES NFR BLD: 44 % (ref 13–44)
MAGNESIUM SERPL-MCNC: 2.3 MG/DL (ref 1.8–2.4)
MCH RBC QN AUTO: 29.6 PG (ref 26.1–32.9)
MCHC RBC AUTO-ENTMCNC: 33.3 G/DL (ref 31.4–35)
MCV RBC AUTO: 88.9 FL (ref 79.6–97.8)
MONOCYTES # BLD: 0.6 K/UL (ref 0.1–1.3)
MONOCYTES NFR BLD: 13 % (ref 4–12)
NEUTS SEG # BLD: 1.7 K/UL (ref 1.7–8.2)
NEUTS SEG NFR BLD: 38 % (ref 43–78)
NRBC # BLD: 0 K/UL (ref 0–0.2)
PLATELET # BLD AUTO: 140 K/UL (ref 150–450)
PMV BLD AUTO: 9.2 FL (ref 9.4–12.3)
POTASSIUM SERPL-SCNC: 3.8 MMOL/L (ref 3.5–5.1)
PROT SERPL-MCNC: 6.6 G/DL (ref 6.3–8.2)
RBC # BLD AUTO: 3.95 M/UL (ref 4.23–5.6)
SODIUM SERPL-SCNC: 140 MMOL/L (ref 136–145)
WBC # BLD AUTO: 4.5 K/UL (ref 4.3–11.1)

## 2022-06-23 PROCEDURE — 36415 COLL VENOUS BLD VENIPUNCTURE: CPT

## 2022-06-23 PROCEDURE — 85025 COMPLETE CBC W/AUTO DIFF WBC: CPT

## 2022-06-23 PROCEDURE — 99214 OFFICE O/P EST MOD 30 MIN: CPT | Performed by: NURSE PRACTITIONER

## 2022-06-23 PROCEDURE — 83735 ASSAY OF MAGNESIUM: CPT

## 2022-06-23 PROCEDURE — 80053 COMPREHEN METABOLIC PANEL: CPT

## 2022-06-23 ASSESSMENT — PATIENT HEALTH QUESTIONNAIRE - PHQ9
9. THOUGHTS THAT YOU WOULD BE BETTER OFF DEAD, OR OF HURTING YOURSELF: 0
5. POOR APPETITE OR OVEREATING: 0
1. LITTLE INTEREST OR PLEASURE IN DOING THINGS: 0
SUM OF ALL RESPONSES TO PHQ QUESTIONS 1-9: 0
SUM OF ALL RESPONSES TO PHQ9 QUESTIONS 1 & 2: 0
3. TROUBLE FALLING OR STAYING ASLEEP: 0
2. FEELING DOWN, DEPRESSED OR HOPELESS: 0
8. MOVING OR SPEAKING SO SLOWLY THAT OTHER PEOPLE COULD HAVE NOTICED. OR THE OPPOSITE, BEING SO FIGETY OR RESTLESS THAT YOU HAVE BEEN MOVING AROUND A LOT MORE THAN USUAL: 0
SUM OF ALL RESPONSES TO PHQ QUESTIONS 1-9: 0
6. FEELING BAD ABOUT YOURSELF - OR THAT YOU ARE A FAILURE OR HAVE LET YOURSELF OR YOUR FAMILY DOWN: 0
4. FEELING TIRED OR HAVING LITTLE ENERGY: 0
7. TROUBLE CONCENTRATING ON THINGS, SUCH AS READING THE NEWSPAPER OR WATCHING TELEVISION: 0
SUM OF ALL RESPONSES TO PHQ QUESTIONS 1-9: 0
SUM OF ALL RESPONSES TO PHQ QUESTIONS 1-9: 0
10. IF YOU CHECKED OFF ANY PROBLEMS, HOW DIFFICULT HAVE THESE PROBLEMS MADE IT FOR YOU TO DO YOUR WORK, TAKE CARE OF THINGS AT HOME, OR GET ALONG WITH OTHER PEOPLE: 0

## 2022-06-23 NOTE — PROGRESS NOTES
6/23-D+73 today, patient is doing well. No complaints. Labs reviewed, no replacements needed. Patient will follow up next week to see Dr. Yokasta White.

## 2022-06-23 NOTE — PROGRESS NOTES
Data Source: Patient, [facility] record. 6/23/2022    10:47 AM    Beronica Weber [id number]    64 y.o. Patient Encounter: [facility] Visit    Heme Diagnosis:  Recurrent high grade B cell lymphoma  Stage:  Initially IIIB, now recurrent  Performance Status:  1  Code Status:  Not discussed  Heme History (Copied from prior):   79-year-old  male, history of herniated lumbar disc & back surgery, cholecystectomy, now kindly referred to us by Dr. Meron Churchill [facility], for relapsed DLBCL. 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 Per records, brain imaging including MRI wo contrast 11/12/20 with no acute changes. CT head without contrast 11/12/20 unremarkable, CT angiogram with contrast 11/12/20 without acute findings. He was also seen by neurology Dr. Ap Aguilar. Headaches slowly resolved over time. - 03/21: On a follow-up visit, noted 30 pound weight loss. He was also found to have enlarged mass in left upper chest with diffusely palpable adenopathy in cervical area as well as bulky bilateral axillary adenopathy. Per patient, swelling developed over a 2-week, and was nontender. Per patient reports being told that his lymphoma diagnosis was seperate from his 3rd cranial nerve palsy.  - CT neck/chest 3/22/2021 showed extensive adenopathy including left pectoralis lymph node mass measuring 7 x 3.7 cm. Extensive bilateral axillary adenopathy measuring up to 6.8 x 11.4 cm. Extensive mediastinal adenopathy noted, including right infrahilar region mass measuring 4.6 x 5.3 cm. Enlarged right adrenal gland measuring 4.1 x 4 cm. Extensive adenopathy in the gurwinder hepatis measuring 5 x 4.4 cm. PET scan could not be performed due to insurance issues. Patient seen by hematology Dr. Johan Mccarty.   Core needle biopsy of left chest wall mass showed high-grade B-cell malignancy with FISH testing revealing rearrangement of BCL6/3q. and additional signals for BCL2/18 q. Significantly no evidence of MYC rearrangement noted. Bone marrow biopsy without lymphoma involvement. 2D echo with normal EF. LDH elevated at 311. Hepatitis B negative. He was diagnosed with DLBCL, stage IIIb, IPI score 2.  - R-CHOP x6 (from 4/6/2021 till 7/28/2021. PET scan 8/13/2021 with essentially CR and minimal FDG uptake in the right axillary lymph node with FDG 1.2.  -11/16/2021 CT CAP with disease relapse including new left supraclavicular mass measuring 5.5 x 3.4 cm. Stable axillary and chest wall lymph nodes as well as shotty retroperitoneal lymph nodes. Ultrasound-guided left supraclavicular lymph node biopsy 11/22/2021 showing monoclonal B-cell population with increased cell size, results consistent with mature B-cell lymphoproliferative disorder. Patient now referred to us for further work-up and management. Hospice Referral:  Na    Interval History:  6/23/2022:Here for follow up, now D+73. He has been feeling well since last seen. There are no GI or bowel complaints. Appetite is good and weight is stable. Good energy level. There is no cough, shortness of breath, or edema. Denies any pain or neuropathy. No fevers or infectious symptoms. Labs reviewed and adequate. Continues on ACV and Bactrim. PHQ-9 Total Score: 0 (6/23/2022 10:27 AM)  Thoughts that you would be better off dead, or of hurting yourself in some way: 0 (6/23/2022 10:27 AM)  Pain/Fatigue: Pain Score:   0 - No pain (0 for fatigue )      REVIEW OF SYSTEMS:  As mentioned above, all other systems were reviewed in full and are negative.     Past Medical History:   Diagnosis Date    Cancer ([address]     Hypertension     Valvular heart disease        Past Surgical History:   Procedure Laterality Date    BACK SURGERY      26 years ago    IR BIOPSY PERC SUPERF BONE  2/24/2022    IR BIOPSY PERC SUPERF BONE  2/24/2022    IR BIOPSY PERC SUPERF BONE 2/24/2022 [facility]    IR CHEMO ADMIN IN CNS SPINAL PUNC  12/28/2021    IR CHEMO ADMIN IN CNS SPINAL PUNC  12/8/2021    IR CHEMO ADMIN IN CNS SPINAL PUNC  1/28/2022    IR CHOLECYSTOSTOMY PERCUTANEOUS COMPLETE      IR NONTUNNELED VASCULAR CATHETER  3/14/2022    IR NONTUNNELED VASCULAR CATHETER  3/14/2022    IR NONTUNNELED VASCULAR CATHETER 3/14/2022 [facility]    IR SPINAL PUNCTURE CSF TREAT/DRAIN  2/4/2022    TRANSPLANT  04/2022    auto stem cell transplant    VASCULAR SURGERY         Current Outpatient Medications   Medication Sig Dispense Refill    lisinopril (PRINIVIL;ZESTRIL) 10 MG tablet Take 1 tablet by mouth daily 90 tablet 3    sulfamethoxazole-trimethoprim (BACTRIM DS;SEPTRA DS) 800-160 MG per tablet       acetaminophen (TYLENOL) 325 MG tablet Take by mouth every 4 hours as needed      acyclovir (ZOVIRAX) 400 MG tablet Take 400 mg by mouth 2 times daily       atenolol (TENORMIN) 25 MG tablet Take 25 mg by mouth daily      loperamide (IMODIUM) 2 MG capsule Take 2 mg by mouth every 4 hours as needed      diphenoxylate-atropine (LOMOTIL) 2.5-0.025 MG per tablet Take 1 tablet by mouth 4 times daily as needed. (Patient not taking: Reported on 6/23/2022)      OLANZapine zydis (ZYPREXA) 5 MG disintegrating tablet Take 5 mg by mouth (Patient not taking: Reported on 6/14/2022)      ondansetron (ZOFRAN-ODT) 8 MG TBDP disintegrating tablet Take 8 mg by mouth every 8 hours as needed (Patient not taking: Reported on 6/9/2022)      prochlorperazine (COMPAZINE) 10 MG tablet Take 5-10 mg by mouth every 6 hours as needed (Patient not taking: Reported on 6/9/2022)       No current facility-administered medications for this visit.        Social History     Socioeconomic History    Marital status:      Spouse name: None    Number of children: None    Years of education: None    Highest education level: None   Occupational History    None   Tobacco Use    Smoking status: Never Smoker    Smokeless tobacco: Never Used   Substance and Sexual Activity    Alcohol use: Not Currently    Drug use: Not Currently    Sexual activity: None   Other Topics Concern    None   Social History Narrative    None     Social Determinants of Health     Financial Resource Strain:     Difficulty of Paying Living Expenses: Not on file   Food Insecurity:     Worried About Running Out of Food in the Last Year: Not on file    Colleen of Food in the Last Year: Not on file   Transportation Needs:     Lack of Transportation (Medical): Not on file    Lack of Transportation (Non-Medical): Not on file   Physical Activity:     Days of Exercise per Week: Not on file    Minutes of Exercise per Session: Not on file   Stress:     Feeling of Stress : Not on file   Social Connections:     Frequency of Communication with Friends and Family: Not on file    Frequency of Social Gatherings with Friends and Family: Not on file    Attends Restorationism Services: Not on file    Active Member of Clubs or Organizations: Not on file    Attends Club or Organization Meetings: Not on file    Marital Status: Not on file   Intimate Partner Violence:     Fear of Current or Ex-Partner: Not on file    Emotionally Abused: Not on file    Physically Abused: Not on file    Sexually Abused: Not on file   Housing Stability:     Unable to Pay for Housing in the Last Year: Not on file    Number of [place] in the Last Year: Not on file    Unstable Housing in the Last Year: Not on file       Family History   Problem Relation Age of Onset    Diabetes Mother     Hypertension Mother     Diabetes Father     Hypertension Father     Hypertension Brother        No Known Allergies    PHYSICAL EXAMINATION:  General Appearance: Healthy appearing patient in no acute distress  Vitals reviewed.    /71 (Site: Right Upper Arm, Position: Sitting, Cuff Size: Small Adult)   Pulse 68   Temp 98.2 °F (36.8 °C) (Oral)   Resp 18   Wt 188 lb 1.6 oz (85.3 kg)   SpO2 98%   BMI 25.51 kg/m²   HEENT: No oral or pharyngeal masses, ulceration or thrush noted, no sinus tenderness. Neck is supple with no thyromegaly or JVD noted. Lungs/Thorax: Clear to auscultation, no accessory muscles of respiration being used. Heart: Regular rate and rhythm, normal S1, S2, no appreciable murmurs, rubs, gallops  Abdomen: Soft, nontender, bowel sounds present, no appreciable hepatosplenomegaly, no palpable masses  Extremeties: Good pulses bilaterally, no peripheral edema. Skin: Normal skin tone with no rash, petechiae, ecchymosis noted. Musculoskeletal: No pain on palpation over bony prominence, no edema, no evidence of gout, no joint or bony deformity. Neurologic: Grossly intact.         LABS/IMAGING:  Hospital Outpatient Visit on 06/23/2022   Component Date Value Ref Range Status    WBC 06/23/2022 4.5  4.3 - 11.1 K/uL Final    RBC 06/23/2022 3.95* 4.23 - 5.6 M/uL Final    Hemoglobin 06/23/2022 11.7* 13.6 - 17.2 g/dL Final    Hematocrit 06/23/2022 35.1  % Final    MCV 06/23/2022 88.9  79.6 - 97.8 FL Final    MCH 06/23/2022 29.6  26.1 - 32.9 PG Final    MCHC 06/23/2022 33.3  31.4 - 35.0 g/dL Final    RDW 06/23/2022 13.4  11.9 - 14.6 % Final    Platelets 20/14/0465 140* 150 - 450 K/uL Final    MPV 06/23/2022 9.2* 9.4 - 12.3 FL Final    nRBC 06/23/2022 0.00  0.0 - 0.2 K/uL Final    **Note: Absolute NRBC parameter is now reported with Hemogram**    Seg Neutrophils 06/23/2022 38* 43 - 78 % Final    Lymphocytes 06/23/2022 44  13 - 44 % Final    Monocytes 06/23/2022 13* 4.0 - 12.0 % Final    Eosinophils % 06/23/2022 4  0.5 - 7.8 % Final    Basophils 06/23/2022 1  0.0 - 2.0 % Final    Immature Granulocytes 06/23/2022 0  0.0 - 5.0 % Final    Segs Absolute 06/23/2022 1.7  1.7 - 8.2 K/UL Final    Absolute Lymph # 06/23/2022 1.9  0.5 - 4.6 K/UL Final    Absolute Mono # 06/23/2022 0.6  0.1 - 1.3 K/UL Final    Absolute Eos # 06/23/2022 0.2  0.0 - 0.8 K/UL Final    Basophils Absolute 06/23/2022 0.0  0.0 - 0.2 K/UL Final    Absolute Immature Granulocyte 06/23/2022 0.0  0.0 - 0.5 K/UL Final    Differential Type 06/23/2022 AUTOMATED    Final    Sodium 06/23/2022 140  136 - 145 mmol/L Final    Potassium 06/23/2022 3.8  3.5 - 5.1 mmol/L Final    Chloride 06/23/2022 106  98 - 107 mmol/L Final    CO2 06/23/2022 31  21 - 32 mmol/L Final    Anion Gap 06/23/2022 3* 7 - 16 mmol/L Final    Glucose 06/23/2022 101* 65 - 100 mg/dL Final    BUN 06/23/2022 12  6 - 23 MG/DL Final    CREATININE 06/23/2022 0.80  0.8 - 1.5 MG/DL Final    GFR  06/23/2022 >60  >60 ml/min/1.73m2 Final    GFR Non- 06/23/2022 >60  >60 ml/min/1.73m2 Final    Comment:      Estimated GFR is calculated using the Modification of Diet in Renal Disease (MDRD) Study equation, reported for both  Americans (GFRAA) and non- Americans (GFRNA), and normalized to 1.73m2 body surface area. The physician must decide which value applies to the patient. The MDRD study equation should only be used in individuals age 25 or older. It has not been validated for the following: pregnant women, patients with serious comorbid conditions,or on certain medications, or persons with extremes of body size, muscle mass, or nutritional status.  Calcium 06/23/2022 9.0  8.3 - 10.4 MG/DL Final    Total Bilirubin 06/23/2022 0.3  0.2 - 1.1 MG/DL Final    ALT 06/23/2022 26  12 - 65 U/L Final    AST 06/23/2022 23  15 - 37 U/L Final    Alk Phosphatase 06/23/2022 110  50 - 136 U/L Final    Total Protein 06/23/2022 6.6  6.3 - 8.2 g/dL Final    Albumin 06/23/2022 3.9  3.5 - 5.0 g/dL Final    Globulin 06/23/2022 2.7  2.3 - 3.5 g/dL Final    Albumin/Globulin Ratio 06/23/2022 1.4  1.2 - 3.5   Final    Magnesium 06/23/2022 2.3  1.8 - 2.4 mg/dL Final     Above results reviewed with patient.     ASSESSMENT:  80-year-old  male, history of herniated lumbar disc & back surgery, cholecystectomy, now kindly referred to us by Dr. Glenny Zee area [address] [place] [address], for relapsed DLBCL. 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 Per records, brain imaging including MRI wo contrast 11/12/20 with no acute changes. CT head without contrast 11/12/20 unremarkable, CT angiogram with contrast 11/12/20 without acute findings. He was also seen by neurology Dr. Aron Ryan. Headaches slowly resolved over time. - 03/21: On a follow-up visit, noted 30 pound weight loss. He was also found to have enlarged mass in left upper chest with diffusely palpable adenopathy in cervical area as well as bulky bilateral axillary adenopathy. Per patient, swelling developed over a 2-week, and was nontender. Per patient reports being told that his lymphoma diagnosis was seperate from his 3rd cranial nerve palsy.  - CT neck/chest 3/22/2021 showed extensive adenopathy including left pectoralis lymph node mass measuring 7 x 3.7 cm. Extensive bilateral axillary adenopathy measuring up to 6.8 x 11.4 cm. Extensive mediastinal adenopathy noted, including right infrahilar region mass measuring 4.6 x 5.3 cm. Enlarged right adrenal gland measuring 4.1 x 4 cm. Extensive adenopathy in the gurwinder hepatis measuring 5 x 4.4 cm. PET scan could not be performed due to insurance issues. Patient seen by hematology Dr. Zohaib Montano. Core needle biopsy of left chest wall mass showed high-grade B-cell malignancy with FISH testing revealing rearrangement of BCL6/3q. and additional signals for BCL2/18 q. Significantly no evidence of MYC rearrangement noted. Bone marrow biopsy without lymphoma involvement. 2D echo with normal EF. LDH elevated at 311. Hepatitis B negative.   He was diagnosed with DLBCL, stage IIIb, IPI score 2.  - R-CHOP x6 (from 4/6/2021 till 7/28/2021. PET scan 8/13/2021 with essentially CR and minimal FDG uptake in the right axillary lymph node with FDG 1.2.  -11/16/2021 CT CAP with disease relapse including new left supraclavicular mass measuring 5.5 x 3.4 cm. Stable axillary and chest wall lymph nodes as well as shotty retroperitoneal lymph nodes. Ultrasound-guided left supraclavicular lymph node biopsy 11/22/2021 showing monoclonal B-cell population with increased cell size, results consistent with mature B-cell lymphoproliferative disorder. Patient now referred to us for further work-up and management. On exam today, he is accompanied by his daughter. Family consists of 4 adult children, 3 daughters and 1 son. Denies b-symptoms or new palpable lumps. Reports headaches mostly resolved, continues to wear left sided eye patch. 1/6/2022: His hepatitis/HIV were negative. LDH was high at 255. He was seen by neurology in house Dr. Jean-Paul Waldrop, and 3rd nerve palsy felt unlikely related to lymphoma. Brain MRI 12/7/2021 without evidence of intracranial systemic lymphoma. Contrasted CT CAP 12/6/2021 with slight interval growth in left supraclavicular soft tissue mass measuring 5.8 x 5 cm, likely area of recurrent lymphoma. Other lymph nodes felt stable to slightly smaller. CSF fluid analysis negative. 2D echo had shown normal EF at 60 to 65%, however moderate to severe mitral regurg noted, seen by cardiology, status post EDUAR with partially flail anterior mitral leaflet, cardiology recommendations to monitor for now with possible surgical intervention should he clinically worsen. He has since received R-ICE x2 which he has tolerated well. Today denies any B symptoms, fevers or chills. Now scheduled for repeat PET scan and follow-up with us in a week. Has since last visit now enrolled in [employer], and hopefully should be able to proceed with HDT/ASCT after 3-4 cycles depending on results of PET scan.   He will continue with prophylaxis with acyclovir, Diflucan and allopurinol. Navigation following, we will see him back in a week. 1/13/2022: Reports some runny nose with sore throat over the last few days. Denies any fevers, otherwise feels well. Labs with mild leukocytosis, possibly G-CSF related. PET scan with significant resolution of prior known disease including left-sided axillary/subpectoral mass (masses decreased from 5.8 x 5 to 1.7 x 1.5 cm, minimal FDG uptake with SUV 1.8). Note made of developing lower lobe groundglass infiltrates possibly sequela of subclinical infection. Will check for COVID and flu. Prescribed Levaquin x5 days for coverage. Given clinically asymptomatic, hopefully still should be able to proceed with cycle 3 RICE next week. We will plan for HDT/ASCT thereafter. 3/9/2022: Reports doing well. Completed cycle 3 on 2/3/2022 along with IT MTX.  CSF flow negative. Recovered well. Good p.o. intake and mobility, denies any new lumps or bumps. Extensive ROS unremarkable today. PET scan with CR. Bone marrow biopsy negative. 2D echo with normal EF however significant mitral valve sclerosis with regurg noted. Patient has seen cardiology in the past.  Will get clearance prior to transplant. PFTs normal.  Infectious panel unremarkable. Will clarify hep B surface antibody status. Okay to proceed with mobilization and collection. This will be through a temporary central line given patient and support. Goal CD34 plus will be 5,000,000/kg. Mobilization will be with G-CSF plus minus Mozobil. He will be staying at a hotel nearby during mobilization and collection. We will see him back prior to transplant. Various risks, and side effects of high dose chemo w/ BEAM/ASCT discussed in detail including nausea, vomiting, diarrhea, life threatening infections, bleeding, anemia, end organ damage, sepsis, and potential death. Also discussed graft failure risk and mortality from procedure ( <5%).  Delayed long term effects such as MDS, leukemia and secondary malignancies also discussed. Patient willing to proceed. 4/4/2022: Successfully collected CD34 plus at 4.82 mil/kg. Reports doing well today. Extensive ROS unremarkable. Vital signs stable, labs unremarkable. He has seen cardiology and has been cleared by them to proceed with stem cell transplant. We will need to be cautious with volume status given mitral valve regurg with flail anterior leaflet. Covid and flu screen pending, if negative, will admit tomorrow to proceed with high-dose therapy with BEAM followed by SCT.    5/12/22: Patient seen for his recurrent DLBCL, recent stem cell transplant, and associated toxicity management. Today is Day +31 of his SCT. Reasonable p.o. intake and mobility. Denies any significant loose stools. Blood work with adequate counts, chemistries unremarkable. Notes some poor tolerance to acyclovir, will prescribe valacyclovir 500 twice daily. We will also add Bactrim next week as prophylaxis. Okay to administer Evusheld every 6 months. Navigation following. Continue weekly visits for now.    5/23/2022: Patient seen for DLBCL with recent high-dose chemotherapy, stem cell transplant, and toxicity management. Today is day +42. Reasonable p.o. intake and mobility. Denies new B symptoms, aches or pains. Continues with acyclovir (in place of valacyclovir and tolerating well). Also on Bactrim prophylaxis. Blood work with adequate counts, chemistries unremarkable, potassium and magnesium normal range. S/p Evusheld injection which she tolerated reasonably. We will see him back in 2 weeks. 6/23/2022:Here for follow up, now D+73. He has been feeling well since last seen. There are no GI or bowel complaints. Appetite is good and weight is stable. Good energy level. There is no cough, shortness of breath, or edema. Denies any pain or neuropathy. No fevers or infectious symptoms. Labs reviewed and adequate. Continues on ACV and Bactrim. ICD-10-CM    1. Diffuse large B-cell lymphoma of lymph nodes of neck (HCC)  C83.31 CBC with Auto Differential     Comprehensive Metabolic Panel     Magnesium   2. Status post bone marrow transplant ([facility] [address]  Z94.81 CBC with Auto Differential     Comprehensive Metabolic Panel     Magnesium       1. 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 Mitral valve regurge, mod-severe flail anterior leaflet. PLAN:  - As above. S/p R ICE x 3 and IT MTX x 4 w CR. S/p BEAM/ASCT, D+73. - Electrolyte replacement as needed  - Immunocompromised: Prophylaxis: valacyclovir 500 twice daily. Bactrim DS MWF.   - Collected CD34+ 4.8 mil/kg. - Neurology has evaluated pt for 3rd nerve palsy, not felt to be lymphoma related  - Mitral valve flail ant leaflet: to be monitored per cardiology. Cleared by cardiology. To be monitored for volume overload. - Evusheld [id number] 5/22.   - Continue good oral nutrition and hydration.   - Encouraged frequent activity throughout the day and rest as needed to combat fatigue.   - Call with any fevers, uncontrolled side effects from treatment or any other worrisome/concerning symptoms. RTC in 2 weeks. Day 100 studies starting to be scheduled.          Jesus Sanders) [address] [place] [address] Hematology and Oncology  [address] [place] [address]  Office : [phone]  Fax : [phone]

## 2022-06-23 NOTE — PATIENT INSTRUCTIONS
Patient Instructions from Today's Visit    Reason for Visit:  Follow up visit, Daily +73    Plan:  -Continue your daily medications.   -Your Day 100 is coming up.   -Happy Early Birthday    Follow Up:  As Scheduled    Recent Lab Results:  Hospital Outpatient Visit on 06/23/2022   Component Date Value Ref Range Status    WBC 06/23/2022 4.5  4.3 - 11.1 K/uL Final    RBC 06/23/2022 3.95* 4.23 - 5.6 M/uL Final    Hemoglobin 06/23/2022 11.7* 13.6 - 17.2 g/dL Final    Hematocrit 06/23/2022 35.1  % Final    MCV 06/23/2022 88.9  79.6 - 97.8 FL Final    MCH 06/23/2022 29.6  26.1 - 32.9 PG Final    MCHC 06/23/2022 33.3  31.4 - 35.0 g/dL Final    RDW 06/23/2022 13.4  11.9 - 14.6 % Final    Platelets 36/91/3561 140* 150 - 450 K/uL Final    MPV 06/23/2022 9.2* 9.4 - 12.3 FL Final    nRBC 06/23/2022 0.00  0.0 - 0.2 K/uL Final    **Note: Absolute NRBC parameter is now reported with Hemogram**    Seg Neutrophils 06/23/2022 38* 43 - 78 % Final    Lymphocytes 06/23/2022 44  13 - 44 % Final    Monocytes 06/23/2022 13* 4.0 - 12.0 % Final    Eosinophils % 06/23/2022 4  0.5 - 7.8 % Final    Basophils 06/23/2022 1  0.0 - 2.0 % Final    Immature Granulocytes 06/23/2022 0  0.0 - 5.0 % Final    Segs Absolute 06/23/2022 1.7  1.7 - 8.2 K/UL Final    Absolute Lymph # 06/23/2022 1.9  0.5 - 4.6 K/UL Final    Absolute Mono # 06/23/2022 0.6  0.1 - 1.3 K/UL Final    Absolute Eos # 06/23/2022 0.2  0.0 - 0.8 K/UL Final    Basophils Absolute 06/23/2022 0.0  0.0 - 0.2 K/UL Final    Absolute Immature Granulocyte 06/23/2022 0.0  0.0 - 0.5 K/UL Final    Differential Type 06/23/2022 AUTOMATED    Final    Sodium 06/23/2022 140  136 - 145 mmol/L Final    Potassium 06/23/2022 3.8  3.5 - 5.1 mmol/L Final    Chloride 06/23/2022 106  98 - 107 mmol/L Final    CO2 06/23/2022 31  21 - 32 mmol/L Final    Anion Gap 06/23/2022 3* 7 - 16 mmol/L Final    Glucose 06/23/2022 101* 65 - 100 mg/dL Final    BUN 06/23/2022 12  6 - 23 MG/DL Final  CREATININE 06/23/2022 0.80  0.8 - 1.5 MG/DL Final    GFR  06/23/2022 >60  >60 ml/min/1.73m2 Final    GFR Non- 06/23/2022 >60  >60 ml/min/1.73m2 Final    Comment:      Estimated GFR is calculated using the Modification of Diet in Renal Disease (MDRD) Study equation, reported for both  Americans (GFRAA) and non- Americans (GFRNA), and normalized to 1.73m2 body surface area. The physician must decide which value applies to the patient. The MDRD study equation should only be used in individuals age 25 or older. It has not been validated for the following: pregnant women, patients with serious comorbid conditions,or on certain medications, or persons with extremes of body size, muscle mass, or nutritional status.  Calcium 06/23/2022 9.0  8.3 - 10.4 MG/DL Final    Total Bilirubin 06/23/2022 0.3  0.2 - 1.1 MG/DL Final    ALT 06/23/2022 26  12 - 65 U/L Final    AST 06/23/2022 23  15 - 37 U/L Final    Alk Phosphatase 06/23/2022 110  50 - 136 U/L Final    Total Protein 06/23/2022 6.6  6.3 - 8.2 g/dL Final    Albumin 06/23/2022 3.9  3.5 - 5.0 g/dL Final    Globulin 06/23/2022 2.7  2.3 - 3.5 g/dL Final    Albumin/Globulin Ratio 06/23/2022 1.4  1.2 - 3.5   Final    Magnesium 06/23/2022 2.3  1.8 - 2.4 mg/dL Final         Treatment Summary has been discussed and given to patient: n/a        -------------------------------------------------------------------------------------------------------------------  Please call our office at [phone] if you have any  of the following symptoms:   · Fever of 100.5 or greater  · Chills  · Shortness of breath  · Swelling or pain in one leg    After office hours an answering service is available and will contact a provider for emergencies or if you are experiencing any of the above symptoms.  Patient did express an interest in My Chart.   My Chart log in information explained on the after visit summary printout at the check-out desk. ANJALI Song, RN  Hematology Navigator  [address] [place] [address]    Navigator is available by phone Monday through Friday 8 am to 4 pm.    If you need assistance after 4pm, or on the weekend please call [phone]. The answering service is available 24 hours a day, 7 days a week.

## 2022-06-24 DIAGNOSIS — C83.31 DIFFUSE LARGE B-CELL LYMPHOMA OF LYMPH NODES OF NECK (HCC): Primary | ICD-10-CM

## 2022-06-24 DIAGNOSIS — Z94.81 STATUS POST BONE MARROW TRANSPLANT (HCC): ICD-10-CM

## 2022-06-29 ENCOUNTER — HOSPITAL ENCOUNTER (OUTPATIENT)
Dept: LAB | Age: 57
Discharge: HOME | End: 2022-07-02
Payer: COMMERCIAL

## 2022-06-29 ENCOUNTER — OFFICE VISIT (OUTPATIENT)
Dept: ONCOLOGY | Age: 57
End: 2022-06-29
Payer: COMMERCIAL

## 2022-06-29 VITALS
OXYGEN SATURATION: 98 % | WEIGHT: 189.5 LBS | HEIGHT: 72 IN | SYSTOLIC BLOOD PRESSURE: 132 MMHG | HEART RATE: 76 BPM | TEMPERATURE: 98 F | RESPIRATION RATE: 18 BRPM | DIASTOLIC BLOOD PRESSURE: 73 MMHG | BODY MASS INDEX: 25.67 KG/M2

## 2022-06-29 DIAGNOSIS — Z79.899 HIGH RISK MEDICATION USE: ICD-10-CM

## 2022-06-29 DIAGNOSIS — Z94.81 STATUS POST BONE MARROW TRANSPLANT (HCC): ICD-10-CM

## 2022-06-29 DIAGNOSIS — C83.31 DIFFUSE LARGE B-CELL LYMPHOMA OF LYMPH NODES OF NECK (HCC): Primary | ICD-10-CM

## 2022-06-29 DIAGNOSIS — C83.31 DIFFUSE LARGE B-CELL LYMPHOMA OF LYMPH NODES OF NECK (HCC): ICD-10-CM

## 2022-06-29 DIAGNOSIS — D84.9 IMMUNOCOMPROMISED (HCC): ICD-10-CM

## 2022-06-29 LAB
ALBUMIN SERPL-MCNC: 4 G/DL (ref 3.5–5)
ALBUMIN/GLOB SERPL: 1.4 {RATIO} (ref 1.2–3.5)
ALP SERPL-CCNC: 126 U/L (ref 50–136)
ALT SERPL-CCNC: 25 U/L (ref 12–65)
ANION GAP SERPL CALC-SCNC: 6 MMOL/L (ref 7–16)
AST SERPL-CCNC: 23 U/L (ref 15–37)
BASOPHILS # BLD: 0 K/UL (ref 0–0.2)
BASOPHILS NFR BLD: 0 % (ref 0–2)
BILIRUB SERPL-MCNC: 0.3 MG/DL (ref 0.2–1.1)
BUN SERPL-MCNC: 13 MG/DL (ref 6–23)
CALCIUM SERPL-MCNC: 9.3 MG/DL (ref 8.3–10.4)
CHLORIDE SERPL-SCNC: 104 MMOL/L (ref 98–107)
CO2 SERPL-SCNC: 31 MMOL/L (ref 21–32)
CREAT SERPL-MCNC: 0.8 MG/DL (ref 0.8–1.5)
DIFFERENTIAL METHOD BLD: ABNORMAL
EOSINOPHIL # BLD: 0.2 K/UL (ref 0–0.8)
EOSINOPHIL NFR BLD: 4 % (ref 0.5–7.8)
ERYTHROCYTE [DISTWIDTH] IN BLOOD BY AUTOMATED COUNT: 13.2 % (ref 11.9–14.6)
GLOBULIN SER CALC-MCNC: 2.8 G/DL (ref 2.3–3.5)
GLUCOSE SERPL-MCNC: 106 MG/DL (ref 65–100)
HCT VFR BLD AUTO: 36 %
HGB BLD-MCNC: 12.4 G/DL (ref 13.6–17.2)
IMM GRANULOCYTES # BLD AUTO: 0 K/UL (ref 0–0.5)
IMM GRANULOCYTES NFR BLD AUTO: 0 % (ref 0–5)
LYMPHOCYTES # BLD: 2.2 K/UL (ref 0.5–4.6)
LYMPHOCYTES NFR BLD: 49 % (ref 13–44)
MAGNESIUM SERPL-MCNC: 2.2 MG/DL (ref 1.8–2.4)
MCH RBC QN AUTO: 30.5 PG (ref 26.1–32.9)
MCHC RBC AUTO-ENTMCNC: 34.4 G/DL (ref 31.4–35)
MCV RBC AUTO: 88.7 FL (ref 79.6–97.8)
MONOCYTES # BLD: 0.5 K/UL (ref 0.1–1.3)
MONOCYTES NFR BLD: 12 % (ref 4–12)
NEUTS SEG # BLD: 1.6 K/UL (ref 1.7–8.2)
NEUTS SEG NFR BLD: 35 % (ref 43–78)
NRBC # BLD: 0 K/UL (ref 0–0.2)
PLATELET # BLD AUTO: 157 K/UL (ref 150–450)
PMV BLD AUTO: 8.9 FL (ref 9.4–12.3)
POTASSIUM SERPL-SCNC: 3.8 MMOL/L (ref 3.5–5.1)
PROT SERPL-MCNC: 6.8 G/DL (ref 6.3–8.2)
RBC # BLD AUTO: 4.06 M/UL (ref 4.23–5.6)
SODIUM SERPL-SCNC: 141 MMOL/L (ref 136–145)
WBC # BLD AUTO: 4.5 K/UL (ref 4.3–11.1)

## 2022-06-29 PROCEDURE — 83735 ASSAY OF MAGNESIUM: CPT

## 2022-06-29 PROCEDURE — 99214 OFFICE O/P EST MOD 30 MIN: CPT | Performed by: INTERNAL MEDICINE

## 2022-06-29 PROCEDURE — 80053 COMPREHEN METABOLIC PANEL: CPT

## 2022-06-29 PROCEDURE — 85025 COMPLETE CBC W/AUTO DIFF WBC: CPT

## 2022-06-29 PROCEDURE — 36415 COLL VENOUS BLD VENIPUNCTURE: CPT

## 2022-06-29 ASSESSMENT — PATIENT HEALTH QUESTIONNAIRE - PHQ9
1. LITTLE INTEREST OR PLEASURE IN DOING THINGS: 0
SUM OF ALL RESPONSES TO PHQ QUESTIONS 1-9: 0
SUM OF ALL RESPONSES TO PHQ9 QUESTIONS 1 & 2: 0
SUM OF ALL RESPONSES TO PHQ QUESTIONS 1-9: 0
2. FEELING DOWN, DEPRESSED OR HOPELESS: 0

## 2022-06-29 NOTE — PROGRESS NOTES
Data Source: Patient, [facility] record. 6/29/2022    11:57 AM    Jesus Marie [id number]    3 y.o. Patient Encounter: [facility]    Heme Diagnosis:  Recurrent high grade B cell lymphoma  Stage:  Initially IIIB, now recurrent  Performance Status:  1  Code Status:  Not discussed  Heme History (Copied from prior):   22-year-old  male, history of herniated lumbar disc & back surgery, cholecystectomy, now kindly referred to us by Dr. Abril Murcia [facility], for relapsed DLBCL. 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 Per records, brain imaging including MRI wo contrast 11/12/20 with no acute changes. CT head without contrast 11/12/20 unremarkable, CT angiogram with contrast 11/12/20 without acute findings. He was also seen by neurology Dr. Hao Quiroz. Headaches slowly resolved over time. - 03/21: On a follow-up visit, noted 30 pound weight loss. He was also found to have enlarged mass in left upper chest with diffusely palpable adenopathy in cervical area as well as bulky bilateral axillary adenopathy. Per patient, swelling developed over a 2-week, and was nontender. Per patient reports being told that his lymphoma diagnosis was seperate from his 3rd cranial nerve palsy.  - CT neck/chest 3/22/2021 showed extensive adenopathy including left pectoralis lymph node mass measuring 7 x 3.7 cm. Extensive bilateral axillary adenopathy measuring up to 6.8 x 11.4 cm. Extensive mediastinal adenopathy noted, including right infrahilar region mass measuring 4.6 x 5.3 cm. Enlarged right adrenal gland measuring 4.1 x 4 cm. Extensive adenopathy in the gurwinder hepatis measuring 5 x 4.4 cm. PET scan could not be performed due to insurance issues. Patient seen by hematology Dr. Candi Tamayo.   Core needle biopsy of left chest wall mass showed high-grade B-cell malignancy with FISH testing revealing rearrangement of BCL6/3q. and additional signals for BCL2/18 q. Significantly no evidence of MYC rearrangement noted. Bone marrow biopsy without lymphoma involvement. 2D echo with normal EF. LDH elevated at 311. Hepatitis B negative. He was diagnosed with DLBCL, stage IIIb, IPI score 2.  - R-CHOP x6 (from 4/6/2021 till 7/28/2021. PET scan 8/13/2021 with essentially CR and minimal FDG uptake in the right axillary lymph node with FDG 1.2.  -11/16/2021 CT CAP with disease relapse including new left supraclavicular mass measuring 5.5 x 3.4 cm. Stable axillary and chest wall lymph nodes as well as shotty retroperitoneal lymph nodes. Ultrasound-guided left supraclavicular lymph node biopsy 11/22/2021 showing monoclonal B-cell population with increased cell size, results consistent with mature B-cell lymphoproliferative disorder. Patient now referred to us for further work-up and management. Hospice Referral:  na  Interval History:  6/29/2022: Patient seen for his history of relapsed DLBCL, high-dose therapy followed by Autologous stem cell transplant. Today is day +79. Reasonably. Denies any B symptoms, new lumps or bumps, aches or pains. Exam without adenopathy. Blood work with adequate counts, chemistries unremarkable. Remains on acyclovir and Bactrim prophylaxis. Scheduled for restaging PET scan along with D100 studies in a few weeks. REVIEW OF SYSTEMS:  As mentioned above, all other systems were reviewed in full and are negative.     Past Medical History:   Diagnosis Date    Cancer ([facility] [address]     Hypertension     Valvular heart disease        Past Surgical History:   Procedure Laterality Date    BACK SURGERY      26 years ago    IR BIOPSY PERC SUPERF BONE  2/24/2022    IR BIOPSY PERC SUPERF BONE  2/24/2022    IR BIOPSY PERC SUPERF BONE 2/24/2022 [facility]    IR CHEMO ADMIN IN CNS SPINAL PUNC  12/28/2021    IR CHEMO ADMIN IN CNS SPINAL [facility]  12/8/2021    IR CHEMO ADMIN IN CNS SPINAL [facility]  1/28/2022    IR CHOLECYSTOSTOMY PERCUTANEOUS COMPLETE      IR NONTUNNELED VASCULAR CATHETER  3/14/2022    IR NONTUNNELED VASCULAR CATHETER  3/14/2022    IR NONTUNNELED VASCULAR CATHETER 3/14/2022 [facility]    IR SPINAL PUNCTURE CSF TREAT/DRAIN  2/4/2022    TRANSPLANT  04/2022    auto stem cell transplant    VASCULAR SURGERY         Current Outpatient Medications   Medication Sig Dispense Refill    lisinopril (PRINIVIL;ZESTRIL) 10 MG tablet Take 1 tablet by mouth daily 90 tablet 3    sulfamethoxazole-trimethoprim (BACTRIM DS;SEPTRA DS) 800-160 MG per tablet       acetaminophen (TYLENOL) 325 MG tablet Take by mouth every 4 hours as needed      acyclovir (ZOVIRAX) 400 MG tablet Take 400 mg by mouth 2 times daily       atenolol (TENORMIN) 25 MG tablet Take 25 mg by mouth daily      loperamide (IMODIUM) 2 MG capsule Take 2 mg by mouth every 4 hours as needed      diphenoxylate-atropine (LOMOTIL) 2.5-0.025 MG per tablet Take 1 tablet by mouth 4 times daily as needed. (Patient not taking: Reported on 6/23/2022)      OLANZapine zydis (ZYPREXA) 5 MG disintegrating tablet Take 5 mg by mouth (Patient not taking: Reported on 6/14/2022)      ondansetron (ZOFRAN-ODT) 8 MG TBDP disintegrating tablet Take 8 mg by mouth every 8 hours as needed (Patient not taking: Reported on 6/9/2022)      prochlorperazine (COMPAZINE) 10 MG tablet Take 5-10 mg by mouth every 6 hours as needed (Patient not taking: Reported on 6/9/2022)       No current facility-administered medications for this visit.        Social History     Socioeconomic History    Marital status:      Spouse name: None    Number of children: None    Years of education: None    Highest education level: None   Occupational History    None   Tobacco Use    Smoking status: Never Smoker    Smokeless tobacco: Never Used   Substance and Sexual Activity    Alcohol use: Not Currently    Drug use: Not Currently    Sexual activity: None   Other Topics Concern    None   Social History Narrative    None     Social Determinants of Health     Financial Resource Strain:     Difficulty of Paying Living Expenses: Not on file   Food Insecurity:     Worried About Running Out of Food in the Last Year: Not on file    Colleen of Food in the Last Year: Not on file   Transportation Needs:     Lack of Transportation (Medical): Not on file    Lack of Transportation (Non-Medical): Not on file   Physical Activity:     Days of Exercise per Week: Not on file    Minutes of Exercise per Session: Not on file   Stress:     Feeling of Stress : Not on file   Social Connections:     Frequency of Communication with Friends and Family: Not on file    Frequency of Social Gatherings with Friends and Family: Not on file    Attends Latter day Services: Not on file    Active Member of Clubs or Organizations: Not on file    Attends Club or Organization Meetings: Not on file    Marital Status: Not on file   Intimate Partner Violence:     Fear of Current or Ex-Partner: Not on file    Emotionally Abused: Not on file    Physically Abused: Not on file    Sexually Abused: Not on file   Housing Stability:     Unable to Pay for Housing in the Last Year: Not on file    Number of [place] in the Last Year: Not on file    Unstable Housing in the Last Year: Not on file       Family History   Problem Relation Age of Onset    Diabetes Mother     Hypertension Mother     Diabetes Father     Hypertension Father     Hypertension Brother        No Known Allergies    PHYSICAL EXAMINATION:  General Appearance: Healthy appearing patient in no acute distress  Vitals reviewed.    /73 (Site: Right Upper Arm, Position: Standing)   Pulse 76   Temp 98 °F (36.7 °C) (Oral)   Resp 18   Ht 6' (1.829 m)   Wt 189 lb 8 oz (86 kg)   SpO2 98%   BMI 25.70 kg/m²   HEENT: No oral or pharyngeal masses, ulceration or thrush noted, no sinus tenderness. Neck is supple with no thyromegaly or JVD noted. Lymph Nodes: No lymphadenopathy noted in the occipital, pre and post auricular, cervical, supra and infraclavicular, axillary, epitrochlear, inguinal, and popliteal region. Breasts: No palpable masses, nipple discharge or skin retraction  Lungs/Thorax: Clear to auscultation, no accessory muscles of respiration being used. Heart: Regular rate and rhythm, normal S1, S2, no appreciable murmurs, rubs, gallops  Abdomen: Soft, nontender, bowel sounds present, no appreciable hepatosplenomegaly, no palpable masses  Extremeties: Good pulses bilaterally, no peripheral edema. Skin: Normal skin tone with no rash, petechiae, ecchymosis noted. Musculoskeletal: No pain on palpation over bony prominence, no edema, no evidence of gout, no joint or bony deformity  Neurologic: Grossly intact    LABS/IMAGING:    Lab Results   Component Value Date    WBC 4.5 06/29/2022    HGB 12.4 06/29/2022    HCT 36.0 06/29/2022     06/29/2022    MCV 88.7 06/29/2022       Lab Results   Component Value Date     06/29/2022    K 3.8 06/29/2022     06/29/2022    CO2 31 06/29/2022    BUN 13 06/29/2022    GFRAA >60 06/29/2022    GLOB 2.8 06/29/2022    ALT 25 06/29/2022         Above results reviewed with patient. ASSESSMENT:  75-year-old  male, history of herniated lumbar disc & back surgery, cholecystectomy, now kindly referred to us by Dr. Jaylen Grullon [facility], for relapsed DLBCL. 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 Per records, brain imaging including MRI wo contrast 11/12/20 with no acute changes. CT head without contrast 11/12/20 unremarkable, CT angiogram with contrast 11/12/20 without acute findings.  He was also seen by neurology Dr. Susan Mercado. Headaches slowly resolved over time. - 03/21: On a follow-up visit, noted 30 pound weight loss. He was also found to have enlarged mass in left upper chest with diffusely palpable adenopathy in cervical area as well as bulky bilateral axillary adenopathy. Per patient, swelling developed over a 2-week, and was nontender. Per patient reports being told that his lymphoma diagnosis was seperate from his 3rd cranial nerve palsy.  - CT neck/chest 3/22/2021 showed extensive adenopathy including left pectoralis lymph node mass measuring 7 x 3.7 cm. Extensive bilateral axillary adenopathy measuring up to 6.8 x 11.4 cm. Extensive mediastinal adenopathy noted, including right infrahilar region mass measuring 4.6 x 5.3 cm. Enlarged right adrenal gland measuring 4.1 x 4 cm. Extensive adenopathy in the gurwinder hepatis measuring 5 x 4.4 cm. PET scan could not be performed due to insurance issues. Patient seen by hematology Dr. Clarke Resides. Core needle biopsy of left chest wall mass showed high-grade B-cell malignancy with FISH testing revealing rearrangement of BCL6/3q. and additional signals for BCL2/18 q. Significantly no evidence of MYC rearrangement noted. Bone marrow biopsy without lymphoma involvement. 2D echo with normal EF. LDH elevated at 311. Hepatitis B negative. He was diagnosed with DLBCL, stage IIIb, IPI score 2.  - R-CHOP x6 (from 4/6/2021 till 7/28/2021. PET scan 8/13/2021 with essentially CR and minimal FDG uptake in the right axillary lymph node with FDG 1.2.  -11/16/2021 CT CAP with disease relapse including new left supraclavicular mass measuring 5.5 x 3.4 cm. Stable axillary and chest wall lymph nodes as well as shotty retroperitoneal lymph nodes. Ultrasound-guided left supraclavicular lymph node biopsy 11/22/2021 showing monoclonal B-cell population with increased cell size, results consistent with mature B-cell lymphoproliferative disorder. Patient now referred to us for further work-up and management.     On exam today, he is accompanied by his daughter. Family consists of 4 adult children, 3 daughters and 1 son. Denies b-symptoms or new palpable lumps. Reports headaches mostly resolved, continues to wear left sided eye patch. 1/6/2022: His hepatitis/HIV were negative. LDH was high at 255. He was seen by neurology in house Dr. Jose Reynaga, and 3rd nerve palsy felt unlikely related to lymphoma. Brain MRI 12/7/2021 without evidence of intracranial systemic lymphoma. Contrasted CT CAP 12/6/2021 with slight interval growth in left supraclavicular soft tissue mass measuring 5.8 x 5 cm, likely area of recurrent lymphoma. Other lymph nodes felt stable to slightly smaller. CSF fluid analysis negative. 2D echo had shown normal EF at 60 to 65%, however moderate to severe mitral regurg noted, seen by cardiology, status post EDUAR with partially flail anterior mitral leaflet, cardiology recommendations to monitor for now with possible surgical intervention should he clinically worsen. He has since received R-ICE x2 which he has tolerated well. Today denies any B symptoms, fevers or chills. Now scheduled for repeat PET scan and follow-up with us in a week. Has since last visit now enrolled in [address] [place] [address] [place] [facility] and hopefully should be able to proceed with HDT/ASCT after 3-4 cycles depending on results of PET scan. He will continue with prophylaxis with acyclovir, Diflucan and allopurinol. Navigation following, we will see him back in a week. 1/13/2022: Reports some runny nose with sore throat over the last few days. Denies any fevers, otherwise feels well. Labs with mild leukocytosis, possibly G-CSF related. PET scan with significant resolution of prior known disease including left-sided axillary/subpectoral mass (masses decreased from 5.8 x 5 to 1.7 x 1.5 cm, minimal FDG uptake with SUV 1.8). Note made of developing lower lobe groundglass infiltrates possibly sequela of subclinical infection.   Will check for COVID and flu.  Prescribed Levaquin x5 days for coverage. Given clinically asymptomatic, hopefully still should be able to proceed with cycle 3 RICE next week. We will plan for HDT/ASCT thereafter. 3/9/2022: Reports doing well. Completed cycle 3 on 2/3/2022 along with IT MTX.  CSF flow negative. Recovered well. Good p.o. intake and mobility, denies any new lumps or bumps. Extensive ROS unremarkable today. PET scan with CR. Bone marrow biopsy negative. 2D echo with normal EF however significant mitral valve sclerosis with regurg noted. Patient has seen cardiology in the past.  Will get clearance prior to transplant. PFTs normal.  Infectious panel unremarkable. Will clarify hep B surface antibody status. Okay to proceed with mobilization and collection. This will be through a temporary central line given patient and support. Goal CD34 plus will be 5,000,000/kg. Mobilization will be with G-CSF plus minus Mozobil. He will be staying at a hotel nearby during mobilization and collection. We will see him back prior to transplant. Various risks, and side effects of high dose chemo w/ BEAM/ASCT discussed in detail including nausea, vomiting, diarrhea, life threatening infections, bleeding, anemia, end organ damage, sepsis, and potential death. Also discussed graft failure risk and mortality from procedure ( <5%). Delayed long term effects such as MDS, leukemia and secondary malignancies also discussed. Patient willing to proceed. 4/4/2022: Successfully collected CD34 plus at 4.82 mil/kg. Reports doing well today. Extensive ROS unremarkable. Vital signs stable, labs unremarkable. He has seen cardiology and has been cleared by them to proceed with stem cell transplant. We will need to be cautious with volume status given mitral valve regurg with flail anterior leaflet.   Covid and flu screen pending, if negative, will admit tomorrow to proceed with high-dose therapy with BEAM followed by SCT.    5/12/22: Patient seen for his recurrent DLBCL, recent stem cell transplant, and associated toxicity management. Today is Day +31 of his SCT. Reasonable p.o. intake and mobility. Denies any significant loose stools. Blood work with adequate counts, chemistries unremarkable. Notes some poor tolerance to acyclovir, will prescribe valacyclovir 500 twice daily. We will also add Bactrim next week as prophylaxis. Okay to administer Evusheld every 6 months. Navigation following. Continue weekly visits for now.    5/23/2022: Patient seen for DLBCL with recent high-dose chemotherapy, stem cell transplant, and toxicity management. Today is day +42. Reasonable p.o. intake and mobility. Denies new B symptoms, aches or pains. Continues with acyclovir (in place of valacyclovir and tolerating well). Also on Bactrim prophylaxis. Blood work with adequate counts, chemistries unremarkable, potassium and magnesium normal range. S/p Evusheld injection which she tolerated reasonably. We will see him back in 2 weeks. 6/29/2022: Patient seen for his history of relapsed DLBCL, high-dose therapy followed by Autologous stem cell transplant. Today is day +79. Reasonably. Denies any B symptoms, new lumps or bumps, aches or pains. Exam without adenopathy. Blood work with adequate counts, chemistries unremarkable. Remains on acyclovir and Bactrim prophylaxis. Scheduled for restaging PET scan along with D100 studies in a few weeks. Navigation following. 1. 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 Mitral valve regurge, mod-severe flail anterior leaflet. PLAN:  - As above. S/p R ICE x 3 and IT MTX x 4 w CR. S/p BEAM/ASCT, D+79   - Electrolyte replacement as needed  - Prophylaxis: poor tolerance to acyclovir, will prescribe valacyclovir 500 twice daily. We will also add Bactrim next week as prophylaxis. - Collected CD34+ 4.8 mil/kg.     - Neurology has evaluated pt for 3rd nerve palsy, not felt to be lymphoma related  - Mitral valve flail ant leaflet: to be monitored per cardiology. Cleared by cardiology. To be monitored for volume overload. - Evushpablo V2ytrotew 5/22. RTC in 2-3 weeks w MD tejeda labs, D100 studies, PET    I truly appreciate the kind referral from Dr Daniel Luna.  Please call w/ any quesions    Christi Kerns MD  [address] [place] [address],[address]  Hematology Oncology  [address] [place] [address]  Office : [phone]  Fax : [phone]

## 2022-06-29 NOTE — PATIENT INSTRUCTIONS
Patient Instructions from Today's Visit    Reason for Visit:  Follow up visit  D+80    Plan:  - Your labs continue to look great!  - You will start your D100 studies soon    Follow Up:  As scheduled    Recent Lab Results:  Hospital Outpatient Visit on 06/29/2022   Component Date Value Ref Range Status    WBC 06/29/2022 4.5  4.3 - 11.1 K/uL Final    RBC 06/29/2022 4.06* 4.23 - 5.6 M/uL Final    Hemoglobin 06/29/2022 12.4* 13.6 - 17.2 g/dL Final    Hematocrit 06/29/2022 36.0  % Final    MCV 06/29/2022 88.7  79.6 - 97.8 FL Final    MCH 06/29/2022 30.5  26.1 - 32.9 PG Final    MCHC 06/29/2022 34.4  31.4 - 35.0 g/dL Final    RDW 06/29/2022 13.2  11.9 - 14.6 % Final    Platelets 42/86/3141 157  150 - 450 K/uL Final    MPV 06/29/2022 8.9* 9.4 - 12.3 FL Final    nRBC 06/29/2022 0.00  0.0 - 0.2 K/uL Final    **Note: Absolute NRBC parameter is now reported with Hemogram**    Differential Type 06/29/2022 AUTOMATED    Final    Seg Neutrophils 06/29/2022 35* 43 - 78 % Final    Lymphocytes 06/29/2022 49* 13 - 44 % Final    Monocytes 06/29/2022 12  4.0 - 12.0 % Final    Eosinophils % 06/29/2022 4  0.5 - 7.8 % Final    Basophils 06/29/2022 0  0.0 - 2.0 % Final    Immature Granulocytes 06/29/2022 0  0.0 - 5.0 % Final    Segs Absolute 06/29/2022 1.6* 1.7 - 8.2 K/UL Final    Absolute Lymph # 06/29/2022 2.2  0.5 - 4.6 K/UL Final    Absolute Mono # 06/29/2022 0.5  0.1 - 1.3 K/UL Final    Absolute Eos # 06/29/2022 0.2  0.0 - 0.8 K/UL Final    Basophils Absolute 06/29/2022 0.0  0.0 - 0.2 K/UL Final    Absolute Immature Granulocyte 06/29/2022 0.0  0.0 - 0.5 K/UL Final    Sodium 06/29/2022 141  136 - 145 mmol/L Final    Potassium 06/29/2022 3.8  3.5 - 5.1 mmol/L Final    Chloride 06/29/2022 104  98 - 107 mmol/L Final    CO2 06/29/2022 31  21 - 32 mmol/L Final    Anion Gap 06/29/2022 6* 7 - 16 mmol/L Final    Glucose 06/29/2022 106* 65 - 100 mg/dL Final    BUN 06/29/2022 13  6 - 23 MG/DL Final    CREATININE 06/29/2022 0.80  0.8 - 1.5 MG/DL Final    GFR  06/29/2022 >60  >60 ml/min/1.73m2 Final    GFR Non- 06/29/2022 >60  >60 ml/min/1.73m2 Final    Comment:      Estimated GFR is calculated using the Modification of Diet in Renal Disease (MDRD) Study equation, reported for both  Americans (GFRAA) and non- Americans (GFRNA), and normalized to 1.73m2 body surface area. The physician must decide which value applies to the patient. The MDRD study equation should only be used in individuals age 25 or older. It has not been validated for the following: pregnant women, patients with serious comorbid conditions,or on certain medications, or persons with extremes of body size, muscle mass, or nutritional status.  Calcium 06/29/2022 9.3  8.3 - 10.4 MG/DL Final    Total Bilirubin 06/29/2022 0.3  0.2 - 1.1 MG/DL Final    ALT 06/29/2022 25  12 - 65 U/L Final    AST 06/29/2022 23  15 - 37 U/L Final    Alk Phosphatase 06/29/2022 126  50 - 136 U/L Final    Total Protein 06/29/2022 6.8  6.3 - 8.2 g/dL Final    Albumin 06/29/2022 4.0  3.5 - 5.0 g/dL Final    Globulin 06/29/2022 2.8  2.3 - 3.5 g/dL Final    Albumin/Globulin Ratio 06/29/2022 1.4  1.2 - 3.5   Final    Magnesium 06/29/2022 2.2  1.8 - 2.4 mg/dL Final     Treatment Summary has been discussed and given to patient: n/a  -------------------------------------------------------------------------------------------------------------------  Please call our office at [phone] if you have any  of the following symptoms:   · Fever of 100.5 or greater  · Chills  · Shortness of breath  · Swelling or pain in one leg    After office hours an answering service is available and will contact a provider for emergencies or if you are experiencing any of the above symptoms.  Patient did express an interest in My Chart. My Chart log in information explained on the after visit summary printout at the [facility] desk.     [facility] ANJALI Rudolph, RN  Hematology Navigator  [address] [place] [address]  [phone]  Office: [phone]   Fax: [phone]  Email:  [email or web address]    Navigator is available by phone Monday through Friday 8 am to 4 pm.    If you need assistance after 4pm, or on the weekend please call [phone]. The answering service is available 24 hours a day, 7 days a week.

## 2022-07-13 ENCOUNTER — NURSE ONLY (OUTPATIENT)
Dept: CARDIOLOGY CLINIC | Age: 57
End: 2022-07-13
Payer: COMMERCIAL

## 2022-07-13 ENCOUNTER — HOSPITAL ENCOUNTER (OUTPATIENT)
Dept: PET IMAGING | Age: 57
Discharge: HOME | End: 2022-07-16
Payer: COMMERCIAL

## 2022-07-13 DIAGNOSIS — C83.30 DIFFUSE LARGE B-CELL LYMPHOMA, UNSPECIFIED BODY REGION (HCC): Primary | ICD-10-CM

## 2022-07-13 DIAGNOSIS — Z94.81 STATUS POST BONE MARROW TRANSPLANT (HCC): ICD-10-CM

## 2022-07-13 LAB
GLUCOSE BLD STRIP.AUTO-MCNC: 108 MG/DL (ref 65–100)
SERVICE CMNT-IMP: ABNORMAL

## 2022-07-13 PROCEDURE — A9552 F18 FDG: HCPCS | Performed by: INTERNAL MEDICINE

## 2022-07-13 PROCEDURE — 2580000003 HC RX 258: Performed by: INTERNAL MEDICINE

## 2022-07-13 PROCEDURE — 6360000004 HC RX CONTRAST MEDICATION: Performed by: INTERNAL MEDICINE

## 2022-07-13 PROCEDURE — 3430000000 HC RX DIAGNOSTIC RADIOPHARMACEUTICAL: Performed by: INTERNAL MEDICINE

## 2022-07-13 PROCEDURE — 82962 GLUCOSE BLOOD TEST: CPT

## 2022-07-13 PROCEDURE — 78815 PET IMAGE W/CT SKULL-THIGH: CPT

## 2022-07-13 PROCEDURE — 93000 ELECTROCARDIOGRAM COMPLETE: CPT | Performed by: INTERNAL MEDICINE

## 2022-07-13 RX ORDER — FLUDEOXYGLUCOSE F 18 200 MCI/ML
14.25 INJECTION, SOLUTION INTRAVENOUS
Status: COMPLETED | OUTPATIENT
Start: 2022-07-13 | End: 2022-07-13

## 2022-07-13 RX ORDER — SODIUM CHLORIDE 0.9 % (FLUSH) 0.9 %
10 SYRINGE (ML) INJECTION ONCE AS NEEDED
Status: COMPLETED | OUTPATIENT
Start: 2022-07-13 | End: 2022-07-13

## 2022-07-13 RX ADMIN — FLUDEOXYGLUCOSE F 18 14.25 MILLICURIE: 200 INJECTION, SOLUTION INTRAVENOUS at 12:24

## 2022-07-13 RX ADMIN — SODIUM CHLORIDE, PRESERVATIVE FREE 10 ML: 5 INJECTION INTRAVENOUS at 12:24

## 2022-07-13 RX ADMIN — DIATRIZOATE MEGLUMINE AND DIATRIZOATE SODIUM 10 ML: 660; 100 LIQUID ORAL; RECTAL at 12:24

## 2022-07-14 DIAGNOSIS — C83.31 DIFFUSE LARGE B-CELL LYMPHOMA OF LYMPH NODES OF NECK (HCC): Primary | ICD-10-CM

## 2022-07-18 ENCOUNTER — HOSPITAL ENCOUNTER (OUTPATIENT)
Dept: PULMONOLOGY | Age: 57
Discharge: HOME | End: 2022-07-21
Payer: COMMERCIAL

## 2022-07-18 DIAGNOSIS — Z94.81 STATUS POST BONE MARROW TRANSPLANT (HCC): ICD-10-CM

## 2022-07-18 PROCEDURE — 94729 DIFFUSING CAPACITY: CPT

## 2022-07-18 PROCEDURE — 94010 BREATHING CAPACITY TEST: CPT

## 2022-07-18 PROCEDURE — 94726 PLETHYSMOGRAPHY LUNG VOLUMES: CPT

## 2022-07-21 ENCOUNTER — HOSPITAL ENCOUNTER (OUTPATIENT)
Dept: LAB | Age: 57
Discharge: HOME | End: 2022-07-24
Payer: COMMERCIAL

## 2022-07-21 ENCOUNTER — OFFICE VISIT (OUTPATIENT)
Dept: ONCOLOGY | Age: 57
End: 2022-07-21
Payer: COMMERCIAL

## 2022-07-21 VITALS
HEIGHT: 72 IN | BODY MASS INDEX: 26.13 KG/M2 | RESPIRATION RATE: 16 BRPM | SYSTOLIC BLOOD PRESSURE: 156 MMHG | DIASTOLIC BLOOD PRESSURE: 73 MMHG | WEIGHT: 192.9 LBS | HEART RATE: 66 BPM | TEMPERATURE: 97.6 F | OXYGEN SATURATION: 100 %

## 2022-07-21 DIAGNOSIS — Z94.81 STATUS POST BONE MARROW TRANSPLANT (HCC): ICD-10-CM

## 2022-07-21 DIAGNOSIS — C83.31 DIFFUSE LARGE B-CELL LYMPHOMA OF LYMPH NODES OF NECK (HCC): ICD-10-CM

## 2022-07-21 DIAGNOSIS — D84.9 IMMUNOCOMPROMISED (HCC): ICD-10-CM

## 2022-07-21 DIAGNOSIS — C83.31 DIFFUSE LARGE B-CELL LYMPHOMA OF LYMPH NODES OF NECK (HCC): Primary | ICD-10-CM

## 2022-07-21 LAB
BASOPHILS # BLD: 0.1 K/UL (ref 0–0.2)
BASOPHILS NFR BLD: 1 % (ref 0–2)
CHOLEST SERPL-MCNC: 185 MG/DL
DIFFERENTIAL METHOD BLD: ABNORMAL
EOSINOPHIL # BLD: 0.1 K/UL (ref 0–0.8)
EOSINOPHIL NFR BLD: 3 % (ref 0.5–7.8)
ERYTHROCYTE [DISTWIDTH] IN BLOOD BY AUTOMATED COUNT: 12.7 % (ref 11.9–14.6)
HCT VFR BLD AUTO: 35.8 %
HDLC SERPL-MCNC: 38 MG/DL (ref 40–60)
HDLC SERPL: 4.9 {RATIO}
HGB BLD-MCNC: 12.3 G/DL (ref 13.6–17.2)
IMM GRANULOCYTES # BLD AUTO: 0 K/UL (ref 0–0.5)
IMM GRANULOCYTES NFR BLD AUTO: 0 % (ref 0–5)
LDLC SERPL CALC-MCNC: 81 MG/DL
LYMPHOCYTES # BLD: 1.7 K/UL (ref 0.5–4.6)
LYMPHOCYTES NFR BLD: 38 % (ref 13–44)
MCH RBC QN AUTO: 30.8 PG (ref 26.1–32.9)
MCHC RBC AUTO-ENTMCNC: 34.4 G/DL (ref 31.4–35)
MCV RBC AUTO: 89.5 FL (ref 79.6–97.8)
MONOCYTES # BLD: 0.7 K/UL (ref 0.1–1.3)
MONOCYTES NFR BLD: 14 % (ref 4–12)
NEUTS SEG # BLD: 2 K/UL (ref 1.7–8.2)
NEUTS SEG NFR BLD: 44 % (ref 43–78)
NRBC # BLD: 0 K/UL (ref 0–0.2)
PLATELET # BLD AUTO: 160 K/UL (ref 150–450)
PMV BLD AUTO: 8.8 FL (ref 9.4–12.3)
RBC # BLD AUTO: 4 M/UL (ref 4.23–5.6)
T4 FREE SERPL-MCNC: 0.9 NG/DL (ref 0.78–1.4)
TRIGL SERPL-MCNC: 330 MG/DL (ref 35–150)
TSH, 3RD GENERATION: 3.09 UIU/ML (ref 0.36–3)
VLDLC SERPL CALC-MCNC: 66 MG/DL (ref 6–23)
WBC # BLD AUTO: 4.5 K/UL (ref 4.3–11.1)

## 2022-07-21 PROCEDURE — 84481 FREE ASSAY (FT-3): CPT

## 2022-07-21 PROCEDURE — 84403 ASSAY OF TOTAL TESTOSTERONE: CPT

## 2022-07-21 PROCEDURE — 80061 LIPID PANEL: CPT

## 2022-07-21 PROCEDURE — 36415 COLL VENOUS BLD VENIPUNCTURE: CPT

## 2022-07-21 PROCEDURE — 86360 T CELL ABSOLUTE COUNT/RATIO: CPT

## 2022-07-21 PROCEDURE — 84439 ASSAY OF FREE THYROXINE: CPT

## 2022-07-21 PROCEDURE — 99214 OFFICE O/P EST MOD 30 MIN: CPT | Performed by: INTERNAL MEDICINE

## 2022-07-21 PROCEDURE — 85025 COMPLETE CBC W/AUTO DIFF WBC: CPT

## 2022-07-21 PROCEDURE — 84443 ASSAY THYROID STIM HORMONE: CPT

## 2022-07-21 ASSESSMENT — PATIENT HEALTH QUESTIONNAIRE - PHQ9
2. FEELING DOWN, DEPRESSED OR HOPELESS: 0
SUM OF ALL RESPONSES TO PHQ QUESTIONS 1-9: 0

## 2022-07-21 NOTE — PROGRESS NOTES
Data Source: Patient, [facility] record. 7/21/2022    10:10 AM    Lorie Yeager [id number]    62 y.o. Patient Encounter: [facility] Visit    Heme Diagnosis:  Recurrent high grade B cell lymphoma  Stage:  Initially IIIB, now recurrent  Performance Status:  1  Code Status:  Not discussed  Heme History (Copied from prior):   80-year-old  male, history of herniated lumbar disc & back surgery, cholecystectomy, now kindly referred to us by Dr. Dimas Winkler [facility], for relapsed DLBCL. 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 Per records, brain imaging including MRI wo contrast 11/12/20 with no acute changes. CT head without contrast 11/12/20 unremarkable, CT angiogram with contrast 11/12/20 without acute findings. He was also seen by neurology Dr. Luis David. Headaches slowly resolved over time. - 03/21: On a follow-up visit, noted 30 pound weight loss. He was also found to have enlarged mass in left upper chest with diffusely palpable adenopathy in cervical area as well as bulky bilateral axillary adenopathy. Per patient, swelling developed over a 2-week, and was nontender. Per patient reports being told that his lymphoma diagnosis was seperate from his 3rd cranial nerve palsy.  - CT neck/chest 3/22/2021 showed extensive adenopathy including left pectoralis lymph node mass measuring 7 x 3.7 cm. Extensive bilateral axillary adenopathy measuring up to 6.8 x 11.4 cm. Extensive mediastinal adenopathy noted, including right infrahilar region mass measuring 4.6 x 5.3 cm. Enlarged right adrenal gland measuring 4.1 x 4 cm. Extensive adenopathy in the gurwinder hepatis measuring 5 x 4.4 cm. PET scan could not be performed due to insurance issues. Patient seen by hematology Dr. Robert Urbina.   Core needle biopsy of left chest wall mass showed high-grade B-cell malignancy with FISH testing revealing rearrangement of BCL6/3q. and additional signals for BCL2/18 q. Significantly no evidence of MYC rearrangement noted. Bone marrow biopsy without lymphoma involvement. 2D echo with normal EF. LDH elevated at 311. Hepatitis B negative. He was diagnosed with DLBCL, stage IIIb, IPI score 2.  - R-CHOP x6 (from 4/6/2021 till 7/28/2021. PET scan 8/13/2021 with essentially CR and minimal FDG uptake in the right axillary lymph node with FDG 1.2.  -11/16/2021 CT CAP with disease relapse including new left supraclavicular mass measuring 5.5 x 3.4 cm. Stable axillary and chest wall lymph nodes as well as shotty retroperitoneal lymph nodes. Ultrasound-guided left supraclavicular lymph node biopsy 11/22/2021 showing monoclonal B-cell population with increased cell size, results consistent with mature B-cell lymphoproliferative disorder. Patient now referred to us for further work-up and management. Hospice Referral:  na  Interval History:  7/21/2022: Patient seen for his D100 evaluation. Reports doing well, denies any B symptoms, new lumps or bumps. Blood work with adequate counts, PET scan with CR. PFTs unremarkable. 2D echo without changes, moderate mitral valve regurg noted as before. He will follow-up with cardiology as needed. Given remission status a day 100, patient congratulated. He will continue to follow from here on locally with Dr. Chani Shen. We will see him back around day 200 with restaging PET scan. He will continue with acyclovir and Bactrim prophylaxis. Post transplant vaccinations will be started 1 year posttransplant. REVIEW OF SYSTEMS:  As mentioned above, all other systems were reviewed in full and are negative.     Past Medical History:   Diagnosis Date    Cancer ([address]     Hypertension     Valvular heart disease        Past Surgical History:   Procedure Laterality Date    BACK SURGERY      26 years ago    IR BIOPSY PERC SUPERF BONE  2/24/2022    IR BIOPSY PERC SUPERF BONE  2/24/2022    IR BIOPSY PERC SUPERF BONE 2/24/2022 [facility]    IR CHEMO ADMIN IN CNS SPINAL PUNC  12/28/2021    IR CHEMO ADMIN IN CNS SPINAL PUNC  12/8/2021    IR CHEMO ADMIN IN CNS SPINAL PUNC  1/28/2022    IR CHOLECYSTOSTOMY PERCUTANEOUS COMPLETE      IR NONTUNNELED VASCULAR CATHETER  3/14/2022    IR NONTUNNELED VASCULAR CATHETER  3/14/2022    IR NONTUNNELED VASCULAR CATHETER 3/14/2022 [facility]    IR SPINAL PUNCTURE CSF TREAT/DRAIN  2/4/2022    TRANSPLANT  04/2022    auto stem cell transplant    VASCULAR SURGERY         Current Outpatient Medications   Medication Sig Dispense Refill    lisinopril (PRINIVIL;ZESTRIL) 10 MG tablet Take 1 tablet by mouth daily 90 tablet 3    sulfamethoxazole-trimethoprim (BACTRIM DS;SEPTRA DS) 800-160 MG per tablet       acetaminophen (TYLENOL) 325 MG tablet Take by mouth every 4 hours as needed      acyclovir (ZOVIRAX) 400 MG tablet Take 400 mg by mouth 2 times daily       atenolol (TENORMIN) 25 MG tablet Take 25 mg by mouth daily      diphenoxylate-atropine (LOMOTIL) 2.5-0.025 MG per tablet Take 1 tablet by mouth 4 times daily as needed. loperamide (IMODIUM) 2 MG capsule Take 2 mg by mouth every 4 hours as needed      OLANZapine zydis (ZYPREXA) 5 MG disintegrating tablet Take 5 mg by mouth      ondansetron (ZOFRAN-ODT) 8 MG TBDP disintegrating tablet Take 8 mg by mouth every 8 hours as needed      prochlorperazine (COMPAZINE) 10 MG tablet Take 5-10 mg by mouth every 6 hours as needed       No current facility-administered medications for this visit.        Social History     Socioeconomic History    Marital status:      Spouse name: None    Number of children: None    Years of education: None    Highest education level: None   Tobacco Use    Smoking status: Never    Smokeless tobacco: Never   Substance and Sexual Activity    Alcohol use: Not Currently    Drug use: Not Currently       Family patient. ASSESSMENT:  49-year-old  male, history of herniated lumbar disc & back surgery, cholecystectomy, now kindly referred to us by Dr. Owen [place] area [address] [place] [address], for relapsed DLBCL. 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 Per records, brain imaging including MRI wo contrast 11/12/20 with no acute changes. CT head without contrast 11/12/20 unremarkable, CT angiogram with contrast 11/12/20 without acute findings. He was also seen by neurology Dr. Shalonda Melgar. Headaches slowly resolved over time. - 03/21: On a follow-up visit, noted 30 pound weight loss. He was also found to have enlarged mass in left upper chest with diffusely palpable adenopathy in cervical area as well as bulky bilateral axillary adenopathy. Per patient, swelling developed over a 2-week, and was nontender. Per patient reports being told that his lymphoma diagnosis was seperate from his 3rd cranial nerve palsy.  - CT neck/chest 3/22/2021 showed extensive adenopathy including left pectoralis lymph node mass measuring 7 x 3.7 cm. Extensive bilateral axillary adenopathy measuring up to 6.8 x 11.4 cm. Extensive mediastinal adenopathy noted, including right infrahilar region mass measuring 4.6 x 5.3 cm. Enlarged right adrenal gland measuring 4.1 x 4 cm. Extensive adenopathy in the gurwinder hepatis measuring 5 x 4.4 cm. PET scan could not be performed due to insurance issues. Patient seen by hematology Dr. Irving Hull. Core needle biopsy of left chest wall mass showed high-grade B-cell malignancy with FISH testing revealing rearrangement of BCL6/3q. and additional signals for BCL2/18 q. Significantly no evidence of MYC rearrangement noted. Bone marrow biopsy without lymphoma involvement. 2D echo with normal EF. LDH elevated at 311. Hepatitis B negative.   He was diagnosed with DLBCL, stage IIIb, IPI score 2.  - R-CHOP x6 (from 4/6/2021 till 7/28/2021. PET scan 8/13/2021 with essentially CR and minimal FDG uptake in the right axillary lymph node with FDG 1.2.  -11/16/2021 CT CAP with disease relapse including new left supraclavicular mass measuring 5.5 x 3.4 cm. Stable axillary and chest wall lymph nodes as well as shotty retroperitoneal lymph nodes. Ultrasound-guided left supraclavicular lymph node biopsy 11/22/2021 showing monoclonal B-cell population with increased cell size, results consistent with mature B-cell lymphoproliferative disorder. Patient now referred to us for further work-up and management. On exam today, he is accompanied by his daughter. Family consists of 4 adult children, 3 daughters and 1 son. Denies b-symptoms or new palpable lumps. Reports headaches mostly resolved, continues to wear left sided eye patch. 1/6/2022: His hepatitis/HIV were negative. LDH was high at 255. He was seen by neurology in house Dr. Elina Rodriguez, and 3rd nerve palsy felt unlikely related to lymphoma. Brain MRI 12/7/2021 without evidence of intracranial systemic lymphoma. Contrasted CT CAP 12/6/2021 with slight interval growth in left supraclavicular soft tissue mass measuring 5.8 x 5 cm, likely area of recurrent lymphoma. Other lymph nodes felt stable to slightly smaller. CSF fluid analysis negative. 2D echo had shown normal EF at 60 to 65%, however moderate to severe mitral regurg noted, seen by cardiology, status post EDUAR with partially flail anterior mitral leaflet, cardiology recommendations to monitor for now with possible surgical intervention should he clinically worsen. He has since received R-ICE x2 which he has tolerated well. Today denies any B symptoms, fevers or chills. Now scheduled for repeat PET scan and follow-up with us in a week.   Has since last visit now enrolled in [address] [place] [address] [employer], and hopefully should be able to proceed with HDT/ASCT after 3-4 cycles depending on results of PET scan. He will continue with prophylaxis with acyclovir, Diflucan and allopurinol. Navigation following, we will see him back in a week. 1/13/2022: Reports some runny nose with sore throat over the last few days. Denies any fevers, otherwise feels well. Labs with mild leukocytosis, possibly G-CSF related. PET scan with significant resolution of prior known disease including left-sided axillary/subpectoral mass (masses decreased from 5.8 x 5 to 1.7 x 1.5 cm, minimal FDG uptake with SUV 1.8). Note made of developing lower lobe groundglass infiltrates possibly sequela of subclinical infection. Will check for COVID and flu. Prescribed Levaquin x5 days for coverage. Given clinically asymptomatic, hopefully still should be able to proceed with cycle 3 RICE next week. We will plan for HDT/ASCT thereafter. 3/9/2022: Reports doing well. Completed cycle 3 on 2/3/2022 along with IT MTX.  CSF flow negative. Recovered well. Good p.o. intake and mobility, denies any new lumps or bumps. Extensive ROS unremarkable today. PET scan with CR. Bone marrow biopsy negative. 2D echo with normal EF however significant mitral valve sclerosis with regurg noted. Patient has seen cardiology in the past.  Will get clearance prior to transplant. PFTs normal.  Infectious panel unremarkable. Will clarify hep B surface antibody status. Okay to proceed with mobilization and collection. This will be through a temporary central line given patient and support. Goal CD34 plus will be 5,000,000/kg. Mobilization will be with G-CSF plus minus Mozobil. He will be staying at a hotel nearby during mobilization and collection. We will see him back prior to transplant. Various risks, and side effects of high dose chemo w/ BEAM/ASCT discussed in detail including nausea, vomiting, diarrhea, life threatening infections, bleeding, anemia, end organ damage, sepsis, and potential death.  Also discussed graft failure risk and mortality from procedure ( <5%). Delayed long term effects such as MDS, leukemia and secondary malignancies also discussed. Patient willing to proceed. 4/4/2022: Successfully collected CD34 plus at 4.82 mil/kg. Reports doing well today. Extensive ROS unremarkable. Vital signs stable, labs unremarkable. He has seen cardiology and has been cleared by them to proceed with stem cell transplant. We will need to be cautious with volume status given mitral valve regurg with flail anterior leaflet. Covid and flu screen pending, if negative, will admit tomorrow to proceed with high-dose therapy with BEAM followed by SCT.    5/12/22: Patient seen for his recurrent DLBCL, recent stem cell transplant, and associated toxicity management. Today is Day +31 of his SCT. Reasonable p.o. intake and mobility. Denies any significant loose stools. Blood work with adequate counts, chemistries unremarkable. Notes some poor tolerance to acyclovir, will prescribe valacyclovir 500 twice daily. We will also add Bactrim next week as prophylaxis. Okay to administer Evusheld every 6 months. Navigation following. Continue weekly visits for now.    5/23/2022: Patient seen for DLBCL with recent high-dose chemotherapy, stem cell transplant, and toxicity management. Today is day +42. Reasonable p.o. intake and mobility. Denies new B symptoms, aches or pains. Continues with acyclovir (in place of valacyclovir and tolerating well). Also on Bactrim prophylaxis. Blood work with adequate counts, chemistries unremarkable, potassium and magnesium normal range. S/p Evusheld injection which she tolerated reasonably. We will see him back in 2 weeks. 6/29/2022: Patient seen for his history of relapsed DLBCL, high-dose therapy followed by Autologous stem cell transplant. Today is day +79. Reasonably. Denies any B symptoms, new lumps or bumps, aches or pains. Exam without adenopathy.   Blood work with adequate counts, chemistries unremarkable. Remains on acyclovir and Bactrim prophylaxis. Scheduled for restaging PET scan along with D100 studies in a few weeks. Navigation following.    7/21/2022: Patient seen for his D100 evaluation. Reports doing well, denies any B symptoms, new lumps or bumps. Blood work with adequate counts, PET scan with CR. PFTs unremarkable. 2D echo without changes, moderate mitral valve regurg noted as before. He will follow-up with cardiology as needed. Given remission status a day 100, patient congratulated. He will continue to follow from here on locally with Dr. Carlos Leo. We will see him back around day 200 with restaging PET scan. He will continue with acyclovir and Bactrim prophylaxis. Post transplant vaccinations will be started 1 year posttransplant. 1. 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 Mitral valve regurge, mod-severe flail anterior leaflet. PLAN:  - As above. S/p R ICE x 3 and IT MTX x 4 w CR. S/p BEAM/ASCT, D+100. In CR.    - Electrolyte replacement as needed  - Prophylaxis: acyclovir 400 twice daily and Bactrim MWF.  - Neurology has evaluated pt for 3rd nerve palsy, not felt to be lymphoma related  - Mitral valve flail ant leaflet: to be monitored per cardiology. Cleared by cardiology. To be monitored for volume overload. - Leydiushpablo [id number] 5/22. RTC in 3 months with labs, LDH, PET scan. I truly appreciate the kind referral from Dr Carlos Leo.  Please call w/ any quesions    Elida Lynn MD  [facility]  Hematology Oncology  [address] [place] [address]  Office : [phone]  Fax : [phone]

## 2022-07-21 NOTE — PATIENT INSTRUCTIONS
Patient Instructions from Today's Visit    Reason for Visit:  Follow up visit  D100 review    Plan:  - Your PET scan came back great!   - You are in [address]!!!!!!!  - You can go back to your primary oncologist  - YOU CAN GO BACK TO WORK!!! Luis A!!    Follow Up:  As scheduled     Recent Lab Results:  Hospital Outpatient Visit on 07/21/2022   Component Date Value Ref Range Status    WBC 07/21/2022 4.5  4.3 - 11.1 K/uL Final    RBC 07/21/2022 4.00 (A) 4.23 - 5.6 M/uL Final    Hemoglobin 07/21/2022 12.3 (A) 13.6 - 17.2 g/dL Final    Hematocrit 07/21/2022 35.8  % Final    MCV 07/21/2022 89.5  79.6 - 97.8 FL Final    MCH 07/21/2022 30.8  26.1 - 32.9 PG Final    MCHC 07/21/2022 34.4  31.4 - 35.0 g/dL Final    RDW 07/21/2022 12.7  11.9 - 14.6 % Final    Platelets 30/98/4596 160  150 - 450 K/uL Final    MPV 07/21/2022 8.8 (A) 9.4 - 12.3 FL Final    nRBC 07/21/2022 0.00  0.0 - 0.2 K/uL Final    **Note: Absolute NRBC parameter is now reported with Hemogram**    Seg Neutrophils 07/21/2022 44  43 - 78 % Final    Lymphocytes 07/21/2022 38  13 - 44 % Final    Monocytes 07/21/2022 14 (A) 4.0 - 12.0 % Final    Eosinophils % 07/21/2022 3  0.5 - 7.8 % Final    Basophils 07/21/2022 1  0.0 - 2.0 % Final    Immature Granulocytes 07/21/2022 0  0.0 - 5.0 % Final    Segs Absolute 07/21/2022 2.0  1.7 - 8.2 K/UL Final    Absolute Lymph # 07/21/2022 1.7  0.5 - 4.6 K/UL Final    Absolute Mono # 07/21/2022 0.7  0.1 - 1.3 K/UL Final    Absolute Eos # 07/21/2022 0.1  0.0 - 0.8 K/UL Final    Basophils Absolute 07/21/2022 0.1  0.0 - 0.2 K/UL Final    Absolute Immature Granulocyte 07/21/2022 0.0  0.0 - 0.5 K/UL Final    Differential Type 07/21/2022 AUTOMATED    Final    TSH, 3RD GENERATION 07/21/2022 3.090 (A) 0.358 - 3 uIU/mL Final    T4 Free 07/21/2022 0.9  0.78 - 1.4 NG/DL Final    Cholesterol, Total 07/21/2022 185  <200 MG/DL Final    Comment: Borderline High: 200-239 mg/dL  High: Greater than or equal to 240 mg/dL      Triglycerides 07/21/2022 330 (A) 35 - 150 MG/DL Final    Comment: Borderline High: 150-199 mg/dL, High: 200-499 mg/dL  Very High: Greater than or equal to 500 mg/dL      HDL 07/21/2022 38 (A) 40 - 60 MG/DL Final    LDL Calculated 07/21/2022 81  <100 MG/DL Final    Comment: Near Optimal: 100-129 mg/dL  Borderline High: 130-159, High: 160-189 mg/dL  Very High: Greater than or equal to 190 mg/dL      VLDL Cholesterol Calculated 07/21/2022 66 (A) 6.0 - 23.0 MG/DL Final    Chol/HDL Ratio 07/21/2022 4.9    Final     Treatment Summary has been discussed and given to patient: n/a    -------------------------------------------------------------------------------------------------------------------  Please call our office at [phone] if you have any  of the following symptoms:   Fever of 100.5 or greater  Chills  Shortness of breath  Swelling or pain in one leg    After office hours an answering service is available and will contact a provider for emergencies or if you are experiencing any of the above symptoms. Patient did express an interest in My Chart. My Chart log in information explained on the after visit summary printout at the [place] [address] desk. ANJALI Salazar, RN  Hematology Navigator  [address] [place], [place] [phone]  Office: [phone]   Fax: [phone]  Email:  [email or web address]    Navigator is available by phone Monday through Friday 8 am to 4 pm.    If you need assistance after 4pm, or on the weekend please call [phone]. The answering service is available 24 hours a day, 7 days a week.

## 2022-07-22 LAB
BASOPHILS # BLD AUTO: 0 X10E3/UL (ref 0–0.2)
BASOPHILS NFR BLD AUTO: 1 %
CD3+CD4+ CELLS # BLD: 561 /UL (ref 359–1519)
CD3+CD4+ CELLS NFR BLD: 33 % (ref 30.8–58.5)
CD3+CD4+ CELLS/CD3+CD8+ CLL BLD: 0.58 % (ref 0.92–3.72)
CD3+CD8+ CELLS # BLD: 961 /UL (ref 109–897)
CD3+CD8+ CELLS NFR BLD: 56.5 % (ref 12–35.5)
EOSINOPHIL # BLD AUTO: 0.1 X10E3/UL (ref 0–0.4)
EOSINOPHIL NFR BLD AUTO: 3 %
ERYTHROCYTE [DISTWIDTH] IN BLOOD BY AUTOMATED COUNT: 13.1 % (ref 11.6–15.4)
HCT VFR BLD AUTO: 37.5 % (ref 37.5–51)
HGB BLD-MCNC: 12.6 G/DL (ref 13–17.7)
IMM GRANULOCYTES # BLD: 0 X10E3/UL (ref 0–0.1)
IMM GRANULOCYTES NFR BLD: 1 %
LYMPHOCYTES # BLD AUTO: 1.7 X10E3/UL (ref 0.7–3.1)
LYMPHOCYTES NFR BLD AUTO: 40 %
MCH RBC QN AUTO: 31 PG (ref 26.6–33)
MCHC RBC AUTO-ENTMCNC: 33.6 G/DL (ref 31.5–35.7)
MCV RBC AUTO: 92 FL (ref 79–97)
MONOCYTES # BLD AUTO: 0.6 X10E3/UL (ref 0.1–0.9)
MONOCYTES NFR BLD AUTO: 14 %
NEUTROPHILS # BLD AUTO: 1.8 X10E3/UL (ref 1.4–7)
NEUTROPHILS NFR BLD AUTO: 41 %
PLATELET # BLD AUTO: 185 X10E3/UL (ref 150–450)
RBC # BLD AUTO: 4.07 X10E6/UL (ref 4.14–5.8)
T3FREE SERPL-MCNC: 3.4 PG/ML (ref 2–4.4)
TESTOST SERPL-MCNC: 410 NG/DL (ref 264–916)
WBC # BLD AUTO: 4.3 X10E3/UL (ref 3.4–10.8)

## 2022-10-07 ENCOUNTER — TELEPHONE (OUTPATIENT)
Dept: CARDIOLOGY CLINIC | Age: 57
End: 2022-10-07

## 2022-10-07 NOTE — TELEPHONE ENCOUNTER
----- Message from Michelle Dalton MD sent at 10/7/2022 11:47 AM EDT -----  Please inform the patient that we reviewed his echocardiogram and it shows normal pumping ability of the heart. We will go over these results with him in detail at his follow-up appointment. He also needs to make a log of his heart rates and blood pressures once or twice a day and bring this in with him when he comes back in for follow-up.   Thanks,  AD

## 2022-10-10 NOTE — TELEPHONE ENCOUNTER
Spoke to patient - notified him per Dr. Kate Schulz, echocardiogram showed normal pumping ability of the heart. Pt will keep log of blood pressure and heart rates once or twice a day and bring the log to his next appointment. Patient verbalized understanding of findings and instructions.

## 2022-10-17 ENCOUNTER — HOSPITAL ENCOUNTER (OUTPATIENT)
Dept: PET IMAGING | Age: 57
Discharge: HOME | End: 2022-10-20

## 2022-10-17 DIAGNOSIS — C83.31 DIFFUSE LARGE B-CELL LYMPHOMA OF LYMPH NODES OF NECK (HCC): ICD-10-CM

## 2022-10-17 DIAGNOSIS — Z94.81 STATUS POST BONE MARROW TRANSPLANT (HCC): ICD-10-CM

## 2022-10-17 LAB
GLUCOSE BLD STRIP.AUTO-MCNC: 120 MG/DL (ref 65–100)
SERVICE CMNT-IMP: ABNORMAL

## 2022-10-17 PROCEDURE — 3430000000 HC RX DIAGNOSTIC RADIOPHARMACEUTICAL: Performed by: INTERNAL MEDICINE

## 2022-10-17 PROCEDURE — A9552 F18 FDG: HCPCS | Performed by: INTERNAL MEDICINE

## 2022-10-17 PROCEDURE — 6360000004 HC RX CONTRAST MEDICATION: Performed by: INTERNAL MEDICINE

## 2022-10-17 PROCEDURE — 82962 GLUCOSE BLOOD TEST: CPT

## 2022-10-17 PROCEDURE — 2580000003 HC RX 258: Performed by: INTERNAL MEDICINE

## 2022-10-17 PROCEDURE — 78815 PET IMAGE W/CT SKULL-THIGH: CPT

## 2022-10-17 RX ORDER — SODIUM CHLORIDE 0.9 % (FLUSH) 0.9 %
10 SYRINGE (ML) INJECTION ONCE AS NEEDED
Status: COMPLETED | OUTPATIENT
Start: 2022-10-17 | End: 2022-10-17

## 2022-10-17 RX ORDER — FLUDEOXYGLUCOSE F 18 200 MCI/ML
13.7 INJECTION, SOLUTION INTRAVENOUS
Status: COMPLETED | OUTPATIENT
Start: 2022-10-17 | End: 2022-10-17

## 2022-10-17 RX ADMIN — SODIUM CHLORIDE, PRESERVATIVE FREE 10 ML: 5 INJECTION INTRAVENOUS at 09:49

## 2022-10-17 RX ADMIN — DIATRIZOATE MEGLUMINE AND DIATRIZOATE SODIUM 10 ML: 660; 100 LIQUID ORAL; RECTAL at 09:49

## 2022-10-17 RX ADMIN — FLUDEOXYGLUCOSE F 18 13.7 MILLICURIE: 200 INJECTION, SOLUTION INTRAVENOUS at 09:49

## 2022-10-18 ENCOUNTER — OFFICE VISIT (OUTPATIENT)
Dept: CARDIOLOGY CLINIC | Age: 57
End: 2022-10-18

## 2022-10-18 VITALS
BODY MASS INDEX: 26.09 KG/M2 | SYSTOLIC BLOOD PRESSURE: 140 MMHG | HEART RATE: 64 BPM | HEIGHT: 72 IN | WEIGHT: 192.6 LBS | DIASTOLIC BLOOD PRESSURE: 72 MMHG

## 2022-10-18 DIAGNOSIS — I34.0 NONRHEUMATIC MITRAL VALVE REGURGITATION: Primary | ICD-10-CM

## 2022-10-18 DIAGNOSIS — C83.30 DIFFUSE LARGE B-CELL LYMPHOMA, UNSPECIFIED BODY REGION (HCC): ICD-10-CM

## 2022-10-18 DIAGNOSIS — I10 PRIMARY HYPERTENSION: ICD-10-CM

## 2022-10-18 PROCEDURE — 99214 OFFICE O/P EST MOD 30 MIN: CPT | Performed by: INTERNAL MEDICINE

## 2022-10-18 RX ORDER — LISINOPRIL 20 MG/1
20 TABLET ORAL DAILY
Qty: 90 TABLET | Refills: 3 | Status: SHIPPED | OUTPATIENT
Start: 2022-10-18

## 2022-10-18 NOTE — PROGRESS NOTES
[facility] CARDIOLOGY   [facility], [address]   PHONE: [phone]           NAME:  Aren Koroma   : 1965   MRN: [id number]         SUBJECTIVE:    Aren Koroma is a 64 y.o.  male seen for a visit regarding the following:        Chief Complaint       Patient presents with          Follow Up Chronic Condition             HTN           HPI:     Cardio problem list:   1. Mitral regurgitation- Severe-eccentric   -EDUAR-  flail A2 segment of the anterior mitral leaflet was partially flail with a small mobile echodensity noted consistent with a torn chordal structure. Severe eccentric mitral regurgitation-posteriorly directed with pulmonary vein systolic  reversal.  EDUAR on 12/10/2021 showed an EF of 60 to 65% with no regional wall motion abnormalities. Left atrium was mildly dilated. -From 2022 showed an EF at 55 to 60% with no regional wall motion abnormalities, severe mitral regurgitation with abnormal anterior mitral valve leaflet-likely torn chordae   2. Hypertension   3. Diffuse large B-cell lymphoma      I saw Mr. Agustín Mercado is a pleasant 80-year-old gentleman in cardiovascular  follow-up for severe mitral regurgitation with known underlying hypertension.     -When we last met with him, we made no significant changes with his medical therapy. His blood pressures were better controlled with a combination of atenolol and lisinopril. Mitral regurgitation:-Background- EDUAR from 2021 showed evidence of a significantly eccentric jet of mitral regurgitation with evidence of a mitral valve anterior leaflet torn chordae-leading to severe eccentric mitral regurgitation. He was fortunately not significantly symptomatic  from this. He had a follow-up echo in 2022 and then again in 2022 which appeared to be stable with preserved LV function, no significant evidence of pulmonary hypertension and he remains in sinus rhythm.   Still NYHA class I for the most.  Remains fairly active. No recent fever or chills or anything suggestive of any infective endocarditis. -      Hypertension:-Denies headaches or blurry vision, denies any significant palpitations or presyncope or syncope or TIAs or strokelike symptoms. No fever or chills. Blood pressures at home have been in the 130s to 150s range for the most.  They could be better. Denies any chest pain or any worsening dyspnea on exertion orthopnea PND or lower extremity edema. H     Past Medical History, Past Surgical History, Family history, Social History, and Medications were all reviewed with the patient today and updated as necessary.        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  Q23.8         Past Medical History:        Diagnosis  Date           Cancer ([facility] [address]         Hypertension             Valvular heart disease           Past Surgical History:         Procedure  Laterality  Date            HX BACK SURGERY             26 years ago            HX VASCULAR ACCESS          IR BX BONE MARROW DIAGNOSTIC    2/24/2022      IR CHOLECYSTOSTOMY PERCUTANEOUS          IR INTRATHECAL CHEMO INJECTION CNS    12/8/2021      IR INTRATHECAL CHEMO INJECTION CNS    12/28/2021      IR INTRATHECAL CHEMO INJECTION CNS    1/28/2022           IR SPINAL PUNCTURE CSF TREAT / DRAIN    2/4/2022        Family History         Problem  Relation  Age of Onset            Diabetes  Mother        Hypertension  Mother        Diabetes  Father        Hypertension  Father             Hypertension  Brother          Social History         Tobacco Use           Smoking status:  Never Smoker       Smokeless tobacco:  Never Used       Substance Use Topics           Alcohol use:  Not Currently         Current Outpatient Medications          Medication  Sig  Dispense  Refill            loratadine (Claritin) 10 mg tablet  Take 10 mg by mouth. acetaminophen (TylenoL) 325 mg tablet  Take  by mouth every four (4) hours as needed for Pain. lisinopriL (PRINIVIL, ZESTRIL) 10 mg tablet  Take 1 Tablet by mouth daily. 90 Tablet  3      atenoloL (Tenormin) 25 mg tablet  Take 1 Tablet by mouth daily. 90 Tablet  3      acyclovir (ZOVIRAX) 400 mg tablet  TAKE 1 TABLET BY MOUTH TWO (2) TIMES A DAY FOR 30 DAYS. (Patient not taking: Reported on 3/9/2022)  60 Tablet  0      allopurinoL (ZYLOPRIM) 300 mg tablet  Take 1 Tablet by mouth daily. (Patient not taking: Reported on 3/9/2022)  30 Tablet  0            ondansetron (ZOFRAN ODT) 8 mg disintegrating tablet  Take 1 Tablet by mouth every eight (8) hours as needed for Nausea or Vomiting. (Patient not taking: Reported on 2/1/2022)  60 Tablet  0        Facility-Administered Medications Ordered in Other Visits             Medication  Dose  Route  Frequency  Provider  Last Rate  Last Admin               sodium chloride (NS) flush 10-40 mL   10-40 mL  [facility]  PRN  Erendira Manning MD     10 mL at 03/14/22 0736          Review of Systems    Constitutional: Negative for fever, weight gain and weight loss. HENT: Negative for ear pain, nosebleeds and sore throat. Eyes: Negative for photophobia and visual disturbance. Cardiovascular: Negative for claudication and syncope. Respiratory: Negative for hemoptysis and wheezing. Endocrine: Negative for cold intolerance and heat intolerance. Hematologic/Lymphatic: Negative for adenopathy and bleeding problem. Skin: Negative for rash and suspicious lesions. Musculoskeletal: Negative for falls and joint swelling. Gastrointestinal: Negative for abdominal pain, change in bowel habit and hematemesis. Genitourinary: Negative for dysuria and hematuria.     Neurological: Negative for focal weakness and seizures. Psychiatric/Behavioral: Negative for hallucinations and suicidal ideas. Allergic/Immunologic: Negative for hives and persistent infections. PHYSICAL EXAM:     Visit Vitals       BP (!) 140/72   Pulse 64   Ht 6' (1.829 m)   Wt 192 lb 9.6 oz (87.4 kg)   BMI 26.12 kg/m²    Physical Exam   General: Alert and awake and oriented in no acute distress    HEENT: Head is normocephalic and atraumatic, pupils are equal and bilaterally reactive to light, throat is clear   Neck: No significant jugular venous distention or carotid bruits. Cardiac: S1 and S2 heard, regular rate and rhythm, no significant rubs or gallops. 3/6 holosystolic murmur heard at the apex radiating across the chest into the axilla. Respiratory: Clear to auscultation bilaterally with no adventitious sounds. Respirations are normal.   Abdomen: Soft, nontender, nondistended, bowel sounds present. No abdominal bruits noted. Extremities: No cyanosis clubbing or edema. Peripheral pulses:  Bilateral radial pulses are equal. Bilateral pedal pulses are well felt. Musculoskeletal: No significant joint swelling or redness noted. Lymphatic: No significant enlarged lymphadenopathy noted   Skin: No significant rashes noted in exposed regions. Neurological: No Facial droop, no gross focal motor or neurological deficits   Medical problems and test results were reviewed with the patient today.           No results found for: CHOL, CHOLPOCT, CHOLX, CHLST, CHOLV, HDL, HDLPOC, HDLP, LDL, LDLCPOC, LDLC, DLDLP, VLDLC, VLDL, TGLX, TRIGL, TRIGP, TGLPOCT, CHHD, CHHDX ,hemoglobin, basic metabolic panel, No results found for: TSH, TSH2, TSH3, TSHP, TSHEXT, TSHEXT ,    Lab Results         Component  Value  Date/Time            Sodium  142  03/14/2022 07:37 AM       Potassium  3.7  03/14/2022 07:37 AM       Chloride  108 (H)  03/14/2022 07:37 AM       CO2  33 (H)  03/14/2022 07:37 AM       Anion gap  1 (L)  03/14/2022 07:37 AM       Glucose  101 (H)  03/14/2022 07:37 AM       BUN  9  03/14/2022 07:37 AM       Creatinine  0.80  03/14/2022 07:37 AM       GFR est AA  >60  03/14/2022 07:37 AM       GFR est non-AA  >60  03/14/2022 07:37 AM       Calcium  9.2  03/14/2022 07:37 AM       Bilirubin, total  0.3  03/14/2022 07:37 AM       Alk. phosphatase  175 (H)  03/14/2022 07:37 AM       Protein, total  6.6  03/14/2022 07:37 AM       Albumin  3.9  03/14/2022 07:37 AM       Globulin  2.7  03/14/2022 07:37 AM       A-G Ratio  1.4  03/14/2022 07:37 AM       ALT (SGPT)  21  03/14/2022 07:37 AM            AST (SGOT)  21  03/14/2022 07:37 AM             Results for orders placed or performed during the hospital encounter of 03/14/22     CBC WITH AUTOMATED DIFF         Result  Value  Ref Range            WBC  36.2 (H)  4.3 - 11.1 K/uL       RBC  3.56 (L)  4.23 - 5.6 M/uL       HGB  11.3 (L)  13.6 - 17.2 g/dL       HCT  35.8  %       MCV  100.6 (H)  79.6 - 97.8 FL       MCH  31.7  26.1 - 32.9 PG       MCHC  31.6  31.4 - 35.0 g/dL       RDW  15.0 (H)  11.9 - 14.6 %       PLATELET  777  965 - 450 K/uL       MPV  10.4  9.4 - 12.3 FL       ABSOLUTE NRBC  0.00  0.0 - 0.2 K/uL       NEUTROPHILS  79 (H)  43 - 78 %       LYMPHOCYTES  4 (L)  13 - 44 %       MONOCYTES  8  4.0 - 12.0 %       EOSINOPHILS  1  0.5 - 7.8 %       BASOPHILS  1  0.0 - 2.0 %       IMMATURE GRANULOCYTES  7 (H)  0.0 - 5.0 %       ABS. NEUTROPHILS  28.6 (H)  1.7 - 8.2 K/UL       ABS. LYMPHOCYTES  1.4  0.5 - 4.6 K/UL       ABS. MONOCYTES  2.9 (H)  0.1 - 1.3 K/UL       ABS. EOSINOPHILS  0.4  0.0 - 0.8 K/UL       ABS. BASOPHILS  0.4 (H)  0.0 - 0.2 K/UL       ABS. IMM. GRANS.   2.5 (H)  0.0 - 0.5 K/UL       RBC COMMENTS  SLIGHT   ANISOCYTOSIS + POIKILOCYTOSIS             RBC COMMENTS  OCCASIONAL   OVALOCYTES             RBC COMMENTS  OCCASIONAL   STOMATOCYTES             RBC COMMENTS  OCCASIONAL   TEARDROP CELLS             WBC COMMENTS  Result Confirmed By Smear          PLATELET COMMENTS ADEQUATE          DF  AUTOMATED          METABOLIC PANEL, COMPREHENSIVE         Result  Value  Ref Range            Sodium  142  136 - 145 mmol/L       Potassium  3.7  3.5 - 5.1 mmol/L       Chloride  108 (H)  98 - 107 mmol/L       CO2  33 (H)  21 - 32 mmol/L       Anion gap  1 (L)  7 - 16 mmol/L       Glucose  101 (H)  65 - 100 mg/dL       BUN  9  6 - 23 MG/DL       Creatinine  0.80  0.8 - 1.5 MG/DL       GFR est AA  >60  >60 ml/min/1.73m2       GFR est non-AA  >60  >60 ml/min/1.73m2       Calcium  9.2  8.3 - 10.4 MG/DL       Bilirubin, total  0.3  0.2 - 1.1 MG/DL       ALT (SGPT)  21  12 - 65 U/L       AST (SGOT)  21  15 - 37 U/L       Alk. phosphatase  175 (H)  50 - 136 U/L       Protein, total  6.6  6.3 - 8.2 g/dL       Albumin  3.9  3.5 - 5.0 g/dL       Globulin  2.7  2.3 - 3.5 g/dL       A-G Ratio  1.4  1.2 - 3.5         MAGNESIUM         Result  Value  Ref Range            Magnesium  1.9  1.8 - 2.4 mg/dL       PHOSPHORUS         Result  Value  Ref Range            Phosphorus  2.8  2.5 - 4.5 MG/DL            EKG done today showed sinus rhythm at 73 bpm, left atrial enlargement     ASSESSMENT and PLAN      1. Nonrheumatic mitral valve regurgitation   -He has severe mitral regurgitation but fortunately not symptomatic from it yet. It is a very eccentric posteriorly directed jet because of an anterior mitral valve leaflet pathology. There is partial prolapse of the A2 leaflet from his torn chordae. We will monitor this carefully. Follow-up echo that he had in February and now again in October 2022 shows stable LV dimensions. 2. 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 3. Primary hypertension   --Needs better control so increase lisinopril to 20 mg daily and be continued atenolol 25 mg once daily      4. Diffuse large B-cell lymphoma, unspecified body region [facility])   -On chemotherapy per heme-onc.   Will need echos followed routinely while on chemotherapy

## 2022-10-18 NOTE — PATIENT INSTRUCTIONS
-The only change that I made with your medical therapy was to increase her lisinopril from 10 to 20 mg- better BP control

## 2022-10-20 ENCOUNTER — CLINICAL DOCUMENTATION (OUTPATIENT)
Dept: ONCOLOGY | Age: 57
End: 2022-10-20

## 2022-10-20 ENCOUNTER — HOSPITAL ENCOUNTER (OUTPATIENT)
Dept: LAB | Age: 57
Discharge: HOME | End: 2022-10-23

## 2022-10-20 ENCOUNTER — OFFICE VISIT (OUTPATIENT)
Dept: ONCOLOGY | Age: 57
End: 2022-10-20

## 2022-10-20 VITALS
HEIGHT: 72 IN | SYSTOLIC BLOOD PRESSURE: 144 MMHG | OXYGEN SATURATION: 100 % | RESPIRATION RATE: 13 BRPM | BODY MASS INDEX: 25.95 KG/M2 | DIASTOLIC BLOOD PRESSURE: 71 MMHG | HEART RATE: 59 BPM | TEMPERATURE: 97.5 F | WEIGHT: 191.6 LBS

## 2022-10-20 DIAGNOSIS — D84.9 IMMUNOCOMPROMISED (HCC): ICD-10-CM

## 2022-10-20 DIAGNOSIS — Z94.81 STATUS POST BONE MARROW TRANSPLANT (HCC): ICD-10-CM

## 2022-10-20 DIAGNOSIS — C83.31 DIFFUSE LARGE B-CELL LYMPHOMA OF LYMPH NODES OF NECK (HCC): ICD-10-CM

## 2022-10-20 DIAGNOSIS — C83.31 DIFFUSE LARGE B-CELL LYMPHOMA OF LYMPH NODES OF NECK (HCC): Primary | ICD-10-CM

## 2022-10-20 LAB
ALBUMIN SERPL-MCNC: 4.3 G/DL (ref 3.5–5)
ALBUMIN/GLOB SERPL: 1.3 {RATIO} (ref 0.4–1.6)
ALP SERPL-CCNC: 145 U/L (ref 50–136)
ALT SERPL-CCNC: 46 U/L (ref 12–65)
ANION GAP SERPL CALC-SCNC: 4 MMOL/L (ref 2–11)
AST SERPL-CCNC: 31 U/L (ref 15–37)
BASOPHILS # BLD: 0 K/UL (ref 0–0.2)
BASOPHILS NFR BLD: 0 % (ref 0–2)
BILIRUB SERPL-MCNC: 0.3 MG/DL (ref 0.2–1.1)
BUN SERPL-MCNC: 18 MG/DL (ref 6–23)
CALCIUM SERPL-MCNC: 9.6 MG/DL (ref 8.3–10.4)
CHLORIDE SERPL-SCNC: 105 MMOL/L (ref 101–110)
CO2 SERPL-SCNC: 31 MMOL/L (ref 21–32)
CREAT SERPL-MCNC: 0.9 MG/DL (ref 0.8–1.5)
DIFFERENTIAL METHOD BLD: NORMAL
EOSINOPHIL # BLD: 0.1 K/UL (ref 0–0.8)
EOSINOPHIL NFR BLD: 1 % (ref 0.5–7.8)
ERYTHROCYTE [DISTWIDTH] IN BLOOD BY AUTOMATED COUNT: 12.3 % (ref 11.9–14.6)
GLOBULIN SER CALC-MCNC: 3.3 G/DL (ref 2.8–4.5)
GLUCOSE SERPL-MCNC: 97 MG/DL (ref 65–100)
HCT VFR BLD AUTO: 41 %
HGB BLD-MCNC: 13.7 G/DL (ref 13.6–17.2)
IMM GRANULOCYTES # BLD AUTO: 0 K/UL (ref 0–0.5)
IMM GRANULOCYTES NFR BLD AUTO: 0 % (ref 0–5)
LDH SERPL L TO P-CCNC: 206 U/L (ref 100–190)
LYMPHOCYTES # BLD: 1.3 K/UL (ref 0.5–4.6)
LYMPHOCYTES NFR BLD: 13 % (ref 13–44)
MAGNESIUM SERPL-MCNC: 2.5 MG/DL (ref 1.8–2.4)
MCH RBC QN AUTO: 30.4 PG (ref 26.1–32.9)
MCHC RBC AUTO-ENTMCNC: 33.4 G/DL (ref 31.4–35)
MCV RBC AUTO: 91.1 FL (ref 82–102)
MONOCYTES # BLD: 0.6 K/UL (ref 0.1–1.3)
MONOCYTES NFR BLD: 7 % (ref 4–12)
NEUTS SEG # BLD: 7.4 K/UL (ref 1.7–8.2)
NEUTS SEG NFR BLD: 78 % (ref 43–78)
NRBC # BLD: 0 K/UL (ref 0–0.2)
PLATELET # BLD AUTO: 191 K/UL (ref 150–450)
PMV BLD AUTO: 9.4 FL (ref 9.4–12.3)
POTASSIUM SERPL-SCNC: 3.9 MMOL/L (ref 3.5–5.1)
PROT SERPL-MCNC: 7.6 G/DL (ref 6.3–8.2)
RBC # BLD AUTO: 4.5 M/UL (ref 4.23–5.6)
SODIUM SERPL-SCNC: 140 MMOL/L (ref 133–143)
WBC # BLD AUTO: 9.5 K/UL (ref 4.3–11.1)

## 2022-10-20 PROCEDURE — 82784 ASSAY IGA/IGD/IGG/IGM EACH: CPT

## 2022-10-20 PROCEDURE — 80053 COMPREHEN METABOLIC PANEL: CPT

## 2022-10-20 PROCEDURE — 99214 OFFICE O/P EST MOD 30 MIN: CPT | Performed by: INTERNAL MEDICINE

## 2022-10-20 PROCEDURE — 85025 COMPLETE CBC W/AUTO DIFF WBC: CPT

## 2022-10-20 PROCEDURE — 36415 COLL VENOUS BLD VENIPUNCTURE: CPT

## 2022-10-20 PROCEDURE — 83735 ASSAY OF MAGNESIUM: CPT

## 2022-10-20 PROCEDURE — 83615 LACTATE (LD) (LDH) ENZYME: CPT

## 2022-10-20 ASSESSMENT — PATIENT HEALTH QUESTIONNAIRE - PHQ9
SUM OF ALL RESPONSES TO PHQ QUESTIONS 1-9: 0
SUM OF ALL RESPONSES TO PHQ QUESTIONS 1-9: 0
2. FEELING DOWN, DEPRESSED OR HOPELESS: 0
SUM OF ALL RESPONSES TO PHQ QUESTIONS 1-9: 0
SUM OF ALL RESPONSES TO PHQ QUESTIONS 1-9: 0
1. LITTLE INTEREST OR PLEASURE IN DOING THINGS: 0
SUM OF ALL RESPONSES TO PHQ9 QUESTIONS 1 & 2: 0

## 2022-10-20 NOTE — PATIENT INSTRUCTIONS
Patient Instructions from Today's Visit    Reason for Visit:  Follow up    Plan:  Labs and PET scan look good! We will see you back in 6 months with 1 year post transplant studies. Continue seeing Dr. Irma Jones regularly. We recommend getting flu and covid vaccines. When you come back in 6 months we will start your post transplant vaccines. Follow Up: Follow up in 6 months    Recent Lab Results:  Hospital Outpatient Visit on 10/20/2022   Component Date Value Ref Range Status    Sodium 10/20/2022 140  133 - 143 mmol/L Final    Potassium 10/20/2022 3.9  3.5 - 5.1 mmol/L Final    Chloride 10/20/2022 105  101 - 110 mmol/L Final    CO2 10/20/2022 31  21 - 32 mmol/L Final    Anion Gap 10/20/2022 4  2 - 11 mmol/L Final    Glucose 10/20/2022 97  65 - 100 mg/dL Final    BUN 10/20/2022 18  6 - 23 MG/DL Final    Creatinine 10/20/2022 0.90  0.8 - 1.5 MG/DL Final    Est, Glom Filt Rate 10/20/2022 >60  >60 ml/min/1.73m2 Final    Comment:      Pediatric calculator link: [employer]. org/professionals/kdoqi/gfr_calculatorped       Effective Oct 3, 2022       These results are not intended for use in patients <25years of age. eGFR results are calculated without a race factor using  the 2021 CKD-EPI equation. Careful clinical correlation is recommended, particularly when comparing to results calculated using previous equations. The CKD-EPI equation is less accurate in patients with extremes of muscle mass, extra-renal metabolism of creatinine, excessive creatine ingestion, or following therapy that affects renal tubular secretion.       Calcium 10/20/2022 9.6  8.3 - 10.4 MG/DL Final    Total Bilirubin 10/20/2022 0.3  0.2 - 1.1 MG/DL Final    ALT 10/20/2022 46  12 - 65 U/L Final    AST 10/20/2022 31  15 - 37 U/L Final    Alk Phosphatase 10/20/2022 145 (A)  50 - 136 U/L Final    Total Protein 10/20/2022 7.6  6.3 - 8.2 g/dL Final    Albumin 10/20/2022 4.3  3.5 - 5.0 g/dL Final    Globulin 10/20/2022 3.3  2.8 - 4.5 g/dL Final    Albumin/Globulin Ratio 10/20/2022 1.3  0.4 - 1.6   Final    WBC 10/20/2022 9.5  4.3 - 11.1 K/uL Final    RBC 10/20/2022 4.50  4.23 - 5.6 M/uL Final    Hemoglobin 10/20/2022 13.7  13.6 - 17.2 g/dL Final    Hematocrit 10/20/2022 41.0  % Final    MCV 10/20/2022 91.1  82.0 - 102.0 FL Final    MCH 10/20/2022 30.4  26.1 - 32.9 PG Final    MCHC 10/20/2022 33.4  31.4 - 35.0 g/dL Final    RDW 10/20/2022 12.3  11.9 - 14.6 % Final    Platelets 15/68/1407 191  150 - 450 K/uL Final    MPV 10/20/2022 9.4  9.4 - 12.3 FL Final    nRBC 10/20/2022 0.00  0.0 - 0.2 K/uL Final    **Note: Absolute NRBC parameter is now reported with Hemogram**    Differential Type 10/20/2022 AUTOMATED    Final    Seg Neutrophils 10/20/2022 78  43 - 78 % Final    Lymphocytes 10/20/2022 13  13 - 44 % Final    Monocytes 10/20/2022 7  4.0 - 12.0 % Final    Eosinophils % 10/20/2022 1  0.5 - 7.8 % Final    Basophils 10/20/2022 0  0.0 - 2.0 % Final    Immature Granulocytes 10/20/2022 0  0.0 - 5.0 % Final    Segs Absolute 10/20/2022 7.4  1.7 - 8.2 K/UL Final    Absolute Lymph # 10/20/2022 1.3  0.5 - 4.6 K/UL Final    Absolute Mono # 10/20/2022 0.6  0.1 - 1.3 K/UL Final    Absolute Eos # 10/20/2022 0.1  0.0 - 0.8 K/UL Final    Basophils Absolute 10/20/2022 0.0  0.0 - 0.2 K/UL Final    Absolute Immature Granulocyte 10/20/2022 0.0  0.0 - 0.5 K/UL Final    Magnesium 10/20/2022 2.5 (A)  1.8 - 2.4 mg/dL Final    LD 10/20/2022 206 (A)  100 - 190 U/L Final        Treatment Summary has been discussed and given to patient: n/a        -------------------------------------------------------------------------------------------------------------------  Please call our office at [phone] if you have any  of the following symptoms:   Fever of 100.5 or greater  Chills  Shortness of breath  Swelling or pain in one leg    After office hours an answering service is available and will contact a provider for emergencies or if you are experiencing any of the above symptoms. Patient did express an interest in My Chart. My Chart log in information explained on the after visit summary printout at the [facility] desk.     Kelly Solis RN

## 2022-10-20 NOTE — PROGRESS NOTES
10/20 Pt seen by POD RN. Pt had OV at day+200. PET results discussed.  Will set up for one year studies in 4/2023

## 2022-10-20 NOTE — PROGRESS NOTES
Data Source: Patient, [facility] record. 10/20/2022    10:57 AM    Susan Abdi [id number]    62 y.o. Patient Encounter: [facility] Visit    Heme Diagnosis:  Recurrent high grade B cell lymphoma  Stage:  Initially IIIB, now recurrent  Performance Status:  1  Code Status:  Not discussed  Heme History (Copied from prior):   49-year-old  male, history of herniated lumbar disc & back surgery, cholecystectomy, now kindly referred to us by Dr. Wanda Apley area [address] [place] [address], for relapsed DLBCL. 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 Per records, brain imaging including MRI wo contrast 11/12/20 with no acute changes. CT head without contrast 11/12/20 unremarkable, CT angiogram with contrast 11/12/20 without acute findings. He was also seen by neurology Dr. Jaz Laird. Headaches slowly resolved over time. - 03/21: On a follow-up visit, noted 30 pound weight loss. He was also found to have enlarged mass in left upper chest with diffusely palpable adenopathy in cervical area as well as bulky bilateral axillary adenopathy. Per patient, swelling developed over a 2-week, and was nontender. Per patient reports being told that his lymphoma diagnosis was seperate from his 3rd cranial nerve palsy.  - CT neck/chest 3/22/2021 showed extensive adenopathy including left pectoralis lymph node mass measuring 7 x 3.7 cm. Extensive bilateral axillary adenopathy measuring up to 6.8 x 11.4 cm. Extensive mediastinal adenopathy noted, including right infrahilar region mass measuring 4.6 x 5.3 cm. Enlarged right adrenal gland measuring 4.1 x 4 cm. Extensive adenopathy in the gurwinder hepatis measuring 5 x 4.4 cm. PET scan could not be performed due to insurance issues. Patient seen by hematology Dr. Irma Jones.   Core needle biopsy of left chest wall mass showed high-grade B-cell malignancy with FISH testing revealing rearrangement of BCL6/3q. and additional signals for BCL2/18 q. Significantly no evidence of MYC rearrangement noted. Bone marrow biopsy without lymphoma involvement. 2D echo with normal EF. LDH elevated at 311. Hepatitis B negative. He was diagnosed with DLBCL, stage IIIb, IPI score 2.  - R-CHOP x6 (from 4/6/2021 till 7/28/2021. PET scan 8/13/2021 with essentially CR and minimal FDG uptake in the right axillary lymph node with FDG 1.2.  -11/16/2021 CT CAP with disease relapse including new left supraclavicular mass measuring 5.5 x 3.4 cm. Stable axillary and chest wall lymph nodes as well as shotty retroperitoneal lymph nodes. Ultrasound-guided left supraclavicular lymph node biopsy 11/22/2021 showing monoclonal B-cell population with increased cell size, results consistent with mature B-cell lymphoproliferative disorder. Patient now referred to us for further work-up and management. Hospice Referral:  na  Interval History:  10/20/2022: Patient seen for D 200 evaluation. He has an interim reestablished care with Dr. Jana Rand at [facility] area [facility] center. Today reports doing well, denies any B symptoms, denies any new lumps or bumps, aches or pains. Stamina has much improved since last visit. Exam unremarkable, labs with adequate counts, chemistries unremarkable. Discussed flu and COVID vaccinations which can be started now. His post transplant vaccinations will be started at post transplant 1 year cornel (next visit). Okay to stop prophylactic Bactrim. PET scan with CR. Patient reassured. Continue following locally. REVIEW OF SYSTEMS:  As mentioned above, all other systems were reviewed in full and are negative.     Past Medical History:   Diagnosis Date    Cancer ([address]     Hypertension     Valvular heart disease        Past Surgical History:   Procedure Laterality Date    BACK SURGERY      26 years ago    IR BIOPSY PERC SUPERF BONE  2/24/2022    IR BIOPSY PERC SUPERF BONE  2/24/2022    IR BIOPSY PERC SUPERF BONE 2/24/2022 [facility]    IR CHEMO ADMIN IN CNS SPINAL PUNC  12/28/2021    IR CHEMO ADMIN IN CNS SPINAL PUNC  12/8/2021    IR CHEMO ADMIN IN CNS SPINAL PUNC  1/28/2022    IR CHOLECYSTOSTOMY PERCUTANEOUS COMPLETE      IR NONTUNNELED VASCULAR CATHETER  3/14/2022    IR NONTUNNELED VASCULAR CATHETER  3/14/2022    IR NONTUNNELED VASCULAR CATHETER 3/14/2022 [facility]    IR SPINAL PUNCTURE CSF TREAT/DRAIN  2/4/2022    TRANSPLANT  04/2022    auto stem cell transplant    VASCULAR SURGERY         Current Outpatient Medications   Medication Sig Dispense Refill    lisinopril (PRINIVIL;ZESTRIL) 20 MG tablet Take 1 tablet by mouth daily 90 tablet 3    sulfamethoxazole-trimethoprim (BACTRIM DS;SEPTRA DS) 800-160 MG per tablet Takes 3 times a week. M,W, & F      acetaminophen (TYLENOL) 325 MG tablet Take by mouth every 4 hours as needed      atenolol (TENORMIN) 25 MG tablet Take 25 mg by mouth daily       No current facility-administered medications for this visit.      Facility-Administered Medications Ordered in Other Visits   Medication Dose Route Frequency Provider Last Rate Last Admin    diatrizoate meglumine-sodium (GASTROGRAFIN) 66-10 % solution 10 mL  10 mL Oral ONCE PRN Tacho Gavin MD   10 mL at 10/17/22 7207       Social History     Socioeconomic History    Marital status:      Spouse name: None    Number of children: None    Years of education: None    Highest education level: None   Tobacco Use    Smoking status: Never    Smokeless tobacco: Never   Substance and Sexual Activity    Alcohol use: Not Currently    Drug use: Not Currently       Family History   Problem Relation Age of Onset    Diabetes Mother     Hypertension Mother     Diabetes Father     Hypertension Father     Hypertension Brother        No Known Allergies    PHYSICAL EXAMINATION:  General Appearance: Healthy appearing patient in no acute distress  Vitals reviewed. BP (!) 144/71 (Site: Right Upper Arm, Position: Sitting, Cuff Size: Medium Adult)   Pulse 59   Temp 97.5 °F (36.4 °C) (Oral)   Resp 13   Ht 6' (1.829 m)   Wt 191 lb 9.6 oz (86.9 kg)   SpO2 100%   BMI 25.99 kg/m²   HEENT: No oral or pharyngeal masses, ulceration or thrush noted, no sinus tenderness. Neck is supple with no thyromegaly or JVD noted. Lymph Nodes: No lymphadenopathy noted in the occipital, pre and post auricular, cervical, supra and infraclavicular, axillary, epitrochlear, inguinal, and popliteal region. Breasts: No palpable masses, nipple discharge or skin retraction  Lungs/Thorax: Clear to auscultation, no accessory muscles of respiration being used. Heart: Regular rate and rhythm, normal S1, S2, no appreciable murmurs, rubs, gallops  Abdomen: Soft, nontender, bowel sounds present, no appreciable hepatosplenomegaly, no palpable masses  Extremeties: Good pulses bilaterally, no peripheral edema. Skin: Normal skin tone with no rash, petechiae, ecchymosis noted. Musculoskeletal: No pain on palpation over bony prominence, no edema, no evidence of gout, no joint or bony deformity  Neurologic: Grossly intact    LABS/IMAGING:    Lab Results   Component Value Date/Time    WBC 9.5 10/20/2022 09:54 AM    HGB 13.7 10/20/2022 09:54 AM    HCT 41.0 10/20/2022 09:54 AM     10/20/2022 09:54 AM    MCV 91.1 10/20/2022 09:54 AM       Lab Results   Component Value Date/Time     10/20/2022 09:54 AM    K 3.9 10/20/2022 09:54 AM     10/20/2022 09:54 AM    CO2 31 10/20/2022 09:54 AM    BUN 18 10/20/2022 09:54 AM    GFRAA >60 06/29/2022 10:34 AM    GLOB 3.3 10/20/2022 09:54 AM    ALT 46 10/20/2022 09:54 AM         Above results reviewed with patient. ASSESSMENT:  80-year-old  male, history of herniated lumbar disc & back surgery, cholecystectomy, now kindly referred to us by Dr. Arthur gunderson [facility], for relapsed DLBCL.   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 Per records, brain imaging including MRI wo contrast 11/12/20 with no acute changes. CT head without contrast 11/12/20 unremarkable, CT angiogram with contrast 11/12/20 without acute findings. He was also seen by neurology Dr. Danis Neville. Headaches slowly resolved over time. - 03/21: On a follow-up visit, noted 30 pound weight loss. He was also found to have enlarged mass in left upper chest with diffusely palpable adenopathy in cervical area as well as bulky bilateral axillary adenopathy. Per patient, swelling developed over a 2-week, and was nontender. Per patient reports being told that his lymphoma diagnosis was seperate from his 3rd cranial nerve palsy.  - CT neck/chest 3/22/2021 showed extensive adenopathy including left pectoralis lymph node mass measuring 7 x 3.7 cm. Extensive bilateral axillary adenopathy measuring up to 6.8 x 11.4 cm. Extensive mediastinal adenopathy noted, including right infrahilar region mass measuring 4.6 x 5.3 cm. Enlarged right adrenal gland measuring 4.1 x 4 cm. Extensive adenopathy in the gurwinder hepatis measuring 5 x 4.4 cm. PET scan could not be performed due to insurance issues. Patient seen by hematology Dr. Alyson Zarate. Core needle biopsy of left chest wall mass showed high-grade B-cell malignancy with FISH testing revealing rearrangement of BCL6/3q. and additional signals for BCL2/18 q. Significantly no evidence of MYC rearrangement noted. Bone marrow biopsy without lymphoma involvement. 2D echo with normal EF. LDH elevated at 311. Hepatitis B negative. He was diagnosed with DLBCL, stage IIIb, IPI score 2.  - R-CHOP x6 (from 4/6/2021 till 7/28/2021.   PET scan 8/13/2021 with essentially CR and minimal FDG uptake in the right axillary lymph node with FDG 1.2.  -11/16/2021 CT CAP with disease relapse including new left supraclavicular mass measuring 5.5 x 3.4 cm. Stable axillary and chest wall lymph nodes as well as shotty retroperitoneal lymph nodes. Ultrasound-guided left supraclavicular lymph node biopsy 2021 showing monoclonal B-cell population with increased cell size, results consistent with mature B-cell lymphoproliferative disorder. Patient now referred to us for further work-up and management. On exam today, he is accompanied by his daughter. Family consists of 4 adult children, 3 daughters and 1 son. Denies b-symptoms or new palpable lumps. Reports headaches mostly resolved, continues to wear left sided eye patch. 2022: His hepatitis/HIV were negative. LDH was high at 255. He was seen by neurology in house Dr. Wynetta Apgar, and 3rd nerve palsy felt unlikely related to lymphoma. Brain MRI 2021 without evidence of intracranial systemic lymphoma. Contrasted CT CAP 2021 with slight interval growth in left supraclavicular soft tissue mass measuring 5.8 x 5 cm, likely area of recurrent lymphoma. Other lymph nodes felt stable to slightly smaller. CSF fluid analysis negative. 2D echo had shown normal EF at 60 to 65%, however moderate to severe mitral regurg noted, seen by cardiology, status post EDUAR with partially flail anterior mitral leaflet, cardiology recommendations to monitor for now with possible surgical intervention should he clinically worsen. He has since received R-ICE x2 which he has tolerated well. Today denies any B symptoms, fevers or chills. Now scheduled for repeat PET scan and follow-up with us in a week. Has since last visit now enrolled in [address] [place] [address], and hopefully should be able to proceed with HDT/ASCT after 3-4 cycles depending on results of PET scan. He will continue with prophylaxis with acyclovir, Diflucan and allopurinol. Navigation following, we will see him back in a week.     2022: Reports some runny nose with sore throat over the last few days.  Denies any fevers, otherwise feels well. Labs with mild leukocytosis, possibly G-CSF related. PET scan with significant resolution of prior known disease including left-sided axillary/subpectoral mass (masses decreased from 5.8 x 5 to 1.7 x 1.5 cm, minimal FDG uptake with SUV 1.8). Note made of developing lower lobe groundglass infiltrates possibly sequela of subclinical infection. Will check for COVID and flu. Prescribed Levaquin x5 days for coverage. Given clinically asymptomatic, hopefully still should be able to proceed with cycle 3 RICE next week. We will plan for HDT/ASCT thereafter. 3/9/2022: Reports doing well. Completed cycle 3 on 2/3/2022 along with IT MTX.  CSF flow negative. Recovered well. Good p.o. intake and mobility, denies any new lumps or bumps. Extensive ROS unremarkable today. PET scan with CR. Bone marrow biopsy negative. 2D echo with normal EF however significant mitral valve sclerosis with regurg noted. Patient has seen cardiology in the past.  Will get clearance prior to transplant. PFTs normal.  Infectious panel unremarkable. Will clarify hep B surface antibody status. Okay to proceed with mobilization and collection. This will be through a temporary central line given patient and support. Goal CD34 plus will be 5,000,000/kg. Mobilization will be with G-CSF plus minus Mozobil. He will be staying at a hotel nearby during mobilization and collection. We will see him back prior to transplant. Various risks, and side effects of high dose chemo w/ BEAM/ASCT discussed in detail including nausea, vomiting, diarrhea, life threatening infections, bleeding, anemia, end organ damage, sepsis, and potential death. Also discussed graft failure risk and mortality from procedure ( <5%). Delayed long term effects such as MDS, leukemia and secondary malignancies also discussed. Patient willing to proceed. 4/4/2022: Successfully collected CD34 plus at 4.82 mil/kg.   Reports doing well today. Extensive ROS unremarkable. Vital signs stable, labs unremarkable. He has seen cardiology and has been cleared by them to proceed with stem cell transplant. We will need to be cautious with volume status given mitral valve regurg with flail anterior leaflet. Covid and flu screen pending, if negative, will admit tomorrow to proceed with high-dose therapy with BEAM followed by SCT.    5/12/22: Patient seen for his recurrent DLBCL, recent stem cell transplant, and associated toxicity management. Today is Day +31 of his SCT. Reasonable p.o. intake and mobility. Denies any significant loose stools. Blood work with adequate counts, chemistries unremarkable. Notes some poor tolerance to acyclovir, will prescribe valacyclovir 500 twice daily. We will also add Bactrim next week as prophylaxis. Okay to administer Evusheld every 6 months. Navigation following. Continue weekly visits for now.    5/23/2022: Patient seen for DLBCL with recent high-dose chemotherapy, stem cell transplant, and toxicity management. Today is day +42. Reasonable p.o. intake and mobility. Denies new B symptoms, aches or pains. Continues with acyclovir (in place of valacyclovir and tolerating well). Also on Bactrim prophylaxis. Blood work with adequate counts, chemistries unremarkable, potassium and magnesium normal range. S/p Evusheld injection which she tolerated reasonably. We will see him back in 2 weeks. 6/29/2022: Patient seen for his history of relapsed DLBCL, high-dose therapy followed by Autologous stem cell transplant. Today is day +79. Reasonably. Denies any B symptoms, new lumps or bumps, aches or pains. Exam without adenopathy. Blood work with adequate counts, chemistries unremarkable. Remains on acyclovir and Bactrim prophylaxis. Scheduled for restaging PET scan along with D100 studies in a few weeks. Navigation following.    7/21/2022: Patient seen for his D100 evaluation.   Reports doing well, denies any B symptoms, new lumps or bumps. Blood work with adequate counts, PET scan with CR. PFTs unremarkable. 2D echo without changes, moderate mitral valve regurg noted as before. He will follow-up with cardiology as needed. Given remission status a day 100, patient congratulated. He will continue to follow from here on locally with Dr. Corrinne Quiet. We will see him back around day 200 with restaging PET scan. He will continue with acyclovir and Bactrim prophylaxis. Post transplant vaccinations will be started 1 year posttransplant. 10/20/2022: Patient seen for D 200 evaluation. He has an interim reestablished care with Dr. Corrinne Quiet at [facility] area [facility] center. Today reports doing well, denies any B symptoms, denies any new lumps or bumps, aches or pains. Stamina has much improved since last visit. Exam unremarkable, labs with adequate counts, chemistries unremarkable. Discussed flu and COVID vaccinations which can be started now. His post transplant vaccinations will be started at post transplant 1 year corenl (next visit). Okay to stop prophylactic Bactrim. PET scan with CR. Patient reassured. Continue following locally. We will see him back in 6 months with his 1 year post transplant evaluation, as well as repeat PET scan. 1. 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 Mitral valve regurge, mod-severe flail anterior leaflet. PLAN:  - As above. S/p R ICE x 3 and IT MTX x 4 w CR. S/p BEAM/ASCT, D+200. In CR.    - Electrolyte replacement as needed  - Prophylaxis: acyclovir 400 twice daily. Ok to stop bactirm  - Neurology has evaluated pt for 3rd nerve palsy, not felt to be lymphoma related  - Mitral valve flail ant leaflet: to be monitored per cardiology. Cleared by cardiology. To be monitored for volume overload. - Daniela [id number] 5/22. RTC in 6 months with labs, LDH, PET scan, 1 yr post transplant tests. I truly appreciate the kind referral from Dr Corrinne Quiet.  Please call w/ any quesions    Jazmín Parks MD  [address] [place] [address],[address]  Hematology Oncology  [address] [place] [address]  Office : [phone]  Fax : [phone]

## 2022-10-20 NOTE — PROGRESS NOTES
I contacted the [facility] and [facility] for verification of benefits for the patient. Per the [facility], patient's insurance was terminated due to the patient not providing income documentation for his wife.

## 2022-10-21 LAB
IGA SERPL-MCNC: 139 MG/DL (ref 90–386)
IGG SERPL-MCNC: 812 MG/DL (ref 603–1613)
IGM SERPL-MCNC: 56 MG/DL (ref 20–172)

## 2023-02-21 DIAGNOSIS — Z94.81 STATUS POST BONE MARROW TRANSPLANT (HCC): Primary | ICD-10-CM

## 2023-02-22 DIAGNOSIS — Z94.81 STATUS POST BONE MARROW TRANSPLANT (HCC): Primary | ICD-10-CM

## 2023-02-28 DIAGNOSIS — C83.31 DIFFUSE LARGE B-CELL LYMPHOMA OF LYMPH NODES OF NECK (HCC): Primary | ICD-10-CM

## 2023-03-14 RX ORDER — ATENOLOL 25 MG/1
TABLET ORAL
Qty: 90 TABLET | Refills: 3 | Status: SHIPPED | OUTPATIENT
Start: 2023-03-14

## 2023-03-14 NOTE — TELEPHONE ENCOUNTER
Requested Prescriptions     Pending Prescriptions Disp Refills    atenolol (TENORMIN) 25 MG tablet [Pharmacy Med Name: ATENOLOL 25 MG TABLET] 90 tablet 3     Sig: TAKE 1 TABLET BY MOUTH EVERY DAY

## 2023-03-29 DIAGNOSIS — Z94.81 STATUS POST BONE MARROW TRANSPLANT (HCC): Primary | ICD-10-CM

## 2023-04-17 ENCOUNTER — HOSPITAL ENCOUNTER (OUTPATIENT)
Dept: PET IMAGING | Age: 58
Discharge: HOME | End: 2023-04-20

## 2023-04-17 ENCOUNTER — NURSE ONLY (OUTPATIENT)
Dept: CARDIOLOGY CLINIC | Age: 58
End: 2023-04-17

## 2023-04-17 ENCOUNTER — HOSPITAL ENCOUNTER (OUTPATIENT)
Dept: PULMONOLOGY | Age: 58
Discharge: HOME | End: 2023-04-20

## 2023-04-17 DIAGNOSIS — Z94.81 STATUS POST BONE MARROW TRANSPLANT (HCC): ICD-10-CM

## 2023-04-17 DIAGNOSIS — I34.0 NONRHEUMATIC MITRAL VALVE REGURGITATION: Primary | ICD-10-CM

## 2023-04-17 LAB
GLUCOSE BLD STRIP.AUTO-MCNC: 100 MG/DL (ref 65–100)
SERVICE CMNT-IMP: NORMAL

## 2023-04-17 PROCEDURE — 78815 PET IMAGE W/CT SKULL-THIGH: CPT

## 2023-04-17 PROCEDURE — 6360000004 HC RX CONTRAST MEDICATION: Performed by: INTERNAL MEDICINE

## 2023-04-17 PROCEDURE — 82962 GLUCOSE BLOOD TEST: CPT

## 2023-04-17 PROCEDURE — 94726 PLETHYSMOGRAPHY LUNG VOLUMES: CPT

## 2023-04-17 PROCEDURE — 94729 DIFFUSING CAPACITY: CPT

## 2023-04-17 PROCEDURE — 3430000000 HC RX DIAGNOSTIC RADIOPHARMACEUTICAL: Performed by: INTERNAL MEDICINE

## 2023-04-17 PROCEDURE — 94010 BREATHING CAPACITY TEST: CPT

## 2023-04-17 PROCEDURE — 93000 ELECTROCARDIOGRAM COMPLETE: CPT | Performed by: INTERNAL MEDICINE

## 2023-04-17 PROCEDURE — A9552 F18 FDG: HCPCS | Performed by: INTERNAL MEDICINE

## 2023-04-17 PROCEDURE — 2580000003 HC RX 258: Performed by: INTERNAL MEDICINE

## 2023-04-17 RX ORDER — FLUDEOXYGLUCOSE F 18 200 MCI/ML
13.6 INJECTION, SOLUTION INTRAVENOUS
Status: COMPLETED | OUTPATIENT
Start: 2023-04-17 | End: 2023-04-17

## 2023-04-17 RX ORDER — SODIUM CHLORIDE 0.9 % (FLUSH) 0.9 %
10 SYRINGE (ML) INJECTION ONCE AS NEEDED
Status: COMPLETED | OUTPATIENT
Start: 2023-04-17 | End: 2023-04-17

## 2023-04-17 RX ADMIN — SODIUM CHLORIDE, PRESERVATIVE FREE 10 ML: 5 INJECTION INTRAVENOUS at 12:06

## 2023-04-17 RX ADMIN — DIATRIZOATE MEGLUMINE AND DIATRIZOATE SODIUM 10 ML: 660; 100 LIQUID ORAL; RECTAL at 12:06

## 2023-04-17 RX ADMIN — FLUDEOXYGLUCOSE F 18 13.6 MILLICURIE: 200 INJECTION, SOLUTION INTRAVENOUS at 12:06

## 2023-04-17 NOTE — PROGRESS NOTES
Pt came in for an EKG. Dr. Vane Garcia reviewed EKG states everything looks. Advised pt and given a copy of EKG.

## 2023-04-18 ENCOUNTER — OFFICE VISIT (OUTPATIENT)
Dept: CARDIOLOGY CLINIC | Age: 58
End: 2023-04-18

## 2023-04-18 VITALS
HEIGHT: 72 IN | WEIGHT: 194.8 LBS | DIASTOLIC BLOOD PRESSURE: 72 MMHG | HEART RATE: 66 BPM | SYSTOLIC BLOOD PRESSURE: 132 MMHG | BODY MASS INDEX: 26.38 KG/M2

## 2023-04-18 DIAGNOSIS — C83.30 DIFFUSE LARGE B-CELL LYMPHOMA, UNSPECIFIED BODY REGION (HCC): ICD-10-CM

## 2023-04-18 DIAGNOSIS — I34.0 NONRHEUMATIC MITRAL VALVE REGURGITATION: Primary | ICD-10-CM

## 2023-04-18 DIAGNOSIS — Q23.8 ABNORMALITY OF CHORDAE TENDINEAE OF MITRAL VALVE: ICD-10-CM

## 2023-04-18 DIAGNOSIS — I10 PRIMARY HYPERTENSION: ICD-10-CM

## 2023-04-18 PROCEDURE — 3075F SYST BP GE 130 - 139MM HG: CPT | Performed by: INTERNAL MEDICINE

## 2023-04-18 PROCEDURE — 3078F DIAST BP <80 MM HG: CPT | Performed by: INTERNAL MEDICINE

## 2023-04-18 PROCEDURE — 99214 OFFICE O/P EST MOD 30 MIN: CPT | Performed by: INTERNAL MEDICINE

## 2023-04-18 ASSESSMENT — ENCOUNTER SYMPTOMS
ABDOMINAL PAIN: 0
HOARSE VOICE: 0
HEMOPTYSIS: 0
EYE REDNESS: 0
DOUBLE VISION: 0
HEMATOCHEZIA: 0
STRIDOR: 0
HEMATEMESIS: 0
WHEEZING: 0

## 2023-04-18 NOTE — PROGRESS NOTES
[facility] CARDIOLOGY  [address] [facility], [address]  PHONE: [phone]          23    NAME:  Alka Pitts  : 1965  MRN: [id number]         SUBJECTIVE:   Alka Pitts is a 62 y.o. male seen for a visit regarding the following:     Chief Complaint   Patient presents with    6 Month Follow-Up    Hypertension           HPI:    Cardio problem list:   1. Mitral regurgitation- Severe-eccentric  -Echofrom 2023 with an EF-biplane at 56%, mild concentric LVH and reported mild mitral regurgitation   -DEUAR-  flail A2 segment of the anterior mitral leaflet was partially flail with a small mobile echodensity noted consistent with a torn chordal structure. Severe eccentric mitral regurgitation-posteriorly directed with pulmonary vein systolic  reversal.  EDUAR on 12/10/2021 showed an EF of 60 to 65% with no regional wall motion abnormalities. Left atrium was mildly dilated. -From 2022 showed an EF at 55 to 60% with no regional wall motion abnormalities, severe mitral regurgitation with abnormal anterior mitral valve leaflet-likely torn chordae   2. Hypertension   3. Diffuse large B-cell lymphoma      I saw Mr. Mike Hinton is a pleasant 31-year-old gentleman in cardiovascular  follow-up for severe mitral regurgitation with known underlying hypertension.     -When we last met with him, we made no significant changes with his medical therapy. His blood pressures were better controlled with a combination of atenolol and lisinopril. Mitral regurgitation:-Background- EDUAR from 2021 showed evidence of a significantly eccentric jet of mitral regurgitation with evidence of a mitral valve anterior leaflet torn chordae-leading to severe eccentric mitral regurgitation. He is fortunately not significantly symptomatic  from this.   He had a follow-up echo most recently on 2023 which appeared to be stable with preserved LV function, no significant evidence of pulmonary

## 2023-04-19 ENCOUNTER — HOSPITAL ENCOUNTER (OUTPATIENT)
Dept: CT IMAGING | Age: 58
Discharge: HOME | End: 2023-04-22

## 2023-04-19 VITALS
BODY MASS INDEX: 26.41 KG/M2 | RESPIRATION RATE: 16 BRPM | OXYGEN SATURATION: 99 % | HEIGHT: 72 IN | DIASTOLIC BLOOD PRESSURE: 79 MMHG | TEMPERATURE: 98.1 F | HEART RATE: 61 BPM | WEIGHT: 195 LBS | SYSTOLIC BLOOD PRESSURE: 141 MMHG

## 2023-04-19 DIAGNOSIS — C83.31 DIFFUSE LARGE B-CELL LYMPHOMA OF LYMPH NODES OF NECK (HCC): ICD-10-CM

## 2023-04-19 LAB
BASOPHILS # BLD: 0 K/UL (ref 0–0.2)
BASOPHILS NFR BLD: 1 % (ref 0–2)
BONE MARROW PREP & WRIGHT STAIN: NORMAL
DIFFERENTIAL METHOD BLD: ABNORMAL
EOSINOPHIL # BLD: 0.1 K/UL (ref 0–0.8)
EOSINOPHIL NFR BLD: 2 % (ref 0.5–7.8)
ERYTHROCYTE [DISTWIDTH] IN BLOOD BY AUTOMATED COUNT: 12.6 % (ref 11.9–14.6)
HCT VFR BLD AUTO: 32.9 % (ref 41.1–50.3)
HGB BLD-MCNC: 11.3 G/DL (ref 13.6–17.2)
IMM GRANULOCYTES # BLD AUTO: 0 K/UL (ref 0–0.5)
IMM GRANULOCYTES NFR BLD AUTO: 0 % (ref 0–5)
LYMPHOCYTES # BLD: 1.8 K/UL (ref 0.5–4.6)
LYMPHOCYTES NFR BLD: 32 % (ref 13–44)
MCH RBC QN AUTO: 30.2 PG (ref 26.1–32.9)
MCHC RBC AUTO-ENTMCNC: 34.3 G/DL (ref 31.4–35)
MCV RBC AUTO: 88 FL (ref 82–102)
MONOCYTES # BLD: 0.4 K/UL (ref 0.1–1.3)
MONOCYTES NFR BLD: 7 % (ref 4–12)
NEUTS SEG # BLD: 3.4 K/UL (ref 1.7–8.2)
NEUTS SEG NFR BLD: 58 % (ref 43–78)
NRBC # BLD: 0 K/UL (ref 0–0.2)
PLATELET # BLD AUTO: 160 K/UL (ref 150–450)
PMV BLD AUTO: 9.4 FL (ref 9.4–12.3)
RBC # BLD AUTO: 3.74 M/UL (ref 4.23–5.6)
WBC # BLD AUTO: 5.8 K/UL (ref 4.3–11.1)

## 2023-04-19 PROCEDURE — 88305 TISSUE EXAM BY PATHOLOGIST: CPT

## 2023-04-19 PROCEDURE — 88313 SPECIAL STAINS GROUP 2: CPT

## 2023-04-19 PROCEDURE — 38222 DX BONE MARROW BX & ASPIR: CPT

## 2023-04-19 PROCEDURE — 88311 DECALCIFY TISSUE: CPT

## 2023-04-19 PROCEDURE — 2580000003 HC RX 258: Performed by: PHYSICIAN ASSISTANT

## 2023-04-19 PROCEDURE — 6360000002 HC RX W HCPCS: Performed by: PHYSICIAN ASSISTANT

## 2023-04-19 PROCEDURE — 85025 COMPLETE CBC W/AUTO DIFF WBC: CPT

## 2023-04-19 PROCEDURE — 6360000002 HC RX W HCPCS: Performed by: RADIOLOGY

## 2023-04-19 PROCEDURE — 2500000003 HC RX 250 WO HCPCS: Performed by: PHYSICIAN ASSISTANT

## 2023-04-19 RX ORDER — MIDAZOLAM HYDROCHLORIDE 2 MG/2ML
INJECTION, SOLUTION INTRAMUSCULAR; INTRAVENOUS PRN
Status: COMPLETED | OUTPATIENT
Start: 2023-04-19 | End: 2023-04-19

## 2023-04-19 RX ORDER — DIPHENHYDRAMINE HYDROCHLORIDE 50 MG/ML
INJECTION INTRAMUSCULAR; INTRAVENOUS PRN
Status: COMPLETED | OUTPATIENT
Start: 2023-04-19 | End: 2023-04-19

## 2023-04-19 RX ORDER — LIDOCAINE HYDROCHLORIDE 20 MG/ML
INJECTION, SOLUTION INFILTRATION; PERINEURAL PRN
Status: COMPLETED | OUTPATIENT
Start: 2023-04-19 | End: 2023-04-19

## 2023-04-19 RX ORDER — FENTANYL CITRATE 50 UG/ML
INJECTION, SOLUTION INTRAMUSCULAR; INTRAVENOUS PRN
Status: COMPLETED | OUTPATIENT
Start: 2023-04-19 | End: 2023-04-19

## 2023-04-19 RX ORDER — SODIUM CHLORIDE 9 MG/ML
INJECTION, SOLUTION INTRAVENOUS CONTINUOUS PRN
Status: COMPLETED | OUTPATIENT
Start: 2023-04-19 | End: 2023-04-19

## 2023-04-19 RX ADMIN — FENTANYL CITRATE 50 MCG: 50 INJECTION, SOLUTION INTRAMUSCULAR; INTRAVENOUS at 13:27

## 2023-04-19 RX ADMIN — MIDAZOLAM HYDROCHLORIDE 1 MG: 1 INJECTION, SOLUTION INTRAMUSCULAR; INTRAVENOUS at 13:20

## 2023-04-19 RX ADMIN — MIDAZOLAM HYDROCHLORIDE 1 MG: 1 INJECTION, SOLUTION INTRAMUSCULAR; INTRAVENOUS at 13:27

## 2023-04-19 RX ADMIN — FENTANYL CITRATE 50 MCG: 50 INJECTION, SOLUTION INTRAMUSCULAR; INTRAVENOUS at 13:19

## 2023-04-19 RX ADMIN — SODIUM CHLORIDE 75 ML/HR: 9 INJECTION, SOLUTION INTRAVENOUS at 13:13

## 2023-04-19 RX ADMIN — DIPHENHYDRAMINE HYDROCHLORIDE 50 MG: 50 INJECTION, SOLUTION INTRAMUSCULAR; INTRAVENOUS at 13:13

## 2023-04-19 RX ADMIN — LIDOCAINE HYDROCHLORIDE 8 ML: 20 INJECTION, SOLUTION INFILTRATION; PERINEURAL at 13:28

## 2023-04-19 ASSESSMENT — PAIN - FUNCTIONAL ASSESSMENT: PAIN_FUNCTIONAL_ASSESSMENT: NONE - DENIES PAIN

## 2023-04-19 NOTE — OR NURSING
Recovery period without difficulty. Pt alert and oriented and denies pain. Dressing is clean, dry, and intact. Reviewed discharge instructions with patient who verbalized understanding. Pt escorted to lobby discharge area via wheelchair. Vital signs and Jackson score completed.

## 2023-04-19 NOTE — BRIEF OP NOTE
Department of Interventional Radiology  [phone]        Interventional Radiology Brief Procedure Note    Patient: Jane Philippe MRN: [id number]  SSN: [id number]    YOB: 1965  Age: 62 y.o. Sex: male      Date of Procedure: 4/19/2023    Pre-Procedure Diagnosis: lymphoma    Post-Procedure Diagnosis: SAME    Procedure(s): Image Guided Biopsy    Brief Description of Procedure: CT guided BMBX    Performed By: Prabhu Mccurdy PA-C     Assistants: None    Anesthesia:Moderate Sedation per MEAGHAN Kwong MD    Estimated Blood Loss: Less than 10ml    Specimens:   aspirate and core    Implants:  None    Findings: no post bx bleeding    Complications: None    Recommendations: routine wound care     Follow Up: prn    Signed By: Prabhu Mccurdy PA-C     April 19, 2023

## 2023-04-19 NOTE — H&P
History     Tobacco Use    Smoking status: Never    Smokeless tobacco: Never   Substance Use Topics    Alcohol use: Not Currently        Not in a hospital admission.     Objective:       Physical Examination:    Vitals:    04/19/23 1239   BP: (!) 159/75   Pulse: 66   Resp: 16   Temp: 98.1 °F (36.7 °C)   TempSrc: Oral   SpO2: 99%   Weight: 195 lb (88.5 kg)   Height: 6' (1.829 m)       Pain Assessment       denies                                              HEART: regular rate and rhythm  LUNG: clear to auscultation bilaterally  ABDOMEN: normal findings: soft, non-tender  EXTREMITIES: warm, no edema    Laboratory:     Lab Results   Component Value Date/Time     10/20/2022 09:54 AM     06/29/2022 10:34 AM    K 3.9 10/20/2022 09:54 AM    K 3.8 06/29/2022 10:34 AM     10/20/2022 09:54 AM     06/29/2022 10:34 AM    CO2 31 10/20/2022 09:54 AM    CO2 31 06/29/2022 10:34 AM    BUN 18 10/20/2022 09:54 AM    BUN 13 06/29/2022 10:34 AM    GFRAA >60 06/29/2022 10:34 AM    GFRAA >60 06/23/2022 10:05 AM    MG 2.5 10/20/2022 09:54 AM    MG 2.2 06/29/2022 10:34 AM    PHOS 2.7 04/22/2022 02:46 AM    PHOS 3.0 04/20/2022 03:42 AM    GLOB 3.3 10/20/2022 09:54 AM    GLOB 2.8 06/29/2022 10:34 AM    ALT 46 10/20/2022 09:54 AM    ALT 25 06/29/2022 10:34 AM     Lab Results   Component Value Date/Time    WBC 9.5 10/20/2022 09:54 AM    WBC 4.5 07/21/2022 08:42 AM    WBC 4.3 07/21/2022 08:42 AM    HGB 13.7 10/20/2022 09:54 AM    HGB 12.3 07/21/2022 08:42 AM    HGB 12.6 07/21/2022 08:42 AM    HCT 41.0 10/20/2022 09:54 AM    HCT 35.8 07/21/2022 08:42 AM    HCT 37.5 07/21/2022 08:42 AM     10/20/2022 09:54 AM     07/21/2022 08:42 AM     07/21/2022 08:42 AM     Lab Results   Component Value Date/Time    APTT 31.8 03/13/2022 09:46 AM    INR 1.3 03/13/2022 09:46 AM       Assessment:     lymphoma    [unfilled]    Plan:     Planned Procedure:  BMBX    Risks, benefits, and alternatives reviewed with patient and

## 2023-04-19 NOTE — PRE SEDATION
notes).     Mallampati Airway Assessment:  normal, normal neck range of motion, Mallampati Class I - (soft palate, fauces, uvula & anterior/posterior tonsillar pillars are visible), poor dentition    Prior History of Anesthesia Complications:   none    ASA Classification:  Class 2 - A normal healthy patient with mild systemic disease    Sedation/ Anesthesia Plan:   intravenous sedation    Medications Planned:   midazolam (Versed) intravenously and fentanyl intravenously    Patient is an appropriate candidate for plan of sedation: yes    Electronically signed by Jaden Wynn PA-C on 4/19/2023 at 1:05 PM

## 2023-04-19 NOTE — DISCHARGE INSTRUCTIONS
If you have any questions about your procedure, please call the Interventional Radiology department at [phone]. After business hours (5pm) and weekends, call the answering service at [phone] and ask for the Radiologist on call to be paged. Si tiene Preguntas acerca del procedimiento, por favor llame al departamento de Radiología Intervencional al [phone]. Después de horas de oficina (5 pm) y los fines de [place], llamar al Bekah Glover al [phone] y pregunte por el Radiologo de [facility].

## 2023-04-24 DIAGNOSIS — C83.31 DIFFUSE LARGE B-CELL LYMPHOMA OF LYMPH NODES OF NECK (HCC): Primary | ICD-10-CM

## 2023-04-27 ENCOUNTER — HOSPITAL ENCOUNTER (OUTPATIENT)
Dept: LAB | Age: 58
Discharge: HOME | End: 2023-04-27

## 2023-04-27 ENCOUNTER — OFFICE VISIT (OUTPATIENT)
Dept: ONCOLOGY | Age: 58
End: 2023-04-27

## 2023-04-27 ENCOUNTER — HOSPITAL ENCOUNTER (OUTPATIENT)
Dept: INFUSION THERAPY | Age: 58
Discharge: HOME | End: 2023-04-27

## 2023-04-27 VITALS
DIASTOLIC BLOOD PRESSURE: 67 MMHG | HEART RATE: 64 BPM | OXYGEN SATURATION: 94 % | BODY MASS INDEX: 26.68 KG/M2 | WEIGHT: 197 LBS | TEMPERATURE: 98.6 F | RESPIRATION RATE: 16 BRPM | HEIGHT: 72 IN | SYSTOLIC BLOOD PRESSURE: 146 MMHG

## 2023-04-27 DIAGNOSIS — C83.31 DIFFUSE LARGE B-CELL LYMPHOMA OF LYMPH NODES OF NECK (HCC): Primary | ICD-10-CM

## 2023-04-27 DIAGNOSIS — D84.9 IMMUNOCOMPROMISED (HCC): ICD-10-CM

## 2023-04-27 DIAGNOSIS — C83.30 DIFFUSE LARGE B-CELL LYMPHOMA, UNSPECIFIED BODY REGION (HCC): Primary | ICD-10-CM

## 2023-04-27 DIAGNOSIS — Z94.81 STATUS POST BONE MARROW TRANSPLANT (HCC): ICD-10-CM

## 2023-04-27 DIAGNOSIS — C83.31 DIFFUSE LARGE B-CELL LYMPHOMA OF LYMPH NODES OF NECK (HCC): ICD-10-CM

## 2023-04-27 DIAGNOSIS — Z94.84 STEM CELLS TRANSPLANT STATUS (HCC): ICD-10-CM

## 2023-04-27 LAB
ALBUMIN SERPL-MCNC: 3.7 G/DL (ref 3.5–5)
ALBUMIN/GLOB SERPL: 1.1 (ref 0.4–1.6)
ALP SERPL-CCNC: 146 U/L (ref 50–136)
ALT SERPL-CCNC: 51 U/L (ref 12–65)
ANION GAP SERPL CALC-SCNC: 4 MMOL/L (ref 2–11)
AST SERPL-CCNC: 28 U/L (ref 15–37)
BASOPHILS # BLD: 0 K/UL (ref 0–0.2)
BASOPHILS NFR BLD: 1 % (ref 0–2)
BILIRUB SERPL-MCNC: 0.3 MG/DL (ref 0.2–1.1)
BUN SERPL-MCNC: 17 MG/DL (ref 6–23)
CALCIUM SERPL-MCNC: 9.2 MG/DL (ref 8.3–10.4)
CHLORIDE SERPL-SCNC: 108 MMOL/L (ref 101–110)
CHOLEST SERPL-MCNC: 161 MG/DL
CO2 SERPL-SCNC: 30 MMOL/L (ref 21–32)
CREAT SERPL-MCNC: 0.9 MG/DL (ref 0.8–1.5)
DIFFERENTIAL METHOD BLD: ABNORMAL
EOSINOPHIL # BLD: 0.1 K/UL (ref 0–0.8)
EOSINOPHIL NFR BLD: 2 % (ref 0.5–7.8)
ERYTHROCYTE [DISTWIDTH] IN BLOOD BY AUTOMATED COUNT: 13 % (ref 11.9–14.6)
FLOW CYTOMETRY RESULTS: NORMAL
GLOBULIN SER CALC-MCNC: 3.3 G/DL (ref 2.8–4.5)
GLUCOSE SERPL-MCNC: 109 MG/DL (ref 65–100)
HCT VFR BLD AUTO: 36.2 % (ref 41.1–50.3)
HDLC SERPL-MCNC: 42 MG/DL (ref 40–60)
HDLC SERPL: 3.8
HGB BLD-MCNC: 11.9 G/DL (ref 13.6–17.2)
IMM GRANULOCYTES # BLD AUTO: 0 K/UL (ref 0–0.5)
IMM GRANULOCYTES NFR BLD AUTO: 0 % (ref 0–5)
LDLC SERPL CALC-MCNC: 77.6 MG/DL
LYMPHOCYTES # BLD: 1.7 K/UL (ref 0.5–4.6)
LYMPHOCYTES NFR BLD: 32 % (ref 13–44)
MAGNESIUM SERPL-MCNC: 2.6 MG/DL (ref 1.8–2.4)
MCH RBC QN AUTO: 29.8 PG (ref 26.1–32.9)
MCHC RBC AUTO-ENTMCNC: 32.9 G/DL (ref 31.4–35)
MCV RBC AUTO: 90.5 FL (ref 82–102)
MONOCYTES # BLD: 0.5 K/UL (ref 0.1–1.3)
MONOCYTES NFR BLD: 10 % (ref 4–12)
NEUTS SEG # BLD: 2.9 K/UL (ref 1.7–8.2)
NEUTS SEG NFR BLD: 55 % (ref 43–78)
NRBC # BLD: 0 K/UL (ref 0–0.2)
PLATELET # BLD AUTO: 182 K/UL (ref 150–450)
PMV BLD AUTO: 9.1 FL (ref 9.4–12.3)
POTASSIUM SERPL-SCNC: 4.6 MMOL/L (ref 3.5–5.1)
PROT SERPL-MCNC: 7 G/DL (ref 6.3–8.2)
RBC # BLD AUTO: 4 M/UL (ref 4.23–5.6)
SODIUM SERPL-SCNC: 142 MMOL/L (ref 133–143)
SPECIMEN SOURCE: NORMAL
T4 FREE SERPL-MCNC: 1 NG/DL (ref 0.78–1.4)
TEST ORDERED: NORMAL
TRIGL SERPL-MCNC: 207 MG/DL (ref 35–150)
TSH, 3RD GENERATION: 1.06 UIU/ML (ref 0.36–3)
VLDLC SERPL CALC-MCNC: 41.4 MG/DL (ref 6–23)
WBC # BLD AUTO: 5.3 K/UL (ref 4.3–11.1)

## 2023-04-27 PROCEDURE — 90472 IMMUNIZATION ADMIN EACH ADD: CPT

## 2023-04-27 PROCEDURE — 90700 DTAP VACCINE < 7 YRS IM: CPT | Performed by: INTERNAL MEDICINE

## 2023-04-27 PROCEDURE — 36415 COLL VENOUS BLD VENIPUNCTURE: CPT

## 2023-04-27 PROCEDURE — G0009 ADMIN PNEUMOCOCCAL VACCINE: HCPCS | Performed by: INTERNAL MEDICINE

## 2023-04-27 PROCEDURE — 90740 HEPB VACC 3 DOSE IMMUNSUP IM: CPT | Performed by: INTERNAL MEDICINE

## 2023-04-27 PROCEDURE — 3078F DIAST BP <80 MM HG: CPT | Performed by: INTERNAL MEDICINE

## 2023-04-27 PROCEDURE — 84439 ASSAY OF FREE THYROXINE: CPT

## 2023-04-27 PROCEDURE — 90648 HIB PRP-T VACCINE 4 DOSE IM: CPT | Performed by: INTERNAL MEDICINE

## 2023-04-27 PROCEDURE — 90734 MENACWYD/MENACWYCRM VACC IM: CPT | Performed by: INTERNAL MEDICINE

## 2023-04-27 PROCEDURE — 90713 POLIOVIRUS IPV SC/IM: CPT | Performed by: INTERNAL MEDICINE

## 2023-04-27 PROCEDURE — 84481 FREE ASSAY (FT-3): CPT

## 2023-04-27 PROCEDURE — 84403 ASSAY OF TOTAL TESTOSTERONE: CPT

## 2023-04-27 PROCEDURE — 86360 T CELL ABSOLUTE COUNT/RATIO: CPT

## 2023-04-27 PROCEDURE — 82784 ASSAY IGA/IGD/IGG/IGM EACH: CPT

## 2023-04-27 PROCEDURE — 84443 ASSAY THYROID STIM HORMONE: CPT

## 2023-04-27 PROCEDURE — 80061 LIPID PANEL: CPT

## 2023-04-27 PROCEDURE — 84402 ASSAY OF FREE TESTOSTERONE: CPT

## 2023-04-27 PROCEDURE — 3077F SYST BP >= 140 MM HG: CPT | Performed by: INTERNAL MEDICINE

## 2023-04-27 PROCEDURE — 83735 ASSAY OF MAGNESIUM: CPT

## 2023-04-27 PROCEDURE — 80053 COMPREHEN METABOLIC PANEL: CPT

## 2023-04-27 PROCEDURE — 85025 COMPLETE CBC W/AUTO DIFF WBC: CPT

## 2023-04-27 PROCEDURE — 90471 IMMUNIZATION ADMIN: CPT | Performed by: INTERNAL MEDICINE

## 2023-04-27 PROCEDURE — 90472 IMMUNIZATION ADMIN EACH ADD: CPT | Performed by: INTERNAL MEDICINE

## 2023-04-27 PROCEDURE — 99214 OFFICE O/P EST MOD 30 MIN: CPT | Performed by: INTERNAL MEDICINE

## 2023-04-27 PROCEDURE — G0010 ADMIN HEPATITIS B VACCINE: HCPCS | Performed by: INTERNAL MEDICINE

## 2023-04-27 PROCEDURE — 6360000002 HC RX W HCPCS: Performed by: INTERNAL MEDICINE

## 2023-04-27 PROCEDURE — 90670 PCV13 VACCINE IM: CPT | Performed by: INTERNAL MEDICINE

## 2023-04-27 RX ADMIN — HAEMOPHILUS B POLYSACCHARIDE CONJUGATE VACCINE FOR INJ 0.5 ML: RECON SOLN at 12:24

## 2023-04-27 RX ADMIN — POLIOVIRUS TYPE 1 ANTIGEN (FORMALDEHYDE INACTIVATED), POLIOVIRUS TYPE 2 ANTIGEN (FORMALDEHYDE INACTIVATED), AND POLIOVIRUS TYPE 3 ANTIGEN (FORMALDEHYDE INACTIVATED) 0.5 ML: 40; 8; 32 INJECTION, SUSPENSION INTRAMUSCULAR at 12:21

## 2023-04-27 RX ADMIN — PNEUMOCOCCAL 13-VALENT CONJUGATE VACCINE 0.5 ML: 2.2; 2.2; 2.2; 2.2; 2.2; 4.4; 2.2; 2.2; 2.2; 2.2; 2.2; 2.2; 2.2 INJECTION, SUSPENSION INTRAMUSCULAR at 12:23

## 2023-04-27 RX ADMIN — DIPHTHERIA AND TETANUS TOXOIDS AND ACELLULAR PERTUSSIS VACCINE ADSORBED 0.5 ML: 10; 25; 25; 25; 8 SUSPENSION INTRAMUSCULAR at 12:22

## 2023-04-27 RX ADMIN — HEPATITIS B VACCINE (RECOMBINANT) 1 ML: 20 INJECTION, SUSPENSION INTRAMUSCULAR at 12:26

## 2023-04-27 RX ADMIN — MENINGOCOCCAL (GROUPS A, C, Y AND W-135) OLIGOSACCHARIDE DIPHTHERIA CRM197 CONJUGATE VACCINE 0.5 ML: KIT at 12:20

## 2023-04-27 NOTE — PROGRESS NOTES
vaccinations will be started 1 year posttransplant. 10/20/2022: Patient seen for D 200 evaluation. He has an interim reestablished care with Dr. Zeb Darnell at [facility]. Today reports doing well, denies any B symptoms, denies any new lumps or bumps, aches or pains. Stamina has much improved since last visit. Exam unremarkable, labs with adequate counts, chemistries unremarkable. Discussed flu and COVID vaccinations which can be started now. His post transplant vaccinations will be started at post transplant 1 year cornel (next visit). Okay to stop prophylactic Bactrim. PET scan with CR. Patient reassured. Continue following locally. We will see him back in 6 months with his 1 year post transplant evaluation, as well as repeat PET scan.    4/27/2023: Patient seen for his 1 year post transplant evaluation. Regularly follows with Dr. Zeb Darnell at [facility]. Reports doing well, denies any new lumps or bumps, aches or pains or B symptoms including night sweats. Blood work pending from today. PET scan 2/21/2023 with multiple small bilateral hypermetabolic cervical lymph nodes, described as new. Otherwise unremarkable study. Bone marrow biopsy without evidence of lymphoma. TFTs normal.  2D echo with normal EF, mild mitral valve regurg. On exam no palpable adenopathy. Patient does admit to URI prior to PET scan. We will simply repeat scans prior to next visit in 6 weeks time. Patient will start his posttransplant 1 year vaccinations. 1. 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 Mitral valve regurge, mod-severe flail anterior leaflet. PLAN:  - As above. S/p R ICE x 3 and IT MTX x 4 w CR. S/p BEAM/ASCT. In CR.    - Electrolyte replacement as needed  - Prophylaxis: acyclovir 400 twice daily. Ok to stop bactirm  - Neurology has evaluated pt for 3rd nerve palsy, not felt to be lymphoma related  - Mitral valve flail ant leaflet: to be monitored per cardiology.

## 2023-04-27 NOTE — PROGRESS NOTES
Arrived to the [facility]. Routine Vaccines completed. Provided education on Routine Vaccines    Patient instructed to report any side affects to ordering provider. Patient tolerated well. Any issues or concerns during appointment: none. Patient aware of next infusion appointment on 6/21/23 (date) at 36 (time). Discharged ambulatory.

## 2023-04-27 NOTE — PATIENT INSTRUCTIONS
Patient Instructions from Today's Visit    Reason for Visit:  Follow up visit  1yr review    Plan:  - We want to get a CT scan in a couple of weeks since we did see some very small lymph nodes lighting up but it could be just from some inflammation  - bone marrow biopsy was clear, no lymphoma   - Today you will get your 1yr immunizations  - you are OK to get a covid vaccine   - then we will see you again in 2 months     Follow Up:  As scheduled    Recent Lab Results:  Hospital Outpatient Visit on 04/27/2023   Component Date Value Ref Range Status    WBC 04/27/2023 5.3  4.3 - 11.1 K/uL Final    RBC 04/27/2023 4.00 (L)  4.23 - 5.6 M/uL Final    Hemoglobin 04/27/2023 11.9 (L)  13.6 - 17.2 g/dL Final    Hematocrit 04/27/2023 36.2 (L)  41.1 - 50.3 % Final    MCV 04/27/2023 90.5  82.0 - 102.0 FL Final    MCH 04/27/2023 29.8  26.1 - 32.9 PG Final    MCHC 04/27/2023 32.9  31.4 - 35.0 g/dL Final    RDW 04/27/2023 13.0  11.9 - 14.6 % Final    Platelets 91/86/2927 182  150 - 450 K/uL Final    MPV 04/27/2023 9.1 (L)  9.4 - 12.3 FL Final    nRBC 04/27/2023 0.00  0.0 - 0.2 K/uL Final    **Note: Absolute NRBC parameter is now reported with Hemogram**    Seg Neutrophils 04/27/2023 55  43 - 78 % Final    Lymphocytes 04/27/2023 32  13 - 44 % Final    Monocytes 04/27/2023 10  4.0 - 12.0 % Final    Eosinophils % 04/27/2023 2  0.5 - 7.8 % Final    Basophils 04/27/2023 1  0.0 - 2.0 % Final    Immature Granulocytes 04/27/2023 0  0.0 - 5.0 % Final    Segs Absolute 04/27/2023 2.9  1.7 - 8.2 K/UL Final    Absolute Lymph # 04/27/2023 1.7  0.5 - 4.6 K/UL Final    Absolute Mono # 04/27/2023 0.5  0.1 - 1.3 K/UL Final    Absolute Eos # 04/27/2023 0.1  0.0 - 0.8 K/UL Final    Basophils Absolute 04/27/2023 0.0  0.0 - 0.2 K/UL Final    Absolute Immature Granulocyte 04/27/2023 0.0  0.0 - 0.5 K/UL Final    Differential Type 04/27/2023 AUTOMATED    Final     Treatment Summary has been discussed and given to patient:

## 2023-04-28 LAB — T3FREE SERPL-MCNC: 3.2 PG/ML (ref 2–4.4)

## 2023-04-29 LAB
BASOPHILS # BLD AUTO: 0 X10E3/UL (ref 0–0.2)
BASOPHILS NFR BLD AUTO: 1 %
CD3+CD4+ CELLS # BLD: 551 /UL (ref 359–1519)
CD3+CD4+ CELLS NFR BLD: 32.4 % (ref 30.8–58.5)
CD3+CD4+ CELLS/CD3+CD8+ CLL BLD: 0.85 (ref 0.92–3.72)
CD3+CD8+ CELLS # BLD: 644 /UL (ref 109–897)
CD3+CD8+ CELLS NFR BLD: 37.9 % (ref 12–35.5)
EOSINOPHIL # BLD AUTO: 0.1 X10E3/UL (ref 0–0.4)
EOSINOPHIL NFR BLD AUTO: 2 %
ERYTHROCYTE [DISTWIDTH] IN BLOOD BY AUTOMATED COUNT: 13.1 % (ref 11.6–15.4)
HCT VFR BLD AUTO: 35.6 % (ref 37.5–51)
HGB BLD-MCNC: 12.2 G/DL (ref 13–17.7)
IGA SERPL-MCNC: 181 MG/DL (ref 90–386)
IGG SERPL-MCNC: 956 MG/DL (ref 603–1613)
IGM SERPL-MCNC: 132 MG/DL (ref 20–172)
IMM GRANULOCYTES # BLD: 0 X10E3/UL (ref 0–0.1)
IMM GRANULOCYTES NFR BLD: 0 %
LYMPHOCYTES # BLD AUTO: 1.7 X10E3/UL (ref 0.7–3.1)
LYMPHOCYTES NFR BLD AUTO: 33 %
MCH RBC QN AUTO: 30.4 PG (ref 26.6–33)
MCHC RBC AUTO-ENTMCNC: 34.3 G/DL (ref 31.5–35.7)
MCV RBC AUTO: 89 FL (ref 79–97)
MONOCYTES # BLD AUTO: 0.5 X10E3/UL (ref 0.1–0.9)
MONOCYTES NFR BLD AUTO: 9 %
MORPHOLOGY BLD-IMP: ABNORMAL
NEUTROPHILS # BLD AUTO: 2.8 X10E3/UL (ref 1.4–7)
NEUTROPHILS NFR BLD AUTO: 55 %
PLATELET # BLD AUTO: 194 X10E3/UL (ref 150–450)
RBC # BLD AUTO: 4.01 X10E6/UL (ref 4.14–5.8)
TESTOST FREE SERPL-MCNC: 3.3 PG/ML (ref 7.2–24)
TESTOST SERPL-MCNC: 332 NG/DL (ref 264–916)
WBC # BLD AUTO: 5.1 X10E3/UL (ref 3.4–10.8)

## 2023-06-15 ENCOUNTER — HOSPITAL ENCOUNTER (OUTPATIENT)
Dept: CT IMAGING | Age: 58
Discharge: HOME | End: 2023-06-18

## 2023-06-15 DIAGNOSIS — C83.31 DIFFUSE LARGE B-CELL LYMPHOMA OF LYMPH NODES OF NECK (HCC): ICD-10-CM

## 2023-06-15 LAB — CREAT BLD-MCNC: 1.04 MG/DL (ref 0.8–1.5)

## 2023-06-15 PROCEDURE — 74177 CT ABD & PELVIS W/CONTRAST: CPT

## 2023-06-15 PROCEDURE — 6360000004 HC RX CONTRAST MEDICATION: Performed by: INTERNAL MEDICINE

## 2023-06-15 PROCEDURE — 82565 ASSAY OF CREATININE: CPT

## 2023-06-15 PROCEDURE — 2580000003 HC RX 258: Performed by: INTERNAL MEDICINE

## 2023-06-15 RX ORDER — 0.9 % SODIUM CHLORIDE 0.9 %
100 INTRAVENOUS SOLUTION INTRAVENOUS
Status: COMPLETED | OUTPATIENT
Start: 2023-06-15 | End: 2023-06-15

## 2023-06-15 RX ORDER — SODIUM CHLORIDE 0.9 % (FLUSH) 0.9 %
10 SYRINGE (ML) INJECTION
Status: COMPLETED | OUTPATIENT
Start: 2023-06-15 | End: 2023-06-15

## 2023-06-15 RX ADMIN — SODIUM CHLORIDE 100 ML: 9 INJECTION, SOLUTION INTRAVENOUS at 11:09

## 2023-06-15 RX ADMIN — IOPAMIDOL 100 ML: 755 INJECTION, SOLUTION INTRAVENOUS at 11:09

## 2023-06-15 RX ADMIN — SODIUM CHLORIDE, PRESERVATIVE FREE 10 ML: 5 INJECTION INTRAVENOUS at 11:09

## 2023-06-15 RX ADMIN — DIATRIZOATE MEGLUMINE AND DIATRIZOATE SODIUM 15 ML: 660; 100 LIQUID ORAL; RECTAL at 11:08

## 2023-06-20 DIAGNOSIS — C83.31 DIFFUSE LARGE B-CELL LYMPHOMA OF LYMPH NODES OF NECK (HCC): Primary | ICD-10-CM

## 2023-06-21 ENCOUNTER — HOSPITAL ENCOUNTER (OUTPATIENT)
Dept: LAB | Age: 58
Discharge: HOME | End: 2023-06-24

## 2023-06-21 ENCOUNTER — HOSPITAL ENCOUNTER (OUTPATIENT)
Dept: INFUSION THERAPY | Age: 58
Discharge: HOME | End: 2023-06-21

## 2023-06-21 VITALS
DIASTOLIC BLOOD PRESSURE: 64 MMHG | TEMPERATURE: 98.1 F | SYSTOLIC BLOOD PRESSURE: 124 MMHG | RESPIRATION RATE: 16 BRPM | HEART RATE: 58 BPM | OXYGEN SATURATION: 99 %

## 2023-06-21 DIAGNOSIS — C83.30 DIFFUSE LARGE B-CELL LYMPHOMA, UNSPECIFIED BODY REGION (HCC): Primary | ICD-10-CM

## 2023-06-21 DIAGNOSIS — C83.31 DIFFUSE LARGE B-CELL LYMPHOMA OF LYMPH NODES OF NECK (HCC): ICD-10-CM

## 2023-06-21 LAB
ALBUMIN SERPL-MCNC: 3.9 G/DL (ref 3.5–5)
ALBUMIN/GLOB SERPL: 1.1 (ref 0.4–1.6)
ALP SERPL-CCNC: 138 U/L (ref 50–136)
ALT SERPL-CCNC: 39 U/L (ref 12–65)
ANION GAP SERPL CALC-SCNC: 2 MMOL/L (ref 2–11)
AST SERPL-CCNC: 26 U/L (ref 15–37)
BASOPHILS # BLD: 0 K/UL (ref 0–0.2)
BASOPHILS NFR BLD: 1 % (ref 0–2)
BILIRUB SERPL-MCNC: 0.3 MG/DL (ref 0.2–1.1)
BUN SERPL-MCNC: 17 MG/DL (ref 6–23)
CALCIUM SERPL-MCNC: 9.5 MG/DL (ref 8.3–10.4)
CHLORIDE SERPL-SCNC: 106 MMOL/L (ref 101–110)
CO2 SERPL-SCNC: 29 MMOL/L (ref 21–32)
CREAT SERPL-MCNC: 1 MG/DL (ref 0.8–1.5)
DIFFERENTIAL METHOD BLD: ABNORMAL
EOSINOPHIL # BLD: 0.1 K/UL (ref 0–0.8)
EOSINOPHIL NFR BLD: 2 % (ref 0.5–7.8)
ERYTHROCYTE [DISTWIDTH] IN BLOOD BY AUTOMATED COUNT: 12.8 % (ref 11.9–14.6)
GLOBULIN SER CALC-MCNC: 3.5 G/DL (ref 2.8–4.5)
GLUCOSE SERPL-MCNC: 130 MG/DL (ref 65–100)
HCT VFR BLD AUTO: 38.4 % (ref 41.1–50.3)
HGB BLD-MCNC: 12.7 G/DL (ref 13.6–17.2)
IMM GRANULOCYTES # BLD AUTO: 0 K/UL (ref 0–0.5)
IMM GRANULOCYTES NFR BLD AUTO: 0 % (ref 0–5)
LYMPHOCYTES # BLD: 2.3 K/UL (ref 0.5–4.6)
LYMPHOCYTES NFR BLD: 39 % (ref 13–44)
MAGNESIUM SERPL-MCNC: 2.5 MG/DL (ref 1.8–2.4)
MCH RBC QN AUTO: 29.8 PG (ref 26.1–32.9)
MCHC RBC AUTO-ENTMCNC: 33.1 G/DL (ref 31.4–35)
MCV RBC AUTO: 90.1 FL (ref 82–102)
MONOCYTES # BLD: 0.5 K/UL (ref 0.1–1.3)
MONOCYTES NFR BLD: 9 % (ref 4–12)
NEUTS SEG # BLD: 2.9 K/UL (ref 1.7–8.2)
NEUTS SEG NFR BLD: 49 % (ref 43–78)
NRBC # BLD: 0 K/UL (ref 0–0.2)
PLATELET # BLD AUTO: 187 K/UL (ref 150–450)
PMV BLD AUTO: 8.9 FL (ref 9.4–12.3)
POTASSIUM SERPL-SCNC: 4.2 MMOL/L (ref 3.5–5.1)
PROT SERPL-MCNC: 7.4 G/DL (ref 6.3–8.2)
RBC # BLD AUTO: 4.26 M/UL (ref 4.23–5.6)
SODIUM SERPL-SCNC: 137 MMOL/L (ref 133–143)
WBC # BLD AUTO: 5.9 K/UL (ref 4.3–11.1)

## 2023-06-21 PROCEDURE — 90734 MENACWYD/MENACWYCRM VACC IM: CPT | Performed by: INTERNAL MEDICINE

## 2023-06-21 PROCEDURE — 36415 COLL VENOUS BLD VENIPUNCTURE: CPT

## 2023-06-21 PROCEDURE — 90648 HIB PRP-T VACCINE 4 DOSE IM: CPT | Performed by: INTERNAL MEDICINE

## 2023-06-21 PROCEDURE — 90472 IMMUNIZATION ADMIN EACH ADD: CPT | Performed by: INTERNAL MEDICINE

## 2023-06-21 PROCEDURE — 83735 ASSAY OF MAGNESIUM: CPT

## 2023-06-21 PROCEDURE — 90670 PCV13 VACCINE IM: CPT | Performed by: INTERNAL MEDICINE

## 2023-06-21 PROCEDURE — G0010 ADMIN HEPATITIS B VACCINE: HCPCS | Performed by: INTERNAL MEDICINE

## 2023-06-21 PROCEDURE — 80053 COMPREHEN METABOLIC PANEL: CPT

## 2023-06-21 PROCEDURE — 85025 COMPLETE CBC W/AUTO DIFF WBC: CPT

## 2023-06-21 PROCEDURE — 6360000002 HC RX W HCPCS: Performed by: INTERNAL MEDICINE

## 2023-06-21 PROCEDURE — 90472 IMMUNIZATION ADMIN EACH ADD: CPT

## 2023-06-21 PROCEDURE — 90740 HEPB VACC 3 DOSE IMMUNSUP IM: CPT | Performed by: INTERNAL MEDICINE

## 2023-06-21 PROCEDURE — G0009 ADMIN PNEUMOCOCCAL VACCINE: HCPCS | Performed by: INTERNAL MEDICINE

## 2023-06-21 PROCEDURE — 90700 DTAP VACCINE < 7 YRS IM: CPT | Performed by: INTERNAL MEDICINE

## 2023-06-21 PROCEDURE — 90471 IMMUNIZATION ADMIN: CPT | Performed by: INTERNAL MEDICINE

## 2023-06-21 PROCEDURE — 90713 POLIOVIRUS IPV SC/IM: CPT | Performed by: INTERNAL MEDICINE

## 2023-06-21 RX ADMIN — MENINGOCOCCAL (GROUPS A, C, Y AND W-135) OLIGOSACCHARIDE DIPHTHERIA CRM197 CONJUGATE VACCINE 0.5 ML: KIT at 14:22

## 2023-06-21 RX ADMIN — DIPHTHERIA AND TETANUS TOXOIDS AND ACELLULAR PERTUSSIS VACCINE ADSORBED 0.5 ML: 10; 25; 25; 25; 8 SUSPENSION INTRAMUSCULAR at 14:22

## 2023-06-21 RX ADMIN — HAEMOPHILUS B POLYSACCHARIDE CONJUGATE VACCINE FOR INJ 0.5 ML: RECON SOLN at 14:20

## 2023-06-21 RX ADMIN — PNEUMOCOCCAL 13-VALENT CONJUGATE VACCINE 0.5 ML: 2.2; 2.2; 2.2; 2.2; 2.2; 4.4; 2.2; 2.2; 2.2; 2.2; 2.2; 2.2; 2.2 INJECTION, SUSPENSION INTRAMUSCULAR at 14:22

## 2023-06-21 RX ADMIN — HEPATITIS B VACCINE (RECOMBINANT) 1 ML: 20 INJECTION, SUSPENSION INTRAMUSCULAR at 14:20

## 2023-06-21 RX ADMIN — POLIOVIRUS TYPE 1 ANTIGEN (FORMALDEHYDE INACTIVATED), POLIOVIRUS TYPE 2 ANTIGEN (FORMALDEHYDE INACTIVATED), AND POLIOVIRUS TYPE 3 ANTIGEN (FORMALDEHYDE INACTIVATED) 0.5 ML: 40; 8; 32 INJECTION, SUSPENSION INTRAMUSCULAR at 14:20

## 2023-06-21 NOTE — PROGRESS NOTES
Arrived to the [facility]. 14-month vaccines completed. Patient declined printed info. Patient tolerated well. Any issues or concerns during appointment: no  Patient instructed to call provider with temperature of 100.4 or greater or nausea/vomiting/ diarrhea or pain not controlled by medications  Discharged ambulatory.

## 2023-07-05 ENCOUNTER — HOSPITAL ENCOUNTER (OUTPATIENT)
Dept: CT IMAGING | Age: 58
Discharge: HOME | End: 2023-07-08
Attending: INTERNAL MEDICINE

## 2023-07-05 DIAGNOSIS — C83.31 DIFFUSE LARGE B-CELL LYMPHOMA OF LYMPH NODES OF NECK (HCC): ICD-10-CM

## 2023-07-05 PROCEDURE — 2580000003 HC RX 258: Performed by: INTERNAL MEDICINE

## 2023-07-05 PROCEDURE — 6360000004 HC RX CONTRAST MEDICATION: Performed by: INTERNAL MEDICINE

## 2023-07-05 PROCEDURE — 70491 CT SOFT TISSUE NECK W/DYE: CPT

## 2023-07-05 RX ORDER — SODIUM CHLORIDE 0.9 % (FLUSH) 0.9 %
10 SYRINGE (ML) INJECTION
Status: COMPLETED | OUTPATIENT
Start: 2023-07-05 | End: 2023-07-05

## 2023-07-05 RX ORDER — 0.9 % SODIUM CHLORIDE 0.9 %
100 INTRAVENOUS SOLUTION INTRAVENOUS ONCE
Status: COMPLETED | OUTPATIENT
Start: 2023-07-05 | End: 2023-07-05

## 2023-07-05 RX ADMIN — SODIUM CHLORIDE, PRESERVATIVE FREE 10 ML: 5 INJECTION INTRAVENOUS at 14:49

## 2023-07-05 RX ADMIN — SODIUM CHLORIDE 100 ML: 9 INJECTION, SOLUTION INTRAVENOUS at 14:48

## 2023-07-05 RX ADMIN — IOPAMIDOL 80 ML: 755 INJECTION, SOLUTION INTRAVENOUS at 14:49

## 2023-07-12 ENCOUNTER — TELEPHONE (OUTPATIENT)
Dept: ONCOLOGY | Age: 58
End: 2023-07-12

## 2023-07-12 DIAGNOSIS — C83.31 DIFFUSE LARGE B-CELL LYMPHOMA OF LYMPH NODES OF NECK (HCC): Primary | ICD-10-CM

## 2023-07-12 NOTE — TELEPHONE ENCOUNTER
LVM for patient to call back to discuss results of CT neck. Per Dr. Debbie Flood did not show anything concerning, the lymph nodes in the neck are not growing so we will plan on doing a repeat PET scan in November followed by lab work (including clonoseq for DLBCL) and office visit with Dr. Darwin Mcmanus.

## 2023-10-09 DIAGNOSIS — I10 PRIMARY HYPERTENSION: ICD-10-CM

## 2023-10-09 RX ORDER — LISINOPRIL 20 MG/1
20 TABLET ORAL DAILY
Qty: 90 TABLET | Refills: 3 | Status: SHIPPED | OUTPATIENT
Start: 2023-10-09

## 2023-10-09 NOTE — TELEPHONE ENCOUNTER
Prescription sent to pharmacy[facility]  Requested Prescriptions     Signed Prescriptions Disp Refills    lisinopril (PRINIVIL;ZESTRIL) 20 MG tablet 90 tablet 3     Sig: TAKE 1 TABLET BY MOUTH EVERY DAY     Authorizing Provider: Maria Antonia Burns, [facility]     Ordering User: Zarina Clifford

## 2023-10-09 NOTE — TELEPHONE ENCOUNTER
Requested Prescriptions     Pending Prescriptions Disp Refills    lisinopril (PRINIVIL;ZESTRIL) 20 MG tablet [Pharmacy Med Name: LISINOPRIL 20 MG TABLET] 90 tablet 3     Sig: TAKE 1 TABLET BY MOUTH EVERY DAY

## 2023-10-20 ENCOUNTER — OFFICE VISIT (OUTPATIENT)
Age: 58
End: 2023-10-20

## 2023-10-20 VITALS
HEART RATE: 60 BPM | DIASTOLIC BLOOD PRESSURE: 76 MMHG | HEIGHT: 72 IN | BODY MASS INDEX: 27.98 KG/M2 | SYSTOLIC BLOOD PRESSURE: 136 MMHG | WEIGHT: 206.6 LBS

## 2023-10-20 DIAGNOSIS — Q23.8 ABNORMALITY OF CHORDAE TENDINEAE OF MITRAL VALVE: ICD-10-CM

## 2023-10-20 DIAGNOSIS — I34.0 NONRHEUMATIC MITRAL VALVE REGURGITATION: Primary | ICD-10-CM

## 2023-10-20 DIAGNOSIS — I10 PRIMARY HYPERTENSION: ICD-10-CM

## 2023-10-20 PROCEDURE — 3078F DIAST BP <80 MM HG: CPT | Performed by: INTERNAL MEDICINE

## 2023-10-20 PROCEDURE — 3075F SYST BP GE 130 - 139MM HG: CPT | Performed by: INTERNAL MEDICINE

## 2023-10-20 PROCEDURE — 99214 OFFICE O/P EST MOD 30 MIN: CPT | Performed by: INTERNAL MEDICINE

## 2023-10-20 ASSESSMENT — ENCOUNTER SYMPTOMS
ABDOMINAL PAIN: 0
DOUBLE VISION: 0
EYE REDNESS: 0
HEMOPTYSIS: 0
HEMATEMESIS: 0
WHEEZING: 0
HEMATOCHEZIA: 0
STRIDOR: 0
HOARSE VOICE: 0

## 2023-10-20 NOTE — PROGRESS NOTES
by: Sonia Judd MD on 4/18/2023  7:36 AM     Lab Results   Component Value Date    HGB 12.7 (L) 06/21/2023      Lab Results   Component Value Date     06/21/2023        ASSESSMENT and PLAN      Nonrheumatic mitral valve regurgitation  -Likely in the moderate range based on echo done on 4/17/2023. Preserved LV function overall. Has a torn chordae involving the anterior mitral valve leaflet. Good control of blood pressure is concerned may have decreased mitral regurgitation. Primary hypertension  Has been better controlled lately with us increasing lisinopril to 20 mg daily, continue atenolol 25 mg once daily. We certainly have room to increase lisinopril if needed. 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 So far atrial dimensions are reasonable, left atrial pressure are also not significantly elevated on his echo.  -No heart failure symptoms    Diffuse large B-cell lymphoma, unspecified body region [facility])  Being treated by heme-onc. Overall Impression  I made no changes with his medical therapy. Blood pressures and heart rates are excellent. If home readings trend upwards, we have room to increase his lisinopril. Personally reviewed last echo with him from April 2023-shows mild to moderate mitral regurgitation-no longer in the severe range especially with better treatment of his blood pressures. Extremely eccentric jet-likely from torn underlying chordae. -I will see him in follow-up in about 6 months time. We will get him through an echo just before we see him next to ensure that his mitral valve regurgitation has been stable. Return in about 6 months (around 4/20/2024) for mitral regurg, htn. Thank you for allowing us to participate in the care of your patient. If you have any further questions, please do not hesitate to contact us.   Sincerely,        Alfred Donato

## 2023-11-02 ENCOUNTER — HOSPITAL ENCOUNTER (OUTPATIENT)
Dept: LAB | Age: 58
Discharge: HOME | End: 2023-11-02

## 2023-11-02 ENCOUNTER — OFFICE VISIT (OUTPATIENT)
Dept: ONCOLOGY | Age: 58
End: 2023-11-02

## 2023-11-02 ENCOUNTER — HOSPITAL ENCOUNTER (OUTPATIENT)
Dept: PET IMAGING | Age: 58
Discharge: HOME | End: 2023-11-02

## 2023-11-02 VITALS
OXYGEN SATURATION: 99 % | TEMPERATURE: 97.9 F | WEIGHT: 206.8 LBS | HEART RATE: 60 BPM | DIASTOLIC BLOOD PRESSURE: 69 MMHG | RESPIRATION RATE: 16 BRPM | BODY MASS INDEX: 28.01 KG/M2 | HEIGHT: 72 IN | SYSTOLIC BLOOD PRESSURE: 136 MMHG

## 2023-11-02 DIAGNOSIS — Z94.84 STEM CELLS TRANSPLANT STATUS (HCC): ICD-10-CM

## 2023-11-02 DIAGNOSIS — D84.9 IMMUNOCOMPROMISED (HCC): ICD-10-CM

## 2023-11-02 DIAGNOSIS — C83.31 DIFFUSE LARGE B-CELL LYMPHOMA OF LYMPH NODES OF NECK (HCC): ICD-10-CM

## 2023-11-02 DIAGNOSIS — C83.31 DIFFUSE LARGE B-CELL LYMPHOMA OF LYMPH NODES OF NECK (HCC): Primary | ICD-10-CM

## 2023-11-02 LAB
ALBUMIN SERPL-MCNC: 4.4 G/DL (ref 3.5–5)
ALBUMIN/GLOB SERPL: 1.2 (ref 0.4–1.6)
ALP SERPL-CCNC: 146 U/L (ref 50–136)
ALT SERPL-CCNC: 59 U/L (ref 12–65)
ANION GAP SERPL CALC-SCNC: 1 MMOL/L (ref 2–11)
AST SERPL-CCNC: 66 U/L (ref 15–37)
BASOPHILS # BLD: 0.1 K/UL (ref 0–0.2)
BASOPHILS NFR BLD: 1 % (ref 0–2)
BILIRUB SERPL-MCNC: 0.6 MG/DL (ref 0.2–1.1)
BUN SERPL-MCNC: 12 MG/DL (ref 6–23)
CALCIUM SERPL-MCNC: 9.4 MG/DL (ref 8.3–10.4)
CHLORIDE SERPL-SCNC: 107 MMOL/L (ref 101–110)
CO2 SERPL-SCNC: 32 MMOL/L (ref 21–32)
CREAT SERPL-MCNC: 1 MG/DL (ref 0.8–1.5)
DIFFERENTIAL METHOD BLD: ABNORMAL
EOSINOPHIL # BLD: 0.1 K/UL (ref 0–0.8)
EOSINOPHIL NFR BLD: 2 % (ref 0.5–7.8)
ERYTHROCYTE [DISTWIDTH] IN BLOOD BY AUTOMATED COUNT: 12.3 % (ref 11.9–14.6)
GLOBULIN SER CALC-MCNC: 3.8 G/DL (ref 2.8–4.5)
GLUCOSE BLD STRIP.AUTO-MCNC: 106 MG/DL (ref 65–100)
GLUCOSE SERPL-MCNC: 103 MG/DL (ref 65–100)
HCT VFR BLD AUTO: 41.1 % (ref 41.1–50.3)
HGB BLD-MCNC: 13.9 G/DL (ref 13.6–17.2)
IMM GRANULOCYTES # BLD AUTO: 0 K/UL (ref 0–0.5)
IMM GRANULOCYTES NFR BLD AUTO: 0 % (ref 0–5)
LDH SERPL L TO P-CCNC: 231 U/L (ref 100–190)
LYMPHOCYTES # BLD: 2.2 K/UL (ref 0.5–4.6)
LYMPHOCYTES NFR BLD: 34 % (ref 13–44)
MCH RBC QN AUTO: 30.1 PG (ref 26.1–32.9)
MCHC RBC AUTO-ENTMCNC: 33.8 G/DL (ref 31.4–35)
MCV RBC AUTO: 89 FL (ref 82–102)
MONOCYTES # BLD: 0.4 K/UL (ref 0.1–1.3)
MONOCYTES NFR BLD: 6 % (ref 4–12)
NEUTS SEG # BLD: 3.7 K/UL (ref 1.7–8.2)
NEUTS SEG NFR BLD: 57 % (ref 43–78)
NRBC # BLD: 0 K/UL (ref 0–0.2)
PLATELET # BLD AUTO: 206 K/UL (ref 150–450)
PMV BLD AUTO: 9.3 FL (ref 9.4–12.3)
POTASSIUM SERPL-SCNC: 4 MMOL/L (ref 3.5–5.1)
PROT SERPL-MCNC: 8.2 G/DL (ref 6.3–8.2)
RBC # BLD AUTO: 4.62 M/UL (ref 4.23–5.6)
SERVICE CMNT-IMP: ABNORMAL
SODIUM SERPL-SCNC: 140 MMOL/L (ref 133–143)
WBC # BLD AUTO: 6.4 K/UL (ref 4.3–11.1)

## 2023-11-02 PROCEDURE — 85025 COMPLETE CBC W/AUTO DIFF WBC: CPT

## 2023-11-02 PROCEDURE — 3075F SYST BP GE 130 - 139MM HG: CPT | Performed by: INTERNAL MEDICINE

## 2023-11-02 PROCEDURE — 36415 COLL VENOUS BLD VENIPUNCTURE: CPT

## 2023-11-02 PROCEDURE — 83615 LACTATE (LD) (LDH) ENZYME: CPT

## 2023-11-02 PROCEDURE — 80053 COMPREHEN METABOLIC PANEL: CPT

## 2023-11-02 PROCEDURE — 82962 GLUCOSE BLOOD TEST: CPT

## 2023-11-02 PROCEDURE — 99214 OFFICE O/P EST MOD 30 MIN: CPT | Performed by: INTERNAL MEDICINE

## 2023-11-02 PROCEDURE — A9552 F18 FDG: HCPCS | Performed by: INTERNAL MEDICINE

## 2023-11-02 PROCEDURE — 3430000000 HC RX DIAGNOSTIC RADIOPHARMACEUTICAL: Performed by: INTERNAL MEDICINE

## 2023-11-02 PROCEDURE — 78815 PET IMAGE W/CT SKULL-THIGH: CPT

## 2023-11-02 PROCEDURE — 2580000003 HC RX 258: Performed by: INTERNAL MEDICINE

## 2023-11-02 PROCEDURE — 3078F DIAST BP <80 MM HG: CPT | Performed by: INTERNAL MEDICINE

## 2023-11-02 PROCEDURE — 6360000004 HC RX CONTRAST MEDICATION: Performed by: INTERNAL MEDICINE

## 2023-11-02 RX ORDER — FLUDEOXYGLUCOSE F 18 200 MCI/ML
12.9 INJECTION, SOLUTION INTRAVENOUS
Status: COMPLETED | OUTPATIENT
Start: 2023-11-02 | End: 2023-11-02

## 2023-11-02 RX ORDER — SODIUM CHLORIDE 0.9 % (FLUSH) 0.9 %
10 SYRINGE (ML) INJECTION ONCE AS NEEDED
Status: COMPLETED | OUTPATIENT
Start: 2023-11-02 | End: 2023-11-02

## 2023-11-02 RX ADMIN — DIATRIZOATE MEGLUMINE AND DIATRIZOATE SODIUM 10 ML: 660; 100 LIQUID ORAL; RECTAL at 08:50

## 2023-11-02 RX ADMIN — SODIUM CHLORIDE, PRESERVATIVE FREE 10 ML: 5 INJECTION INTRAVENOUS at 08:50

## 2023-11-02 RX ADMIN — FLUDEOXYGLUCOSE F 18 12.9 MILLICURIE: 200 INJECTION, SOLUTION INTRAVENOUS at 08:50

## 2023-11-02 ASSESSMENT — PATIENT HEALTH QUESTIONNAIRE - PHQ9
SUM OF ALL RESPONSES TO PHQ QUESTIONS 1-9: 0
2. FEELING DOWN, DEPRESSED OR HOPELESS: 0
SUM OF ALL RESPONSES TO PHQ9 QUESTIONS 1 & 2: 0
SUM OF ALL RESPONSES TO PHQ QUESTIONS 1-9: 0
1. LITTLE INTEREST OR PLEASURE IN DOING THINGS: 0
SUM OF ALL RESPONSES TO PHQ QUESTIONS 1-9: 0
SUM OF ALL RESPONSES TO PHQ QUESTIONS 1-9: 0

## 2023-11-02 NOTE — PROGRESS NOTES
Markus [facility]. Continues to do well, denies any B symptoms, in fact gaining weight. Labs today unremarkable. ClonoSeq MRD sent and will be followed. PET scan without evidence of significant disease recurrence (bilateral cervical lymph nodes without uptake). Continue monitoring locally. We will see him back in 6 months with repeat PET scan and ClonoSeq MRD. Continues with posttransplant vaccinations. 1. 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 Mitral valve regurge, mod-severe flail anterior leaflet. PLAN:  - As above. S/p R ICE x 3 and IT MTX x 4 w CR. S/p BEAM/ASCT 4/22. In CR.    - Electrolyte replacement as needed  - Prophylaxis: acyclovir 400 twice daily. Ok to stop bactirm  - Neurology has evaluated pt for 3rd nerve palsy, not felt to be lymphoma related  - Mitral valve flail ant leaflet: to be monitored per cardiology. Cleared by cardiology. To be monitored for volume overload. - Evusheld [id number] 5/22.   -Getting posttransplant vaccinations    RTC in 6 months with labs, LDH, ClonoSeq MRD, PET scan    I truly appreciate the kind referral from Dr Ron Zaragoza.  Please call w/ any quesions    Jennifer Singh MD  [facility]  Hematology Oncology  [address]  [address] [place] [address]  Office : [phone]  Fax : [phone]

## 2023-11-02 NOTE — PATIENT INSTRUCTIONS
Performed by: 11/02/2023 Bisi/Pet   Final        Treatment Summary has been discussed and given to patient: n/a        -------------------------------------------------------------------------------------------------------------------  Please call our office at [phone] if you have any  of the following symptoms:   Fever of 100.5 or greater  Chills  Shortness of breath  Swelling or pain in one leg    After office hours an answering service is available and will contact a provider for emergencies or if you are experiencing any of the above symptoms. Patient did express an interest in My Chart. My Chart log in information explained on the after visit summary printout at the [address] desk.     Chani Hanks RN

## 2023-11-10 LAB — CLONOSEQ: NORMAL

## 2024-03-11 RX ORDER — ATENOLOL 25 MG/1
TABLET ORAL
Qty: 90 TABLET | Refills: 3 | Status: SHIPPED | OUTPATIENT
Start: 2024-03-11

## 2024-04-22 ENCOUNTER — TELEPHONE (OUTPATIENT)
Age: 59
End: 2024-04-22

## 2024-04-23 ENCOUNTER — TELEPHONE (OUTPATIENT)
Age: 59
End: 2024-04-23

## 2024-04-23 NOTE — TELEPHONE ENCOUNTER
Spoke with pt and informed of echo results. Pt voiced understanding.  Advised pt Dr Scott didn't specify another echo in 6 months and echo are usually [facility] every couple years. Pt verbalized understanding.

## 2024-04-23 NOTE — TELEPHONE ENCOUNTER
We can let him know that his echocardiogram showed normal pumping ability of the heart-with a moderately to severely leaky mitral valve.   -No need to schedule follow-up appointment.  He can call us if his breathing gets really bad or he has significant leg swelling.   Thanks,   AD         See other encounter.

## 2024-05-02 ENCOUNTER — HOSPITAL ENCOUNTER (OUTPATIENT)
Dept: LAB | Age: 59
Discharge: HOME | End: 2024-05-02

## 2024-05-02 ENCOUNTER — HOSPITAL ENCOUNTER (OUTPATIENT)
Dept: INFUSION THERAPY | Age: 59
Setting detail: INFUSION SERIES
Discharge: HOME | End: 2024-05-02

## 2024-05-02 ENCOUNTER — HOSPITAL ENCOUNTER (OUTPATIENT)
Dept: PET IMAGING | Age: 59
Discharge: HOME | End: 2024-05-02

## 2024-05-02 ENCOUNTER — OFFICE VISIT (OUTPATIENT)
Dept: ONCOLOGY | Age: 59
End: 2024-05-02

## 2024-05-02 VITALS — BODY MASS INDEX: 27.13 KG/M2 | WEIGHT: 200.3 LBS | HEIGHT: 72 IN

## 2024-05-02 DIAGNOSIS — D84.9 IMMUNOCOMPROMISED (HCC): ICD-10-CM

## 2024-05-02 DIAGNOSIS — C83.31 DIFFUSE LARGE B-CELL LYMPHOMA OF LYMPH NODES OF NECK (HCC): ICD-10-CM

## 2024-05-02 DIAGNOSIS — C83.30 DIFFUSE LARGE B-CELL LYMPHOMA, UNSPECIFIED BODY REGION (HCC): Primary | ICD-10-CM

## 2024-05-02 DIAGNOSIS — C83.31 DIFFUSE LARGE B-CELL LYMPHOMA OF LYMPH NODES OF NECK (HCC): Primary | ICD-10-CM

## 2024-05-02 DIAGNOSIS — Z94.84 STEM CELLS TRANSPLANT STATUS (HCC): ICD-10-CM

## 2024-05-02 LAB
ALBUMIN SERPL-MCNC: 4.1 G/DL (ref 3.5–5)
ALBUMIN/GLOB SERPL: 1.3 (ref 1–1.9)
ALP SERPL-CCNC: 109 U/L (ref 40–129)
ALT SERPL-CCNC: 30 U/L (ref 12–65)
ANION GAP SERPL CALC-SCNC: 8 MMOL/L (ref 9–18)
AST SERPL-CCNC: 37 U/L (ref 15–37)
BASOPHILS # BLD: 0.1 K/UL (ref 0–0.2)
BASOPHILS NFR BLD: 1 % (ref 0–2)
BILIRUB SERPL-MCNC: 0.4 MG/DL (ref 0–1.2)
BUN SERPL-MCNC: 12 MG/DL (ref 6–23)
CALCIUM SERPL-MCNC: 9.5 MG/DL (ref 8.8–10.2)
CHLORIDE SERPL-SCNC: 104 MMOL/L (ref 98–107)
CO2 SERPL-SCNC: 30 MMOL/L (ref 20–28)
CREAT SERPL-MCNC: 0.93 MG/DL (ref 0.8–1.3)
DIFFERENTIAL METHOD BLD: ABNORMAL
EOSINOPHIL # BLD: 0.1 K/UL (ref 0–0.8)
EOSINOPHIL NFR BLD: 2 % (ref 0.5–7.8)
ERYTHROCYTE [DISTWIDTH] IN BLOOD BY AUTOMATED COUNT: 12.3 % (ref 11.9–14.6)
GLOBULIN SER CALC-MCNC: 3.2 G/DL (ref 2.3–3.5)
GLUCOSE BLD STRIP.AUTO-MCNC: 116 MG/DL (ref 65–100)
GLUCOSE SERPL-MCNC: 114 MG/DL (ref 70–99)
HCT VFR BLD AUTO: 39.3 % (ref 41.1–50.3)
HGB BLD-MCNC: 13 G/DL (ref 13.6–17.2)
IMM GRANULOCYTES # BLD AUTO: 0 K/UL (ref 0–0.5)
IMM GRANULOCYTES NFR BLD AUTO: 0 % (ref 0–5)
LDH SERPL L TO P-CCNC: 219 U/L (ref 127–281)
LYMPHOCYTES # BLD: 2 K/UL (ref 0.5–4.6)
LYMPHOCYTES NFR BLD: 33 % (ref 13–44)
MCH RBC QN AUTO: 29.3 PG (ref 26.1–32.9)
MCHC RBC AUTO-ENTMCNC: 33.1 G/DL (ref 31.4–35)
MCV RBC AUTO: 88.7 FL (ref 82–102)
MONOCYTES # BLD: 0.5 K/UL (ref 0.1–1.3)
MONOCYTES NFR BLD: 9 % (ref 4–12)
NEUTS SEG # BLD: 3.4 K/UL (ref 1.7–8.2)
NEUTS SEG NFR BLD: 56 % (ref 43–78)
NRBC # BLD: 0 K/UL (ref 0–0.2)
PLATELET # BLD AUTO: 199 K/UL (ref 150–450)
PMV BLD AUTO: 9.1 FL (ref 9.4–12.3)
POTASSIUM SERPL-SCNC: 3.9 MMOL/L (ref 3.5–5.1)
PROT SERPL-MCNC: 7.3 G/DL (ref 6.3–8.2)
RBC # BLD AUTO: 4.43 M/UL (ref 4.23–5.6)
SERVICE CMNT-IMP: ABNORMAL
SODIUM SERPL-SCNC: 142 MMOL/L (ref 136–145)
WBC # BLD AUTO: 6.2 K/UL (ref 4.3–11.1)

## 2024-05-02 PROCEDURE — 78815 PET IMAGE W/CT SKULL-THIGH: CPT

## 2024-05-02 PROCEDURE — 90707 MMR VACCINE SC: CPT | Performed by: INTERNAL MEDICINE

## 2024-05-02 PROCEDURE — 90648 HIB PRP-T VACCINE 4 DOSE IM: CPT | Performed by: INTERNAL MEDICINE

## 2024-05-02 PROCEDURE — 90713 POLIOVIRUS IPV SC/IM: CPT | Performed by: INTERNAL MEDICINE

## 2024-05-02 PROCEDURE — G0009 ADMIN PNEUMOCOCCAL VACCINE: HCPCS | Performed by: INTERNAL MEDICINE

## 2024-05-02 PROCEDURE — 82962 GLUCOSE BLOOD TEST: CPT

## 2024-05-02 PROCEDURE — 80053 COMPREHEN METABOLIC PANEL: CPT

## 2024-05-02 PROCEDURE — 99214 OFFICE O/P EST MOD 30 MIN: CPT | Performed by: INTERNAL MEDICINE

## 2024-05-02 PROCEDURE — 6360000002 HC RX W HCPCS: Performed by: INTERNAL MEDICINE

## 2024-05-02 PROCEDURE — G0010 ADMIN HEPATITIS B VACCINE: HCPCS | Performed by: INTERNAL MEDICINE

## 2024-05-02 PROCEDURE — 90471 IMMUNIZATION ADMIN: CPT | Performed by: INTERNAL MEDICINE

## 2024-05-02 PROCEDURE — A9609 HC RX DIAGNOSTIC RADIOPHARMACEUTICAL: HCPCS | Performed by: INTERNAL MEDICINE

## 2024-05-02 PROCEDURE — 6360000004 HC RX CONTRAST MEDICATION: Performed by: INTERNAL MEDICINE

## 2024-05-02 PROCEDURE — 90700 DTAP VACCINE < 7 YRS IM: CPT | Performed by: INTERNAL MEDICINE

## 2024-05-02 PROCEDURE — 90740 HEPB VACC 3 DOSE IMMUNSUP IM: CPT | Performed by: INTERNAL MEDICINE

## 2024-05-02 PROCEDURE — 3430000000 HC RX DIAGNOSTIC RADIOPHARMACEUTICAL: Performed by: INTERNAL MEDICINE

## 2024-05-02 PROCEDURE — 90472 IMMUNIZATION ADMIN EACH ADD: CPT | Performed by: INTERNAL MEDICINE

## 2024-05-02 PROCEDURE — 83615 LACTATE (LD) (LDH) ENZYME: CPT

## 2024-05-02 PROCEDURE — 85025 COMPLETE CBC W/AUTO DIFF WBC: CPT

## 2024-05-02 PROCEDURE — 2580000003 HC RX 258: Performed by: INTERNAL MEDICINE

## 2024-05-02 PROCEDURE — 90670 PCV13 VACCINE IM: CPT | Performed by: INTERNAL MEDICINE

## 2024-05-02 PROCEDURE — 90472 IMMUNIZATION ADMIN EACH ADD: CPT

## 2024-05-02 PROCEDURE — 36415 COLL VENOUS BLD VENIPUNCTURE: CPT

## 2024-05-02 RX ORDER — FLUDEOXYGLUCOSE F 18 200 MCI/ML
12.37 INJECTION, SOLUTION INTRAVENOUS
Status: COMPLETED | OUTPATIENT
Start: 2024-05-02 | End: 2024-05-02

## 2024-05-02 RX ORDER — SODIUM CHLORIDE 0.9 % (FLUSH) 0.9 %
20 SYRINGE (ML) INJECTION AS NEEDED
Status: DISCONTINUED | OUTPATIENT
Start: 2024-05-02 | End: 2024-05-06 | Stop reason: HOSPADM

## 2024-05-02 RX ADMIN — HAEMOPHILUS B POLYSACCHARIDE CONJUGATE VACCINE FOR INJ 0.5 ML: RECON SOLN at 14:05

## 2024-05-02 RX ADMIN — DIPHTHERIA AND TETANUS TOXOIDS AND ACELLULAR PERTUSSIS VACCINE ADSORBED 0.5 ML: 10; 25; 25; 25; 8 SUSPENSION INTRAMUSCULAR at 14:08

## 2024-05-02 RX ADMIN — HEPATITIS B VACCINE (RECOMBINANT) 1 ML: 20 INJECTION, SUSPENSION INTRAMUSCULAR at 14:04

## 2024-05-02 RX ADMIN — FLUDEOXYGLUCOSE F 18 12.37 MILLICURIE: 200 INJECTION, SOLUTION INTRAVENOUS at 08:52

## 2024-05-02 RX ADMIN — DIATRIZOATE MEGLUMINE AND DIATRIZOATE SODIUM 10 ML: 660; 100 LIQUID ORAL; RECTAL at 08:52

## 2024-05-02 RX ADMIN — MEASLES, MUMPS, AND RUBELLA VIRUS VACCINE LIVE 0.5 ML: 1000; 12500; 1000 INJECTION, POWDER, LYOPHILIZED, FOR SUSPENSION SUBCUTANEOUS at 14:06

## 2024-05-02 RX ADMIN — SODIUM CHLORIDE, PRESERVATIVE FREE 20 ML: 5 INJECTION INTRAVENOUS at 08:52

## 2024-05-02 RX ADMIN — POLIOVIRUS TYPE 1 ANTIGEN (FORMALDEHYDE INACTIVATED), POLIOVIRUS TYPE 2 ANTIGEN (FORMALDEHYDE INACTIVATED), AND POLIOVIRUS TYPE 3 ANTIGEN (FORMALDEHYDE INACTIVATED) 0.5 ML: 40; 8; 32 INJECTION, SUSPENSION INTRAMUSCULAR at 14:12

## 2024-05-02 RX ADMIN — PNEUMOCOCCAL 13-VALENT CONJUGATE VACCINE 0.5 ML: 2.2; 2.2; 2.2; 2.2; 2.2; 4.4; 2.2; 2.2; 2.2; 2.2; 2.2; 2.2; 2.2 INJECTION, SUSPENSION INTRAMUSCULAR at 14:08

## 2024-05-02 ASSESSMENT — PATIENT HEALTH QUESTIONNAIRE - PHQ9
2. FEELING DOWN, DEPRESSED OR HOPELESS: NOT AT ALL
SUM OF ALL RESPONSES TO PHQ9 QUESTIONS 1 & 2: 0
SUM OF ALL RESPONSES TO PHQ QUESTIONS 1-9: 0
SUM OF ALL RESPONSES TO PHQ QUESTIONS 1-9: 0
1. LITTLE INTEREST OR PLEASURE IN DOING THINGS: NOT AT ALL
SUM OF ALL RESPONSES TO PHQ QUESTIONS 1-9: 0
SUM OF ALL RESPONSES TO PHQ QUESTIONS 1-9: 0

## 2024-05-02 NOTE — PROGRESS NOTES
Arrived to the Infusion Center.  Vacccines completed.  Patient tolerated well.   Any issues or concerns during appointment: none.  Discharged ambulatory.

## 2024-05-02 NOTE — PATIENT INSTRUCTIONS
Patient Information from Today's Visit    - PET scan reviewed and shows remission. Proceed with 24 month post transplant vaccines. Plan for another PET scan in 6 months     Follow Up Instructions: in 6 months      Treatment Summary has been discussed and given to patient:No      Current Labs:    Hospital Outpatient Visit on 05/02/2024   Component Date Value Ref Range Status    LD 05/02/2024 219  127 - 281 U/L Final    Sodium 05/02/2024 142  136 - 145 mmol/L Final    Potassium 05/02/2024 3.9  3.5 - 5.1 mmol/L Final    Chloride 05/02/2024 104  98 - 107 mmol/L Final    CO2 05/02/2024 30 (H)  20 - 28 mmol/L Final    Anion Gap 05/02/2024 8 (L)  9 - 18 mmol/L Final    Glucose 05/02/2024 114 (H)  70 - 99 mg/dL Final    Comment: <70 mg/dL Consistent with, but not fully diagnostic of hypoglycemia.  100 - 125 mg/dL Impaired fasting glucose/consistent with pre-diabetes mellitus.  > 126 mg/dl Fasting glucose consistent with overt diabetes mellitus      BUN 05/02/2024 12  6 - 23 MG/DL Final    Creatinine 05/02/2024 0.93  0.80 - 1.30 MG/DL Final    Est, Glom Filt Rate 05/02/2024 >90  >60 ml/min/1.73m2 Final    Comment:    Pediatric calculator link: https://www.kidney.org/professionals/kdoqi/gfr_calculatorped     These results are not intended for use in patients <18 years of age.     eGFR results are calculated without a race factor using  the 2021 CKD-EPI equation. Careful clinical correlation is recommended, particularly when comparing to results calculated using previous equations.  The CKD-EPI equation is less accurate in patients with extremes of muscle mass, extra-renal metabolism of creatinine, excessive creatine ingestion, or following therapy that affects renal tubular secretion.      Calcium 05/02/2024 9.5  8.8 - 10.2 MG/DL Final    Total Bilirubin 05/02/2024 0.4  0.0 - 1.2 MG/DL Final    ALT 05/02/2024 30  12 - 65 U/L Final    AST 05/02/2024 37  15 - 37 U/L Final    Alk Phosphatase 05/02/2024 109  40 - 129 U/L Final

## 2024-05-02 NOTE — PROGRESS NOTES
PET order placed during this encounter were verbal orders received from Dr. Ramirez, read back and verified.    
adult children, 3 daughters and 1 son. Denies b-symptoms or new palpable lumps. Reports headaches mostly resolved, continues to wear left sided eye patch.     1/6/2022: His hepatitis/HIV were negative.  LDH was high at 255.  He was seen by neurology in house Dr. Pierre, and 3rd nerve palsy felt unlikely related to lymphoma.  Brain MRI 12/7/2021 without evidence of intracranial systemic lymphoma.  Contrasted CT CAP 12/6/2021 with slight interval growth in left supraclavicular soft tissue mass measuring 5.8 x 5 cm, likely area of recurrent lymphoma.  Other lymph nodes felt stable to slightly smaller.  CSF fluid analysis negative.  2D echo had shown normal EF at 60 to 65%, however moderate to severe mitral regurg noted, seen by cardiology, status post EDUAR with partially flail anterior mitral leaflet, cardiology recommendations to monitor for now with possible surgical intervention should he clinically worsen.  He has since received R-ICE x2 which he has tolerated well.  Today denies any B symptoms, fevers or chills.  Now scheduled for repeat PET scan and follow-up with us in a week.  Has since last visit now enrolled in [employer] plan, and hopefully should be able to proceed with HDT/ASCT after 3-4 cycles depending on results of PET scan.  He will continue with prophylaxis with acyclovir, Diflucan and allopurinol.  Navigation following, we will see him back in a week.    1/13/2022: Reports some runny nose with sore throat over the last few days.  Denies any fevers, otherwise feels well.  Labs with mild leukocytosis, possibly G-CSF related.  PET scan with significant resolution of prior known disease including left-sided axillary/subpectoral mass (masses decreased from 5.8 x 5 to 1.7 x 1.5 cm, minimal FDG uptake with SUV 1.8).  Note made of developing lower lobe groundglass infiltrates possibly sequela of subclinical infection.  Will check for COVID and flu.  Prescribed Levaquin x5 days for coverage.  Given clinically

## 2024-05-15 LAB — CLONOSEQ: NORMAL

## 2024-05-17 ENCOUNTER — TELEPHONE (OUTPATIENT)
Dept: ONCOLOGY | Age: 59
End: 2024-05-17

## 2024-05-17 NOTE — TELEPHONE ENCOUNTER
Clonoseq MRD reviewed by Dr. Ramirez. Okay to get port removed. Patient notified and he will contact Dr. Betts's office to set this up. Copy of MRD report faxed to Dr. Betts's office.

## 2024-10-03 DIAGNOSIS — I10 PRIMARY HYPERTENSION: ICD-10-CM

## 2024-10-03 RX ORDER — LISINOPRIL 20 MG/1
20 TABLET ORAL DAILY
Qty: 90 TABLET | Refills: 3 | OUTPATIENT
Start: 2024-10-03

## 2024-10-03 NOTE — TELEPHONE ENCOUNTER
Advised that patient needs an appointment. He has not been here in 1 year. I will send in a 30 day supply until he can get an appointment set up.

## 2024-10-14 ENCOUNTER — TELEPHONE (OUTPATIENT)
Age: 59
End: 2024-10-14

## 2024-10-15 ENCOUNTER — TELEPHONE (OUTPATIENT)
Age: 59
End: 2024-10-15

## 2024-10-15 DIAGNOSIS — I10 PRIMARY HYPERTENSION: ICD-10-CM

## 2024-10-15 NOTE — TELEPHONE ENCOUNTER
MEDICATION REFILL REQUEST      Name of Medication:  lisinopril   Dose:  20 mg  Frequency:  once daily  Quantity:  90  Days' supply:  90      Pharmacy Name/Location:  [facility]      MEDICATION REFILL REQUEST      Name of Medication:  atenolol   Dose:  25 mg  Frequency:  once daily  Quantity:  90  Days' supply:  90      Pharmacy Name/Location:  [facility]            --- late 6 month appt has been scheduled for 11/5/24 - LS 10/20/23  Pt running low on one medication and will be out prior to appt and completely out of the other med. Please call when/ if able to send to pharmacy

## 2024-10-16 RX ORDER — ATENOLOL 25 MG/1
25 TABLET ORAL DAILY
Qty: 30 TABLET | Refills: 0 | Status: CANCELLED | OUTPATIENT
Start: 2024-10-16

## 2024-10-16 RX ORDER — LISINOPRIL 20 MG/1
20 TABLET ORAL DAILY
Qty: 30 TABLET | Refills: 0 | Status: CANCELLED | OUTPATIENT
Start: 2024-10-16

## 2024-10-16 NOTE — TELEPHONE ENCOUNTER
Requested Prescriptions     Pending Prescriptions Disp Refills    lisinopril (PRINIVIL;ZESTRIL) 20 MG tablet 30 tablet 0     Sig: Take 1 tablet by mouth daily    atenolol (TENORMIN) 25 MG tablet 30 tablet 0     Sig: Take 1 tablet by mouth daily        Verified rx. Last OV 10/20/23. Erx to pharm on file. Has an appt scheduled for 11/5. It has been 1 year since he has been here. Refill for 30 days with no refills. We will refill for 1 year when he comes to his appt.

## 2024-10-31 DIAGNOSIS — C83.31 DIFFUSE LARGE B-CELL LYMPHOMA OF LYMPH NODES OF NECK (HCC): Primary | ICD-10-CM

## 2024-11-05 ENCOUNTER — OFFICE VISIT (OUTPATIENT)
Age: 59
End: 2024-11-05

## 2024-11-05 VITALS
WEIGHT: 210 LBS | DIASTOLIC BLOOD PRESSURE: 72 MMHG | HEART RATE: 62 BPM | BODY MASS INDEX: 28.48 KG/M2 | SYSTOLIC BLOOD PRESSURE: 138 MMHG

## 2024-11-05 DIAGNOSIS — C83.30 DIFFUSE LARGE B-CELL LYMPHOMA, UNSPECIFIED BODY REGION (HCC): ICD-10-CM

## 2024-11-05 DIAGNOSIS — Q23.88 ABNORMALITY OF CHORDAE TENDINEAE OF MITRAL VALVE: ICD-10-CM

## 2024-11-05 DIAGNOSIS — I10 PRIMARY HYPERTENSION: ICD-10-CM

## 2024-11-05 DIAGNOSIS — I34.0 NONRHEUMATIC MITRAL VALVE REGURGITATION: Primary | ICD-10-CM

## 2024-11-05 PROCEDURE — 99214 OFFICE O/P EST MOD 30 MIN: CPT | Performed by: INTERNAL MEDICINE

## 2024-11-05 PROCEDURE — 3078F DIAST BP <80 MM HG: CPT | Performed by: INTERNAL MEDICINE

## 2024-11-05 PROCEDURE — 3075F SYST BP GE 130 - 139MM HG: CPT | Performed by: INTERNAL MEDICINE

## 2024-11-05 PROCEDURE — 93000 ELECTROCARDIOGRAM COMPLETE: CPT | Performed by: INTERNAL MEDICINE

## 2024-11-05 RX ORDER — LISINOPRIL 20 MG/1
20 TABLET ORAL DAILY
Qty: 90 TABLET | Refills: 3 | Status: SHIPPED | OUTPATIENT
Start: 2024-11-05

## 2024-11-05 RX ORDER — ATENOLOL 25 MG/1
25 TABLET ORAL DAILY
Qty: 90 TABLET | Refills: 3 | Status: SHIPPED | OUTPATIENT
Start: 2024-11-05

## 2024-11-05 ASSESSMENT — ENCOUNTER SYMPTOMS
HEMOPTYSIS: 0
ABDOMINAL PAIN: 0
HEMATEMESIS: 0
HEMATOCHEZIA: 0
STRIDOR: 0
EYE REDNESS: 0
DOUBLE VISION: 0
WHEEZING: 0
HOARSE VOICE: 0

## 2024-11-05 NOTE — PROGRESS NOTES
[facility] CARDIOLOGY  [address] [place] [address], [address]  [place] [address]  PHONE: [phone]          24    NAME:  Jordan Lopez  : 1965  MRN: [id number]         SUBJECTIVE:   Jordan Lopez is a 59 y.o. male seen for a visit regarding the following:     Chief Complaint   Patient presents with    mitral valve regurgitation           HPI:    Cardio problem list:   1.  Mitral regurgitation- Severe-eccentric  Echo-2024-EF 55 to 60%, moderate MR with posteriorly directed jet, RVSP 23, mildly dilated LA  -Echo from 2023 with an EF-biplane at 56%, mild concentric LVH and reported mild mitral regurgitation   -EDUAR-  flail A2 segment of the anterior mitral leaflet was partially flail with a small mobile echodensity noted consistent with a torn chordal structure.  Severe eccentric mitral regurgitation-posteriorly directed with pulmonary vein systolic  reversal.  EDUAR on 12/10/2021 showed an EF of 60 to 65% with no regional wall motion abnormalities.  Left atrium was mildly dilated.   -From 2022 showed an EF at 55 to 60% with no regional wall motion abnormalities, severe mitral regurgitation with abnormal anterior mitral valve leaflet-likely torn chordae   2.  Hypertension   3.  Diffuse large B-cell lymphoma      I saw Mr. Lopez is a pleasant 59-year-old gentleman in cardiovascular  follow-up for mitral regurgitation,  with known underlying hypertension and diffuse large B-cell lymphoma.    -When we last met with him, we made no significant changes with his medical therapy.  His blood pressures were better controlled with a combination of atenolol and lisinopril.    He was supposed to see us about 6 months ago but missed his appointment as he lost his wife to a stroke.     Mitral regurgitation:-Background- EDUAR from 2021 showed evidence of a significantly eccentric jet of mitral regurgitation with evidence of a mitral valve anterior leaflet torn chordae-leading to severe eccentric mitral

## 2024-11-07 ENCOUNTER — HOSPITAL ENCOUNTER (OUTPATIENT)
Dept: LAB | Age: 59
Discharge: HOME | End: 2024-11-07

## 2024-11-07 ENCOUNTER — HOSPITAL ENCOUNTER (OUTPATIENT)
Dept: PET IMAGING | Age: 59
Discharge: HOME | End: 2024-11-10

## 2024-11-07 ENCOUNTER — OFFICE VISIT (OUTPATIENT)
Dept: ONCOLOGY | Age: 59
End: 2024-11-07

## 2024-11-07 VITALS
BODY MASS INDEX: 28.12 KG/M2 | WEIGHT: 207.6 LBS | SYSTOLIC BLOOD PRESSURE: 115 MMHG | HEIGHT: 72 IN | RESPIRATION RATE: 16 BRPM | HEART RATE: 59 BPM | DIASTOLIC BLOOD PRESSURE: 71 MMHG | TEMPERATURE: 97.9 F | OXYGEN SATURATION: 99 %

## 2024-11-07 DIAGNOSIS — Z94.84 STEM CELLS TRANSPLANT STATUS (HCC): ICD-10-CM

## 2024-11-07 DIAGNOSIS — D84.9 IMMUNOCOMPROMISED (HCC): ICD-10-CM

## 2024-11-07 DIAGNOSIS — C83.31 DIFFUSE LARGE B-CELL LYMPHOMA OF LYMPH NODES OF NECK (HCC): ICD-10-CM

## 2024-11-07 DIAGNOSIS — C83.31 DIFFUSE LARGE B-CELL LYMPHOMA OF LYMPH NODES OF NECK (HCC): Primary | ICD-10-CM

## 2024-11-07 LAB
ALBUMIN SERPL-MCNC: 4 G/DL (ref 3.5–5)
ALBUMIN/GLOB SERPL: 1.2 (ref 1–1.9)
ALP SERPL-CCNC: 121 U/L (ref 40–129)
ALT SERPL-CCNC: 38 U/L (ref 8–55)
ANION GAP SERPL CALC-SCNC: 8 MMOL/L (ref 7–16)
AST SERPL-CCNC: 39 U/L (ref 15–37)
BASOPHILS # BLD: 0 K/UL (ref 0–0.2)
BASOPHILS NFR BLD: 1 % (ref 0–2)
BILIRUB SERPL-MCNC: 0.3 MG/DL (ref 0–1.2)
BUN SERPL-MCNC: 14 MG/DL (ref 6–23)
CALCIUM SERPL-MCNC: 9.5 MG/DL (ref 8.8–10.2)
CHLORIDE SERPL-SCNC: 103 MMOL/L (ref 98–107)
CO2 SERPL-SCNC: 29 MMOL/L (ref 20–29)
CREAT SERPL-MCNC: 0.97 MG/DL (ref 0.8–1.3)
DIFFERENTIAL METHOD BLD: ABNORMAL
EOSINOPHIL # BLD: 0.1 K/UL (ref 0–0.8)
EOSINOPHIL NFR BLD: 2 % (ref 0.5–7.8)
ERYTHROCYTE [DISTWIDTH] IN BLOOD BY AUTOMATED COUNT: 12.8 % (ref 11.9–14.6)
GLOBULIN SER CALC-MCNC: 3.4 G/DL (ref 2.3–3.5)
GLUCOSE BLD STRIP.AUTO-MCNC: 106 MG/DL (ref 65–100)
GLUCOSE SERPL-MCNC: 118 MG/DL (ref 70–99)
HCT VFR BLD AUTO: 39.5 % (ref 41.1–50.3)
HGB BLD-MCNC: 13 G/DL (ref 13.6–17.2)
IMM GRANULOCYTES # BLD AUTO: 0 K/UL (ref 0–0.5)
IMM GRANULOCYTES NFR BLD AUTO: 0 % (ref 0–5)
LDH SERPL L TO P-CCNC: 208 U/L (ref 127–281)
LYMPHOCYTES # BLD: 2.6 K/UL (ref 0.5–4.6)
LYMPHOCYTES NFR BLD: 36 % (ref 13–44)
MCH RBC QN AUTO: 29.8 PG (ref 26.1–32.9)
MCHC RBC AUTO-ENTMCNC: 32.9 G/DL (ref 31.4–35)
MCV RBC AUTO: 90.6 FL (ref 82–102)
MONOCYTES # BLD: 0.5 K/UL (ref 0.1–1.3)
MONOCYTES NFR BLD: 7 % (ref 4–12)
NEUTS SEG # BLD: 3.9 K/UL (ref 1.7–8.2)
NEUTS SEG NFR BLD: 54 % (ref 43–78)
NRBC # BLD: 0 K/UL (ref 0–0.2)
PLATELET # BLD AUTO: 206 K/UL (ref 150–450)
PMV BLD AUTO: 9.1 FL (ref 9.4–12.3)
POTASSIUM SERPL-SCNC: 4.2 MMOL/L (ref 3.5–5.1)
PROT SERPL-MCNC: 7.3 G/DL (ref 6.3–8.2)
RBC # BLD AUTO: 4.36 M/UL (ref 4.23–5.6)
SERVICE CMNT-IMP: ABNORMAL
SODIUM SERPL-SCNC: 140 MMOL/L (ref 136–145)
WBC # BLD AUTO: 7.2 K/UL (ref 4.3–11.1)

## 2024-11-07 PROCEDURE — 3074F SYST BP LT 130 MM HG: CPT | Performed by: INTERNAL MEDICINE

## 2024-11-07 PROCEDURE — 3430000000 HC RX DIAGNOSTIC RADIOPHARMACEUTICAL: Performed by: INTERNAL MEDICINE

## 2024-11-07 PROCEDURE — 80053 COMPREHEN METABOLIC PANEL: CPT

## 2024-11-07 PROCEDURE — 2580000003 HC RX 258: Performed by: INTERNAL MEDICINE

## 2024-11-07 PROCEDURE — 82962 GLUCOSE BLOOD TEST: CPT

## 2024-11-07 PROCEDURE — 36415 COLL VENOUS BLD VENIPUNCTURE: CPT

## 2024-11-07 PROCEDURE — A9609 HC RX DIAGNOSTIC RADIOPHARMACEUTICAL: HCPCS | Performed by: INTERNAL MEDICINE

## 2024-11-07 PROCEDURE — 3078F DIAST BP <80 MM HG: CPT | Performed by: INTERNAL MEDICINE

## 2024-11-07 PROCEDURE — 78815 PET IMAGE W/CT SKULL-THIGH: CPT

## 2024-11-07 PROCEDURE — 99214 OFFICE O/P EST MOD 30 MIN: CPT | Performed by: INTERNAL MEDICINE

## 2024-11-07 PROCEDURE — 85025 COMPLETE CBC W/AUTO DIFF WBC: CPT

## 2024-11-07 PROCEDURE — 6360000004 HC RX CONTRAST MEDICATION: Performed by: INTERNAL MEDICINE

## 2024-11-07 PROCEDURE — 83615 LACTATE (LD) (LDH) ENZYME: CPT

## 2024-11-07 RX ORDER — FLUDEOXYGLUCOSE F 18 200 MCI/ML
11.84 INJECTION, SOLUTION INTRAVENOUS
Status: COMPLETED | OUTPATIENT
Start: 2024-11-07 | End: 2024-11-07

## 2024-11-07 RX ORDER — SODIUM CHLORIDE 0.9 % (FLUSH) 0.9 %
20 SYRINGE (ML) INJECTION AS NEEDED
Status: DISCONTINUED | OUTPATIENT
Start: 2024-11-07 | End: 2024-11-11 | Stop reason: HOSPADM

## 2024-11-07 RX ORDER — DIATRIZOATE MEGLUMINE AND DIATRIZOATE SODIUM 660; 100 MG/ML; MG/ML
10 SOLUTION ORAL; RECTAL
Status: DISCONTINUED | OUTPATIENT
Start: 2024-11-07 | End: 2024-11-11 | Stop reason: HOSPADM

## 2024-11-07 RX ADMIN — FLUDEOXYGLUCOSE F 18 11.84 MILLICURIE: 200 INJECTION, SOLUTION INTRAVENOUS at 09:12

## 2024-11-07 RX ADMIN — SODIUM CHLORIDE, PRESERVATIVE FREE 20 ML: 5 INJECTION INTRAVENOUS at 09:12

## 2024-11-07 RX ADMIN — DIATRIZOATE MEGLUMINE AND DIATRIZOATE SODIUM 10 ML: 660; 100 LIQUID ORAL; RECTAL at 09:12

## 2024-11-07 ASSESSMENT — PATIENT HEALTH QUESTIONNAIRE - PHQ9
SUM OF ALL RESPONSES TO PHQ QUESTIONS 1-9: 0
2. FEELING DOWN, DEPRESSED OR HOPELESS: NOT AT ALL
1. LITTLE INTEREST OR PLEASURE IN DOING THINGS: NOT AT ALL
SUM OF ALL RESPONSES TO PHQ QUESTIONS 1-9: 0
SUM OF ALL RESPONSES TO PHQ9 QUESTIONS 1 & 2: 0

## 2024-11-07 NOTE — PATIENT INSTRUCTIONS
Patient Information from Today's Visit    The members of your [facility] are listed below:    Physician Provider: Kelle Ramirez Medical Oncologist  Advanced Practice Clinician: Diana Arias NP  Registered Nurse: Pema SMALL RN  Navigator: N/A  Medical Assistant: Maggie SMALL MA  : April GONZÁLES   Supportive Care Services: Mary Jane LAUGHLIN LMSW    Diagnosis: lymphoma      Follow Up Instructions:   - labs/PET reviewed- report of PET scan not in yet. We will call you with those results.  - follow up in 6 months with another pet scan      Treatment Summary has been discussed and given to patient:No      Current Labs:   Hospital Outpatient Visit on 11/07/2024   Component Date Value Ref Range Status    WBC 11/07/2024 7.2  4.3 - 11.1 K/uL Final    RBC 11/07/2024 4.36  4.23 - 5.6 M/uL Final    Hemoglobin 11/07/2024 13.0 (L)  13.6 - 17.2 g/dL Final    Hematocrit 11/07/2024 39.5 (L)  41.1 - 50.3 % Final    MCV 11/07/2024 90.6  82.0 - 102.0 FL Final    MCH 11/07/2024 29.8  26.1 - 32.9 PG Final    MCHC 11/07/2024 32.9  31.4 - 35.0 g/dL Final    RDW 11/07/2024 12.8  11.9 - 14.6 % Final    Platelets 11/07/2024 206  150 - 450 K/uL Final    MPV 11/07/2024 9.1 (L)  9.4 - 12.3 FL Final    nRBC 11/07/2024 0.00  0.0 - 0.2 K/uL Final    **Note: Absolute NRBC parameter is now reported with Hemogram**    Neutrophils % 11/07/2024 54  43 - 78 % Final    Lymphocytes % 11/07/2024 36  13 - 44 % Final    Monocytes % 11/07/2024 7  4.0 - 12.0 % Final    Eosinophils % 11/07/2024 2  0.5 - 7.8 % Final    Basophils % 11/07/2024 1  0.0 - 2.0 % Final    Immature Granulocytes % 11/07/2024 0  0.0 - 5.0 % Final    Neutrophils Absolute 11/07/2024 3.9  1.7 - 8.2 K/UL Final    Lymphocytes Absolute 11/07/2024 2.6  0.5 - 4.6 K/UL Final    Monocytes Absolute 11/07/2024 0.5  0.1 - 1.3 K/UL Final    Eosinophils Absolute 11/07/2024 0.1  0.0 - 0.8 K/UL Final    Basophils Absolute 11/07/2024 0.0  0.0 - 0.2 K/UL Final    Immature Granulocytes Absolute

## 2024-11-07 NOTE — PROGRESS NOTES
findings. He was also seen by neurology Dr. Ward. Headaches slowly resolved over time.   - 03/21: On a follow-up visit, noted 30 pound weight loss.  He was also found to have enlarged mass in left upper chest with diffusely palpable adenopathy in cervical area as well as bulky bilateral axillary adenopathy.  Per patient, swelling developed over a 2-week, and was nontender. Per patient reports being told that his lymphoma diagnosis was seperate from his 3rd cranial nerve palsy.  - CT neck/chest 3/22/2021 showed extensive adenopathy including left pectoralis lymph node mass measuring 7 x 3.7 cm.  Extensive bilateral axillary adenopathy measuring up to 6.8 x 11.4 cm.  Extensive mediastinal adenopathy noted, including right infrahilar region mass measuring 4.6 x 5.3 cm.  Enlarged right adrenal gland measuring 4.1 x 4 cm.  Extensive adenopathy in the gurwinder hepatis measuring 5 x 4.4 cm.  PET scan could not be performed due to insurance issues.  Patient seen by hematology Dr. Betts.  Core needle biopsy of left chest wall mass showed high-grade B-cell malignancy with FISH testing revealing rearrangement of BCL6/3q. and additional signals for BCL2/18 q.  Significantly no evidence of MYC rearrangement noted.  Bone marrow biopsy without lymphoma involvement.  2D echo with normal EF.  LDH elevated at 311.  Hepatitis B negative.  He was diagnosed with DLBCL, stage IIIb, IPI score 2.  - R-CHOP x6 (from 4/6/2021 till 7/28/2021.  PET scan 8/13/2021 with essentially CR and minimal FDG uptake in the right axillary lymph node with FDG 1.2.  -11/16/2021 CT CAP with disease relapse including new left supraclavicular mass measuring 5.5 x 3.4 cm.  Stable axillary and chest wall lymph nodes as well as shotty retroperitoneal lymph nodes.  Ultrasound-guided left supraclavicular lymph node biopsy 11/22/2021 showing monoclonal B-cell population with increased cell size, results consistent with mature B-cell lymphoproliferative disorder.

## 2024-11-18 LAB — CLONOSEQ: NORMAL

## 2025-05-08 ENCOUNTER — HOSPITAL ENCOUNTER (OUTPATIENT)
Dept: LAB | Age: 60
Discharge: HOME | End: 2025-05-08

## 2025-05-08 ENCOUNTER — HOSPITAL ENCOUNTER (OUTPATIENT)
Dept: PET IMAGING | Age: 60
Discharge: HOME | End: 2025-05-11

## 2025-05-08 DIAGNOSIS — C83.31 DIFFUSE LARGE B-CELL LYMPHOMA OF LYMPH NODES OF NECK (HCC): ICD-10-CM

## 2025-05-08 LAB
ALBUMIN SERPL-MCNC: 4.1 G/DL (ref 3.5–5)
ALBUMIN/GLOB SERPL: 1.2 (ref 1–1.9)
ALP SERPL-CCNC: 117 U/L (ref 40–129)
ALT SERPL-CCNC: 44 U/L (ref 8–55)
ANION GAP SERPL CALC-SCNC: 9 MMOL/L (ref 7–16)
AST SERPL-CCNC: 49 U/L (ref 15–37)
BASOPHILS # BLD: 0.04 K/UL (ref 0–0.2)
BASOPHILS NFR BLD: 0.6 % (ref 0–2)
BILIRUB SERPL-MCNC: 0.5 MG/DL (ref 0–1.2)
BUN SERPL-MCNC: 13 MG/DL (ref 6–23)
CALCIUM SERPL-MCNC: 9.7 MG/DL (ref 8.8–10.2)
CHLORIDE SERPL-SCNC: 102 MMOL/L (ref 98–107)
CO2 SERPL-SCNC: 29 MMOL/L (ref 20–29)
CREAT SERPL-MCNC: 1 MG/DL (ref 0.8–1.3)
DIFFERENTIAL METHOD BLD: ABNORMAL
EOSINOPHIL # BLD: 0.11 K/UL (ref 0–0.8)
EOSINOPHIL NFR BLD: 1.6 % (ref 0.5–7.8)
ERYTHROCYTE [DISTWIDTH] IN BLOOD BY AUTOMATED COUNT: 12.4 % (ref 11.9–14.6)
GLOBULIN SER CALC-MCNC: 3.4 G/DL (ref 2.3–3.5)
GLUCOSE BLD STRIP.AUTO-MCNC: 99 MG/DL (ref 65–100)
GLUCOSE SERPL-MCNC: 106 MG/DL (ref 70–99)
HCT VFR BLD AUTO: 40 % (ref 41.1–50.3)
HGB BLD-MCNC: 13.6 G/DL (ref 13.6–17.2)
IMM GRANULOCYTES # BLD AUTO: 0.02 K/UL (ref 0–0.5)
IMM GRANULOCYTES NFR BLD AUTO: 0.3 % (ref 0–5)
LDH SERPL L TO P-CCNC: 235 U/L (ref 127–281)
LYMPHOCYTES # BLD: 2.73 K/UL (ref 0.5–4.6)
LYMPHOCYTES NFR BLD: 39.3 % (ref 13–44)
MCH RBC QN AUTO: 30 PG (ref 26.1–32.9)
MCHC RBC AUTO-ENTMCNC: 34 G/DL (ref 31.4–35)
MCV RBC AUTO: 88.3 FL (ref 82–102)
MONOCYTES # BLD: 0.62 K/UL (ref 0.1–1.3)
MONOCYTES NFR BLD: 8.9 % (ref 4–12)
NEUTS SEG # BLD: 3.43 K/UL (ref 1.7–8.2)
NEUTS SEG NFR BLD: 49.3 % (ref 43–78)
NRBC # BLD: 0 K/UL (ref 0–0.2)
PLATELET # BLD AUTO: 215 K/UL (ref 150–450)
PMV BLD AUTO: 9 FL (ref 9.4–12.3)
POTASSIUM SERPL-SCNC: 4.6 MMOL/L (ref 3.5–5.1)
PROT SERPL-MCNC: 7.5 G/DL (ref 6.3–8.2)
RBC # BLD AUTO: 4.53 M/UL (ref 4.23–5.6)
SERVICE CMNT-IMP: NORMAL
SODIUM SERPL-SCNC: 140 MMOL/L (ref 136–145)
WBC # BLD AUTO: 7 K/UL (ref 4.3–11.1)

## 2025-05-08 PROCEDURE — 78815 PET IMAGE W/CT SKULL-THIGH: CPT

## 2025-05-08 PROCEDURE — 83615 LACTATE (LD) (LDH) ENZYME: CPT

## 2025-05-08 PROCEDURE — 80053 COMPREHEN METABOLIC PANEL: CPT

## 2025-05-08 PROCEDURE — 82962 GLUCOSE BLOOD TEST: CPT

## 2025-05-08 PROCEDURE — 2500000003 HC RX 250 WO HCPCS: Performed by: INTERNAL MEDICINE

## 2025-05-08 PROCEDURE — 36415 COLL VENOUS BLD VENIPUNCTURE: CPT

## 2025-05-08 PROCEDURE — 85025 COMPLETE CBC W/AUTO DIFF WBC: CPT

## 2025-05-08 PROCEDURE — A9609 HC RX DIAGNOSTIC RADIOPHARMACEUTICAL: HCPCS | Performed by: INTERNAL MEDICINE

## 2025-05-08 PROCEDURE — 3430000000 HC RX DIAGNOSTIC RADIOPHARMACEUTICAL: Performed by: INTERNAL MEDICINE

## 2025-05-08 PROCEDURE — 6360000004 HC RX CONTRAST MEDICATION: Performed by: INTERNAL MEDICINE

## 2025-05-08 RX ORDER — SODIUM CHLORIDE 0.9 % (FLUSH) 0.9 %
10 SYRINGE (ML) INJECTION AS NEEDED
Status: DISCONTINUED | OUTPATIENT
Start: 2025-05-08 | End: 2025-05-12 | Stop reason: HOSPADM

## 2025-05-08 RX ORDER — FLUDEOXYGLUCOSE F 18 200 MCI/ML
12.96 INJECTION, SOLUTION INTRAVENOUS
Status: COMPLETED | OUTPATIENT
Start: 2025-05-08 | End: 2025-05-08

## 2025-05-08 RX ORDER — DIATRIZOATE MEGLUMINE AND DIATRIZOATE SODIUM 660; 100 MG/ML; MG/ML
10 SOLUTION ORAL; RECTAL
Status: DISCONTINUED | OUTPATIENT
Start: 2025-05-08 | End: 2025-05-12 | Stop reason: HOSPADM

## 2025-05-08 RX ADMIN — SODIUM CHLORIDE, PRESERVATIVE FREE 10 ML: 5 INJECTION INTRAVENOUS at 10:22

## 2025-05-08 RX ADMIN — FLUDEOXYGLUCOSE F 18 12.96 MILLICURIE: 200 INJECTION, SOLUTION INTRAVENOUS at 10:22

## 2025-05-08 RX ADMIN — DIATRIZOATE MEGLUMINE AND DIATRIZOATE SODIUM 10 ML: 660; 100 LIQUID ORAL; RECTAL at 10:22

## 2025-05-15 ENCOUNTER — OFFICE VISIT (OUTPATIENT)
Dept: ONCOLOGY | Age: 60
End: 2025-05-15

## 2025-05-15 VITALS
RESPIRATION RATE: 16 BRPM | TEMPERATURE: 99 F | DIASTOLIC BLOOD PRESSURE: 66 MMHG | OXYGEN SATURATION: 95 % | HEART RATE: 71 BPM | BODY MASS INDEX: 29.66 KG/M2 | SYSTOLIC BLOOD PRESSURE: 131 MMHG | WEIGHT: 219 LBS | HEIGHT: 72 IN

## 2025-05-15 DIAGNOSIS — C83.31 DIFFUSE LARGE B-CELL LYMPHOMA OF LYMPH NODES OF NECK (HCC): Primary | ICD-10-CM

## 2025-05-15 DIAGNOSIS — D84.9 IMMUNOCOMPROMISED: ICD-10-CM

## 2025-05-15 ASSESSMENT — PATIENT HEALTH QUESTIONNAIRE - PHQ9
2. FEELING DOWN, DEPRESSED OR HOPELESS: NOT AT ALL
SUM OF ALL RESPONSES TO PHQ QUESTIONS 1-9: 0
1. LITTLE INTEREST OR PLEASURE IN DOING THINGS: NOT AT ALL
SUM OF ALL RESPONSES TO PHQ QUESTIONS 1-9: 0

## 2025-05-15 NOTE — PROGRESS NOTES
follow-up 2.5 years from his transplant.  Continues to do well, gaining weight, denies any B symptoms, new lumps or bumps.  Deciding on timing of port removal as wife recently had stroke.  PET scan pending and will be followed.  Peripheral blood ClonoSeq MRD also pending.  If unremarkable, we will see him back in 6 months for his 3-year posttransplant evaluation.  Reduce frequency of scans thereafter    5/15/2025: Patient seen for his history of DLBCL requiring autologous stem cell transplant, now 3 years out from transplant.  Continues to do well, denies B symptoms, new lumps or bumps.  Recent blood work unremarkable including LDH.  Recent PET scan with CR.  Last MRD negative.  Given multiple years out from transplant, will discharge from office.  He understands to follow regularly with Dr. Betts.  Would be happy to see him again should need arise.        1. 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 Mitral valve regurge, mod-severe flail anterior leaflet.     PLAN:  - As above. S/p R ICE x 3 and IT MTX x 4 w CR. S/p BEAM/ASCT 4/22. In CR.    - Neurology has evaluated pt for 3rd nerve palsy, not felt to be lymphoma related  - Mitral valve flail ant leaflet: monitored per cardiology.   - Completed posttransplant vaccinations 5/24    Patient discharged from clinic. He understands we will not be making any follow up appointments but would be happy to see him again in future should the need arise. Advised continued follow up w PCP & Dr Betts for health needs.       Kelle Ramirez MD  [facility]  Hematology Oncology  [address] [place] [address]  Office : [phone]  Fax : [phone]

## 2025-05-15 NOTE — PATIENT INSTRUCTIONS
Patient Information from Today's Visit    The members of your [facility] are listed below:    Physician Provider: Dr. Kelle Ramirez   Advanced Practice Clinician: Diana Arias   Registered Nurse: Pema SMALL   Nurse Navigator:   Medical Assistant: Maggie SMALL   : Diana \"April\" I.   Supportive Care Services: BRANT Mcadams    Diagnosis (Information Sheet Provided on Day of Diagnosis): DLBCL    Follow Up Instructions:   - labs/PET reviewed  - you are staying in remission!  - we will discharge you from our office, continue following with Dr. Betts    Has Treatment Plan Been Finalized? N/A     Current Labs:   Hospital Outpatient Visit on 05/08/2025   Component Date Value Ref Range Status    POC Glucose 05/08/2025 99  65 - 100 mg/dL Final    Comment: 47 - 60 mg/dl Consistent with, but not fully diagnostic of hypoglycemia.  101 - 125 mg/dl Impaired fasting glucose/consistent with pre-diabetes mellitus  > 126 mg/dl Fasting glucose consistent with overt diabetes mellitus      Performed by: 05/08/2025 Brown (Shannon)   Final   Hospital Outpatient Visit on 05/08/2025   Component Date Value Ref Range Status    WBC 05/08/2025 7.0  4.3 - 11.1 K/uL Final    RBC 05/08/2025 4.53  4.23 - 5.6 M/uL Final    Hemoglobin 05/08/2025 13.6  13.6 - 17.2 g/dL Final    Hematocrit 05/08/2025 40.0 (L)  41.1 - 50.3 % Final    MCV 05/08/2025 88.3  82.0 - 102.0 FL Final    MCH 05/08/2025 30.0  26.1 - 32.9 PG Final    MCHC 05/08/2025 34.0  31.4 - 35.0 g/dL Final    RDW 05/08/2025 12.4  11.9 - 14.6 % Final    Platelets 05/08/2025 215  150 - 450 K/uL Final    MPV 05/08/2025 9.0 (L)  9.4 - 12.3 FL Final    nRBC 05/08/2025 0.00  0.0 - 0.2 K/uL Final    **Note: Absolute NRBC parameter is now reported with Hemogram**    Neutrophils % 05/08/2025 49.3  43.0 - 78.0 % Final    Lymphocytes % 05/08/2025 39.3  13.0 - 44.0 % Final    Monocytes % 05/08/2025 8.9  4.0 - 12.0 % Final    Eosinophils % 05/08/2025 1.6  0.5 - 7.8 % Final

## 2025-05-21 LAB — CLONOSEQ: NORMAL
